# Patient Record
Sex: FEMALE | Race: WHITE | NOT HISPANIC OR LATINO | Employment: UNEMPLOYED | URBAN - NONMETROPOLITAN AREA
[De-identification: names, ages, dates, MRNs, and addresses within clinical notes are randomized per-mention and may not be internally consistent; named-entity substitution may affect disease eponyms.]

---

## 2021-08-02 DIAGNOSIS — Z12.31 OTHER SCREENING MAMMOGRAM: Primary | ICD-10-CM

## 2021-08-12 ENCOUNTER — HOSPITAL ENCOUNTER (OUTPATIENT)
Dept: RADIOLOGY | Facility: HOSPITAL | Age: 56
Discharge: HOME | End: 2021-08-12
Attending: NURSE PRACTITIONER
Payer: MEDICAID

## 2021-08-12 VITALS — WEIGHT: 215 LBS | HEIGHT: 68 IN | BODY MASS INDEX: 32.58 KG/M2

## 2021-08-12 DIAGNOSIS — Z12.31 OTHER SCREENING MAMMOGRAM: ICD-10-CM

## 2021-08-12 PROCEDURE — 77067 SCR MAMMO BI INCL CAD: CPT | Mod: TC

## 2021-08-16 DIAGNOSIS — K70.30 ALCOHOLIC CIRRHOSIS OF LIVER: Primary | ICD-10-CM

## 2021-08-16 DIAGNOSIS — R92.8 ABNORMAL MAMMOGRAM: Primary | ICD-10-CM

## 2021-08-18 ENCOUNTER — HOSPITAL ENCOUNTER (OUTPATIENT)
Dept: RADIOLOGY | Facility: HOSPITAL | Age: 56
Discharge: HOME | End: 2021-08-18
Attending: NURSE PRACTITIONER
Payer: MEDICAID

## 2021-08-18 DIAGNOSIS — R92.8 ABNORMAL MAMMOGRAM: ICD-10-CM

## 2021-08-18 DIAGNOSIS — K70.30 ALCOHOLIC CIRRHOSIS OF LIVER: ICD-10-CM

## 2021-08-18 PROCEDURE — 76705 ECHO EXAM OF ABDOMEN: CPT | Mod: TC

## 2021-08-18 PROCEDURE — 77066 DX MAMMO INCL CAD BI: CPT | Mod: TC

## 2021-09-20 DIAGNOSIS — F17.210 NICOTINE DEPENDENCE, CIGARETTES, UNCOMPLICATED: ICD-10-CM

## 2021-09-20 LAB
HPV APTIMA: NEGATIVE
PAP RECOMMENDATION EXT: NORMAL

## 2021-09-23 ENCOUNTER — HOSPITAL ENCOUNTER (OUTPATIENT)
Dept: RADIOLOGY | Facility: HOSPITAL | Age: 56
Discharge: HOME | End: 2021-09-23
Attending: NURSE PRACTITIONER
Payer: MEDICAID

## 2021-09-23 DIAGNOSIS — F17.210 NICOTINE DEPENDENCE, CIGARETTES, UNCOMPLICATED: ICD-10-CM

## 2021-09-23 PROCEDURE — 71271 CT THORAX LUNG CANCER SCR C-: CPT | Mod: TC

## 2021-10-15 PROBLEM — K70.30 ALCOHOLIC CIRRHOSIS OF LIVER WITHOUT ASCITES: Status: ACTIVE | Noted: 2021-10-15

## 2021-10-22 PROBLEM — K80.20 CALCULUS OF GALLBLADDER WITHOUT CHOLECYSTITIS WITHOUT OBSTRUCTION: Status: ACTIVE | Noted: 2021-10-22

## 2021-10-22 PROBLEM — Z95.828 S/P TIPS (TRANSJUGULAR INTRAHEPATIC PORTOSYSTEMIC SHUNT): Status: ACTIVE | Noted: 2021-10-22

## 2021-10-22 PROBLEM — I85.00 ESOPHAGEAL VARICES WITHOUT BLEEDING: Status: ACTIVE | Noted: 2021-10-22

## 2021-10-22 PROBLEM — D69.6 THROMBOCYTOPENIA: Status: ACTIVE | Noted: 2021-10-22

## 2021-10-28 ENCOUNTER — OFFICE VISIT (OUTPATIENT)
Dept: SURGERY | Facility: CLINIC | Age: 56
End: 2021-10-28
Payer: MEDICAID

## 2021-10-28 VITALS
HEIGHT: 68 IN | DIASTOLIC BLOOD PRESSURE: 67 MMHG | SYSTOLIC BLOOD PRESSURE: 144 MMHG | OXYGEN SATURATION: 95 % | BODY MASS INDEX: 32.01 KG/M2 | WEIGHT: 211.19 LBS | HEART RATE: 86 BPM

## 2021-10-28 DIAGNOSIS — G89.29 CHRONIC BILATERAL UPPER ABDOMINAL PAIN: ICD-10-CM

## 2021-10-28 DIAGNOSIS — R10.12 CHRONIC BILATERAL UPPER ABDOMINAL PAIN: ICD-10-CM

## 2021-10-28 DIAGNOSIS — R10.11 CHRONIC BILATERAL UPPER ABDOMINAL PAIN: ICD-10-CM

## 2021-10-28 DIAGNOSIS — K80.20 CALCULUS OF GALLBLADDER WITHOUT CHOLECYSTITIS WITHOUT OBSTRUCTION: Primary | ICD-10-CM

## 2021-10-28 PROCEDURE — 99202 PR OFFICE/OUTPT VISIT, NEW, LEVL II, 15-29 MIN: ICD-10-PCS | Mod: S$PBB,,, | Performed by: STUDENT IN AN ORGANIZED HEALTH CARE EDUCATION/TRAINING PROGRAM

## 2021-10-28 PROCEDURE — 99999 PR PBB SHADOW E&M-EST. PATIENT-LVL IV: ICD-10-PCS | Mod: PBBFAC,,, | Performed by: STUDENT IN AN ORGANIZED HEALTH CARE EDUCATION/TRAINING PROGRAM

## 2021-10-28 PROCEDURE — 99202 OFFICE O/P NEW SF 15 MIN: CPT | Mod: S$PBB,,, | Performed by: STUDENT IN AN ORGANIZED HEALTH CARE EDUCATION/TRAINING PROGRAM

## 2021-10-28 PROCEDURE — 99999 PR PBB SHADOW E&M-EST. PATIENT-LVL IV: CPT | Mod: PBBFAC,,, | Performed by: STUDENT IN AN ORGANIZED HEALTH CARE EDUCATION/TRAINING PROGRAM

## 2021-10-28 PROCEDURE — 99214 OFFICE O/P EST MOD 30 MIN: CPT | Mod: PBBFAC,PN | Performed by: STUDENT IN AN ORGANIZED HEALTH CARE EDUCATION/TRAINING PROGRAM

## 2021-10-28 RX ORDER — THIAMINE HCL 250 MG
250 TABLET ORAL DAILY
COMMUNITY
End: 2022-04-20 | Stop reason: SDUPTHER

## 2021-10-28 RX ORDER — TIOTROPIUM BROMIDE AND OLODATEROL 3.124; 2.736 UG/1; UG/1
SPRAY, METERED RESPIRATORY (INHALATION)
COMMUNITY
End: 2022-04-20 | Stop reason: SDUPTHER

## 2021-10-28 RX ORDER — ROPINIROLE 0.25 MG/1
0.5 TABLET, FILM COATED ORAL DAILY
COMMUNITY
End: 2022-04-20 | Stop reason: SDUPTHER

## 2021-10-28 RX ORDER — NITROFURANTOIN 25; 75 MG/1; MG/1
100 CAPSULE ORAL DAILY
COMMUNITY
End: 2022-04-20 | Stop reason: ALTCHOICE

## 2021-10-28 RX ORDER — FESOTERODINE FUMARATE 4 MG/1
4 TABLET, FILM COATED, EXTENDED RELEASE ORAL DAILY
COMMUNITY
End: 2022-04-20 | Stop reason: SDUPTHER

## 2021-11-18 ENCOUNTER — OFFICE VISIT (OUTPATIENT)
Dept: SURGERY | Facility: CLINIC | Age: 56
End: 2021-11-18
Payer: MEDICAID

## 2021-11-18 VITALS
WEIGHT: 231.13 LBS | SYSTOLIC BLOOD PRESSURE: 147 MMHG | HEIGHT: 68 IN | BODY MASS INDEX: 35.03 KG/M2 | DIASTOLIC BLOOD PRESSURE: 67 MMHG | OXYGEN SATURATION: 97 % | HEART RATE: 87 BPM

## 2021-11-18 DIAGNOSIS — R10.12 CHRONIC BILATERAL UPPER ABDOMINAL PAIN: ICD-10-CM

## 2021-11-18 DIAGNOSIS — R10.11 CHRONIC BILATERAL UPPER ABDOMINAL PAIN: ICD-10-CM

## 2021-11-18 DIAGNOSIS — G89.29 CHRONIC BILATERAL UPPER ABDOMINAL PAIN: ICD-10-CM

## 2021-11-18 DIAGNOSIS — K80.20 CALCULUS OF GALLBLADDER WITHOUT CHOLECYSTITIS WITHOUT OBSTRUCTION: Primary | ICD-10-CM

## 2021-11-18 PROCEDURE — 99213 OFFICE O/P EST LOW 20 MIN: CPT | Mod: PBBFAC | Performed by: STUDENT IN AN ORGANIZED HEALTH CARE EDUCATION/TRAINING PROGRAM

## 2021-11-18 PROCEDURE — 99999 PR PBB SHADOW E&M-EST. PATIENT-LVL III: ICD-10-PCS | Mod: PBBFAC,,, | Performed by: STUDENT IN AN ORGANIZED HEALTH CARE EDUCATION/TRAINING PROGRAM

## 2021-11-18 PROCEDURE — 99212 PR OFFICE/OUTPT VISIT, EST, LEVL II, 10-19 MIN: ICD-10-PCS | Mod: S$PBB,,, | Performed by: STUDENT IN AN ORGANIZED HEALTH CARE EDUCATION/TRAINING PROGRAM

## 2021-11-18 PROCEDURE — 99999 PR PBB SHADOW E&M-EST. PATIENT-LVL III: CPT | Mod: PBBFAC,,, | Performed by: STUDENT IN AN ORGANIZED HEALTH CARE EDUCATION/TRAINING PROGRAM

## 2021-11-18 PROCEDURE — 99212 OFFICE O/P EST SF 10 MIN: CPT | Mod: S$PBB,,, | Performed by: STUDENT IN AN ORGANIZED HEALTH CARE EDUCATION/TRAINING PROGRAM

## 2021-12-01 ENCOUNTER — HOSPITAL ENCOUNTER (OUTPATIENT)
Dept: RADIOLOGY | Facility: HOSPITAL | Age: 56
Discharge: HOME | End: 2021-12-01
Attending: STUDENT IN AN ORGANIZED HEALTH CARE EDUCATION/TRAINING PROGRAM
Payer: MEDICAID

## 2021-12-01 DIAGNOSIS — K80.20 CALCULUS OF GALLBLADDER WITHOUT CHOLECYSTITIS WITHOUT OBSTRUCTION: ICD-10-CM

## 2021-12-01 DIAGNOSIS — R10.12 CHRONIC BILATERAL UPPER ABDOMINAL PAIN: ICD-10-CM

## 2021-12-01 DIAGNOSIS — R10.11 CHRONIC BILATERAL UPPER ABDOMINAL PAIN: ICD-10-CM

## 2021-12-01 DIAGNOSIS — G89.29 CHRONIC BILATERAL UPPER ABDOMINAL PAIN: ICD-10-CM

## 2021-12-01 PROCEDURE — 78227 HEPATOBIL SYST IMAGE W/DRUG: CPT | Mod: TC

## 2021-12-01 PROCEDURE — 63600175 PHARM REV CODE 636 W HCPCS: Performed by: STUDENT IN AN ORGANIZED HEALTH CARE EDUCATION/TRAINING PROGRAM

## 2021-12-01 RX ORDER — SINCALIDE 5 UG/5ML
0.02 INJECTION, POWDER, LYOPHILIZED, FOR SOLUTION INTRAVENOUS ONCE
Status: COMPLETED | OUTPATIENT
Start: 2021-12-01 | End: 2021-12-01

## 2021-12-01 RX ADMIN — SINCALIDE 2 MCG: 5 INJECTION, POWDER, LYOPHILIZED, FOR SOLUTION INTRAVENOUS at 11:12

## 2021-12-16 ENCOUNTER — HOSPITAL ENCOUNTER (EMERGENCY)
Facility: HOSPITAL | Age: 56
Discharge: HOME | End: 2021-12-16
Attending: EMERGENCY MEDICINE
Payer: MEDICAID

## 2021-12-16 ENCOUNTER — OFFICE VISIT (OUTPATIENT)
Dept: SURGERY | Facility: CLINIC | Age: 56
End: 2021-12-16
Payer: MEDICAID

## 2021-12-16 ENCOUNTER — HOSPITAL ENCOUNTER (OUTPATIENT)
Dept: RADIOLOGY | Facility: HOSPITAL | Age: 56
Discharge: HOME | End: 2021-12-16
Attending: STUDENT IN AN ORGANIZED HEALTH CARE EDUCATION/TRAINING PROGRAM
Payer: MEDICAID

## 2021-12-16 VITALS
DIASTOLIC BLOOD PRESSURE: 71 MMHG | HEART RATE: 80 BPM | WEIGHT: 243 LBS | BODY MASS INDEX: 36.95 KG/M2 | SYSTOLIC BLOOD PRESSURE: 187 MMHG | RESPIRATION RATE: 18 BRPM | OXYGEN SATURATION: 99 % | TEMPERATURE: 98 F

## 2021-12-16 VITALS
OXYGEN SATURATION: 96 % | HEIGHT: 68 IN | DIASTOLIC BLOOD PRESSURE: 72 MMHG | BODY MASS INDEX: 36.58 KG/M2 | SYSTOLIC BLOOD PRESSURE: 139 MMHG | HEART RATE: 86 BPM | WEIGHT: 241.38 LBS

## 2021-12-16 DIAGNOSIS — R06.02 SOB (SHORTNESS OF BREATH): ICD-10-CM

## 2021-12-16 DIAGNOSIS — K70.30 ALCOHOLIC CIRRHOSIS OF LIVER WITHOUT ASCITES: ICD-10-CM

## 2021-12-16 DIAGNOSIS — K70.30 ALCOHOLIC CIRRHOSIS OF LIVER WITHOUT ASCITES: Primary | ICD-10-CM

## 2021-12-16 DIAGNOSIS — R06.00 DYSPNEA, UNSPECIFIED TYPE: Primary | ICD-10-CM

## 2021-12-16 LAB
ALBUMIN SERPL BCP-MCNC: 2.6 G/DL (ref 3.5–5.2)
ALP SERPL-CCNC: 198 U/L (ref 55–135)
ALT SERPL W/O P-5'-P-CCNC: 39 U/L (ref 10–44)
ANION GAP SERPL CALC-SCNC: 6 MMOL/L (ref 8–16)
AST SERPL-CCNC: 73 U/L (ref 10–40)
BASOPHILS # BLD AUTO: 0.05 K/UL (ref 0–0.2)
BASOPHILS NFR BLD: 1 % (ref 0–1.9)
BILIRUB SERPL-MCNC: 1.9 MG/DL (ref 0.1–1)
BUN SERPL-MCNC: 8 MG/DL (ref 6–20)
CALCIUM SERPL-MCNC: 8.8 MG/DL (ref 8.7–10.5)
CHLORIDE SERPL-SCNC: 109 MMOL/L (ref 95–110)
CO2 SERPL-SCNC: 28 MMOL/L (ref 23–29)
CREAT SERPL-MCNC: 0.8 MG/DL (ref 0.5–1.4)
DIFFERENTIAL METHOD: ABNORMAL
EOSINOPHIL # BLD AUTO: 0.2 K/UL (ref 0–0.5)
EOSINOPHIL NFR BLD: 3.8 % (ref 0–8)
ERYTHROCYTE [DISTWIDTH] IN BLOOD BY AUTOMATED COUNT: 13.9 % (ref 11.5–14.5)
EST. GFR  (AFRICAN AMERICAN): >60 ML/MIN/1.73 M^2
EST. GFR  (NON AFRICAN AMERICAN): >60 ML/MIN/1.73 M^2
GLUCOSE SERPL-MCNC: 99 MG/DL (ref 70–110)
HCT VFR BLD AUTO: 39.9 % (ref 37–48.5)
HGB BLD-MCNC: 13.4 G/DL (ref 12–16)
IMM GRANULOCYTES # BLD AUTO: 0.02 K/UL (ref 0–0.04)
IMM GRANULOCYTES NFR BLD AUTO: 0.4 % (ref 0–0.5)
LYMPHOCYTES # BLD AUTO: 1.8 K/UL (ref 1–4.8)
LYMPHOCYTES NFR BLD: 33.8 % (ref 18–48)
MCH RBC QN AUTO: 31.2 PG (ref 27–31)
MCHC RBC AUTO-ENTMCNC: 33.6 G/DL (ref 32–36)
MCV RBC AUTO: 93 FL (ref 82–98)
MONOCYTES # BLD AUTO: 0.6 K/UL (ref 0.3–1)
MONOCYTES NFR BLD: 10.6 % (ref 4–15)
NEUTROPHILS # BLD AUTO: 2.7 K/UL (ref 1.8–7.7)
NEUTROPHILS NFR BLD: 50.4 % (ref 38–73)
NRBC BLD-RTO: 0 /100 WBC
NT-PROBNP SERPL-MCNC: 121 PG/ML (ref 5–900)
PLATELET # BLD AUTO: 111 K/UL (ref 150–450)
PMV BLD AUTO: 11 FL (ref 9.2–12.9)
POTASSIUM SERPL-SCNC: 3.7 MMOL/L (ref 3.5–5.1)
PROT SERPL-MCNC: 6.7 G/DL (ref 6–8.4)
RBC # BLD AUTO: 4.29 M/UL (ref 4–5.4)
SODIUM SERPL-SCNC: 143 MMOL/L (ref 136–145)
WBC # BLD AUTO: 5.26 K/UL (ref 3.9–12.7)

## 2021-12-16 PROCEDURE — 80053 COMPREHEN METABOLIC PANEL: CPT | Performed by: EMERGENCY MEDICINE

## 2021-12-16 PROCEDURE — 36415 COLL VENOUS BLD VENIPUNCTURE: CPT | Performed by: EMERGENCY MEDICINE

## 2021-12-16 PROCEDURE — 93005 ELECTROCARDIOGRAM TRACING: CPT

## 2021-12-16 PROCEDURE — 99213 PR OFFICE/OUTPT VISIT, EST, LEVL III, 20-29 MIN: ICD-10-PCS | Mod: S$PBB,,, | Performed by: STUDENT IN AN ORGANIZED HEALTH CARE EDUCATION/TRAINING PROGRAM

## 2021-12-16 PROCEDURE — 99999 PR PBB SHADOW E&M-EST. PATIENT-LVL III: ICD-10-PCS | Mod: PBBFAC,,, | Performed by: STUDENT IN AN ORGANIZED HEALTH CARE EDUCATION/TRAINING PROGRAM

## 2021-12-16 PROCEDURE — 99213 OFFICE O/P EST LOW 20 MIN: CPT | Mod: S$PBB,,, | Performed by: STUDENT IN AN ORGANIZED HEALTH CARE EDUCATION/TRAINING PROGRAM

## 2021-12-16 PROCEDURE — 99213 OFFICE O/P EST LOW 20 MIN: CPT | Mod: PBBFAC,25 | Performed by: STUDENT IN AN ORGANIZED HEALTH CARE EDUCATION/TRAINING PROGRAM

## 2021-12-16 PROCEDURE — 93010 EKG 12-LEAD: ICD-10-PCS | Mod: ,,, | Performed by: INTERNAL MEDICINE

## 2021-12-16 PROCEDURE — 85025 COMPLETE CBC W/AUTO DIFF WBC: CPT | Performed by: EMERGENCY MEDICINE

## 2021-12-16 PROCEDURE — 83880 ASSAY OF NATRIURETIC PEPTIDE: CPT | Performed by: EMERGENCY MEDICINE

## 2021-12-16 PROCEDURE — 99284 EMERGENCY DEPT VISIT MOD MDM: CPT | Mod: 25,27

## 2021-12-16 PROCEDURE — 99999 PR PBB SHADOW E&M-EST. PATIENT-LVL III: CPT | Mod: PBBFAC,,, | Performed by: STUDENT IN AN ORGANIZED HEALTH CARE EDUCATION/TRAINING PROGRAM

## 2021-12-16 PROCEDURE — 71045 X-RAY EXAM CHEST 1 VIEW: CPT | Mod: TC

## 2021-12-16 PROCEDURE — 93010 ELECTROCARDIOGRAM REPORT: CPT | Mod: ,,, | Performed by: INTERNAL MEDICINE

## 2021-12-16 RX ORDER — DICYCLOMINE HYDROCHLORIDE 10 MG/1
10 CAPSULE ORAL
Qty: 120 CAPSULE | Refills: 0 | Status: SHIPPED | OUTPATIENT
Start: 2021-12-16 | End: 2022-01-18 | Stop reason: SDUPTHER

## 2021-12-16 RX ORDER — CHOLESTYRAMINE 4 G/9G
4 POWDER, FOR SUSPENSION ORAL 3 TIMES DAILY
Qty: 348 G | Refills: 3 | Status: SHIPPED | OUTPATIENT
Start: 2021-12-16 | End: 2022-07-26

## 2022-01-18 ENCOUNTER — TELEPHONE (OUTPATIENT)
Dept: SURGERY | Facility: CLINIC | Age: 57
End: 2022-01-18
Payer: MEDICAID

## 2022-01-18 RX ORDER — DICYCLOMINE HYDROCHLORIDE 10 MG/1
10 CAPSULE ORAL
Qty: 120 CAPSULE | Refills: 3 | Status: SHIPPED | OUTPATIENT
Start: 2022-01-18 | End: 2022-04-20

## 2022-02-07 ENCOUNTER — OFFICE VISIT (OUTPATIENT)
Dept: SURGERY | Facility: CLINIC | Age: 57
End: 2022-02-07
Payer: MEDICAID

## 2022-02-07 VITALS
OXYGEN SATURATION: 96 % | DIASTOLIC BLOOD PRESSURE: 70 MMHG | SYSTOLIC BLOOD PRESSURE: 149 MMHG | HEIGHT: 68 IN | WEIGHT: 238.19 LBS | BODY MASS INDEX: 36.1 KG/M2 | HEART RATE: 83 BPM

## 2022-02-07 DIAGNOSIS — I87.2 STASIS DERMATITIS WITHOUT VARICOSITIES: ICD-10-CM

## 2022-02-07 DIAGNOSIS — K70.30 ALCOHOLIC CIRRHOSIS OF LIVER WITHOUT ASCITES: Primary | ICD-10-CM

## 2022-02-07 PROCEDURE — 3078F DIAST BP <80 MM HG: CPT | Mod: CPTII,,, | Performed by: STUDENT IN AN ORGANIZED HEALTH CARE EDUCATION/TRAINING PROGRAM

## 2022-02-07 PROCEDURE — 3078F PR MOST RECENT DIASTOLIC BLOOD PRESSURE < 80 MM HG: ICD-10-PCS | Mod: CPTII,,, | Performed by: STUDENT IN AN ORGANIZED HEALTH CARE EDUCATION/TRAINING PROGRAM

## 2022-02-07 PROCEDURE — 99212 PR OFFICE/OUTPT VISIT, EST, LEVL II, 10-19 MIN: ICD-10-PCS | Mod: S$PBB,,, | Performed by: STUDENT IN AN ORGANIZED HEALTH CARE EDUCATION/TRAINING PROGRAM

## 2022-02-07 PROCEDURE — 3077F SYST BP >= 140 MM HG: CPT | Mod: CPTII,,, | Performed by: STUDENT IN AN ORGANIZED HEALTH CARE EDUCATION/TRAINING PROGRAM

## 2022-02-07 PROCEDURE — 3008F PR BODY MASS INDEX (BMI) DOCUMENTED: ICD-10-PCS | Mod: CPTII,,, | Performed by: STUDENT IN AN ORGANIZED HEALTH CARE EDUCATION/TRAINING PROGRAM

## 2022-02-07 PROCEDURE — 99213 OFFICE O/P EST LOW 20 MIN: CPT | Mod: PBBFAC | Performed by: STUDENT IN AN ORGANIZED HEALTH CARE EDUCATION/TRAINING PROGRAM

## 2022-02-07 PROCEDURE — 1159F PR MEDICATION LIST DOCUMENTED IN MEDICAL RECORD: ICD-10-PCS | Mod: CPTII,,, | Performed by: STUDENT IN AN ORGANIZED HEALTH CARE EDUCATION/TRAINING PROGRAM

## 2022-02-07 PROCEDURE — 99212 OFFICE O/P EST SF 10 MIN: CPT | Mod: S$PBB,,, | Performed by: STUDENT IN AN ORGANIZED HEALTH CARE EDUCATION/TRAINING PROGRAM

## 2022-02-07 PROCEDURE — 99999 PR PBB SHADOW E&M-EST. PATIENT-LVL III: CPT | Mod: PBBFAC,,, | Performed by: STUDENT IN AN ORGANIZED HEALTH CARE EDUCATION/TRAINING PROGRAM

## 2022-02-07 PROCEDURE — 3077F PR MOST RECENT SYSTOLIC BLOOD PRESSURE >= 140 MM HG: ICD-10-PCS | Mod: CPTII,,, | Performed by: STUDENT IN AN ORGANIZED HEALTH CARE EDUCATION/TRAINING PROGRAM

## 2022-02-07 PROCEDURE — 3008F BODY MASS INDEX DOCD: CPT | Mod: CPTII,,, | Performed by: STUDENT IN AN ORGANIZED HEALTH CARE EDUCATION/TRAINING PROGRAM

## 2022-02-07 PROCEDURE — 1159F MED LIST DOCD IN RCRD: CPT | Mod: CPTII,,, | Performed by: STUDENT IN AN ORGANIZED HEALTH CARE EDUCATION/TRAINING PROGRAM

## 2022-02-07 PROCEDURE — 99999 PR PBB SHADOW E&M-EST. PATIENT-LVL III: ICD-10-PCS | Mod: PBBFAC,,, | Performed by: STUDENT IN AN ORGANIZED HEALTH CARE EDUCATION/TRAINING PROGRAM

## 2022-02-10 PROBLEM — I87.2 STASIS DERMATITIS WITHOUT VARICOSITIES: Status: ACTIVE | Noted: 2022-02-10

## 2022-02-10 NOTE — PROGRESS NOTES
[facility]  General Surgery Clinic Progress Note    HPI:  Shonda Borrego is a 56 y.o. female with history of cholelithiasis and EtOH cirrhosis who presents with new concern of progressive discoloration of dorsum of feet.  Does not complain of pain or difficulty walking.  Lower extremity swelling has improved with alterations in medication.  Denies CP, SOB, abdominal pain, nausea, vomiting, fevers, or chills.       ROS:  Negative except above    PE:  Vitals:    02/07/22 0915   BP: (!) 149/70   Pulse: 83       Gen: Alert and oriented x3, judgement intact, NAD  CV: RRR  Resp: CTAB; breaths non-labored and symmetrical  Abd: Soft, NT, ND, BS+  Extremities: +1 pitting edema to bilateral LE;  Brown speckled discoloration to dorsum of bilateral feet;  Feet warm to touch.  +DP, PT pulses detected bilaterally;  No signs of tissue loss or ulceration     A/P:  56 y.o. female with history of cholelithiasis and EtOH cirrhosis who presents with concerns over feet discoloration  1.  Feet discoloration  -No acute surgical concern; pt with palpable pulses and no signs of tissue loss  -Discoloration likely due to chronic LE swelling and early stasis dermatitis   -Leg elevation and compression socks encouraged     2.  Cholelithiasis  -Abdominal pain with PO intake has improved and patient still in agreement with deferring surgical intervention     RTC PRRICK Juarez MD  General Surgery   [phone]  [phone]        2/10/2022  10:45 AM

## 2022-03-18 ENCOUNTER — OFFICE VISIT (OUTPATIENT)
Dept: PRIMARY CARE CLINIC | Facility: CLINIC | Age: 57
End: 2022-03-18
Payer: MEDICAID

## 2022-03-18 VITALS
SYSTOLIC BLOOD PRESSURE: 131 MMHG | OXYGEN SATURATION: 97 % | WEIGHT: 230.06 LBS | HEART RATE: 80 BPM | BODY MASS INDEX: 34.87 KG/M2 | DIASTOLIC BLOOD PRESSURE: 64 MMHG | HEIGHT: 68 IN | TEMPERATURE: 99 F

## 2022-03-18 DIAGNOSIS — K70.30 ALCOHOLIC CIRRHOSIS OF LIVER WITHOUT ASCITES: ICD-10-CM

## 2022-03-18 DIAGNOSIS — I73.9 CLAUDICATION OF BOTH LOWER EXTREMITIES: Primary | ICD-10-CM

## 2022-03-18 DIAGNOSIS — F17.218 CIGARETTE NICOTINE DEPENDENCE WITH OTHER NICOTINE-INDUCED DISORDER: ICD-10-CM

## 2022-03-18 DIAGNOSIS — F41.9 ANXIETY: ICD-10-CM

## 2022-03-18 PROBLEM — F17.210 DEPENDENCE ON NICOTINE FROM CIGARETTES: Status: ACTIVE | Noted: 2022-03-18

## 2022-03-18 PROCEDURE — 99999 PR PBB SHADOW E&M-EST. PATIENT-LVL IV: CPT | Mod: PBBFAC,,, | Performed by: STUDENT IN AN ORGANIZED HEALTH CARE EDUCATION/TRAINING PROGRAM

## 2022-03-18 PROCEDURE — 1159F PR MEDICATION LIST DOCUMENTED IN MEDICAL RECORD: ICD-10-PCS | Mod: CPTII,,, | Performed by: STUDENT IN AN ORGANIZED HEALTH CARE EDUCATION/TRAINING PROGRAM

## 2022-03-18 PROCEDURE — 99204 PR OFFICE/OUTPT VISIT, NEW, LEVL IV, 45-59 MIN: ICD-10-PCS | Mod: S$PBB,,, | Performed by: STUDENT IN AN ORGANIZED HEALTH CARE EDUCATION/TRAINING PROGRAM

## 2022-03-18 PROCEDURE — 3078F PR MOST RECENT DIASTOLIC BLOOD PRESSURE < 80 MM HG: ICD-10-PCS | Mod: CPTII,,, | Performed by: STUDENT IN AN ORGANIZED HEALTH CARE EDUCATION/TRAINING PROGRAM

## 2022-03-18 PROCEDURE — 99214 OFFICE O/P EST MOD 30 MIN: CPT | Mod: PBBFAC,PN | Performed by: STUDENT IN AN ORGANIZED HEALTH CARE EDUCATION/TRAINING PROGRAM

## 2022-03-18 PROCEDURE — 3008F BODY MASS INDEX DOCD: CPT | Mod: CPTII,,, | Performed by: STUDENT IN AN ORGANIZED HEALTH CARE EDUCATION/TRAINING PROGRAM

## 2022-03-18 PROCEDURE — 99999 PR PBB SHADOW E&M-EST. PATIENT-LVL IV: ICD-10-PCS | Mod: PBBFAC,,, | Performed by: STUDENT IN AN ORGANIZED HEALTH CARE EDUCATION/TRAINING PROGRAM

## 2022-03-18 PROCEDURE — 3078F DIAST BP <80 MM HG: CPT | Mod: CPTII,,, | Performed by: STUDENT IN AN ORGANIZED HEALTH CARE EDUCATION/TRAINING PROGRAM

## 2022-03-18 PROCEDURE — 3075F PR MOST RECENT SYSTOLIC BLOOD PRESS GE 130-139MM HG: ICD-10-PCS | Mod: CPTII,,, | Performed by: STUDENT IN AN ORGANIZED HEALTH CARE EDUCATION/TRAINING PROGRAM

## 2022-03-18 PROCEDURE — 1159F MED LIST DOCD IN RCRD: CPT | Mod: CPTII,,, | Performed by: STUDENT IN AN ORGANIZED HEALTH CARE EDUCATION/TRAINING PROGRAM

## 2022-03-18 PROCEDURE — 99204 OFFICE O/P NEW MOD 45 MIN: CPT | Mod: S$PBB,,, | Performed by: STUDENT IN AN ORGANIZED HEALTH CARE EDUCATION/TRAINING PROGRAM

## 2022-03-18 PROCEDURE — 3075F SYST BP GE 130 - 139MM HG: CPT | Mod: CPTII,,, | Performed by: STUDENT IN AN ORGANIZED HEALTH CARE EDUCATION/TRAINING PROGRAM

## 2022-03-18 PROCEDURE — 3008F PR BODY MASS INDEX (BMI) DOCUMENTED: ICD-10-PCS | Mod: CPTII,,, | Performed by: STUDENT IN AN ORGANIZED HEALTH CARE EDUCATION/TRAINING PROGRAM

## 2022-03-18 RX ORDER — HYDROXYZINE HYDROCHLORIDE 50 MG/1
50 TABLET, FILM COATED ORAL 3 TIMES DAILY
Qty: 90 TABLET | Refills: 0 | Status: SHIPPED | OUTPATIENT
Start: 2022-03-18 | End: 2022-04-13

## 2022-03-18 RX ORDER — TOLTERODINE 2 MG/1
4 CAPSULE, EXTENDED RELEASE ORAL DAILY
COMMUNITY
End: 2022-04-20 | Stop reason: ALTCHOICE

## 2022-03-18 NOTE — ASSESSMENT & PLAN NOTE
- stay physically active  - maintain daily adequate hydration 1.5 to 2L fluid volume  - as tolerated incorporate resistance training with stretching and cardio  - goal of 150 minutes per week of moderate intensity activity or 7,500 - 10,000 steps per day  - follow the Mediterranean diet  - include whole fresh fruits, vegetables, olive oil, seeds, nuts, whole grains, cold water fish, salmon, mackerel and lean cuts of meat    - do not drink sugary/diet carbonated beverages  - decrease portion sizes slightly which will result in an approximately 500-calorie deficit  - avoid fast or fried and processed food, especially canned foods   - avoid refined carbohydrates, white starchy foods, flour, white potato, bread, muffins, and cakes

## 2022-03-18 NOTE — PROGRESS NOTES
"[facility] Note    Chief Complaint      Chief Complaint   Patient presents with    Establish Care       History of Present Illness      hSonda Borrego is a 56 y.o. female who presents today for [facility]   .  56 year old female with liver cirrhosis s/p TIPs placement four years ago in [place] complains of darkly pigmented feet bilaterally. She states she has had the discoloration for couple of months. There has been no swelling of the feet in recent weeks. After ambulation her legs start to hurt from the knees down to her feet and has to rest for the pain to go away.   Denies fever, chills, headaches, nausea, vomiting, chest pain, palpitations, difficulty breathing, edema to extremities or abdomen, GI/ complaints.   She endorses abstaining from alcohol one week ago after her primary care doctor "discharged" her.   In the past, has been diagnosed with bipolar disorder and was once on medications. She is currently complaining of severe anxiety that is affecting her sleep as well. She has an upcoming evaluation for behavioral health.   Patient remains a smoker of 4 cigarettes in a day and is working on smoking cessation. She has been a heavy smoker since age 14, (+2PPD in the past) and prefers to quit on her own terms.     Review of medical records from [place].   Recent labs from outside resource available 3/4/22.  - Hg/HCT 14/41.2  -    - vitamin D25OH 35  - TSH 1.77  - free T4 1.05  - , TG 87, HDL48, LDL 35  - BUN/Cr, 6/0.77  - alk phos 170, AST 98, ALT 47  - vitamin B12 1008  - folic acid 3.1  - electrolytes wnl    Past Medical History:  Past Medical History:   Diagnosis Date    Bipolar disorder     Cirrhosis     GERD (gastroesophageal reflux disease)     Restless leg syndrome     Urinary frequency      Past Surgical History:   has a past surgical history that includes TIPS procedure.    Family History:  family history includes Cancer in her sister; Lung cancer in her " mother and sister; No Known Problems in her daughter, father, maternal aunt, maternal grandfather, maternal grandmother, maternal uncle, paternal aunt, paternal grandfather, paternal grandmother, and paternal uncle.     Social History:  Social History     Tobacco Use    Smoking status: Current Every Day Smoker    Smokeless tobacco: Never Used   Substance Use Topics    Alcohol use: Not Currently     Comment: socially    Drug use: Never     I personally reviewed all past medical, surgical, social and family history.    Review of Systems   Constitutional: Negative for chills, fever, malaise/fatigue and weight loss.   HENT: Negative for congestion, ear discharge, ear pain, sinus pain and sore throat.    Eyes: Negative for blurred vision, pain, discharge and redness.   Respiratory: Negative for cough, hemoptysis, sputum production, shortness of breath, wheezing and stridor.    Cardiovascular: Negative for chest pain, palpitations and leg swelling.   Gastrointestinal: Negative for abdominal pain, blood in stool, constipation, diarrhea, nausea and vomiting.   Genitourinary: Negative for dysuria, frequency and hematuria.   Musculoskeletal: Negative for back pain, falls, joint pain, myalgias and neck pain.   Skin: Negative.  Negative for rash.   Neurological: Negative for dizziness, tingling, focal weakness, seizures, weakness and headaches.   Endo/Heme/Allergies: Negative for environmental allergies and polydipsia. Does not bruise/bleed easily.   Psychiatric/Behavioral: Negative for depression and suicidal ideas. The patient is not nervous/anxious.       Medications:  Outpatient Encounter Medications as of 3/18/2022   Medication Sig Dispense Refill    cholestyramine, with sugar, 4 gram Powd Take 4 g by mouth 3 (three) times daily. 348 g 3    dicyclomine (BENTYL) 10 MG capsule Take 1 capsule (10 mg total) by mouth 4 (four) times daily before meals and nightly. 120 capsule 3    fesoterodine (TOVIAZ) 4 mg Tb24 Take 4 mg  by mouth once daily.      furosemide (LASIX) 20 MG tablet Take 20 mg by mouth once daily.      hydrOXYzine (ATARAX) 50 MG tablet Take 1 tablet (50 mg total) by mouth 3 (three) times daily. 90 tablet 0    nitrofurantoin, macrocrystal-monohydrate, (MACROBID) 100 MG capsule Take 100 mg by mouth once daily.      pantoprazole (PROTONIX) 20 MG tablet Take 20 mg by mouth once daily.      rOPINIRole (REQUIP) 0.25 MG tablet Take 0.25 mg by mouth once daily.      spironolactone (ALDACTONE) 50 MG tablet Take 50 mg by mouth once daily.      thiamine 250 MG tablet Take 250 mg by mouth once daily.      tiotropium-olodateroL (STIOLTO RESPIMAT) 2.5-2.5 mcg/actuation Mist Inhale into the lungs. Controller      tolterodine (DETROL LA) 2 MG Cp24 Take 4 mg by mouth once daily.      [DISCONTINUED] cholecalciferol, vitamin D3, 1,250 mcg (50,000 unit) capsule Take 1,250 mcg by mouth every 14 (fourteen) days.       No facility-administered encounter medications on file as of 3/18/2022.       Allergies:  Review of patient's allergies indicates:  No Known Allergies    Health Maintenance:    There is no immunization history on file for this patient.   Health Maintenance   Topic Date Due    Hepatitis C Screening  Never done    Lipid Panel  Never done    TETANUS VACCINE  Never done    Mammogram  08/18/2022     Health Maintenance Topics with due status: Not Due       Topic Last Completion Date    Mammogram 08/18/2021    Shingles Vaccine 03/03/2022     Health Maintenance Due   Topic Date Due    Hepatitis C Screening  Never done    Cervical Cancer Screening  Never done    Lipid Panel  Never done    COVID-19 Vaccine (1) Never done    Pneumococcal Vaccines (Age 0-64) (1 of 2 - PPSV23) Never done    HIV Screening  Never done    TETANUS VACCINE  Never done    Colorectal Cancer Screening  Never done    Influenza Vaccine (1) Never done     Physical Exam      Vital Signs  Temp: 98.5 °F (36.9 °C)  Temp src: Oral  Pulse: 80  SpO2: 97  "%  BP: 131/64  BP Location: Right arm  Patient Position: Sitting  Height and Weight  Height: 5' 8" (172.7 cm)  Weight: 104.3 kg (230 lb 0.8 oz)  BSA (Calculated - sq m): 2.24 sq meters  BMI (Calculated): 35  Weight in (lb) to have BMI = 25: 164.1]    Physical Exam  Vitals reviewed.   Constitutional:       General: She is not in acute distress.     Appearance: Normal appearance. She is normal weight. She is not ill-appearing or toxic-appearing.   HENT:      Head: Normocephalic and atraumatic.      Right Ear: External ear normal.      Left Ear: External ear normal.      Nose: Nose normal.      Mouth/Throat:      Mouth: Mucous membranes are moist.      Pharynx: Oropharynx is clear.   Eyes:      Extraocular Movements: Extraocular movements intact.      Conjunctiva/sclera: Conjunctivae normal.   Cardiovascular:      Rate and Rhythm: Normal rate and regular rhythm.      Pulses: Normal pulses.      Heart sounds: Normal heart sounds.      Comments: +DP, PT pulses bilaterally  Pulmonary:      Effort: Pulmonary effort is normal. No respiratory distress.      Breath sounds: No stridor. No wheezing, rhonchi or rales.   Abdominal:      General: Abdomen is flat. Bowel sounds are normal. There is no distension.      Palpations: Abdomen is soft.      Tenderness: There is no abdominal tenderness. There is no right CVA tenderness, left CVA tenderness or guarding.   Musculoskeletal:         General: No swelling or tenderness. Normal range of motion.      Cervical back: Normal range of motion and neck supple. No rigidity or tenderness.      Right lower leg: No edema.      Left lower leg: No edema.   Skin:     General: Skin is warm and dry.      Capillary Refill: Capillary refill takes less than 2 seconds.      Coloration: Skin is not jaundiced.      Comments: sun drenched skin throughout, feet bilaterally, dorsum with punctum hyperpigmentation, ROM intact in lower extremities, hip, knee, ankle and distal toes, no swelling throughout "   Neurological:      General: No focal deficit present.      Mental Status: She is alert and oriented to person, place, and time. Mental status is at baseline.      Motor: No weakness.      Gait: Gait normal.   Psychiatric:         Mood and Affect: Mood normal.         Behavior: Behavior normal.         Thought Content: Thought content normal.         Judgment: Judgment normal.        Assessment/Plan     Shonda Borrego is a 56 y.o.female with:    Problem List Items Addressed This Visit        Psychiatric    Anxiety    Relevant Medications    hydrOXYzine (ATARAX) 50 MG tablet       Cardiac/Vascular    Claudication of both lower extremities - Primary    Relevant Orders    US Lower Extrem Arteries Bilat with SWAPNA (xpd)       Endocrine    BMI 34.0-34.9,adult    Current Assessment & Plan     - stay physically active  - maintain daily adequate hydration 1.5 to 2L fluid volume  - as tolerated incorporate resistance training with stretching and cardio  - goal of 150 minutes per week of moderate intensity activity or 7,500 - 10,000 steps per day  - follow the Mediterranean diet  - include whole fresh fruits, vegetables, olive oil, seeds, nuts, whole grains, cold water fish, salmon, mackerel and lean cuts of meat    - do not drink sugary/diet carbonated beverages  - decrease portion sizes slightly which will result in an approximately 500-calorie deficit  - avoid fast or fried and processed food, especially canned foods   - avoid refined carbohydrates, white starchy foods, flour, white potato, bread, muffins, and cakes              GI    Alcoholic cirrhosis of liver without ascites    Current Assessment & Plan     Follows hepatology in [facility], has upcoming ultrasound study of liver and labwork.               Other    Dependence on nicotine from cigarettes    Current Assessment & Plan     Per patient reduced smoking significantly and will work on complete cessation.                Other than changes above, cont current  medications and maintain follow up with specialists.  Follow up in about 4 weeks (around 4/15/2022).    Future Appointments   Date Time Provider Department Center   4/13/2022  8:30 AM Geeta Gaines DO [facility]   4/16/2022 10:00 AM [facility]   4/18/2022 10:30 AM [address]     Kazumi G Yoshinaga, DO [facility]

## 2022-04-20 ENCOUNTER — OFFICE VISIT (OUTPATIENT)
Dept: PRIMARY CARE CLINIC | Facility: CLINIC | Age: 57
End: 2022-04-20
Payer: MEDICAID

## 2022-04-20 ENCOUNTER — HOSPITAL ENCOUNTER (OUTPATIENT)
Dept: RADIOLOGY | Facility: HOSPITAL | Age: 57
Discharge: HOME | End: 2022-04-20
Attending: STUDENT IN AN ORGANIZED HEALTH CARE EDUCATION/TRAINING PROGRAM
Payer: MEDICAID

## 2022-04-20 VITALS
TEMPERATURE: 99 F | HEIGHT: 68 IN | HEART RATE: 89 BPM | DIASTOLIC BLOOD PRESSURE: 68 MMHG | WEIGHT: 230.38 LBS | BODY MASS INDEX: 34.92 KG/M2 | SYSTOLIC BLOOD PRESSURE: 120 MMHG | OXYGEN SATURATION: 96 %

## 2022-04-20 DIAGNOSIS — E80.6 HYPERBILIRUBINEMIA: Primary | ICD-10-CM

## 2022-04-20 DIAGNOSIS — I73.9 CLAUDICATION OF BOTH LOWER EXTREMITIES: ICD-10-CM

## 2022-04-20 DIAGNOSIS — G25.81 RESTLESS LEG SYNDROME: ICD-10-CM

## 2022-04-20 DIAGNOSIS — J44.9 COPD WITHOUT EXACERBATION: ICD-10-CM

## 2022-04-20 DIAGNOSIS — K21.9 GASTROESOPHAGEAL REFLUX DISEASE, UNSPECIFIED WHETHER ESOPHAGITIS PRESENT: ICD-10-CM

## 2022-04-20 DIAGNOSIS — E72.20 HYPERAMMONEMIA: ICD-10-CM

## 2022-04-20 DIAGNOSIS — F41.9 ANXIETY: ICD-10-CM

## 2022-04-20 DIAGNOSIS — K70.30 ALCOHOLIC CIRRHOSIS OF LIVER WITHOUT ASCITES: ICD-10-CM

## 2022-04-20 DIAGNOSIS — N32.81 OVERACTIVE BLADDER: ICD-10-CM

## 2022-04-20 PROCEDURE — 99999 PR PBB SHADOW E&M-EST. PATIENT-LVL III: ICD-10-PCS | Mod: PBBFAC,,, | Performed by: STUDENT IN AN ORGANIZED HEALTH CARE EDUCATION/TRAINING PROGRAM

## 2022-04-20 PROCEDURE — 1159F PR MEDICATION LIST DOCUMENTED IN MEDICAL RECORD: ICD-10-PCS | Mod: CPTII,,, | Performed by: STUDENT IN AN ORGANIZED HEALTH CARE EDUCATION/TRAINING PROGRAM

## 2022-04-20 PROCEDURE — 3078F PR MOST RECENT DIASTOLIC BLOOD PRESSURE < 80 MM HG: ICD-10-PCS | Mod: CPTII,,, | Performed by: STUDENT IN AN ORGANIZED HEALTH CARE EDUCATION/TRAINING PROGRAM

## 2022-04-20 PROCEDURE — 99214 OFFICE O/P EST MOD 30 MIN: CPT | Mod: S$PBB,,, | Performed by: STUDENT IN AN ORGANIZED HEALTH CARE EDUCATION/TRAINING PROGRAM

## 2022-04-20 PROCEDURE — 99214 PR OFFICE/OUTPT VISIT, EST, LEVL IV, 30-39 MIN: ICD-10-PCS | Mod: S$PBB,,, | Performed by: STUDENT IN AN ORGANIZED HEALTH CARE EDUCATION/TRAINING PROGRAM

## 2022-04-20 PROCEDURE — 3074F PR MOST RECENT SYSTOLIC BLOOD PRESSURE < 130 MM HG: ICD-10-PCS | Mod: CPTII,,, | Performed by: STUDENT IN AN ORGANIZED HEALTH CARE EDUCATION/TRAINING PROGRAM

## 2022-04-20 PROCEDURE — 3008F BODY MASS INDEX DOCD: CPT | Mod: CPTII,,, | Performed by: STUDENT IN AN ORGANIZED HEALTH CARE EDUCATION/TRAINING PROGRAM

## 2022-04-20 PROCEDURE — 1160F RVW MEDS BY RX/DR IN RCRD: CPT | Mod: CPTII,,, | Performed by: STUDENT IN AN ORGANIZED HEALTH CARE EDUCATION/TRAINING PROGRAM

## 2022-04-20 PROCEDURE — 1159F MED LIST DOCD IN RCRD: CPT | Mod: CPTII,,, | Performed by: STUDENT IN AN ORGANIZED HEALTH CARE EDUCATION/TRAINING PROGRAM

## 2022-04-20 PROCEDURE — 99999 PR PBB SHADOW E&M-EST. PATIENT-LVL III: CPT | Mod: PBBFAC,,, | Performed by: STUDENT IN AN ORGANIZED HEALTH CARE EDUCATION/TRAINING PROGRAM

## 2022-04-20 PROCEDURE — 93925 LOWER EXTREMITY STUDY: CPT | Mod: TC

## 2022-04-20 PROCEDURE — 3074F SYST BP LT 130 MM HG: CPT | Mod: CPTII,,, | Performed by: STUDENT IN AN ORGANIZED HEALTH CARE EDUCATION/TRAINING PROGRAM

## 2022-04-20 PROCEDURE — 99213 OFFICE O/P EST LOW 20 MIN: CPT | Mod: PBBFAC,25,PN | Performed by: STUDENT IN AN ORGANIZED HEALTH CARE EDUCATION/TRAINING PROGRAM

## 2022-04-20 PROCEDURE — 3008F PR BODY MASS INDEX (BMI) DOCUMENTED: ICD-10-PCS | Mod: CPTII,,, | Performed by: STUDENT IN AN ORGANIZED HEALTH CARE EDUCATION/TRAINING PROGRAM

## 2022-04-20 PROCEDURE — 3078F DIAST BP <80 MM HG: CPT | Mod: CPTII,,, | Performed by: STUDENT IN AN ORGANIZED HEALTH CARE EDUCATION/TRAINING PROGRAM

## 2022-04-20 PROCEDURE — 1160F PR REVIEW ALL MEDS BY PRESCRIBER/CLIN PHARMACIST DOCUMENTED: ICD-10-PCS | Mod: CPTII,,, | Performed by: STUDENT IN AN ORGANIZED HEALTH CARE EDUCATION/TRAINING PROGRAM

## 2022-04-20 RX ORDER — SPIRONOLACTONE 50 MG/1
50 TABLET, FILM COATED ORAL DAILY
Qty: 30 TABLET | Refills: 3 | Status: SHIPPED | OUTPATIENT
Start: 2022-04-20 | End: 2023-02-07

## 2022-04-20 RX ORDER — PANTOPRAZOLE SODIUM 20 MG/1
20 TABLET, DELAYED RELEASE ORAL DAILY
Qty: 30 TABLET | Refills: 3 | Status: SHIPPED | OUTPATIENT
Start: 2022-04-20 | End: 2022-06-10 | Stop reason: SDUPTHER

## 2022-04-20 RX ORDER — TIOTROPIUM BROMIDE AND OLODATEROL 3.124; 2.736 UG/1; UG/1
1 SPRAY, METERED RESPIRATORY (INHALATION) DAILY
Qty: 4 G | Refills: 3 | Status: SHIPPED | OUTPATIENT
Start: 2022-04-20 | End: 2022-05-20

## 2022-04-20 RX ORDER — HYDROXYZINE HYDROCHLORIDE 50 MG/1
50 TABLET, FILM COATED ORAL 4 TIMES DAILY
Qty: 90 TABLET | Refills: 3 | Status: SHIPPED | OUTPATIENT
Start: 2022-04-20 | End: 2022-05-20

## 2022-04-20 RX ORDER — FUROSEMIDE 20 MG/1
20 TABLET ORAL DAILY
Qty: 30 TABLET | Refills: 3 | Status: SHIPPED | OUTPATIENT
Start: 2022-04-20 | End: 2022-06-09 | Stop reason: SDUPTHER

## 2022-04-20 RX ORDER — ROPINIROLE 0.5 MG/1
0.5 TABLET, FILM COATED ORAL NIGHTLY
Qty: 30 TABLET | Refills: 1 | Status: SHIPPED | OUTPATIENT
Start: 2022-04-20 | End: 2022-07-06

## 2022-04-20 RX ORDER — THIAMINE HCL 250 MG
250 TABLET ORAL DAILY
Qty: 30 TABLET | Refills: 1 | Status: SHIPPED | OUTPATIENT
Start: 2022-04-20 | End: 2022-07-19

## 2022-04-20 RX ORDER — FESOTERODINE FUMARATE 4 MG/1
4 TABLET, FILM COATED, EXTENDED RELEASE ORAL DAILY
Qty: 30 TABLET | Refills: 3 | Status: SHIPPED | OUTPATIENT
Start: 2022-04-20 | End: 2022-09-06 | Stop reason: SDUPTHER

## 2022-04-20 NOTE — PROGRESS NOTES
[facility] Note    Chief Complaint      Chief Complaint   Patient presents with    Follow-up     History of Present Illness      Shonda Borrego is a 56 y.o. female who presents today for Follow-up  Patient requires refills on her medications. Interim visit, patient has started seeing behavioral health specialist at [facility]. She will have more frequent visits with therapist and will meet with psychiatry on 5/7/22. Patient feels comfortable and meeting with therapist will be helpful.   Patient has noticed hand tremors and some short term memory loss. Will check ammonia levels. Right upper quadrant pain minimal, but intermittently presents more frequently. Ultrasound to be scheduled.     For anxiety, hydroxyzine is helping, takes dose in morning, PRN in the afternoon and two tablets at bedtime.    Overactive bladder; unchanged. Encouraged increased hydration and to plan scheduled voiding every 2 hours to minimize discomfort of incontinence. Patient agrees to discontinue Detrol and continue Toviaz daily.   Leg swelling; well controlled with daily lasix 20 mg and spironolactone  50 mg.   Restless leg syndrome; still complains of leg fatigue and pain in morning and on ambulation. Will increase Requip to 0.5 mg from 0.25 mg at night time.   Bilateral LE SWAPNA study (4/20/22), no stenosis findings, normal SWAPNA bilaterally.    GI follow up for liver cirrhosis; will order liver ultrasound prior to her follow up. Labs reviewed. LFTs stable, coag within normal limits, no significant electrolyte derangements. T bili and direct bili elevated concerning for choledocholithiasis. Patient with known cholelithiasis. Will discuss with general surgery.         Denies fever, chills, excessive headache, chest pain, palpitations, shortness of breath, nausea, vomiting, diarrhea, constipation.     Past Medical History:  Past Medical History:   Diagnosis Date    Bipolar disorder     Cirrhosis     GERD  (gastroesophageal reflux disease)     Restless leg syndrome     Urinary frequency      Past Surgical History:   has a past surgical history that includes TIPS procedure.    Family History:  family history includes Cancer in her sister; Lung cancer in her mother and sister; No Known Problems in her daughter, father, maternal aunt, maternal grandfather, maternal grandmother, maternal uncle, paternal aunt, paternal grandfather, paternal grandmother, and paternal uncle.     Social History:  Social History     Tobacco Use    Smoking status: Current Every Day Smoker    Smokeless tobacco: Never Used   Substance Use Topics    Alcohol use: Not Currently     Comment: socially    Drug use: Never     I personally reviewed all past medical, surgical, social and family history.    Review of Systems   Constitutional: Negative for chills, fever, malaise/fatigue and weight loss.   HENT: Negative for congestion, ear discharge, ear pain, sinus pain and sore throat.    Eyes: Negative for blurred vision, pain, discharge and redness.   Respiratory: Negative for cough, hemoptysis, sputum production, shortness of breath, wheezing and stridor.    Cardiovascular: Negative for chest pain, palpitations and leg swelling.   Gastrointestinal: Negative for abdominal pain, blood in stool, constipation, diarrhea, nausea and vomiting.   Genitourinary: Negative for dysuria, frequency and hematuria.   Musculoskeletal: Negative for back pain, falls, joint pain, myalgias and neck pain.   Skin: Negative.  Negative for rash.   Neurological: Negative for dizziness, tingling, focal weakness, seizures, weakness and headaches.   Endo/Heme/Allergies: Negative for environmental allergies and polydipsia. Does not bruise/bleed easily.   Psychiatric/Behavioral: Negative for depression and suicidal ideas. The patient is not nervous/anxious.         Medications:  Outpatient Encounter Medications as of 4/20/2022   Medication Sig Dispense Refill     [DISCONTINUED] fesoterodine (TOVIAZ) 4 mg Tb24 Take 4 mg by mouth once daily.      [DISCONTINUED] furosemide (LASIX) 20 MG tablet Take 20 mg by mouth once daily.      [DISCONTINUED] hydrOXYzine (ATARAX) 50 MG tablet TAKE 1 TABLET BY MOUTH THREE TIMES DAILY 90 tablet 0    [DISCONTINUED] pantoprazole (PROTONIX) 20 MG tablet Take 20 mg by mouth once daily.      [DISCONTINUED] rOPINIRole (REQUIP) 0.25 MG tablet Take 0.5 mg by mouth once daily.      [DISCONTINUED] spironolactone (ALDACTONE) 50 MG tablet Take 50 mg by mouth once daily.      [DISCONTINUED] thiamine 250 MG tablet Take 250 mg by mouth once daily.      [DISCONTINUED] tiotropium-olodateroL (STIOLTO RESPIMAT) 2.5-2.5 mcg/actuation Mist Inhale into the lungs. Controller      [DISCONTINUED] tolterodine (DETROL LA) 2 MG Cp24 Take 4 mg by mouth once daily.      cholestyramine, with sugar, 4 gram Powd Take 4 g by mouth 3 (three) times daily. (Patient not taking: Reported on 4/20/2022) 348 g 3    fesoterodine (TOVIAZ) 4 mg Tb24 Take 1 tablet (4 mg total) by mouth once daily. 30 tablet 3    furosemide (LASIX) 20 MG tablet Take 1 tablet (20 mg total) by mouth once daily. 30 tablet 3    hydrOXYzine (ATARAX) 50 MG tablet Take 1 tablet (50 mg total) by mouth 4 (four) times daily. 90 tablet 3    incontinence pad, liner, disp Pads 1 Piece by Misc.(Non-Drug; Combo Route) route 3 (three) times daily. 72 each 3    lactulose (CHRONULAC) 20 gram/30 mL Soln Take 30 mLs (20 g total) by mouth 4 (four) times daily. for 14 days 1680 mL 0    pantoprazole (PROTONIX) 20 MG tablet Take 1 tablet (20 mg total) by mouth once daily. 30 tablet 3    rOPINIRole (REQUIP) 0.5 MG tablet Take 1 tablet (0.5 mg total) by mouth every evening. 30 tablet 1    spironolactone (ALDACTONE) 50 MG tablet Take 1 tablet (50 mg total) by mouth once daily. 30 tablet 3    thiamine 250 MG tablet Take 1 tablet (250 mg total) by mouth once daily. 30 tablet 1    tiotropium-olodateroL (STIOLTO  "RESPIMAT) 2.5-2.5 mcg/actuation Mist Inhale 1 puff into the lungs once daily at 6am. Controller 4 g 3    [DISCONTINUED] dicyclomine (BENTYL) 10 MG capsule Take 1 capsule (10 mg total) by mouth 4 (four) times daily before meals and nightly. (Patient not taking: Reported on 4/20/2022) 120 capsule 3    [DISCONTINUED] nitrofurantoin, macrocrystal-monohydrate, (MACROBID) 100 MG capsule Take 100 mg by mouth once daily.       No facility-administered encounter medications on file as of 4/20/2022.       Allergies:  Review of patient's allergies indicates:  No Known Allergies    Health Maintenance:  Immunization History   Administered Date(s) Administered    COVID-19 MRNA, LN-S PF (MODERNA HALF 0.25 ML DOSE) 12/09/2021    COVID-19, MRNA, LN-S, PF (MODERNA FULL 0.5 ML DOSE) 05/12/2021    COVID-19, MRNA, LN-S, PF (Pfizer) (Purple Cap) 06/09/2021      Health Maintenance   Topic Date Due    Hepatitis C Screening  Never done    Lipid Panel  Never done    TETANUS VACCINE  Never done    Mammogram  08/18/2022        Physical Exam      Vital Signs  Temp: 98.5 °F (36.9 °C)  Temp src: Oral  Pulse: 89  SpO2: 96 %  BP: 120/68  BP Location: Left arm  Patient Position: Sitting  Height and Weight  Height: 5' 8" (172.7 cm)  Weight: 104.5 kg (230 lb 6.1 oz)  BSA (Calculated - sq m): 2.24 sq meters  BMI (Calculated): 35  Weight in (lb) to have BMI = 25: 164.1]    Physical Exam  Vitals reviewed.   Constitutional:       General: She is not in acute distress.     Appearance: Normal appearance. She is normal weight. She is not ill-appearing or toxic-appearing.   HENT:      Head: Normocephalic and atraumatic.      Right Ear: External ear normal.      Left Ear: External ear normal.      Nose: Nose normal.      Mouth/Throat:      Mouth: Mucous membranes are moist.      Pharynx: Oropharynx is clear.   Eyes:      Extraocular Movements: Extraocular movements intact.      Conjunctiva/sclera: Conjunctivae normal.   Cardiovascular:      Rate and " Rhythm: Normal rate and regular rhythm.      Pulses: Normal pulses.      Heart sounds: Normal heart sounds.      Comments: +DP, PT pulses bilaterally  Pulmonary:      Effort: Pulmonary effort is normal. No respiratory distress.      Breath sounds: No stridor. No wheezing, rhonchi or rales.   Abdominal:      General: Abdomen is flat. Bowel sounds are normal. There is no distension.      Palpations: Abdomen is soft.      Tenderness: There is abdominal tenderness (RUQ, mild). There is no right CVA tenderness, left CVA tenderness or guarding.      Comments: No fluid sign   Musculoskeletal:         General: No swelling or tenderness. Normal range of motion.      Cervical back: Normal range of motion and neck supple. No rigidity or tenderness.      Right lower leg: No edema.      Left lower leg: No edema.   Skin:     General: Skin is warm and dry.      Capillary Refill: Capillary refill takes less than 2 seconds.      Coloration: Skin is not jaundiced.      Comments: sun drenched skin throughout, feet bilaterally, dorsum with punctum hyperpigmentation, ROM intact in lower extremities, hip, knee, ankle and distal toes, no swelling throughout   Neurological:      General: No focal deficit present.      Mental Status: She is alert and oriented to person, place, and time. Mental status is at baseline.      Motor: No weakness.      Gait: Gait normal.   Psychiatric:         Mood and Affect: Mood normal.         Behavior: Behavior normal.         Thought Content: Thought content normal.         Judgment: Judgment normal.        Laboratory:  CBC:  Recent Labs   Lab 10/15/21  1416 12/16/21  1202 04/20/22  1303   WBC 8.19  8.19 5.26 6.39   RBC 4.76  4.76 4.29 5.00   Hemoglobin 14.8  14.8 13.4 15.2   Hematocrit 44.1  44.1 39.9 44.8   Platelets 142 L  142 L 111 L 123 L   MCV 93  93 93 90   MCH 31.1 H  31.1 H 31.2 H 30.4   MCHC 33.6  33.6 33.6 33.9     CMP:  Recent Labs   Lab 10/15/21  1416 12/16/21  1202 04/20/22  1303    Glucose 129 H  129 H 99 112 H   Calcium 10.0  10.0 8.8 9.6   Albumin 3.1 L 2.6 L 3.1 L   Total Protein 7.1 6.7 7.8   Sodium 142  142 143 140   Potassium 2.9 L  2.9 L 3.7 3.7   CO2 34 H  34 H 28 28   Chloride 96  96 109 106   BUN 11  11 8 12   Alkaline Phosphatase 212 H 198 H 187 H   ALT 50 H 39 43   AST 84 H 73 H 54 H   Total Bilirubin 2.5 H 1.9 H 3.0 H     Assessment/Plan     Shonda Borrego is a 56 y.o.female with:    Problem List Items Addressed This Visit        Psychiatric    Anxiety    Relevant Medications    hydrOXYzine (ATARAX) 50 MG tablet       GI    Alcoholic cirrhosis of liver without ascites    Relevant Medications    furosemide (LASIX) 20 MG tablet    spironolactone (ALDACTONE) 50 MG tablet    thiamine 250 MG tablet    Other Relevant Orders    US Abdomen Limited    Ammonia (Completed)    Bilirubin, Direct (Completed)    Esophageal varices without bleeding      Other Visit Diagnoses     Restless leg syndrome    -  Primary    Relevant Medications    rOPINIRole (REQUIP) 0.5 MG tablet    Gastroesophageal reflux disease, unspecified whether esophagitis present        Relevant Medications    pantoprazole (PROTONIX) 20 MG tablet    COPD without exacerbation        Relevant Medications    tiotropium-olodateroL (STIOLTO RESPIMAT) 2.5-2.5 mcg/actuation Mist    Overactive bladder        Relevant Medications    fesoterodine (TOVIAZ) 4 mg Tb24    incontinence pad, liner, disp Pads    Hyperbilirubinemia        Relevant Orders    Bilirubin, Direct (Completed)    Hyperammonemia        Relevant Medications    lactulose (CHRONULAC) 20 gram/30 mL Soln        Other than changes above, cont current medications and maintain follow up with specialists.  No follow-ups on file.    Future Appointments   Date Time Provider Department Center   5/24/2022  8:30 AM Geeta Gaines DO [facility]       Kazumi G Yoshinaga, DO [facility]

## 2022-04-21 PROBLEM — E72.20 HYPERAMMONEMIA: Status: ACTIVE | Noted: 2022-04-21

## 2022-04-21 PROBLEM — K21.9 GASTROESOPHAGEAL REFLUX DISEASE: Status: ACTIVE | Noted: 2022-04-21

## 2022-04-21 PROBLEM — N32.81 OVERACTIVE BLADDER: Status: ACTIVE | Noted: 2022-04-21

## 2022-04-21 PROBLEM — E80.6 HYPERBILIRUBINEMIA: Status: ACTIVE | Noted: 2022-04-21

## 2022-04-21 PROBLEM — G25.81 RESTLESS LEG SYNDROME: Status: ACTIVE | Noted: 2022-04-21

## 2022-04-21 PROBLEM — J44.9 COPD WITHOUT EXACERBATION: Status: ACTIVE | Noted: 2022-04-21

## 2022-04-21 RX ORDER — LACTULOSE 10 G/15ML
20 SOLUTION ORAL 4 TIMES DAILY
Qty: 1680 ML | Refills: 0 | Status: SHIPPED | OUTPATIENT
Start: 2022-04-21 | End: 2022-05-05

## 2022-04-22 ENCOUNTER — HOSPITAL ENCOUNTER (OUTPATIENT)
Dept: RADIOLOGY | Facility: HOSPITAL | Age: 57
Discharge: HOME | End: 2022-04-22
Attending: STUDENT IN AN ORGANIZED HEALTH CARE EDUCATION/TRAINING PROGRAM
Payer: MEDICAID

## 2022-04-22 DIAGNOSIS — K70.30 ALCOHOLIC CIRRHOSIS OF LIVER WITHOUT ASCITES: ICD-10-CM

## 2022-04-22 PROCEDURE — 76705 ECHO EXAM OF ABDOMEN: CPT | Mod: TC

## 2022-05-24 ENCOUNTER — OFFICE VISIT (OUTPATIENT)
Dept: PRIMARY CARE CLINIC | Facility: CLINIC | Age: 57
End: 2022-05-24
Payer: MEDICAID

## 2022-05-24 VITALS
TEMPERATURE: 98 F | BODY MASS INDEX: 33.98 KG/M2 | DIASTOLIC BLOOD PRESSURE: 64 MMHG | HEIGHT: 68 IN | WEIGHT: 224.19 LBS | OXYGEN SATURATION: 96 % | SYSTOLIC BLOOD PRESSURE: 124 MMHG | HEART RATE: 66 BPM

## 2022-05-24 DIAGNOSIS — F41.9 ANXIETY: Primary | ICD-10-CM

## 2022-05-24 DIAGNOSIS — K70.30 ALCOHOLIC CIRRHOSIS OF LIVER WITHOUT ASCITES: ICD-10-CM

## 2022-05-24 DIAGNOSIS — K59.00 CONSTIPATION, UNSPECIFIED CONSTIPATION TYPE: ICD-10-CM

## 2022-05-24 DIAGNOSIS — K80.20 CALCULUS OF GALLBLADDER WITHOUT CHOLECYSTITIS WITHOUT OBSTRUCTION: ICD-10-CM

## 2022-05-24 PROCEDURE — 3074F SYST BP LT 130 MM HG: CPT | Mod: CPTII,,, | Performed by: STUDENT IN AN ORGANIZED HEALTH CARE EDUCATION/TRAINING PROGRAM

## 2022-05-24 PROCEDURE — 3008F BODY MASS INDEX DOCD: CPT | Mod: CPTII,,, | Performed by: STUDENT IN AN ORGANIZED HEALTH CARE EDUCATION/TRAINING PROGRAM

## 2022-05-24 PROCEDURE — 3008F PR BODY MASS INDEX (BMI) DOCUMENTED: ICD-10-PCS | Mod: CPTII,,, | Performed by: STUDENT IN AN ORGANIZED HEALTH CARE EDUCATION/TRAINING PROGRAM

## 2022-05-24 PROCEDURE — 99214 OFFICE O/P EST MOD 30 MIN: CPT | Mod: S$PBB,,, | Performed by: STUDENT IN AN ORGANIZED HEALTH CARE EDUCATION/TRAINING PROGRAM

## 2022-05-24 PROCEDURE — 99999 PR PBB SHADOW E&M-EST. PATIENT-LVL IV: ICD-10-PCS | Mod: PBBFAC,,, | Performed by: STUDENT IN AN ORGANIZED HEALTH CARE EDUCATION/TRAINING PROGRAM

## 2022-05-24 PROCEDURE — 3078F PR MOST RECENT DIASTOLIC BLOOD PRESSURE < 80 MM HG: ICD-10-PCS | Mod: CPTII,,, | Performed by: STUDENT IN AN ORGANIZED HEALTH CARE EDUCATION/TRAINING PROGRAM

## 2022-05-24 PROCEDURE — 99214 OFFICE O/P EST MOD 30 MIN: CPT | Mod: PBBFAC,PN | Performed by: STUDENT IN AN ORGANIZED HEALTH CARE EDUCATION/TRAINING PROGRAM

## 2022-05-24 PROCEDURE — 1159F PR MEDICATION LIST DOCUMENTED IN MEDICAL RECORD: ICD-10-PCS | Mod: CPTII,,, | Performed by: STUDENT IN AN ORGANIZED HEALTH CARE EDUCATION/TRAINING PROGRAM

## 2022-05-24 PROCEDURE — 1159F MED LIST DOCD IN RCRD: CPT | Mod: CPTII,,, | Performed by: STUDENT IN AN ORGANIZED HEALTH CARE EDUCATION/TRAINING PROGRAM

## 2022-05-24 PROCEDURE — 3074F PR MOST RECENT SYSTOLIC BLOOD PRESSURE < 130 MM HG: ICD-10-PCS | Mod: CPTII,,, | Performed by: STUDENT IN AN ORGANIZED HEALTH CARE EDUCATION/TRAINING PROGRAM

## 2022-05-24 PROCEDURE — 3078F DIAST BP <80 MM HG: CPT | Mod: CPTII,,, | Performed by: STUDENT IN AN ORGANIZED HEALTH CARE EDUCATION/TRAINING PROGRAM

## 2022-05-24 PROCEDURE — 99999 PR PBB SHADOW E&M-EST. PATIENT-LVL IV: CPT | Mod: PBBFAC,,, | Performed by: STUDENT IN AN ORGANIZED HEALTH CARE EDUCATION/TRAINING PROGRAM

## 2022-05-24 PROCEDURE — 99214 PR OFFICE/OUTPT VISIT, EST, LEVL IV, 30-39 MIN: ICD-10-PCS | Mod: S$PBB,,, | Performed by: STUDENT IN AN ORGANIZED HEALTH CARE EDUCATION/TRAINING PROGRAM

## 2022-05-24 RX ORDER — LACTULOSE 10 G/15ML
30 SOLUTION ORAL 3 TIMES DAILY
Qty: 4050 ML | Refills: 1 | Status: SHIPPED | OUTPATIENT
Start: 2022-05-24 | End: 2022-07-26 | Stop reason: SDUPTHER

## 2022-05-24 RX ORDER — SPIRONOLACTONE 50 MG/1
TABLET, FILM COATED ORAL
COMMUNITY
End: 2022-08-18 | Stop reason: SDUPTHER

## 2022-05-24 RX ORDER — HYDROXYZINE HYDROCHLORIDE 50 MG/1
TABLET, FILM COATED ORAL
COMMUNITY
Start: 2022-05-23 | End: 2022-08-04

## 2022-05-24 RX ORDER — ALBUTEROL SULFATE 90 UG/1
AEROSOL, METERED RESPIRATORY (INHALATION)
COMMUNITY
Start: 2022-05-06 | End: 2023-03-23 | Stop reason: SDUPTHER

## 2022-05-24 RX ORDER — TIOTROPIUM BROMIDE AND OLODATEROL 3.124; 2.736 UG/1; UG/1
SPRAY, METERED RESPIRATORY (INHALATION)
COMMUNITY
End: 2023-03-23

## 2022-05-24 NOTE — ASSESSMENT & PLAN NOTE
Wt Readings from Last 3 Encounters:   05/24/22 0826 101.7 kg (224 lb 3.3 oz)   04/20/22 1420 104.5 kg (230 lb 6.1 oz)   03/18/22 0920 104.3 kg (230 lb 0.8 oz)     - stay physically active  - maintain daily adequate hydration 1.5 to 2L fluid volume  - as tolerated incorporate resistance training with stretching and cardio  - goal of 150 minutes per week of moderate intensity activity or 7,500 - 10,000 steps per day  - follow the Mediterranean diet  - include whole fresh fruits, vegetables, olive oil, seeds, nuts, whole grains, cold water fish, salmon, mackerel and lean cuts of meat    - do not drink sugary/diet carbonated beverages  - decrease portion sizes slightly which will result in an approximately 500-calorie deficit  - avoid fast or fried and processed food, especially canned foods   - avoid refined carbohydrates, white starchy foods, flour, white potato, bread, muffins, and cakes

## 2022-05-24 NOTE — PROGRESS NOTES
[facility] Note    Chief Complaint      Chief Complaint   Patient presents with    Follow-up     Follow up ultrasounds on feet and legs       History of Present Illness      Shonda Borrego is a 56 y.o. female who presents today for Follow-up (Follow up ultrasounds on feet and legs)  Recent concerning for claudication and hyperpigmentation over her lower extremities, SWAPNA study within normal limits with no findings of stenosis.   Repeat upper quadrant ultrasound shows stable disease, + cholelithiasis,  nodular contour of the liver indicating cirrhotic change.  There is associated diffuse heterogeneity of the hepatic parenchymal echotexture  Past Medical History:  Past Medical History:   Diagnosis Date    Anxiety     Bipolar disorder     Cirrhosis     GERD (gastroesophageal reflux disease)     Restless leg syndrome     Urinary frequency      Past Surgical History:   has a past surgical history that includes TIPS procedure.    Family History:  family history includes Cancer in her sister; Lung cancer in her mother and sister; No Known Problems in her daughter, father, maternal aunt, maternal grandfather, maternal grandmother, maternal uncle, paternal aunt, paternal grandfather, paternal grandmother, and paternal uncle.     Social History:  Social History     Tobacco Use    Smoking status: Current Every Day Smoker    Smokeless tobacco: Never Used   Substance Use Topics    Alcohol use: Not Currently     Comment: socially    Drug use: Never     I personally reviewed all past medical, surgical, social and family history.    Review of Systems   Constitutional: Negative for chills, fever, malaise/fatigue and weight loss.   HENT: Negative for congestion, ear discharge, ear pain, sinus pain and sore throat.    Eyes: Negative for blurred vision, pain, discharge and redness.   Respiratory: Negative for cough, hemoptysis, sputum production, shortness of breath, wheezing and stridor.    Cardiovascular:  Negative for chest pain, palpitations and leg swelling.   Gastrointestinal: Negative for abdominal pain, blood in stool, constipation, diarrhea, nausea and vomiting.   Genitourinary: Negative for dysuria, frequency and hematuria.   Musculoskeletal: Negative for back pain, falls, joint pain, myalgias and neck pain.   Skin: Negative.  Negative for rash.   Neurological: Negative for dizziness, tingling, focal weakness, seizures, weakness and headaches.   Endo/Heme/Allergies: Negative for environmental allergies and polydipsia. Does not bruise/bleed easily.   Psychiatric/Behavioral: Negative for depression and suicidal ideas. The patient is not nervous/anxious.         Medications:  Outpatient Encounter Medications as of 2022   Medication Sig Dispense Refill    albuterol (PROVENTIL/VENTOLIN HFA) 90 mcg/actuation inhaler Inhale into the lungs.      hydrOXYzine (ATARAX) 50 MG tablet Take by mouth.      pantoprazole (PROTONIX) 20 MG tablet Take 1 tablet (20 mg total) by mouth once daily. 30 tablet 3    thiamine 250 MG tablet Take 1 tablet (250 mg total) by mouth once daily. 30 tablet 1    cholestyramine, with sugar, 4 gram Powd Take 4 g by mouth 3 (three) times daily. (Patient not taking: Reported on 2022) 348 g 3    fesoterodine (TOVIAZ) 4 mg Tb24 Take 1 tablet (4 mg total) by mouth once daily. 30 tablet 3    furosemide (LASIX) 20 MG tablet Take 1 tablet (20 mg total) by mouth once daily. 30 tablet 3    [] hydrOXYzine (ATARAX) 50 MG tablet Take 1 tablet (50 mg total) by mouth 4 (four) times daily. 90 tablet 3    incontinence pad, liner, disp Pads 1 Piece by Misc.(Non-Drug; Combo Route) route 3 (three) times daily. 72 each 3    lactulose (CONSTULOSE) 10 gram/15 mL solution Take 45 mLs (30 g total) by mouth 3 (three) times daily. 4050 mL 1    rOPINIRole (REQUIP) 0.5 MG tablet Take 1 tablet (0.5 mg total) by mouth every evening. 30 tablet 1    spironolactone (ALDACTONE) 50 MG tablet Take 1 tablet  "(50 mg total) by mouth once daily. 30 tablet 3    spironolactone (ALDACTONE) 50 MG tablet 1 tablet      [] tiotropium-olodateroL (STIOLTO RESPIMAT) 2.5-2.5 mcg/actuation Mist Inhale 1 puff into the lungs once daily at 6am. Controller 4 g 3    tiotropium-olodateroL (STIOLTO RESPIMAT) 2.5-2.5 mcg/actuation Mist 2 puffs       No facility-administered encounter medications on file as of 2022.     Allergies:  Review of patient's allergies indicates:  No Known Allergies    Health Maintenance:  Immunization History   Administered Date(s) Administered    COVID-19 MRNA, LN-S PF (MODERNA HALF 0.25 ML DOSE) 2021    COVID-19, MRNA, LN-S, PF (MODERNA FULL 0.5 ML DOSE) 2021    COVID-19, MRNA, LN-S, PF (Pfizer) (Purple Cap) 2021    Influenza - Trivalent (ADULT) 10/20/2021    Tdap 2022    Zoster Recombinant 2022      Health Maintenance   Topic Date Due    Hepatitis C Screening  Never done    Lipid Panel  Never done    Mammogram  2022    TETANUS VACCINE  2032        Physical Exam      Vital Signs  Temp: 98.3 °F (36.8 °C)  Temp src: Oral  Pulse: 66  SpO2: 96 %  BP: 124/64  BP Location: Right arm  Patient Position: Sitting  Pain Score:   6  Pain Loc: Leg  Height and Weight  Height: 5' 8" (172.7 cm)  Weight: 101.7 kg (224 lb 3.3 oz)  BSA (Calculated - sq m): 2.21 sq meters  BMI (Calculated): 34.1  Weight in (lb) to have BMI = 25: 164.1]    Physical Exam  Vitals reviewed.   Constitutional:       General: She is not in acute distress.     Appearance: Normal appearance. She is normal weight. She is not ill-appearing or toxic-appearing.   HENT:      Head: Normocephalic and atraumatic.      Right Ear: External ear normal.      Left Ear: External ear normal.      Nose: Nose normal.      Mouth/Throat:      Mouth: Mucous membranes are moist.      Pharynx: Oropharynx is clear.   Eyes:      Extraocular Movements: Extraocular movements intact.      Conjunctiva/sclera: Conjunctivae " normal.   Cardiovascular:      Rate and Rhythm: Normal rate and regular rhythm.      Pulses: Normal pulses.      Heart sounds: Normal heart sounds.      Comments: +DP, PT pulses bilaterally  Pulmonary:      Effort: Pulmonary effort is normal. No respiratory distress.      Breath sounds: No stridor. No wheezing, rhonchi or rales.   Abdominal:      General: Abdomen is flat. Bowel sounds are normal. There is no distension.      Palpations: Abdomen is soft.      Tenderness: There is no abdominal tenderness. There is no right CVA tenderness, left CVA tenderness or guarding.      Comments: No fluid sign   Musculoskeletal:         General: No swelling or tenderness. Normal range of motion.      Cervical back: Normal range of motion and neck supple. No rigidity or tenderness.      Right lower leg: No edema.      Left lower leg: No edema.   Skin:     General: Skin is warm and dry.      Capillary Refill: Capillary refill takes less than 2 seconds.      Coloration: Skin is not jaundiced.      Comments: sun drenched skin throughout, feet bilaterally, dorsum with punctum hyperpigmentation, ROM intact in lower extremities, hip, knee, ankle and distal toes, no swelling throughout   Neurological:      General: No focal deficit present.      Mental Status: She is alert and oriented to person, place, and time. Mental status is at baseline.      Motor: No weakness.      Gait: Gait normal.   Psychiatric:         Mood and Affect: Mood normal.         Behavior: Behavior normal.         Thought Content: Thought content normal.         Judgment: Judgment normal.       Laboratory:  CBC:  Recent Labs   Lab 10/15/21  1416 12/16/21  1202 04/20/22  1303   WBC 8.19  8.19 5.26 6.39   RBC 4.76  4.76 4.29 5.00   Hemoglobin 14.8  14.8 13.4 15.2   Hematocrit 44.1  44.1 39.9 44.8   Platelets 142 L  142 L 111 L 123 L   MCV 93  93 93 90   MCH 31.1 H  31.1 H 31.2 H 30.4   MCHC 33.6  33.6 33.6 33.9     CMP:  Recent Labs   Lab 10/15/21  1416  12/16/21  1202 04/20/22  1303   Glucose 129 H  129 H 99 112 H   Calcium 10.0  10.0 8.8 9.6   Albumin 3.1 L 2.6 L 3.1 L   Total Protein 7.1 6.7 7.8   Sodium 142  142 143 140   Potassium 2.9 L  2.9 L 3.7 3.7   CO2 34 H  34 H 28 28   Chloride 96  96 109 106   BUN 11  11 8 12   Alkaline Phosphatase 212 H 198 H 187 H   ALT 50 H 39 43   AST 84 H 73 H 54 H   Total Bilirubin 2.5 H 1.9 H 3.0 H         US Abdomen Limited  Narrative: EXAMINATION:  US ABDOMEN LIMITED    CLINICAL HISTORY:  Alcoholic cirrhosis of liver without ascites liver cirrhosis, plus doppler for portal hypertension, concern for choledocholithiasis;    COMPARISON:  08/18/2021    TECHNIQUE:  Multiple transverse and sagittal sonographic grayscale and Doppler images obtained of the right upper quadrant    FINDINGS:  Suboptimal study secondary to patient body habitus and bowel gas resulting in decreased acoustic windows.  Gallbladder is somewhat distended, but appears similar to previous ultrasound.  No gallbladder wall thickening or pericholecystic fluid.  Sonographer notes a negative sonographic Gonzales sign.  Single echogenic gallstone present within the dependent gallbladder.  Patent IVC.  Diffuse coarsened appearance of the hepatic parenchymal echotexture.  The liver measures 14.3 cm with a nodular contour.  Hepatopetal flow in the portal vein.  Previously demonstrated tips shunt is not demonstrated on this exam.  Common duct normal in size measuring 5 mm.  Impression: 1. Nodular contour of the liver indicating cirrhotic change.  There is associated diffuse heterogeneity of the hepatic parenchymal echotexture.  2. Cholelithiasis    Electronically signed by: Ashu Menezes MD  Date:    04/22/2022  Time:    09:50    Assessment/Plan     Shonda Borrego is a 56 y.o.female with:    Problem List Items Addressed This Visit        Psychiatric    Anxiety - Primary       Endocrine    BMI 34.0-34.9,adult    Current Assessment & Plan     Wt Readings from Last 3  Encounters:   05/24/22 0826 101.7 kg (224 lb 3.3 oz)   04/20/22 1420 104.5 kg (230 lb 6.1 oz)   03/18/22 0920 104.3 kg (230 lb 0.8 oz)     - stay physically active  - maintain daily adequate hydration 1.5 to 2L fluid volume  - as tolerated incorporate resistance training with stretching and cardio  - goal of 150 minutes per week of moderate intensity activity or 7,500 - 10,000 steps per day  - follow the Mediterranean diet  - include whole fresh fruits, vegetables, olive oil, seeds, nuts, whole grains, cold water fish, salmon, mackerel and lean cuts of meat    - do not drink sugary/diet carbonated beverages  - decrease portion sizes slightly which will result in an approximately 500-calorie deficit  - avoid fast or fried and processed food, especially canned foods   - avoid refined carbohydrates, white starchy foods, flour, white potato, bread, muffins, and cakes              GI    Alcoholic cirrhosis of liver without ascites    Current Assessment & Plan     Stable. With no complaint of abdominal pain. Patient denies excessive leg, abdominal swelling, difficulty with PO intake. Patient has not been stooling as regularly, will start lactulose and to taper use if she experiences more than 4 -5 uncomfortable stooling. Encouraged maintaining adequate hydration, diet with high protein, avoiding processed food items, cold cuts, canned foods in sugary and high sodium preservatives. Stay physically active.   - TIPS not visualized on last ultrasound 4/20/22, patient has not had removal procedure, no increased complaint over abdomen  - discussed with patient, to ensure visualization will require CT abdomen with and without contrast, will defer study for now  - place referral to hepatology clinic for further recommendations on management           Relevant Medications    lactulose (CONSTULOSE) 10 gram/15 mL solution    Other Relevant Orders    Ambulatory referral/consult to Hepatology    Calculus of gallbladder without  cholecystitis without obstruction      Other Visit Diagnoses     Constipation, unspecified constipation type        Relevant Medications    lactulose (CONSTULOSE) 10 gram/15 mL solution        Other than changes above, cont current medications and maintain follow up with specialists.  No follow-ups on file.    Future Appointments   Date Time Provider Department Center   6/3/2022  9:00 AM [facility]   7/26/2022  9:00 AM Geeta Gaines DO [facility]       Kazumi G Yoshinaga, DO [facility]

## 2022-05-25 NOTE — ASSESSMENT & PLAN NOTE
Stable. With no complaint of abdominal pain. Patient denies excessive leg, abdominal swelling, difficulty with PO intake. Patient has not been stooling as regularly, will start lactulose and to taper use if she experiences more than 4 -5 uncomfortable stooling. Encouraged maintaining adequate hydration, diet with high protein, avoiding processed food items, cold cuts, canned foods in sugary and high sodium preservatives. Stay physically active.   - TIPS not visualized on last ultrasound 4/20/22, patient has not had removal procedure, no increased complaint over abdomen  - discussed with patient, to ensure visualization will require CT abdomen with and without contrast, will defer study for now  - place referral to hepatology clinic for further recommendations on management

## 2022-06-03 ENCOUNTER — IMMUNIZATION (OUTPATIENT)
Dept: PRIMARY CARE CLINIC | Facility: CLINIC | Age: 57
End: 2022-06-03
Payer: MEDICAID

## 2022-06-03 DIAGNOSIS — Z23 NEED FOR VACCINATION: Primary | ICD-10-CM

## 2022-06-03 PROCEDURE — 91305 COVID-19, MRNA, LNP-S, PF, 30 MCG/0.3 ML DOSE VACCINE (PFIZER): CPT | Mod: PBBFAC,PN

## 2022-06-09 DIAGNOSIS — K70.30 ALCOHOLIC CIRRHOSIS OF LIVER WITHOUT ASCITES: ICD-10-CM

## 2022-06-10 DIAGNOSIS — K21.9 GASTROESOPHAGEAL REFLUX DISEASE, UNSPECIFIED WHETHER ESOPHAGITIS PRESENT: ICD-10-CM

## 2022-06-10 RX ORDER — FUROSEMIDE 20 MG/1
20 TABLET ORAL DAILY
Qty: 30 TABLET | Refills: 1 | Status: SHIPPED | OUTPATIENT
Start: 2022-06-10 | End: 2022-08-18 | Stop reason: SDUPTHER

## 2022-06-10 RX ORDER — PANTOPRAZOLE SODIUM 20 MG/1
20 TABLET, DELAYED RELEASE ORAL DAILY
Qty: 30 TABLET | Refills: 3 | Status: SHIPPED | OUTPATIENT
Start: 2022-06-10 | End: 2023-01-09 | Stop reason: SDUPTHER

## 2022-06-21 ENCOUNTER — PATIENT MESSAGE (OUTPATIENT)
Dept: SMOKING CESSATION | Facility: CLINIC | Age: 57
End: 2022-06-21
Payer: MEDICAID

## 2022-06-23 ENCOUNTER — TELEPHONE (OUTPATIENT)
Dept: SMOKING CESSATION | Facility: CLINIC | Age: 57
End: 2022-06-23
Payer: MEDICAID

## 2022-06-23 NOTE — TELEPHONE ENCOUNTER
Called patient in regard to smoking cessation free program, had to leave a detailed message with name and telephone number for a call back.

## 2022-07-07 ENCOUNTER — LAB VISIT (OUTPATIENT)
Dept: LAB | Facility: HOSPITAL | Age: 57
End: 2022-07-07
Payer: MEDICAID

## 2022-07-07 ENCOUNTER — OFFICE VISIT (OUTPATIENT)
Dept: HEPATOLOGY | Facility: CLINIC | Age: 57
End: 2022-07-07
Payer: MEDICAID

## 2022-07-07 VITALS
OXYGEN SATURATION: 92 % | SYSTOLIC BLOOD PRESSURE: 116 MMHG | HEIGHT: 68 IN | DIASTOLIC BLOOD PRESSURE: 58 MMHG | TEMPERATURE: 99 F | HEART RATE: 77 BPM | RESPIRATION RATE: 18 BRPM | BODY MASS INDEX: 33.88 KG/M2 | WEIGHT: 223.56 LBS

## 2022-07-07 DIAGNOSIS — K70.30 ALCOHOLIC CIRRHOSIS OF LIVER WITHOUT ASCITES: ICD-10-CM

## 2022-07-07 LAB
AFP SERPL-MCNC: 3.7 NG/ML (ref 0–8.4)
ALBUMIN SERPL BCP-MCNC: 3.2 G/DL (ref 3.5–5.2)
ALP SERPL-CCNC: 168 U/L (ref 55–135)
ALT SERPL W/O P-5'-P-CCNC: 37 U/L (ref 10–44)
ANION GAP SERPL CALC-SCNC: 6 MMOL/L (ref 8–16)
AST SERPL-CCNC: 57 U/L (ref 10–40)
BASOPHILS # BLD AUTO: 0.07 K/UL (ref 0–0.2)
BASOPHILS NFR BLD: 1.2 % (ref 0–1.9)
BILIRUB SERPL-MCNC: 3.2 MG/DL (ref 0.1–1)
BUN SERPL-MCNC: 16 MG/DL (ref 6–20)
CALCIUM SERPL-MCNC: 9.3 MG/DL (ref 8.7–10.5)
CHLORIDE SERPL-SCNC: 106 MMOL/L (ref 95–110)
CO2 SERPL-SCNC: 26 MMOL/L (ref 23–29)
CREAT SERPL-MCNC: 1.3 MG/DL (ref 0.5–1.4)
DIFFERENTIAL METHOD: ABNORMAL
EOSINOPHIL # BLD AUTO: 0.3 K/UL (ref 0–0.5)
EOSINOPHIL NFR BLD: 4.5 % (ref 0–8)
ERYTHROCYTE [DISTWIDTH] IN BLOOD BY AUTOMATED COUNT: 14.2 % (ref 11.5–14.5)
EST. GFR  (AFRICAN AMERICAN): 52.6 ML/MIN/1.73 M^2
EST. GFR  (NON AFRICAN AMERICAN): 45.6 ML/MIN/1.73 M^2
GLUCOSE SERPL-MCNC: 193 MG/DL (ref 70–110)
HCT VFR BLD AUTO: 41.7 % (ref 37–48.5)
HGB BLD-MCNC: 14 G/DL (ref 12–16)
IMM GRANULOCYTES # BLD AUTO: 0 K/UL (ref 0–0.04)
IMM GRANULOCYTES NFR BLD AUTO: 0 % (ref 0–0.5)
INR PPP: 1.2 (ref 0.8–1.2)
LYMPHOCYTES # BLD AUTO: 2.5 K/UL (ref 1–4.8)
LYMPHOCYTES NFR BLD: 43.1 % (ref 18–48)
MCH RBC QN AUTO: 30.4 PG (ref 27–31)
MCHC RBC AUTO-ENTMCNC: 33.6 G/DL (ref 32–36)
MCV RBC AUTO: 91 FL (ref 82–98)
MONOCYTES # BLD AUTO: 0.6 K/UL (ref 0.3–1)
MONOCYTES NFR BLD: 9.8 % (ref 4–15)
NEUTROPHILS # BLD AUTO: 2.4 K/UL (ref 1.8–7.7)
NEUTROPHILS NFR BLD: 41.4 % (ref 38–73)
NRBC BLD-RTO: 0 /100 WBC
PLATELET # BLD AUTO: 128 K/UL (ref 150–450)
PMV BLD AUTO: 10.1 FL (ref 9.2–12.9)
POTASSIUM SERPL-SCNC: 3.8 MMOL/L (ref 3.5–5.1)
PROT SERPL-MCNC: 6.9 G/DL (ref 6–8.4)
PROTHROMBIN TIME: 12.3 SEC (ref 9–12.5)
RBC # BLD AUTO: 4.61 M/UL (ref 4–5.4)
SODIUM SERPL-SCNC: 138 MMOL/L (ref 136–145)
WBC # BLD AUTO: 5.8 K/UL (ref 3.9–12.7)

## 2022-07-07 PROCEDURE — 82105 ALPHA-FETOPROTEIN SERUM: CPT | Performed by: STUDENT IN AN ORGANIZED HEALTH CARE EDUCATION/TRAINING PROGRAM

## 2022-07-07 PROCEDURE — 99999 PR PBB SHADOW E&M-EST. PATIENT-LVL IV: ICD-10-PCS | Mod: PBBFAC,,, | Performed by: INTERNAL MEDICINE

## 2022-07-07 PROCEDURE — 85025 COMPLETE CBC W/AUTO DIFF WBC: CPT | Performed by: STUDENT IN AN ORGANIZED HEALTH CARE EDUCATION/TRAINING PROGRAM

## 2022-07-07 PROCEDURE — 99999 PR PBB SHADOW E&M-EST. PATIENT-LVL IV: CPT | Mod: PBBFAC,,, | Performed by: INTERNAL MEDICINE

## 2022-07-07 PROCEDURE — 99214 OFFICE O/P EST MOD 30 MIN: CPT | Mod: PBBFAC | Performed by: INTERNAL MEDICINE

## 2022-07-07 PROCEDURE — 99205 OFFICE O/P NEW HI 60 MIN: CPT | Mod: S$PBB,,, | Performed by: INTERNAL MEDICINE

## 2022-07-07 PROCEDURE — 80053 COMPREHEN METABOLIC PANEL: CPT | Performed by: STUDENT IN AN ORGANIZED HEALTH CARE EDUCATION/TRAINING PROGRAM

## 2022-07-07 PROCEDURE — 3078F DIAST BP <80 MM HG: CPT | Mod: CPTII,,, | Performed by: INTERNAL MEDICINE

## 2022-07-07 PROCEDURE — 36415 COLL VENOUS BLD VENIPUNCTURE: CPT | Performed by: STUDENT IN AN ORGANIZED HEALTH CARE EDUCATION/TRAINING PROGRAM

## 2022-07-07 PROCEDURE — 3078F PR MOST RECENT DIASTOLIC BLOOD PRESSURE < 80 MM HG: ICD-10-PCS | Mod: CPTII,,, | Performed by: INTERNAL MEDICINE

## 2022-07-07 PROCEDURE — 85610 PROTHROMBIN TIME: CPT | Performed by: STUDENT IN AN ORGANIZED HEALTH CARE EDUCATION/TRAINING PROGRAM

## 2022-07-07 PROCEDURE — 3074F SYST BP LT 130 MM HG: CPT | Mod: CPTII,,, | Performed by: INTERNAL MEDICINE

## 2022-07-07 PROCEDURE — 3074F PR MOST RECENT SYSTOLIC BLOOD PRESSURE < 130 MM HG: ICD-10-PCS | Mod: CPTII,,, | Performed by: INTERNAL MEDICINE

## 2022-07-07 PROCEDURE — 99205 PR OFFICE/OUTPT VISIT, NEW, LEVL V, 60-74 MIN: ICD-10-PCS | Mod: S$PBB,,, | Performed by: INTERNAL MEDICINE

## 2022-07-07 RX ORDER — CARIPRAZINE 1.5 MG/1
1 CAPSULE, GELATIN COATED ORAL DAILY
COMMUNITY
Start: 2022-06-21 | End: 2022-07-26 | Stop reason: SDUPTHER

## 2022-07-07 NOTE — PATIENT INSTRUCTIONS
- avoid non-steroidal anti-inflammatory drugs (NSAIDs) such as ibuprofen, Motrin, naprosyn, Alleve due to the risk of kidney damage  - can take acetaminophen (Tylenol), no more than 2000 mg per day  - low sodium (salt) 2 gram per day diet  - nutrition: 25-30kcal (calorie per body body weight in kilogram) per day  - no need to restrict protein in diet  - high protein diet: 1.2-1.5 gram/kg (protein per body weight in kilogram) per day to prevent muscle mass loss  - avoid fasting  - Late night snack with 50 gram of complex carbohydrates and protein  - resistance exercises for muscle strength  - avoid raw seafoods due to the risk of fatal Vibrio vulnificus infection  - ultrasound or MRI of the liver every 6 months for liver cancer screening (you are at risk of developing liver cancer due to scar tissue in the liver)  - Upper endoscopy every 1-2 years to screen for varices in the stomach and foodpipe which can burst and cause fatal bleeding

## 2022-07-07 NOTE — PROGRESS NOTES
[facility] New Patient (Self-Referral) Note    Reason for Visit:  The encounter diagnosis is Alcoholic Cirrhosis         HPI:  This is a 56 year lady with history of Rest less leg syndrome, bipolar disorder  here for evaluation of her alcoholic cirrhosis diagnosed in 2019 when she had a variceal requiring emergent TIPS in [facility].  She had been following with PCP but has not had hepatology evaluation.  Patient says that she quit drinking 6 months ago after slowly cutting down since her diagnosis in 2019.  Her only complaints are lack of energy fatigue and constipation. The constipation is gradually improving since she became compliant with Lactulose.  She has also been taking Spironolactone and Lasix which has helped considerably with ascites and she has noted some weight loss after fluid removal.        Elevated liver enzymes: Yes  Abnormal imaging: Yes  Cirrhosis: Yes  Hepatitis C: No  Hepatitis B: No  Fatty liver: No  Encephalopathy: No  Post-hospital discharge: No  Symptoms: Malaise Fatigue     Primary hepatic manifestations:  Fatigue: Yes  Edema:No  Ascites:Yes  Encephalopathy:According to patient few days after TIPS none since   Abdominal pain:No  GI bleeds: None since bleeding episode in 2019   Pruritus:No  Weight Changes:No  Changes in Bowel habits: No  Muscle cramps:No    Risk factors for liver disease:  No jaundice  No transfusions  No IVDU  Did not snort cocaine or similar agents  Did not live with anyone with hepatitis B or C  Sexual partner not tested  No hepatotoxic medications  No exposure to industrial toxins  Alcohol: Quit 6 months ago after using hard liquor > 10-15 years        Review of Systems   Constitutional: Positive for malaise/fatigue. Negative for chills, fever and weight loss.   Respiratory: Negative for cough.    Cardiovascular: Negative for chest pain, palpitations and orthopnea.   Gastrointestinal: Negative for abdominal pain, blood in stool, constipation,  diarrhea, heartburn, melena, nausea and vomiting.         Surgical History:  None     Family History: No Family history of Liver disorders      Current Outpatient Medications   Medication Sig    albuterol (PROVENTIL/VENTOLIN HFA) 90 mcg/actuation inhaler Inhale into the lungs.    cholestyramine, with sugar, 4 gram Powd Take 4 g by mouth 3 (three) times daily.    fesoterodine (TOVIAZ) 4 mg Tb24 Take 1 tablet (4 mg total) by mouth once daily.    furosemide (LASIX) 20 MG tablet Take 1 tablet (20 mg total) by mouth once daily.    hydrOXYzine (ATARAX) 50 MG tablet Take by mouth.    pantoprazole (PROTONIX) 20 MG tablet Take 1 tablet (20 mg total) by mouth once daily.    rOPINIRole (REQUIP) 0.5 MG tablet Take 1 tablet by mouth in the evening    spironolactone (ALDACTONE) 50 MG tablet 1 tablet    thiamine 250 MG tablet Take 1 tablet (250 mg total) by mouth once daily.    tiotropium-olodateroL (STIOLTO RESPIMAT) 2.5-2.5 mcg/actuation Mist 2 puffs    VRAYLAR 1.5 mg Cap Take 1 capsule by mouth once daily.    incontinence pad, liner, disp Pads 1 Piece by Misc.(Non-Drug; Combo Route) route 3 (three) times daily. (Patient not taking: Reported on 7/7/2022)     No current facility-administered medications for this visit.         Objective Findings:    Vital Signs:  Vitals:    07/07/22 1450   BP: (!) 116/58   Pulse: 77   Resp: 18   Temp: 98.7 °F (37.1 °C)         Physical Exam  Vitals reviewed.   Constitutional:       Appearance: Normal appearance. She is not ill-appearing.   Cardiovascular:      Rate and Rhythm: Normal rate.      Pulses: Normal pulses.   Pulmonary:      Effort: Pulmonary effort is normal. No respiratory distress.   Abdominal:      General: Abdomen is flat. Bowel sounds are normal. There is no distension.      Palpations: Abdomen is soft. There is no mass.      Tenderness: There is no abdominal tenderness. There is no guarding.   Musculoskeletal:      Right lower leg: No edema.      Left lower leg: No  edema.   Skin:     Capillary Refill: Capillary refill takes less than 2 seconds.      Coloration: Skin is pale. Skin is not jaundiced.      Findings: No erythema.   Neurological:      General: No focal deficit present.      Mental Status: She is alert and oriented to person, place, and time.             Imaging:   Ultrasound liver    Nodular contour of the liver indicating cirrhotic change.  There is associated diffuse heterogeneity of the hepatic parenchymal echotexture.  Hepatopetal flow in the portal vein.  Previously demonstrated tips shunt is not demonstrated on this exam.  Common duct normal in size measuring 5 mm  2. Cholelithiasis    Endoscopy:  Will evaluate after TIPS stent  evaluation is compleetd    Assessment:   Alcoholic Liver Cirrhosis   Decompensated with ascites and variceal bleeding  S/p TIPS but most likely stent migration as the TIPS is no longer visualized on the recent ultrasound- Will Repeat ultrasound today to confirm recent scan findings  MELD-Na score of 14 Child Class B  HCC screen- No focal lesion on ultrasound in May and AFP negative- will repeat in 6 months   HE- no history, for primary prophylaxis continue Lactulose to target 2-3 soft BM/day   - strict abstinence of alcohol use  - No history of SBP- today no ascites noted continue furosemide 20 mga nd Spironolactone 50 mg.    Recommendations:  -  Labs today:    CBC, CMP, PT INR  AFP,     Transplant option discussed, will evaluate when MELD >15 or HCC found in the liver.  Return in 4 months    - avoid non-steroidal anti-inflammatory drugs (NSAIDs) such as ibuprofen, Motrin, naprosyn, Alleve due to the risk of kidney damage  - can take acetaminophen (Tylenol), no more than 2000 mg per day  - low sodium (salt) 2 gram per day diet  - nutrition: 25-30kcal (calorie per body body weight in kilogram) per day  - no need to restrict protein in diet  - high protein diet: 1.2-1.5 gram/kg (protein per body weight in kilogram) per day to prevent  muscle mass loss  - avoid fasting  - Late night snack with 50 gram of complex carbohydrates and protein  - resistance exercises for muscle strength  - avoid raw seafoods due to the risk of fatal Vibrio vulnificus infection  - ultrasound or MRI of the liver every 6 months for liver cancer screening (you are at risk of developing liver cancer due to scar tissue in the liver)  - Upper endoscopy every 1-2 years to screen for varices in the stomach and foodpipe which can burst and cause fatal bleeding

## 2022-07-08 ENCOUNTER — PATIENT MESSAGE (OUTPATIENT)
Dept: HEPATOLOGY | Facility: CLINIC | Age: 57
End: 2022-07-08
Payer: MEDICAID

## 2022-07-21 ENCOUNTER — PATIENT OUTREACH (OUTPATIENT)
Dept: ADMINISTRATIVE | Facility: HOSPITAL | Age: 57
End: 2022-07-21
Payer: MEDICAID

## 2022-07-26 ENCOUNTER — OFFICE VISIT (OUTPATIENT)
Dept: PRIMARY CARE CLINIC | Facility: CLINIC | Age: 57
End: 2022-07-26
Payer: MEDICAID

## 2022-07-26 VITALS
RESPIRATION RATE: 15 BRPM | DIASTOLIC BLOOD PRESSURE: 70 MMHG | OXYGEN SATURATION: 97 % | HEIGHT: 68 IN | TEMPERATURE: 98 F | WEIGHT: 223 LBS | HEART RATE: 58 BPM | BODY MASS INDEX: 33.8 KG/M2 | SYSTOLIC BLOOD PRESSURE: 137 MMHG

## 2022-07-26 DIAGNOSIS — R68.89 FORGETFULNESS: ICD-10-CM

## 2022-07-26 DIAGNOSIS — F39 MOOD DISORDER: ICD-10-CM

## 2022-07-26 DIAGNOSIS — K59.00 CONSTIPATION, UNSPECIFIED CONSTIPATION TYPE: ICD-10-CM

## 2022-07-26 DIAGNOSIS — K70.30 ALCOHOLIC CIRRHOSIS OF LIVER WITHOUT ASCITES: ICD-10-CM

## 2022-07-26 PROCEDURE — 3008F BODY MASS INDEX DOCD: CPT | Mod: CPTII,,, | Performed by: STUDENT IN AN ORGANIZED HEALTH CARE EDUCATION/TRAINING PROGRAM

## 2022-07-26 PROCEDURE — 3075F PR MOST RECENT SYSTOLIC BLOOD PRESS GE 130-139MM HG: ICD-10-PCS | Mod: CPTII,,, | Performed by: STUDENT IN AN ORGANIZED HEALTH CARE EDUCATION/TRAINING PROGRAM

## 2022-07-26 PROCEDURE — 99213 OFFICE O/P EST LOW 20 MIN: CPT | Mod: PBBFAC,PN | Performed by: STUDENT IN AN ORGANIZED HEALTH CARE EDUCATION/TRAINING PROGRAM

## 2022-07-26 PROCEDURE — 99214 PR OFFICE/OUTPT VISIT, EST, LEVL IV, 30-39 MIN: ICD-10-PCS | Mod: S$PBB,,, | Performed by: STUDENT IN AN ORGANIZED HEALTH CARE EDUCATION/TRAINING PROGRAM

## 2022-07-26 PROCEDURE — 99214 OFFICE O/P EST MOD 30 MIN: CPT | Mod: S$PBB,,, | Performed by: STUDENT IN AN ORGANIZED HEALTH CARE EDUCATION/TRAINING PROGRAM

## 2022-07-26 PROCEDURE — 1159F MED LIST DOCD IN RCRD: CPT | Mod: CPTII,,, | Performed by: STUDENT IN AN ORGANIZED HEALTH CARE EDUCATION/TRAINING PROGRAM

## 2022-07-26 PROCEDURE — 3078F DIAST BP <80 MM HG: CPT | Mod: CPTII,,, | Performed by: STUDENT IN AN ORGANIZED HEALTH CARE EDUCATION/TRAINING PROGRAM

## 2022-07-26 PROCEDURE — 3008F PR BODY MASS INDEX (BMI) DOCUMENTED: ICD-10-PCS | Mod: CPTII,,, | Performed by: STUDENT IN AN ORGANIZED HEALTH CARE EDUCATION/TRAINING PROGRAM

## 2022-07-26 PROCEDURE — 99999 PR PBB SHADOW E&M-EST. PATIENT-LVL III: ICD-10-PCS | Mod: PBBFAC,,, | Performed by: STUDENT IN AN ORGANIZED HEALTH CARE EDUCATION/TRAINING PROGRAM

## 2022-07-26 PROCEDURE — 1160F RVW MEDS BY RX/DR IN RCRD: CPT | Mod: CPTII,,, | Performed by: STUDENT IN AN ORGANIZED HEALTH CARE EDUCATION/TRAINING PROGRAM

## 2022-07-26 PROCEDURE — 3078F PR MOST RECENT DIASTOLIC BLOOD PRESSURE < 80 MM HG: ICD-10-PCS | Mod: CPTII,,, | Performed by: STUDENT IN AN ORGANIZED HEALTH CARE EDUCATION/TRAINING PROGRAM

## 2022-07-26 PROCEDURE — 1159F PR MEDICATION LIST DOCUMENTED IN MEDICAL RECORD: ICD-10-PCS | Mod: CPTII,,, | Performed by: STUDENT IN AN ORGANIZED HEALTH CARE EDUCATION/TRAINING PROGRAM

## 2022-07-26 PROCEDURE — 1160F PR REVIEW ALL MEDS BY PRESCRIBER/CLIN PHARMACIST DOCUMENTED: ICD-10-PCS | Mod: CPTII,,, | Performed by: STUDENT IN AN ORGANIZED HEALTH CARE EDUCATION/TRAINING PROGRAM

## 2022-07-26 PROCEDURE — 99999 PR PBB SHADOW E&M-EST. PATIENT-LVL III: CPT | Mod: PBBFAC,,, | Performed by: STUDENT IN AN ORGANIZED HEALTH CARE EDUCATION/TRAINING PROGRAM

## 2022-07-26 PROCEDURE — 3075F SYST BP GE 130 - 139MM HG: CPT | Mod: CPTII,,, | Performed by: STUDENT IN AN ORGANIZED HEALTH CARE EDUCATION/TRAINING PROGRAM

## 2022-07-26 RX ORDER — LACTULOSE 10 G/15ML
30 SOLUTION ORAL; RECTAL 3 TIMES DAILY
Qty: 4050 ML | Refills: 1 | Status: SHIPPED | OUTPATIENT
Start: 2022-07-26 | End: 2022-08-25

## 2022-07-26 RX ORDER — CARIPRAZINE 1.5 MG/1
1.5 CAPSULE, GELATIN COATED ORAL DAILY
Qty: 30 CAPSULE | Refills: 0 | Status: SHIPPED | OUTPATIENT
Start: 2022-07-26 | End: 2022-08-25

## 2022-07-26 NOTE — ASSESSMENT & PLAN NOTE
Currently sees the following behavioral specialists; Dr. Chas Abarca [facility] and Melody at [facility].   Patient has tried Abilify and did not tolerate and was placed on Vryalar. Patient has been feeling well on Vraylar, but has run of meds. She has been off for 5 days and can feel the effects of not taking the medication. Denies SI/HI. Will refill today. Patient has follow up on 8/6/22.

## 2022-07-26 NOTE — ASSESSMENT & PLAN NOTE
Wt Readings from Last 3 Encounters:   07/26/22 0858 101.2 kg (223 lb)   07/07/22 1450 101.4 kg (223 lb 8.7 oz)   05/24/22 0826 101.7 kg (224 lb 3.3 oz)   - stay physically active  - maintain daily adequate hydration 1.5 to 2L fluid volume  - as tolerated incorporate resistance training with stretching and cardio  - goal of 150 minutes per week of moderate intensity activity or 7,500 - 10,000 steps per day  - follow the Mediterranean diet  - include whole fresh fruits, vegetables, olive oil, seeds, nuts, whole grains, cold water fish, salmon, mackerel and lean cuts of meat    - do not drink sugary/diet carbonated beverages  - decrease portion sizes slightly which will result in an approximately 500-calorie deficit  - avoid fast or fried and processed food, especially canned foods   - avoid refined carbohydrates, white starchy foods, flour, white potato, bread, muffins, and cakes

## 2022-07-26 NOTE — ASSESSMENT & PLAN NOTE
Now being followed by hepatology. Monitoring MELD score for transplant listing. Per patient, TIPS and its function to be evaluated on 8/1/22.

## 2022-07-26 NOTE — PROGRESS NOTES
[facility] Note    Chief Complaint      Chief Complaint   Patient presents with    Follow-up     Patient reports that she saw the liver transplant doctor at [facility] in [place], and that she is on new mental health meds.        History of Present Illness      Shonda Borrego is a 56 y.o. female who presents today for Follow-up (Patient reports that she saw the liver transplant doctor at [facility] in [place], and that she is on new mental health meds. )    Past Medical History:  Past Medical History:   Diagnosis Date    Anxiety     Bipolar disorder     Cirrhosis     GERD (gastroesophageal reflux disease)     Restless leg syndrome     Urinary frequency        Past Surgical History:   has a past surgical history that includes TIPS procedure.    Family History:  family history includes Cancer in her sister; Lung cancer in her mother and sister; No Known Problems in her daughter, father, maternal aunt, maternal grandfather, maternal grandmother, maternal uncle, paternal aunt, paternal grandfather, paternal grandmother, and paternal uncle.     Social History:  Social History     Tobacco Use    Smoking status: Current Every Day Smoker    Smokeless tobacco: Never Used   Substance Use Topics    Alcohol use: Not Currently     Comment: socially    Drug use: Never     I personally reviewed all past medical, surgical, social and family history.    Review of Systems   Constitutional: Negative for chills, fever, malaise/fatigue and weight loss.   HENT: Negative for congestion, ear discharge, ear pain, sinus pain and sore throat.    Eyes: Negative for blurred vision, pain, discharge and redness.   Respiratory: Negative for cough, hemoptysis, sputum production, shortness of breath, wheezing and stridor.    Cardiovascular: Negative for chest pain, palpitations and leg swelling.   Gastrointestinal: Negative for abdominal pain, blood in stool, constipation, diarrhea, nausea and vomiting.    Genitourinary: Negative for dysuria, frequency and hematuria.   Musculoskeletal: Negative for back pain, falls, joint pain, myalgias and neck pain.   Skin: Negative.  Negative for rash.   Neurological: Negative for dizziness, tingling, focal weakness, seizures, weakness and headaches.   Endo/Heme/Allergies: Negative for environmental allergies and polydipsia. Does not bruise/bleed easily.   Psychiatric/Behavioral: Positive for memory loss. Negative for depression, hallucinations, substance abuse and suicidal ideas. The patient is nervous/anxious (mildly). Insomnia: denies hypersomnia.       Medications:  Outpatient Encounter Medications as of 7/26/2022   Medication Sig Dispense Refill    albuterol (PROVENTIL/VENTOLIN HFA) 90 mcg/actuation inhaler Inhale into the lungs.      hydrOXYzine (ATARAX) 50 MG tablet Take by mouth.      pantoprazole (PROTONIX) 20 MG tablet Take 1 tablet (20 mg total) by mouth once daily. 30 tablet 3    rOPINIRole (REQUIP) 0.5 MG tablet Take 1 tablet by mouth in the evening 30 tablet 0    spironolactone (ALDACTONE) 50 MG tablet 1 tablet      tiotropium-olodateroL (STIOLTO RESPIMAT) 2.5-2.5 mcg/actuation Mist 2 puffs      [DISCONTINUED] VRAYLAR 1.5 mg Cap Take 1 capsule by mouth once daily.      cholestyramine, with sugar, 4 gram Powd Take 4 g by mouth 3 (three) times daily. (Patient not taking: Reported on 7/26/2022) 348 g 3    fesoterodine (TOVIAZ) 4 mg Tb24 Take 1 tablet (4 mg total) by mouth once daily. 30 tablet 3    furosemide (LASIX) 20 MG tablet Take 1 tablet (20 mg total) by mouth once daily. 30 tablet 1    incontinence pad, liner, disp Pads 1 Piece by Misc.(Non-Drug; Combo Route) route 3 (three) times daily. (Patient not taking: No sig reported) 72 each 3    lactulose (CONSTULOSE) 10 gram/15 mL solution Take 45 mLs (30 g total) by mouth 3 (three) times daily. 4050 mL 1    VRAYLAR 1.5 mg Cap Take 1 capsule (1.5 mg total) by mouth once daily. 30 capsule 0     No  "facility-administered encounter medications on file as of 7/26/2022.       Allergies:  Review of patient's allergies indicates:  No Known Allergies    Health Maintenance:  Immunization History   Administered Date(s) Administered    COVID-19 MRNA, LN-S PF (MODERNA HALF 0.25 ML DOSE) 12/09/2021    COVID-19, MRNA, LN-S, PF (MODERNA FULL 0.5 ML DOSE) 05/12/2021    COVID-19, MRNA, LN-S, PF (Pfizer) (Gray Cap) 06/03/2022    COVID-19, MRNA, LN-S, PF (Pfizer) (Purple Cap) 06/09/2021    Influenza - Trivalent (ADULT) 10/20/2021    Tdap 03/03/2022    Zoster Recombinant 03/03/2022      Health Maintenance   Topic Date Due    Hepatitis C Screening  Never done    Lipid Panel  Never done    Mammogram  08/18/2022    TETANUS VACCINE  03/03/2032      Physical Exam      Vital Signs  Temp: 98.2 °F (36.8 °C)  Temp src: Oral  Pulse: (!) 58  Resp: 15  SpO2: 97 %  BP: 137/70  Pain Score: 0-No pain  Height and Weight  Height: 5' 8" (172.7 cm)  Weight: 101.2 kg (223 lb)  BSA (Calculated - sq m): 2.2 sq meters  BMI (Calculated): 33.9  Weight in (lb) to have BMI = 25: 164.1]    Physical Exam  Vitals reviewed.   Constitutional:       General: She is not in acute distress.     Appearance: Normal appearance. She is normal weight. She is not ill-appearing or toxic-appearing.   HENT:      Head: Normocephalic and atraumatic.      Right Ear: External ear normal.      Left Ear: External ear normal.      Nose: Nose normal.      Mouth/Throat:      Mouth: Mucous membranes are moist.      Pharynx: Oropharynx is clear.   Eyes:      Extraocular Movements: Extraocular movements intact.      Conjunctiva/sclera: Conjunctivae normal.   Cardiovascular:      Rate and Rhythm: Normal rate and regular rhythm.      Pulses: Normal pulses.      Heart sounds: Normal heart sounds.      Comments: +DP, PT pulses bilaterally  Pulmonary:      Effort: Pulmonary effort is normal. No respiratory distress.      Breath sounds: No stridor. No wheezing, rhonchi or rales. "   Abdominal:      General: Abdomen is flat. Bowel sounds are normal. There is no distension.      Palpations: Abdomen is soft.      Tenderness: There is no abdominal tenderness. There is no right CVA tenderness, left CVA tenderness or guarding.      Comments: No fluid sign   Musculoskeletal:         General: No swelling or tenderness. Normal range of motion.      Cervical back: Normal range of motion and neck supple. No rigidity or tenderness.      Right lower leg: No edema.      Left lower leg: No edema.   Skin:     General: Skin is warm and dry.      Capillary Refill: Capillary refill takes less than 2 seconds.      Coloration: Skin is not jaundiced.      Comments: sun drenched skin throughout, feet bilaterally, dorsum with punctum hyperpigmentation, ROM intact in lower extremities, hip, knee, ankle and distal toes, no swelling throughout   Neurological:      General: No focal deficit present.      Mental Status: She is alert and oriented to person, place, and time. Mental status is at baseline.      Motor: No weakness.      Gait: Gait normal.   Psychiatric:         Mood and Affect: Mood normal.         Behavior: Behavior normal.         Thought Content: Thought content normal.         Judgment: Judgment normal.      Laboratory:  CBC:  Recent Labs   Lab 12/16/21  1202 04/20/22  1303 07/07/22  1547   WBC 5.26 6.39 5.80   RBC 4.29 5.00 4.61   Hemoglobin 13.4 15.2 14.0   Hematocrit 39.9 44.8 41.7   Platelets 111 L 123 L 128 L   MCV 93 90 91   MCH 31.2 H 30.4 30.4   MCHC 33.6 33.9 33.6     CMP:  Recent Labs   Lab 12/16/21  1202 04/20/22  1303 07/07/22  1547   Glucose 99 112 H 193 H   Calcium 8.8 9.6 9.3   Albumin 2.6 L 3.1 L 3.2 L   Total Protein 6.7 7.8 6.9   Sodium 143 140 138   Potassium 3.7 3.7 3.8   CO2 28 28 26   Chloride 109 106 106   BUN 8 12 16   Alkaline Phosphatase 198 H 187 H 168 H   ALT 39 43 37   AST 73 H 54 H 57 H   Total Bilirubin 1.9 H 3.0 H 3.2 H     Assessment/Plan     Shonda Borrego is a 56  y.o.female with:    Problem List Items Addressed This Visit        Psychiatric    Mood disorder    Current Assessment & Plan     Currently sees the following behavioral specialists; Dr. Chas Abarca [facility] and Melody at [facility].   Patient has tried Abilify and did not tolerate and was placed on Vryalar. Patient has been feeling well on Vraylar, but has run of meds. She has been off for 5 days and can feel the effects of not taking the medication. Denies SI/HI. Will refill today. Patient has follow up on 8/6/22.              Relevant Medications    VRAYLAR 1.5 mg Cap       Endocrine    BMI 34.0-34.9,adult - Primary    Current Assessment & Plan     Wt Readings from Last 3 Encounters:   07/26/22 0858 101.2 kg (223 lb)   07/07/22 1450 101.4 kg (223 lb 8.7 oz)   05/24/22 0826 101.7 kg (224 lb 3.3 oz)   - stay physically active  - maintain daily adequate hydration 1.5 to 2L fluid volume  - as tolerated incorporate resistance training with stretching and cardio  - goal of 150 minutes per week of moderate intensity activity or 7,500 - 10,000 steps per day  - follow the Mediterranean diet  - include whole fresh fruits, vegetables, olive oil, seeds, nuts, whole grains, cold water fish, salmon, mackerel and lean cuts of meat    - do not drink sugary/diet carbonated beverages  - decrease portion sizes slightly which will result in an approximately 500-calorie deficit  - avoid fast or fried and processed food, especially canned foods   - avoid refined carbohydrates, white starchy foods, flour, white potato, bread, muffins, and cakes              GI    Alcoholic cirrhosis of liver without ascites    Current Assessment & Plan     Now being followed by hepatology. Monitoring MELD score for transplant listing. Per patient, TIPS and its function to be evaluated on 8/1/22.            Relevant Medications    lactulose (CONSTULOSE) 10 gram/15 mL solution    Constipation    Current Assessment & Plan     Patient has been stooling  "infrequently at every 2-3 days. Also complains of mild memory loss and "forgetfulness."  Speech not affected, no signs of active tremor. Patient with history of hyperammonemia. Will resume lactulose regimen.   - f/u two weeks or sooner with worsening symptoms           Relevant Medications    lactulose (CONSTULOSE) 10 gram/15 mL solution       Other    Forgetfulness    Current Assessment & Plan     See above constipation. Resume lactulose and monitor for worsening symptoms.                Other than changes above, cont current medications and maintain follow up with specialists.  No follow-ups on file.    Future Appointments   Date Time Provider Department Center   8/1/2022 11:00 AM [facility] OIC-US1 MASTER [facility]   11/8/2022  2:00 PM Shari Arreola MD [facility] HEPAT [facility] Kazumi G Yoshinaga, DO [facility]      Dr. Chas Abarca [facility] and Melody at [facility]            "

## 2022-07-26 NOTE — ASSESSMENT & PLAN NOTE
"Patient has been stooling infrequently at every 2-3 days. Also complains of mild memory loss and "forgetfulness."  Speech not affected, no signs of active tremor. Patient with history of hyperammonemia. Will resume lactulose regimen.   - f/u two weeks or sooner with worsening symptoms  "

## 2022-07-29 ENCOUNTER — TELEPHONE (OUTPATIENT)
Dept: PRIMARY CARE CLINIC | Facility: CLINIC | Age: 57
End: 2022-07-29
Payer: MEDICAID

## 2022-07-29 NOTE — TELEPHONE ENCOUNTER
Called patient to let her know that she may come  her medical records to bring with her to her liver doctor appt.  They have been scanned in.   The numbers that we have are not right.  The first number someone answered and stated whe have the wrong number. And the 2nd number is not in service

## 2022-08-12 ENCOUNTER — HOSPITAL ENCOUNTER (OUTPATIENT)
Dept: RADIOLOGY | Facility: HOSPITAL | Age: 57
Discharge: HOME | End: 2022-08-12
Attending: STUDENT IN AN ORGANIZED HEALTH CARE EDUCATION/TRAINING PROGRAM
Payer: MEDICAID

## 2022-08-12 DIAGNOSIS — K70.30 ALCOHOLIC CIRRHOSIS OF LIVER WITHOUT ASCITES: ICD-10-CM

## 2022-08-12 PROCEDURE — 93976 VASCULAR STUDY: CPT | Mod: 26,,, | Performed by: RADIOLOGY

## 2022-08-12 PROCEDURE — 76705 ECHO EXAM OF ABDOMEN: CPT | Mod: 26,XS,, | Performed by: RADIOLOGY

## 2022-08-12 PROCEDURE — 93976 VASCULAR STUDY: CPT | Mod: TC

## 2022-08-12 PROCEDURE — 93976 US LIVER WITH DOPPLER: ICD-10-PCS | Mod: 26,,, | Performed by: RADIOLOGY

## 2022-08-12 PROCEDURE — 76705 US LIVER WITH DOPPLER: ICD-10-PCS | Mod: 26,XS,, | Performed by: RADIOLOGY

## 2022-08-12 PROCEDURE — 76705 ECHO EXAM OF ABDOMEN: CPT | Mod: 59,TC

## 2022-08-18 DIAGNOSIS — K70.30 ALCOHOLIC CIRRHOSIS OF LIVER WITHOUT ASCITES: ICD-10-CM

## 2022-08-18 RX ORDER — SPIRONOLACTONE 50 MG/1
TABLET, FILM COATED ORAL
Qty: 90 TABLET | Refills: 1 | Status: SHIPPED | OUTPATIENT
Start: 2022-08-18 | End: 2023-02-20

## 2022-08-18 RX ORDER — FUROSEMIDE 20 MG/1
20 TABLET ORAL DAILY
Qty: 30 TABLET | Refills: 1 | Status: SHIPPED | OUTPATIENT
Start: 2022-08-18 | End: 2022-10-05

## 2022-08-19 ENCOUNTER — PATIENT MESSAGE (OUTPATIENT)
Dept: HEPATOLOGY | Facility: CLINIC | Age: 57
End: 2022-08-19
Payer: MEDICAID

## 2022-08-19 ENCOUNTER — TELEPHONE (OUTPATIENT)
Dept: HEPATOLOGY | Facility: CLINIC | Age: 57
End: 2022-08-19
Payer: MEDICAID

## 2022-08-19 DIAGNOSIS — K76.9 LIVER DISEASE, UNSPECIFIED: ICD-10-CM

## 2022-08-19 NOTE — TELEPHONE ENCOUNTER
I called patient today. No answer. Unable to get in touch with patient.  My Chart NIRMALA message sent.            ----- Message from Shari Arreola MD sent at 8/19/2022  7:32 AM CDT -----  Please inform patient:   Ultrasound showed cirrhosis and there is a small lump in the liver measuring 2 cm which needs further evaluation with MRI.  Tips is ok.   Clinic note is by Dr. Norah Rodriguez, [employer] fellow from [place].    Placed order for MRI, please get it done as soon as possible.

## 2022-08-25 ENCOUNTER — TELEPHONE (OUTPATIENT)
Dept: HEPATOLOGY | Facility: CLINIC | Age: 57
End: 2022-08-25
Payer: MEDICAID

## 2022-08-25 NOTE — TELEPHONE ENCOUNTER
Results printed and mailed to patient with a note that we need to schedule MRI to further evaluate the liver.  Hepatology clinic phone number given.

## 2022-09-01 ENCOUNTER — TELEPHONE (OUTPATIENT)
Dept: HEPATOLOGY | Facility: CLINIC | Age: 57
End: 2022-09-01
Payer: MEDICAID

## 2022-09-01 NOTE — TELEPHONE ENCOUNTER
----- Message from Kimmy West sent at 9/1/2022 12:28 PM CDT -----  Regarding: Speak with office  Contact: Shonda Gonzalez is calling to speak with office about letter received.    [phone]

## 2022-09-07 ENCOUNTER — HOSPITAL ENCOUNTER (OUTPATIENT)
Dept: RADIOLOGY | Facility: HOSPITAL | Age: 57
Discharge: HOME | End: 2022-09-07
Attending: INTERNAL MEDICINE
Payer: MEDICAID

## 2022-09-07 ENCOUNTER — TELEPHONE (OUTPATIENT)
Dept: HEPATOLOGY | Facility: CLINIC | Age: 57
End: 2022-09-07
Payer: MEDICAID

## 2022-09-07 DIAGNOSIS — K76.9 LIVER DISEASE, UNSPECIFIED: ICD-10-CM

## 2022-09-07 RX ORDER — GADOBUTROL 604.72 MG/ML
20 INJECTION INTRAVENOUS
Status: DISCONTINUED | OUTPATIENT
Start: 2022-09-07 | End: 2022-09-08 | Stop reason: HOSPADM

## 2022-09-07 NOTE — TELEPHONE ENCOUNTER
----- Message from Shane Young sent at 9/7/2022  3:58 PM CDT -----  Regarding: MRI ordered  Contact: Raymundo Nunes  Radiologist in MRI dept at [facility] is calling to state that their machine for MRI is incapable of conducting the abdominal exam ordered by provider. States that their machine is outdated. Also, confirmed by their lead Radiologist.      Contact: [phone]

## 2022-09-08 DIAGNOSIS — F41.9 ANXIETY: ICD-10-CM

## 2022-09-08 DIAGNOSIS — F41.9 ANXIETY DUE TO INVASIVE PROCEDURE: Primary | ICD-10-CM

## 2022-09-08 RX ORDER — ALPRAZOLAM 0.25 MG/1
0.25 TABLET ORAL 2 TIMES DAILY PRN
Qty: 2 TABLET | Refills: 0 | Status: SHIPPED | OUTPATIENT
Start: 2022-09-08 | End: 2022-10-05

## 2022-09-12 ENCOUNTER — TELEPHONE (OUTPATIENT)
Dept: HEPATOLOGY | Facility: CLINIC | Age: 57
End: 2022-09-12
Payer: MEDICAID

## 2022-09-12 NOTE — TELEPHONE ENCOUNTER
----- Message from Gadiel Sweeney sent at 9/12/2022  3:16 PM CDT -----  Regarding: call back  Pt call to speak with someone in regards to her MRI    CALL

## 2022-09-13 ENCOUNTER — TELEPHONE (OUTPATIENT)
Dept: HEPATOLOGY | Facility: CLINIC | Age: 57
End: 2022-09-13
Payer: MEDICAID

## 2022-09-13 NOTE — TELEPHONE ENCOUNTER
----- Message from Gadiel Sweeney sent at 9/13/2022  9:47 AM CDT -----  Regarding: call back  Pt call to speak with someone in regards to scheduling  MRI     CALL

## 2022-09-14 ENCOUNTER — TELEPHONE (OUTPATIENT)
Dept: HEPATOLOGY | Facility: CLINIC | Age: 57
End: 2022-09-14
Payer: MEDICAID

## 2022-09-14 NOTE — TELEPHONE ENCOUNTER
----- Message from Gadiel Sweeney sent at 9/14/2022  9:22 AM CDT -----  Regarding: call back  Pt call to speak with someone in regards to scheduling  MRI     CALL

## 2022-09-23 ENCOUNTER — TELEPHONE (OUTPATIENT)
Dept: HEPATOLOGY | Facility: CLINIC | Age: 57
End: 2022-09-23
Payer: MEDICAID

## 2022-09-23 DIAGNOSIS — R16.0 LIVER MASS: ICD-10-CM

## 2022-09-23 DIAGNOSIS — K70.30 ALCOHOLIC CIRRHOSIS OF LIVER WITHOUT ASCITES: Primary | ICD-10-CM

## 2022-09-23 NOTE — TELEPHONE ENCOUNTER
Patient: Shonda Borrego       MRN: [id number]      : 1965     Age: 57 y.o.  [address]  [facility] [id number]    Providers  Hepatologists: Shari Arreola MD    Priority of review: Cancer    Patient Transplant Status: Other will evaluate for transplant.     Reason for presentation: Initial staging for transplant    Clinical Summary: 56 y/o female with bipolar disorder, alcoholic cirrhosis, has a 2 cm liver mass on ultrasound and now MRI shows a 1.8 x 1.6 cm lass with concern for HCC.  Should we plan on treatment of lesion, should we monitor her lesion until it reaches 2 cm size before treating it so she can get MELD exception?  Last MELD in July was 15.  Labs ordered for current MELD.  Can start transplant evaluation in the meantime.   AFP 3.7, T bili 3.2.     Imaging to be reviewed: MRI 22    HCC Treatment History: none    ABO: NA    Platelets:   Lab Results   Component Value Date/Time     (L) 2022 03:47 PM     Creatinine:   Lab Results   Component Value Date/Time    CREATININE 1.3 2022 03:47 PM     Bilirubin:   Lab Results   Component Value Date/Time    BILITOT 3.2 (H) 2022 03:47 PM     AFP Last 3 each if available:   Lab Results   Component Value Date/Time    AFP 3.7 2022 03:47 PM    AFP 5.4 2022 01:03 PM    AFP 4.4 10/15/2021 02:16 PM    AFP 4.4 10/15/2021 02:16 PM       MELD: MELD-Na score: 15 at 2022  3:47 PM  MELD score: 15 at 2022  3:47 PM  Calculated from:  Serum Creatinine: 1.3 mg/dL at 2022  3:47 PM  Serum Sodium: 138 mmol/L (Using max of 137 mmol/L) at 2022  3:47 PM  Total Bilirubin: 3.2 mg/dL at 2022  3:47 PM  INR(ratio): 1.2 at 2022  3:47 PM  Age: 56 years    Plan:     Follow-up Provider:

## 2022-09-26 ENCOUNTER — LAB VISIT (OUTPATIENT)
Dept: LAB | Facility: HOSPITAL | Age: 57
End: 2022-09-26
Attending: PHYSICIAN ASSISTANT
Payer: MEDICAID

## 2022-09-26 DIAGNOSIS — K70.30 ALCOHOLIC CIRRHOSIS OF LIVER WITHOUT ASCITES: ICD-10-CM

## 2022-09-26 DIAGNOSIS — R16.0 LIVER MASS: ICD-10-CM

## 2022-09-26 LAB
ALBUMIN SERPL BCP-MCNC: 3.1 G/DL (ref 3.5–5.2)
ALP SERPL-CCNC: 150 U/L (ref 55–135)
ALT SERPL W/O P-5'-P-CCNC: 43 U/L (ref 10–44)
ANION GAP SERPL CALC-SCNC: 1 MMOL/L (ref 8–16)
AST SERPL-CCNC: 51 U/L (ref 10–40)
BASOPHILS # BLD AUTO: 0.06 K/UL (ref 0–0.2)
BASOPHILS NFR BLD: 0.8 % (ref 0–1.9)
BILIRUB SERPL-MCNC: 4 MG/DL (ref 0.1–1)
BUN SERPL-MCNC: 15 MG/DL (ref 6–20)
CALCIUM SERPL-MCNC: 9.1 MG/DL (ref 8.7–10.5)
CHLORIDE SERPL-SCNC: 111 MMOL/L (ref 95–110)
CO2 SERPL-SCNC: 28 MMOL/L (ref 23–29)
CREAT SERPL-MCNC: 1 MG/DL (ref 0.5–1.4)
DIFFERENTIAL METHOD: ABNORMAL
EOSINOPHIL # BLD AUTO: 0.1 K/UL (ref 0–0.5)
EOSINOPHIL NFR BLD: 1.3 % (ref 0–8)
ERYTHROCYTE [DISTWIDTH] IN BLOOD BY AUTOMATED COUNT: 15.1 % (ref 11.5–14.5)
EST. GFR  (NO RACE VARIABLE): >60 ML/MIN/1.73 M^2
GLUCOSE SERPL-MCNC: 108 MG/DL (ref 70–110)
HCT VFR BLD AUTO: 44 % (ref 37–48.5)
HGB BLD-MCNC: 15.4 G/DL (ref 12–16)
IMM GRANULOCYTES # BLD AUTO: 0.02 K/UL (ref 0–0.04)
IMM GRANULOCYTES NFR BLD AUTO: 0.3 % (ref 0–0.5)
INR PPP: 1.3 (ref 0.8–1.2)
LYMPHOCYTES # BLD AUTO: 1.7 K/UL (ref 1–4.8)
LYMPHOCYTES NFR BLD: 22.9 % (ref 18–48)
MCH RBC QN AUTO: 30.4 PG (ref 27–31)
MCHC RBC AUTO-ENTMCNC: 35 G/DL (ref 32–36)
MCV RBC AUTO: 87 FL (ref 82–98)
MONOCYTES # BLD AUTO: 1 K/UL (ref 0.3–1)
MONOCYTES NFR BLD: 13 % (ref 4–15)
NEUTROPHILS # BLD AUTO: 4.6 K/UL (ref 1.8–7.7)
NEUTROPHILS NFR BLD: 61.7 % (ref 38–73)
NRBC BLD-RTO: 0 /100 WBC
PLATELET # BLD AUTO: 98 K/UL (ref 150–450)
PLATELET BLD QL SMEAR: ABNORMAL
PMV BLD AUTO: 10.9 FL (ref 9.2–12.9)
POTASSIUM SERPL-SCNC: 4.4 MMOL/L (ref 3.5–5.1)
PROT SERPL-MCNC: 7 G/DL (ref 6–8.4)
PROTHROMBIN TIME: 13.5 SEC (ref 9–12.5)
RBC # BLD AUTO: 5.06 M/UL (ref 4–5.4)
SODIUM SERPL-SCNC: 140 MMOL/L (ref 136–145)
WBC # BLD AUTO: 7.41 K/UL (ref 3.9–12.7)

## 2022-09-26 PROCEDURE — 85610 PROTHROMBIN TIME: CPT | Performed by: INTERNAL MEDICINE

## 2022-09-26 PROCEDURE — 36415 COLL VENOUS BLD VENIPUNCTURE: CPT | Performed by: INTERNAL MEDICINE

## 2022-09-26 PROCEDURE — 80053 COMPREHEN METABOLIC PANEL: CPT | Performed by: INTERNAL MEDICINE

## 2022-09-26 PROCEDURE — 85025 COMPLETE CBC W/AUTO DIFF WBC: CPT | Performed by: INTERNAL MEDICINE

## 2022-09-29 ENCOUNTER — TELEPHONE (OUTPATIENT)
Dept: TRANSPLANT | Facility: CLINIC | Age: 57
End: 2022-09-29
Payer: MEDICAID

## 2022-09-29 ENCOUNTER — TELEPHONE (OUTPATIENT)
Dept: HEPATOLOGY | Facility: CLINIC | Age: 57
End: 2022-09-29
Payer: MEDICAID

## 2022-09-29 DIAGNOSIS — K83.1 CHOLESTASIS: Primary | ICD-10-CM

## 2022-09-29 RX ORDER — URSODIOL 300 MG/1
300 CAPSULE ORAL 3 TIMES DAILY
Qty: 90 CAPSULE | Refills: 3 | Status: SHIPPED | OUTPATIENT
Start: 2022-09-29 | End: 2023-03-23 | Stop reason: SDUPTHER

## 2022-09-30 ENCOUNTER — TELEPHONE (OUTPATIENT)
Dept: HEPATOLOGY | Facility: CLINIC | Age: 57
End: 2022-09-30
Payer: MEDICAID

## 2022-09-30 DIAGNOSIS — K70.30 ALCOHOLIC CIRRHOSIS OF LIVER WITHOUT ASCITES: ICD-10-CM

## 2022-09-30 DIAGNOSIS — K70.30 ALCOHOLIC CIRRHOSIS OF LIVER WITHOUT ASCITES: Primary | ICD-10-CM

## 2022-09-30 DIAGNOSIS — K76.9 LIVER DISEASE, UNSPECIFIED: Primary | ICD-10-CM

## 2022-09-30 NOTE — TELEPHONE ENCOUNTER
----- Message from Shari Arreola MD sent at 9/29/2022  4:47 PM CDT -----  I have called the patient, and explained to her the 1.8 cm lesion in the liver was reviewed in our conference, and it appears to be consistent with HCC.  We have the option to treat it now or wait for it to grow to at least 2 cm, and then treat it.  Latter option is for people who are interested in liver transplant.  She stated that she wants a liver transplant.  Please schedule her for abd MRI w/wo contrast and AFP to be done in 3 months around 12/23/2022.  Thanks

## 2022-09-30 NOTE — TELEPHONE ENCOUNTER
Per Dr Arreola,    I informed patient that her blood test results show normal blood cell counts, except low platelet count, which is usually seen with cirrhosis.    Liver chemistries are elevated, which we can try to improve with a natural bile acid called ursodiol, 300 mg tablet 3 tomes daily. Sending prescription to her pharmacy.   We should do the repeat MRI in 2 months instead of 2 months.        Patient notified.

## 2022-09-30 NOTE — TELEPHONE ENCOUNTER
MELD-Na score: 15 at 9/26/2022  2:12 PM  MELD score: 15 at 9/26/2022  2:12 PM  Calculated from:  Serum Creatinine: 1.0 mg/dL at 9/26/2022  2:12 PM  Serum Sodium: 140 mmol/L (Using max of 137 mmol/L) at 9/26/2022  2:12 PM  Total Bilirubin: 4.0 mg/dL at 9/26/2022  2:12 PM  INR(ratio): 1.3 at 9/26/2022  2:12 PM  Age: 57 years     Ursodiol 300 mg TID with meals, escript sent to pharmacy.   This is to bring down total bilirubin, before locoregional treatment.  Please let patient know to  medication. Call us if any problems.     Shari Arreola MD   Hepatology

## 2022-10-03 DIAGNOSIS — F17.210 CONTINUOUS DEPENDENCE ON CIGARETTE SMOKING: Primary | ICD-10-CM

## 2022-10-03 RX ORDER — MICONAZOLE NITRATE 2 %
2 CREAM (GRAM) TOPICAL
Qty: 90 EACH | Refills: 5 | Status: SHIPPED | OUTPATIENT
Start: 2022-10-03 | End: 2022-10-05 | Stop reason: SDUPTHER

## 2022-10-05 ENCOUNTER — OFFICE VISIT (OUTPATIENT)
Dept: PRIMARY CARE CLINIC | Facility: CLINIC | Age: 57
End: 2022-10-05
Payer: MEDICAID

## 2022-10-05 VITALS
SYSTOLIC BLOOD PRESSURE: 137 MMHG | HEART RATE: 68 BPM | RESPIRATION RATE: 14 BRPM | HEIGHT: 68 IN | OXYGEN SATURATION: 95 % | TEMPERATURE: 99 F | BODY MASS INDEX: 33.04 KG/M2 | DIASTOLIC BLOOD PRESSURE: 61 MMHG | WEIGHT: 218 LBS

## 2022-10-05 DIAGNOSIS — K70.30 ALCOHOLIC CIRRHOSIS OF LIVER WITHOUT ASCITES: ICD-10-CM

## 2022-10-05 DIAGNOSIS — F17.210 CONTINUOUS DEPENDENCE ON CIGARETTE SMOKING: ICD-10-CM

## 2022-10-05 DIAGNOSIS — M79.89 LEG SWELLING: ICD-10-CM

## 2022-10-05 DIAGNOSIS — G25.81 RESTLESS LEG SYNDROME: ICD-10-CM

## 2022-10-05 DIAGNOSIS — Z12.31 SCREENING MAMMOGRAM, ENCOUNTER FOR: ICD-10-CM

## 2022-10-05 DIAGNOSIS — F41.9 ANXIETY DUE TO INVASIVE PROCEDURE: ICD-10-CM

## 2022-10-05 DIAGNOSIS — F17.218 CIGARETTE NICOTINE DEPENDENCE WITH OTHER NICOTINE-INDUCED DISORDER: ICD-10-CM

## 2022-10-05 DIAGNOSIS — L60.2 THICKENED NAIL: Primary | ICD-10-CM

## 2022-10-05 DIAGNOSIS — F39 MOOD DISORDER: ICD-10-CM

## 2022-10-05 DIAGNOSIS — K59.00 CONSTIPATION, UNSPECIFIED CONSTIPATION TYPE: ICD-10-CM

## 2022-10-05 PROCEDURE — 3075F PR MOST RECENT SYSTOLIC BLOOD PRESS GE 130-139MM HG: ICD-10-PCS | Mod: CPTII,,, | Performed by: STUDENT IN AN ORGANIZED HEALTH CARE EDUCATION/TRAINING PROGRAM

## 2022-10-05 PROCEDURE — 3075F SYST BP GE 130 - 139MM HG: CPT | Mod: CPTII,,, | Performed by: STUDENT IN AN ORGANIZED HEALTH CARE EDUCATION/TRAINING PROGRAM

## 2022-10-05 PROCEDURE — 3008F BODY MASS INDEX DOCD: CPT | Mod: CPTII,,, | Performed by: STUDENT IN AN ORGANIZED HEALTH CARE EDUCATION/TRAINING PROGRAM

## 2022-10-05 PROCEDURE — 99214 OFFICE O/P EST MOD 30 MIN: CPT | Mod: S$PBB,,, | Performed by: STUDENT IN AN ORGANIZED HEALTH CARE EDUCATION/TRAINING PROGRAM

## 2022-10-05 PROCEDURE — 99999 PR PBB SHADOW E&M-EST. PATIENT-LVL IV: CPT | Mod: PBBFAC,,, | Performed by: STUDENT IN AN ORGANIZED HEALTH CARE EDUCATION/TRAINING PROGRAM

## 2022-10-05 PROCEDURE — 3078F DIAST BP <80 MM HG: CPT | Mod: CPTII,,, | Performed by: STUDENT IN AN ORGANIZED HEALTH CARE EDUCATION/TRAINING PROGRAM

## 2022-10-05 PROCEDURE — 3008F PR BODY MASS INDEX (BMI) DOCUMENTED: ICD-10-PCS | Mod: CPTII,,, | Performed by: STUDENT IN AN ORGANIZED HEALTH CARE EDUCATION/TRAINING PROGRAM

## 2022-10-05 PROCEDURE — 3078F PR MOST RECENT DIASTOLIC BLOOD PRESSURE < 80 MM HG: ICD-10-PCS | Mod: CPTII,,, | Performed by: STUDENT IN AN ORGANIZED HEALTH CARE EDUCATION/TRAINING PROGRAM

## 2022-10-05 PROCEDURE — 99999 PR PBB SHADOW E&M-EST. PATIENT-LVL IV: ICD-10-PCS | Mod: PBBFAC,,, | Performed by: STUDENT IN AN ORGANIZED HEALTH CARE EDUCATION/TRAINING PROGRAM

## 2022-10-05 PROCEDURE — 1159F MED LIST DOCD IN RCRD: CPT | Mod: CPTII,,, | Performed by: STUDENT IN AN ORGANIZED HEALTH CARE EDUCATION/TRAINING PROGRAM

## 2022-10-05 PROCEDURE — 99214 OFFICE O/P EST MOD 30 MIN: CPT | Mod: PBBFAC,PN | Performed by: STUDENT IN AN ORGANIZED HEALTH CARE EDUCATION/TRAINING PROGRAM

## 2022-10-05 PROCEDURE — 99214 PR OFFICE/OUTPT VISIT, EST, LEVL IV, 30-39 MIN: ICD-10-PCS | Mod: S$PBB,,, | Performed by: STUDENT IN AN ORGANIZED HEALTH CARE EDUCATION/TRAINING PROGRAM

## 2022-10-05 PROCEDURE — 1159F PR MEDICATION LIST DOCUMENTED IN MEDICAL RECORD: ICD-10-PCS | Mod: CPTII,,, | Performed by: STUDENT IN AN ORGANIZED HEALTH CARE EDUCATION/TRAINING PROGRAM

## 2022-10-05 RX ORDER — CARIPRAZINE 3 MG/1
3 CAPSULE, GELATIN COATED ORAL EVERY MORNING
COMMUNITY
Start: 2022-09-17 | End: 2023-02-07 | Stop reason: SDUPTHER

## 2022-10-05 RX ORDER — ALPRAZOLAM 0.25 MG/1
0.5 TABLET ORAL 2 TIMES DAILY PRN
Qty: 2 TABLET | Refills: 0 | Status: SHIPPED | OUTPATIENT
Start: 2022-10-05 | End: 2023-01-20 | Stop reason: ALTCHOICE

## 2022-10-05 RX ORDER — ESCITALOPRAM OXALATE 5 MG/1
5 TABLET ORAL DAILY
COMMUNITY
Start: 2022-09-26 | End: 2022-12-14

## 2022-10-05 RX ORDER — MICONAZOLE NITRATE 2 %
2 CREAM (GRAM) TOPICAL
Qty: 90 EACH | Refills: 5 | Status: SHIPPED | OUTPATIENT
Start: 2022-10-05 | End: 2022-10-13 | Stop reason: SDUPTHER

## 2022-10-05 RX ORDER — ROPINIROLE 0.5 MG/1
0.5 TABLET, FILM COATED ORAL NIGHTLY
Qty: 90 TABLET | Refills: 1 | Status: SHIPPED | OUTPATIENT
Start: 2022-10-05 | End: 2022-11-22 | Stop reason: SDUPTHER

## 2022-10-05 RX ORDER — LACTULOSE 10 G/15ML
SOLUTION ORAL
COMMUNITY
Start: 2022-09-07 | End: 2022-10-05 | Stop reason: ALTCHOICE

## 2022-10-05 RX ORDER — FUROSEMIDE 20 MG/1
20 TABLET ORAL 2 TIMES DAILY
Qty: 60 TABLET | Refills: 2 | Status: SHIPPED | OUTPATIENT
Start: 2022-10-05 | End: 2023-01-20 | Stop reason: ALTCHOICE

## 2022-10-05 NOTE — ASSESSMENT & PLAN NOTE
Wt Readings from Last 3 Encounters:   10/05/22 1009 98.9 kg (218 lb)   07/26/22 0858 101.2 kg (223 lb)   07/07/22 1450 101.4 kg (223 lb 8.7 oz)   - stay physically active  - maintain daily adequate hydration 1.5 to 2L fluid volume  - follow low-sodium diet  - incorporate protein in each meal  - as tolerated incorporate resistance training with stretching and cardio  - goal of 150 minutes per week of moderate intensity activity or 7,500 - 10,000 steps per day  - follow the Mediterranean diet  - include whole fresh fruits, vegetables, olive oil, seeds, nuts, whole grains, cold water fish, salmon, mackerel and lean cuts of meat    - do not drink sugary/diet carbonated beverages  - decrease portion sizes slightly which will result in an approximately 500-calorie deficit  - avoid fast or fried and processed food, especially canned foods   - avoid refined carbohydrates, white starchy foods, flour, white potato, bread, muffins, and cakes

## 2022-10-05 NOTE — ASSESSMENT & PLAN NOTE
Findings of lesion on liver, possible HCC and being worked up by hepatologist for liver transplant. MRI scan scheduled in couple of weeks. Patient's goal is to optimize her health in preparation for liver in the future.   - follow diet per hepatology  - f/u counseling for smoking cessation  - maintain adequate hydration and monitor daily BM and daily use of lactulose  - f/u hepatology notes

## 2022-10-05 NOTE — PROGRESS NOTES
[facility] Note    Chief Complaint      Chief Complaint   Patient presents with    Ingrown Toenail     Requesting referral to foot doctor. She has 3 overgrown thickened toenails bilaterally. Has tried soaking her feet in warm water to soften nails for trimming, but was unable to do that at her nail salon. Denies trauma to toes, cuts, ulcerations, swelling, bleeding. Pain associated with ingrown nature.     Follow-up     Recent hepatology visit.  Patient with diagnosis of HCC and is currently undergoing liver transplant workup.  She states she was recently started on Ursodiol and has an MRI of the liver scheduled in mid October.  She requests refill of her Lasix.  Also states the lactulose she was given can last visit has not helped with her constipation.  Denies fever, chills, dizziness, chest pain, palpitations, shortness of breath, excessive swelling over the abdomen or lower extremity, nausea and vomiting.       History of Present Illness      Shonda Borrego is a 57 y.o. female who presents today for Ingrown Toenail (Requesting referral to foot doctor. She has 3 overgrown thickened toenails bilaterally. Has tried soaking her feet in warm water to soften nails for trimming, but was unable to do that at her nail salon. Denies trauma to toes, cuts, ulcerations, swelling, bleeding. Pain associated with ingrown nature. ) and Follow-up (Recent hepatology visit.  Patient with diagnosis of HCC and is currently undergoing liver transplant workup.  She states she was recently started on Ursodiol and has an MRI of the liver scheduled in mid October.  She requests refill of her Lasix.  Also states the lactulose she was given can last visit has not helped with her constipation.  Denies fever, chills, dizziness, chest pain, palpitations, shortness of breath, excessive swelling over the abdomen or lower extremity, nausea and vomiting.)  Past Medical History:  Past Medical History:   Diagnosis Date    Anxiety      Bipolar disorder     Cirrhosis     GERD (gastroesophageal reflux disease)     Restless leg syndrome     Urinary frequency        Past Surgical History:   has a past surgical history that includes TIPS procedure.    Family History:  family history includes Cancer in her sister; Lung cancer in her mother and sister; No Known Problems in her daughter, father, maternal aunt, maternal grandfather, maternal grandmother, maternal uncle, paternal aunt, paternal grandfather, paternal grandmother, and paternal uncle.     Social History:  Social History     Tobacco Use    Smoking status: Every Day    Smokeless tobacco: Never   Substance Use Topics    Alcohol use: Not Currently     Comment: socially    Drug use: Never       I personally reviewed all past medical, surgical, social and family history.    Review of Systems   Constitutional:  Negative for chills, fever, malaise/fatigue and weight loss.   HENT:  Negative for congestion, ear discharge, ear pain, sinus pain and sore throat.    Eyes:  Negative for blurred vision, pain, discharge and redness.   Respiratory:  Negative for cough, hemoptysis, sputum production, shortness of breath, wheezing and stridor.    Cardiovascular:  Negative for chest pain, palpitations and leg swelling.   Gastrointestinal:  Positive for constipation (one every other day). Negative for abdominal pain, blood in stool, diarrhea, nausea and vomiting.   Genitourinary:  Negative for dysuria, frequency and hematuria.   Musculoskeletal:  Negative for back pain, falls, joint pain, myalgias and neck pain.   Skin: Negative.  Negative for rash.   Neurological:  Negative for dizziness, tingling, focal weakness, seizures, weakness and headaches.   Endo/Heme/Allergies:  Negative for environmental allergies and polydipsia. Does not bruise/bleed easily.   Psychiatric/Behavioral:  Positive for memory loss. Negative for depression, hallucinations, substance abuse and suicidal ideas. The patient is not  nervous/anxious (mildly) and does not have insomnia.       Medications:  Outpatient Encounter Medications as of 10/5/2022   Medication Sig Dispense Refill    albuterol (PROVENTIL/VENTOLIN HFA) 90 mcg/actuation inhaler Inhale into the lungs.      EScitalopram oxalate (LEXAPRO) 5 MG Tab Take 5 mg by mouth once daily.      spironolactone (ALDACTONE) 50 MG tablet 1 tablet 90 tablet 1    tiotropium-olodateroL (STIOLTO RESPIMAT) 2.5-2.5 mcg/actuation Mist 2 puffs      ursodioL (ACTIGALL) 300 mg capsule Take 1 capsule (300 mg total) by mouth 3 (three) times daily. 90 capsule 3    VRAYLAR 3 mg Cap Take 3 mg by mouth once daily.      [DISCONTINUED] CONSTULOSE 10 gram/15 mL solution Take by mouth.      [DISCONTINUED] rOPINIRole (REQUIP) 0.5 MG tablet Take 1 tablet by mouth in the evening 30 tablet 0    ALPRAZolam (XANAX) 0.25 MG tablet Take 2 tablets (0.5 mg total) by mouth 2 (two) times daily as needed for Anxiety. 2 tablet 0    fesoterodine (TOVIAZ) 4 mg Tb24 Take 1 tablet (4 mg total) by mouth once daily. (Patient not taking: Reported on 10/5/2022) 30 tablet 3    furosemide (LASIX) 20 MG tablet Take 1 tablet (20 mg total) by mouth 2 (two) times a day. 60 tablet 2    hydrOXYzine (ATARAX) 50 MG tablet Take 1 tablet by mouth 4 times daily (Patient not taking: Reported on 10/5/2022) 90 tablet 0    lactulose (CEPHULAC) 20 gram Pack Take 1 packet (20 g total) by mouth 3 (three) times daily. 90 packet 2    nicotine, polacrilex, (NICORETTE) 2 mg Gum Take 1 each (2 mg total) by mouth every 2 (two) hours. 90 each 5    pantoprazole (PROTONIX) 20 MG tablet Take 1 tablet (20 mg total) by mouth once daily. 30 tablet 3    rOPINIRole (REQUIP) 0.5 MG tablet Take 1 tablet (0.5 mg total) by mouth every evening. 90 tablet 1    [DISCONTINUED] ALPRAZolam (XANAX) 0.25 MG tablet Take 1 tablet (0.25 mg total) by mouth 2 (two) times daily as needed for Anxiety. (Patient not taking: Reported on 10/5/2022) 2 tablet 0    [DISCONTINUED] furosemide  "(LASIX) 20 MG tablet Take 1 tablet (20 mg total) by mouth once daily. 30 tablet 1    [DISCONTINUED] nicotine, polacrilex, (NICORETTE) 2 mg Gum Take 1 each (2 mg total) by mouth every 2 (two) hours. (Patient not taking: Reported on 10/5/2022) 90 each 5     No facility-administered encounter medications on file as of 10/5/2022.       Allergies:  Review of patient's allergies indicates:  No Known Allergies    Health Maintenance:  Immunization History   Administered Date(s) Administered    COVID-19 MRNA, LN-S PF (MODERNA HALF 0.25 ML DOSE) 12/09/2021    COVID-19, MRNA, LN-S, PF (MODERNA FULL 0.5 ML DOSE) 05/12/2021    COVID-19, MRNA, LN-S, PF (Pfizer) (Gray Cap) 06/03/2022    COVID-19, MRNA, LN-S, PF (Pfizer) (Purple Cap) 06/09/2021    COVID-19, mRNA, LNP-S, bivalent booster, PF (Moderna Omicron) 09/25/2022    Influenza - Quadrivalent - PF *Preferred* (6 months and older) 09/25/2022    Influenza - Trivalent (ADULT) 10/20/2021    Tdap 03/03/2022    Zoster Recombinant 03/03/2022, 09/25/2022      Health Maintenance   Topic Date Due    Hepatitis C Screening  Never done    Lipid Panel  Never done    Mammogram  08/18/2022    TETANUS VACCINE  03/03/2032        Physical Exam      Vital Signs  Temp: 98.5 °F (36.9 °C)  Temp src: Oral  Pulse: 68  Resp: 14  SpO2: 95 %  BP: 137/61  BP Location: Left arm  Patient Position: Sitting  Pain Score: 0-No pain  Height and Weight  Height: 5' 8" (172.7 cm)  Weight: 98.9 kg (218 lb)  BSA (Calculated - sq m): 2.18 sq meters  BMI (Calculated): 33.2  Weight in (lb) to have BMI = 25: 164.1]    Physical Exam  Vitals reviewed.   Constitutional:       General: She is not in acute distress.     Appearance: Normal appearance. She is normal weight. She is not ill-appearing or toxic-appearing.   HENT:      Head: Normocephalic and atraumatic.      Right Ear: External ear normal.      Left Ear: External ear normal.      Nose: Nose normal.      Mouth/Throat:      Mouth: Mucous membranes are moist.      " Pharynx: Oropharynx is clear.   Eyes:      Extraocular Movements: Extraocular movements intact.      Conjunctiva/sclera: Conjunctivae normal.   Cardiovascular:      Rate and Rhythm: Normal rate and regular rhythm.      Pulses: Normal pulses.      Heart sounds: Normal heart sounds.      Comments: +DP, PT pulses bilaterally  Pulmonary:      Effort: Pulmonary effort is normal. No respiratory distress.      Breath sounds: No stridor. No wheezing, rhonchi or rales.   Abdominal:      General: Abdomen is flat. Bowel sounds are normal. There is no distension.      Palpations: Abdomen is soft.      Tenderness: There is no abdominal tenderness. There is no right CVA tenderness, left CVA tenderness or guarding.      Comments: No fluid sign   Musculoskeletal:         General: No swelling or tenderness. Normal range of motion.      Cervical back: Normal range of motion and neck supple. No rigidity or tenderness.      Right lower leg: No edema.      Left lower leg: No edema.   Feet:      Right foot:      Toenail Condition: Right toenails are abnormally thick, long and ingrown.      Left foot:      Toenail Condition: Left toenails are abnormally thick, long and ingrown.   Skin:     General: Skin is warm and dry.      Capillary Refill: Capillary refill takes less than 2 seconds.      Coloration: Skin is not jaundiced.   Neurological:      General: No focal deficit present.      Mental Status: She is alert and oriented to person, place, and time. Mental status is at baseline.      Motor: No weakness.      Gait: Gait normal.   Psychiatric:         Mood and Affect: Mood normal.         Behavior: Behavior normal.         Thought Content: Thought content normal.         Judgment: Judgment normal.      Laboratory:  CBC:  Recent Labs   Lab 04/20/22  1303 07/07/22  1547 09/26/22  1412   WBC 6.39 5.80 7.41   RBC 5.00 4.61 5.06   Hemoglobin 15.2 14.0 15.4   Hematocrit 44.8 41.7 44.0   Platelets 123 L 128 L 98 L   MCV 90 91 87   MCH 30.4 30.4  30.4   MCHC 33.9 33.6 35.0     CMP:  Recent Labs   Lab 04/20/22  1303 07/07/22  1547 09/26/22  1412   Glucose 112 H 193 H 108   Calcium 9.6 9.3 9.1   Albumin 3.1 L 3.2 L 3.1 L   Total Protein 7.8 6.9 7.0   Sodium 140 138 140   Potassium 3.7 3.8 4.4   CO2 28 26 28   Chloride 106 106 111 H   BUN 12 16 15   Alkaline Phosphatase 187 H 168 H 150 H   ALT 43 37 43   AST 54 H 57 H 51 H   Total Bilirubin 3.0 H 3.2 H 4.0 H           Assessment/Plan     Shonda Borrego is a 57 y.o.female with:    Problem List Items Addressed This Visit          Neuro    Restless leg syndrome    Current Assessment & Plan     With current regimen she is waking up in the middle of the night with restless leg half the night.  Will increase Requip to 1 mg every night.  And continue to monitor symptom resolution.           Relevant Medications    rOPINIRole (REQUIP) 0.5 MG tablet       Psychiatric    Mood disorder    Current Assessment & Plan     Currently sees the following behavioral specialists; Dr. Chas Abarca [facility] and Melody at [facility].   Mood stable while on Vraylar.  States she was depressed the notification of liver cancer development, but now ready to fight the cancer as best as she can. Denies SI/HI.   - f/u 4 months            Endocrine    BMI 34.0-34.9,adult    Current Assessment & Plan     Wt Readings from Last 3 Encounters:   10/05/22 1009 98.9 kg (218 lb)   07/26/22 0858 101.2 kg (223 lb)   07/07/22 1450 101.4 kg (223 lb 8.7 oz)   - stay physically active  - maintain daily adequate hydration 1.5 to 2L fluid volume  - follow low-sodium diet  - incorporate protein in each meal  - as tolerated incorporate resistance training with stretching and cardio  - goal of 150 minutes per week of moderate intensity activity or 7,500 - 10,000 steps per day  - follow the Mediterranean diet  - include whole fresh fruits, vegetables, olive oil, seeds, nuts, whole grains, cold water fish, salmon, mackerel and lean cuts of meat    - do not  drink sugary/diet carbonated beverages  - decrease portion sizes slightly which will result in an approximately 500-calorie deficit  - avoid fast or fried and processed food, especially canned foods   - avoid refined carbohydrates, white starchy foods, flour, white potato, bread, muffins, and cakes            GI    Alcoholic cirrhosis of liver without ascites    Current Assessment & Plan     Findings of lesion on liver, possible HCC and being worked up by hepatologist for liver transplant. MRI scan scheduled in couple of weeks. Patient's goal is to optimize her health in preparation for liver in the future.   - follow diet per hepatology  - f/u counseling for smoking cessation  - maintain adequate hydration and monitor daily BM and daily use of lactulose  - f/u hepatology notes           Relevant Medications    lactulose (CEPHULAC) 20 gram Pack    furosemide (LASIX) 20 MG tablet    Constipation    Relevant Medications    lactulose (CEPHULAC) 20 gram Pack       Other    Dependence on nicotine from cigarettes    Current Assessment & Plan     Has scheduled counseling this week. Follow up notes. Patient would like to start with nicotine gum. Provided RX.             Other Visit Diagnoses       Screening mammogram, encounter for    -  Primary    Relevant Orders    Mammo Digital Screening Bilat    Continuous dependence on cigarette smoking        Relevant Medications    nicotine, polacrilex, (NICORETTE) 2 mg Gum    Leg swelling        Relevant Medications    furosemide (LASIX) 20 MG tablet    Anxiety due to invasive procedure        Relevant Medications    ALPRAZolam (XANAX) 0.25 MG tablet    Thickened nail        Relevant Orders    Ambulatory referral/consult to Podiatry            Other than changes above, cont current medications and maintain follow up with specialists.  No follow-ups on file.    Future Appointments   Date Time Provider Department Center   10/13/2022  9:00 AM CASS [facility] SMOKE [facility]   10/24/2022 10:20 AM [facility] MAMMO1 [facility]   11/28/2022  9:20 AM LAB, [facility]   11/29/2022  9:50 AM TG MRI1 TG MRI [place] [facility]   1/3/2023  2:00 PM Shari Arreola MD [facility] HEPAT [address]   2/7/2023  8:45 AM Geeta Gaines DO [facility] PRICAR [facility]       Geeta Gaines DO  [facility]

## 2022-10-05 NOTE — ASSESSMENT & PLAN NOTE
Has scheduled counseling this week. Follow up notes. Patient would like to start with nicotine gum. Provided RX.

## 2022-10-05 NOTE — ASSESSMENT & PLAN NOTE
With current regimen she is waking up in the middle of the night with restless leg half the night.  Will increase Requip to 1 mg every night.  And continue to monitor symptom resolution.

## 2022-10-05 NOTE — ASSESSMENT & PLAN NOTE
During last MRI, Xanax was provided for claustrophobia.  We will provide another course for her upcoming procedure.

## 2022-10-05 NOTE — ASSESSMENT & PLAN NOTE
Currently sees the following behavioral specialists; Dr. Chas Abarca [facility] and Melody at [facility].   Mood stable while on Vraylar.  States she was depressed the notification of liver cancer development, but now ready to fight the cancer as best as she can. Denies SI/HI.   - f/u 4 months

## 2022-10-13 ENCOUNTER — CLINICAL SUPPORT (OUTPATIENT)
Dept: SMOKING CESSATION | Facility: CLINIC | Age: 57
End: 2022-10-13
Payer: COMMERCIAL

## 2022-10-13 DIAGNOSIS — F17.200 NICOTINE DEPENDENCE: Primary | ICD-10-CM

## 2022-10-13 PROCEDURE — 99404 PREV MED CNSL INDIV APPRX 60: CPT | Mod: S$GLB,,,

## 2022-10-13 PROCEDURE — 99999 PR PBB SHADOW E&M-EST. PATIENT-LVL II: ICD-10-PCS | Mod: PBBFAC,,,

## 2022-10-13 PROCEDURE — 99999 PR PBB SHADOW E&M-EST. PATIENT-LVL II: CPT | Mod: PBBFAC,,,

## 2022-10-13 PROCEDURE — 99404 PR PREVENT COUNSEL,INDIV,60 MIN: ICD-10-PCS | Mod: S$GLB,,,

## 2022-10-13 RX ORDER — DM/P-EPHED/ACETAMINOPH/DOXYLAM 30-7.5/3
2 LIQUID (ML) ORAL
Qty: 144 LOZENGE | Refills: 0 | Status: SHIPPED | OUTPATIENT
Start: 2022-10-13 | End: 2023-02-23

## 2022-10-13 RX ORDER — IBUPROFEN 200 MG
1 TABLET ORAL DAILY
Qty: 14 PATCH | Refills: 0 | Status: SHIPPED | OUTPATIENT
Start: 2022-10-13 | End: 2022-10-26 | Stop reason: SDUPTHER

## 2022-10-13 NOTE — Clinical Note
Good morning. This patient is new to the smoking cessation program. She has significant medical history including cirrhosis, cancer, Bipolar disorder, and alcohol dependence. I have started her on 21 mg nicotine patch QD and 2 mg lozenges PRN. Please let me know if I can provided you with any other information. Thank you

## 2022-10-20 ENCOUNTER — CLINICAL SUPPORT (OUTPATIENT)
Dept: SMOKING CESSATION | Facility: CLINIC | Age: 57
End: 2022-10-20
Payer: COMMERCIAL

## 2022-10-20 DIAGNOSIS — F17.200 NICOTINE DEPENDENCE: Primary | ICD-10-CM

## 2022-10-20 PROCEDURE — 99403 PREV MED CNSL INDIV APPRX 45: CPT | Mod: S$GLB,,,

## 2022-10-20 PROCEDURE — 99999 PR PBB SHADOW E&M-EST. PATIENT-LVL I: CPT | Mod: PBBFAC,,,

## 2022-10-20 PROCEDURE — 99999 PR PBB SHADOW E&M-EST. PATIENT-LVL I: ICD-10-PCS | Mod: PBBFAC,,,

## 2022-10-20 PROCEDURE — 99403 PR PREVENT COUNSEL,INDIV,45 MIN: ICD-10-PCS | Mod: S$GLB,,,

## 2022-10-20 NOTE — PROGRESS NOTES
Individual Follow-Up Form    10/20/2022    Quit Date: 10/19/22    Clinical Status of Patient: Outpatient    Length of Service: 45 minutes    Continuing Medication: yes  Patches and lozenges    Other Medications: none     Target Symptoms: Withdrawal and medication side effects. The following were  rated moderate (3) to severe (4) on TCRS:  Moderate (3): none  Severe (4): none    Comments: Patient seen in clinic regarding smoking cessation follow up. Patient reports that she is smoke free as of yesterday, 10/19/22. Congratulated patient on meeting her goal. Pt remains on 21 mg nicotine patch QD and 2 mg nicotine lozenge PRN (1-2 in place of cigarette.) No adverse reactions noted at this time. Goal is to wean NRT as tolerated. Identified pt personal reasons for wanting to quit as 1)She needs a liver transplant and 2) She is ready to be done with cigarettes forever. Reviewed learned addiction model, triggers, cues, and short/long term consequences of tobacco use. Pt identified personal triggers as withdrawal upon waking, coffee, following meals, and stress. Of note, her partner is also quitting with her. Pt's exhaled carbon monoxide level was 3 ppm as per smokerlyzer (non-smoker = 0-5.) Pt to continue with smoking cessation counseling in 1 week.    Diagnosis: F17.200    Next Visit: 1 week

## 2022-10-24 ENCOUNTER — HOSPITAL ENCOUNTER (OUTPATIENT)
Dept: RADIOLOGY | Facility: HOSPITAL | Age: 57
Discharge: HOME | End: 2022-10-24
Attending: STUDENT IN AN ORGANIZED HEALTH CARE EDUCATION/TRAINING PROGRAM
Payer: MEDICAID

## 2022-10-24 VITALS — HEIGHT: 68 IN | BODY MASS INDEX: 33.04 KG/M2 | WEIGHT: 218 LBS

## 2022-10-24 DIAGNOSIS — Z12.31 SCREENING MAMMOGRAM, ENCOUNTER FOR: ICD-10-CM

## 2022-10-24 PROCEDURE — 77067 SCR MAMMO BI INCL CAD: CPT | Mod: TC

## 2022-10-24 PROCEDURE — 77063 BREAST TOMOSYNTHESIS BI: CPT | Mod: TC

## 2022-10-26 DIAGNOSIS — F17.200 NICOTINE DEPENDENCE: ICD-10-CM

## 2022-10-26 RX ORDER — IBUPROFEN 200 MG
1 TABLET ORAL DAILY
Qty: 14 PATCH | Refills: 0 | Status: SHIPPED | OUTPATIENT
Start: 2022-10-26 | End: 2022-11-14 | Stop reason: DRUGHIGH

## 2022-10-31 ENCOUNTER — CLINICAL SUPPORT (OUTPATIENT)
Dept: SMOKING CESSATION | Facility: CLINIC | Age: 57
End: 2022-10-31
Payer: COMMERCIAL

## 2022-10-31 DIAGNOSIS — F17.200 NICOTINE DEPENDENCE: Primary | ICD-10-CM

## 2022-10-31 PROCEDURE — 99999 PR PBB SHADOW E&M-EST. PATIENT-LVL I: CPT | Mod: PBBFAC,,,

## 2022-10-31 PROCEDURE — 99404 PREV MED CNSL INDIV APPRX 60: CPT | Mod: S$GLB,,,

## 2022-10-31 PROCEDURE — 99999 PR PBB SHADOW E&M-EST. PATIENT-LVL I: ICD-10-PCS | Mod: PBBFAC,,,

## 2022-10-31 PROCEDURE — 99404 PR PREVENT COUNSEL,INDIV,60 MIN: ICD-10-PCS | Mod: S$GLB,,,

## 2022-10-31 NOTE — PROGRESS NOTES
Individual Follow-Up Form    10/31/2022    Quit Date: TBD    Clinical Status of Patient: Outpatient    Length of Service: 60 minutes    Continuing Medication: yes  Patches and lozenges    Other Medications: none     Target Symptoms: Withdrawal and medication side effects. The following were  rated moderate (3) to severe (4) on TCRS:  Moderate (3): none  Severe (4): none    Comments: Patient seen in clinic regarding smoking cessation follow up. Patient reports that she is not tobacco free at this time. She has smoked a on a few different occassions since our last meeting. Her last cigarette was Saturday.  Pt remains on 21 mg nicotine patch QD and 2 mg nicotine lozenge PRN (1-2 in place of cigarette.) No adverse reactions noted at this time. Goal is reset quit date. Completion of TCRS (Tobacco Cessation Rating Scale) reviewed strategies, cues, and triggers. Introduced the negative impact of tobacco on health, the health advantages of discontinuing the use of tobacco, time line improved health changes after a quit, withdrawal issues to expect from nicotine and habit, and ways to achieve the goal of a quit. Pt's exhaled carbon monoxide level was 4 ppm as per smokerlyzer (non-smoker = 0-5.) Pt to continue with smoking cessation counseling in 1 week    Diagnosis: F17.200    Next Visit: 1 week

## 2022-11-01 ENCOUNTER — TELEPHONE (OUTPATIENT)
Dept: HEPATOLOGY | Facility: CLINIC | Age: 57
End: 2022-11-01
Payer: MEDICAID

## 2022-11-01 NOTE — TELEPHONE ENCOUNTER
----- Message from Shane Young sent at 11/1/2022 12:15 PM CDT -----  Regarding: Patient Health  Patient called in to inform nurse that she has some concerns with her health she would like to express. Requesting a call back.        Contact: [phone]

## 2022-11-01 NOTE — TELEPHONE ENCOUNTER
MA contacted Ms. Merchant she stated she's been feeling real weak have no energy its been hard to get out the bed stated she know she have labs and mri soon wanted to know if she can get these testing done sooner scared that the cancer grew I moved labs and mri up to 11/8 will mail reminders

## 2022-11-03 ENCOUNTER — TELEPHONE (OUTPATIENT)
Dept: HEPATOLOGY | Facility: CLINIC | Age: 57
End: 2022-11-03
Payer: MEDICAID

## 2022-11-03 NOTE — TELEPHONE ENCOUNTER
Patient calling to let me know she will go to MRI/lab appointments on 11/8/22    ----- Message from Deyanira Cronin MA sent at 10/31/2022  2:27 PM CDT -----  Regarding: FW: speak with office  Contact: duran    ----- Message -----  From: Kimmy West  Sent: 10/31/2022   2:18 PM CDT  To: Maxwell Mccarthy Staff  Subject: speak with office                                Pt is calling to speak with christianne ..[phone] (home)

## 2022-11-09 ENCOUNTER — TELEPHONE (OUTPATIENT)
Dept: HEPATOLOGY | Facility: CLINIC | Age: 57
End: 2022-11-09
Payer: MEDICAID

## 2022-11-09 NOTE — TELEPHONE ENCOUNTER
----- Message from Shari Arreola MD sent at 11/9/2022  2:30 PM CST -----  Please inform patient:   MRI showed the mass has increased a little, now meets criteria to start liver transplant evaluation and be treated by IR in the meantime.

## 2022-11-09 NOTE — TELEPHONE ENCOUNTER
Patient notified.  Per Dr Arreola, MRI showed the mass has increased a little, now meets criteria to start liver transplant evaluation and be treated by IR in the meantime.      Patient verbalized understanding.

## 2022-11-09 NOTE — TELEPHONE ENCOUNTER
----- Message from Jessie Schulz sent at 11/9/2022  3:20 PM CST -----  Regarding: RE: Treatment for mass  An AFP was collected yesterday and is in process. Will monitor and notify all of result.  ----- Message -----  From: Lopez Fisher MD  Sent: 11/9/2022   3:14 PM CST  To: Shari Arreola MD, Marquis Hernández MD, #  Subject: RE: Treatment for mass                           2.2cm mass in the right hepatic lobe with arterial enhancement and washout that meets imaging criteria for HCC.  It has a weird taper to it that trails posterior and medially. I don't think there is portal vein vascular invasion.  ##But, I'd get a repeat AFP.##  If it's through the roof, I'd consider it real.  If it's not significantly elevated, then it's probably just motion artifact.  In the meantime, we should see her for Y90.    Procedure: Y90 mapping  Procedure duration: 2 hours  Level of sedation: IV sedation  [place]: Dane [address]ine   Performing physician (if any specific): Any  Procedure room: 188/189   Clinic visit: Yes  Priority: Non- Urgent  Snapboard comments: N/A  Other comments: N/A     ----- Message -----  From: Shari Arreola MD  Sent: 11/9/2022   2:41 PM CST  To: Marquis Hernández MD, Leyda Lino RN, #  Subject: Treatment for mass                               Her liver mass has increased to 2.2 cm, so if you folks agree, will consult you for Y-90 or whatever you want to treat her with. We will start txp eval.     Shari

## 2022-11-10 ENCOUNTER — TELEPHONE (OUTPATIENT)
Dept: INTERVENTIONAL RADIOLOGY/VASCULAR | Facility: CLINIC | Age: 57
End: 2022-11-10
Payer: MEDICAID

## 2022-11-10 NOTE — TELEPHONE ENCOUNTER
Spoke to patient and schedule an IR clinic visit on Tue 11/22 at 9am for Y90 consult. Ref by Dr. Shari Arreola. Patient verbally confirmed.

## 2022-11-14 ENCOUNTER — CLINICAL SUPPORT (OUTPATIENT)
Dept: SMOKING CESSATION | Facility: CLINIC | Age: 57
End: 2022-11-14
Payer: COMMERCIAL

## 2022-11-14 DIAGNOSIS — F17.200 NICOTINE DEPENDENCE: Primary | ICD-10-CM

## 2022-11-14 PROCEDURE — 99406 BEHAV CHNG SMOKING 3-10 MIN: CPT | Mod: S$GLB,,,

## 2022-11-14 PROCEDURE — 99999 PR PBB SHADOW E&M-EST. PATIENT-LVL I: CPT | Mod: PBBFAC,,,

## 2022-11-14 PROCEDURE — 99999 PR PBB SHADOW E&M-EST. PATIENT-LVL I: ICD-10-PCS | Mod: PBBFAC,,,

## 2022-11-14 PROCEDURE — 99406 PR TOBACCO USE CESSATION INTERMEDIATE 3-10 MINUTES: ICD-10-PCS | Mod: S$GLB,,,

## 2022-11-14 RX ORDER — IBUPROFEN 200 MG
1 TABLET ORAL DAILY
Qty: 28 PATCH | Refills: 0 | Status: SHIPPED | OUTPATIENT
Start: 2022-11-14 | End: 2023-02-23

## 2022-11-15 ENCOUNTER — TELEPHONE (OUTPATIENT)
Dept: TRANSPLANT | Facility: CLINIC | Age: 57
End: 2022-11-15
Payer: MEDICAID

## 2022-11-16 NOTE — TELEPHONE ENCOUNTER
Noted    ----- Message from Marquis Hernández MD sent at 11/14/2022  8:54 AM CST -----  Regarding: RE: Treatment for mass  Sounds good. OK to proceed, that is likely artifact and not vascular invasion.    Marquis    ----- Message -----  From: Leyda Lino RN  Sent: 11/14/2022   8:32 AM CST  To: Shari Arreola MD, Marquis Hernández MD, #  Subject: RE: Treatment for mass                           AFP 3.4    ----- Message -----  From: Rosibel Cotto MD  Sent: 11/11/2022   8:42 PM CST  To: Shari Arreola MD, Marquis Hernández MD, #  Subject: RE: Treatment for mass                           I dont see it  ----- Message -----  From: Jen Law NP  Sent: 11/11/2022  11:42 AM CST  To: Shari Arreola MD, Marquis Hernández MD, #  Subject: RE: Treatment for mass                           Just curious if the AFP resulted yet? I couldn't find it in the chart.    Thank you,  Jen    ----- Message -----  From: Jessie Schulz  Sent: 11/9/2022   3:21 PM CST  To: Shari Arreola MD, Marquis Hernández MD, #  Subject: RE: Treatment for mass                           An AFP was collected yesterday and is in process. Will monitor and notify all of result.  ----- Message -----  From: Lopez Fisher MD  Sent: 11/9/2022   3:14 PM CST  To: Shari Arreola MD, Marquis Hernández MD, #  Subject: RE: Treatment for mass                           2.2cm mass in the right hepatic lobe with arterial enhancement and washout that meets imaging criteria for HCC.  It has a weird taper to it that trails posterior and medially. I don't think there is portal vein vascular invasion.  ##But, I'd get a repeat AFP.##  If it's through the roof, I'd consider it real.  If it's not significantly elevated, then it's probably just motion artifact.  In the meantime, we should see her for Y90.    Procedure: Y90 mapping  Procedure duration: 2 hours  Level of sedation: IV sedation  [place]: [address]   Performing physician (if any specific):  Any  Procedure room: 188/189   Clinic visit: Yes  Priority: Non- Urgent  Snapboard comments: N/A  Other comments: N/A     ----- Message -----  From: Shari Arreola MD  Sent: 11/9/2022   2:41 PM CST  To: Marquis Hernández MD, Leyda Lino RN, #  Subject: Treatment for mass                               Her liver mass has increased to 2.2 cm, so if you folks agree, will consult you for Y-90 or whatever you want to treat her with. We will start txp eval.     Shari

## 2022-11-21 ENCOUNTER — TELEPHONE (OUTPATIENT)
Dept: PRIMARY CARE CLINIC | Facility: CLINIC | Age: 57
End: 2022-11-21
Payer: MEDICAID

## 2022-11-21 DIAGNOSIS — G25.81 RESTLESS LEG SYNDROME: ICD-10-CM

## 2022-11-21 NOTE — TELEPHONE ENCOUNTER
Pt needs refill on her ropinprole for restless legs she said you told her to up her meds and take 2 pills at night

## 2022-11-22 ENCOUNTER — OFFICE VISIT (OUTPATIENT)
Dept: INTERVENTIONAL RADIOLOGY/VASCULAR | Facility: CLINIC | Age: 57
End: 2022-11-22
Payer: MEDICAID

## 2022-11-22 VITALS
DIASTOLIC BLOOD PRESSURE: 75 MMHG | BODY MASS INDEX: 34.1 KG/M2 | WEIGHT: 225 LBS | HEART RATE: 62 BPM | SYSTOLIC BLOOD PRESSURE: 133 MMHG | HEIGHT: 68 IN

## 2022-11-22 DIAGNOSIS — C22.0 HCC (HEPATOCELLULAR CARCINOMA): Primary | ICD-10-CM

## 2022-11-22 DIAGNOSIS — G25.81 RESTLESS LEG SYNDROME: ICD-10-CM

## 2022-11-22 PROCEDURE — 1160F RVW MEDS BY RX/DR IN RCRD: CPT | Mod: CPTII,,, | Performed by: FAMILY MEDICINE

## 2022-11-22 PROCEDURE — 1159F PR MEDICATION LIST DOCUMENTED IN MEDICAL RECORD: ICD-10-PCS | Mod: CPTII,,, | Performed by: FAMILY MEDICINE

## 2022-11-22 PROCEDURE — 99999 PR PBB SHADOW E&M-EST. PATIENT-LVL IV: ICD-10-PCS | Mod: PBBFAC,,, | Performed by: FAMILY MEDICINE

## 2022-11-22 PROCEDURE — 3075F PR MOST RECENT SYSTOLIC BLOOD PRESS GE 130-139MM HG: ICD-10-PCS | Mod: CPTII,,, | Performed by: FAMILY MEDICINE

## 2022-11-22 PROCEDURE — 3078F PR MOST RECENT DIASTOLIC BLOOD PRESSURE < 80 MM HG: ICD-10-PCS | Mod: CPTII,,, | Performed by: FAMILY MEDICINE

## 2022-11-22 PROCEDURE — 99204 PR OFFICE/OUTPT VISIT, NEW, LEVL IV, 45-59 MIN: ICD-10-PCS | Mod: S$PBB,,, | Performed by: FAMILY MEDICINE

## 2022-11-22 PROCEDURE — 99999 PR PBB SHADOW E&M-EST. PATIENT-LVL IV: CPT | Mod: PBBFAC,,, | Performed by: FAMILY MEDICINE

## 2022-11-22 PROCEDURE — 3078F DIAST BP <80 MM HG: CPT | Mod: CPTII,,, | Performed by: FAMILY MEDICINE

## 2022-11-22 PROCEDURE — 1159F MED LIST DOCD IN RCRD: CPT | Mod: CPTII,,, | Performed by: FAMILY MEDICINE

## 2022-11-22 PROCEDURE — 1160F PR REVIEW ALL MEDS BY PRESCRIBER/CLIN PHARMACIST DOCUMENTED: ICD-10-PCS | Mod: CPTII,,, | Performed by: FAMILY MEDICINE

## 2022-11-22 PROCEDURE — 3008F BODY MASS INDEX DOCD: CPT | Mod: CPTII,,, | Performed by: FAMILY MEDICINE

## 2022-11-22 PROCEDURE — 3075F SYST BP GE 130 - 139MM HG: CPT | Mod: CPTII,,, | Performed by: FAMILY MEDICINE

## 2022-11-22 PROCEDURE — 99214 OFFICE O/P EST MOD 30 MIN: CPT | Mod: PBBFAC | Performed by: FAMILY MEDICINE

## 2022-11-22 PROCEDURE — 3008F PR BODY MASS INDEX (BMI) DOCUMENTED: ICD-10-PCS | Mod: CPTII,,, | Performed by: FAMILY MEDICINE

## 2022-11-22 PROCEDURE — 99204 OFFICE O/P NEW MOD 45 MIN: CPT | Mod: S$PBB,,, | Performed by: FAMILY MEDICINE

## 2022-11-22 RX ORDER — ROPINIROLE 0.5 MG/1
0.5 TABLET, FILM COATED ORAL NIGHTLY
Qty: 90 TABLET | Refills: 1 | Status: SHIPPED | OUTPATIENT
Start: 2022-11-22 | End: 2022-11-22

## 2022-11-22 RX ORDER — ROPINIROLE 0.5 MG/1
1 TABLET, FILM COATED ORAL NIGHTLY
Qty: 180 TABLET | Refills: 1 | Status: SHIPPED | OUTPATIENT
Start: 2022-11-22 | End: 2023-02-07 | Stop reason: SDUPTHER

## 2022-11-22 NOTE — Clinical Note
Thank you for referring Ms. Borrego to Interventional Radiology at the [facility]. Please don't hesitate to contact us if there are any questions regarding this evaluation at [phone].   Sincerely, ALEN Good, FNP Interventional Radiology

## 2022-11-22 NOTE — PROGRESS NOTES
"Subjective:       Patient ID: Shonda Borrego is a 57 y.o. female.    Chief Complaint: Cancer    Patient referred to Interventional Radiology bu Shari Arreola MD to discuss treatment of hepatocellular carcinoma. She has a history of alcoholic cirrhosis. Recent imaging with MRI obtained on 11/8/2022 noted "2.2 x 1.7 cm enhancing right hepatic lobe lesion.  This lesion is slightly larger from the previous exam and is suspicious for hepatocellular carcinoma. New 1.0 cm nodular lesion with apparent rim enhancement at the dome of the liver.  HCC is in the differential." Patient has complaints of fatigue. She denies any abdominal pain or abdominal distention. She is accompanied by her fiance. She was reviewed in liver conference.     Review of Systems   Constitutional:  Positive for activity change (decreased due to fatigue) and fatigue. Negative for appetite change, chills and fever.   HENT:  Negative for nasal congestion, drooling, ear discharge, postnasal drip, sneezing and trouble swallowing.    Eyes:  Negative for pain, discharge, redness and itching.   Respiratory:  Negative for cough, shortness of breath, wheezing and stridor.    Cardiovascular:  Negative for chest pain, palpitations and leg swelling.   Gastrointestinal:  Positive for abdominal distention (intermittent) and abdominal pain (intermittent 7/10; passes after a couple of hours). Negative for constipation, diarrhea, nausea and vomiting.   Genitourinary:  Negative for difficulty urinating, dysuria, frequency and urgency.   Musculoskeletal:  Negative for arthralgias, back pain, gait problem, joint swelling, myalgias and neck pain.   Integumentary:  Negative for color change, pallor and rash.   Neurological:  Positive for weakness. Negative for dizziness and headaches.       Objective:      Physical Exam  Vitals reviewed.   Constitutional:       General: She is not in acute distress.     Appearance: She is well-developed. She is not diaphoretic.   HENT:     "  Head: Normocephalic and atraumatic.      Right Ear: External ear normal.      Left Ear: External ear normal.      Nose: Nose normal.      Mouth/Throat:      Pharynx: No oropharyngeal exudate.   Eyes:      General: No scleral icterus.        Right eye: No discharge.         Left eye: No discharge.      Conjunctiva/sclera: Conjunctivae normal.      Pupils: Pupils are equal, round, and reactive to light.   Neck:      Thyroid: No thyromegaly.      Vascular: No JVD.      Trachea: No tracheal deviation.   Cardiovascular:      Rate and Rhythm: Normal rate and regular rhythm.      Heart sounds: Normal heart sounds. No murmur heard.    No friction rub. No gallop.   Pulmonary:      Effort: Pulmonary effort is normal. No respiratory distress.      Breath sounds: Normal breath sounds. No stridor. No wheezing or rales.   Abdominal:      General: Bowel sounds are normal. There is no distension.      Palpations: Abdomen is soft. There is no mass.      Tenderness: There is no abdominal tenderness. There is no guarding or rebound.   Musculoskeletal:      Cervical back: Neck supple.   Lymphadenopathy:      Cervical: No cervical adenopathy.   Skin:     General: Skin is warm and dry.      Nails: There is no clubbing.   Neurological:      Mental Status: She is alert and oriented to person, place, and time.      Gait: Gait normal.       ECO  MELD-Na score: 11 at 2022 11:54 AM  MELD score: 11 at 2022 11:54 AM  Calculated from:  Serum Creatinine: 0.84 mg/dL (Using min of 1 mg/dL) at 2022 11:54 AM  Serum Sodium: 137 mmol/L at 2022 11:54 AM  Total Bilirubin: 2.7 mg/dL at 2022 11:54 AM  INR(ratio): 1.1 at 2022 11:54 AM  Age: 57 years  Child Golden: Class A  Transplant Status: to be evaluated    MRI 2022      Assessment:       Problem List Items Addressed This Visit    None  Visit Diagnoses       HCC (hepatocellular carcinoma)    -  Primary    Relevant Orders    IR Embolization for Tumor_Organ Ischemia     NM Liver Imaging Static PRE Y-90 Emboliz    NM Liver Imaging Static POST Y-90 Emboli    NM Spect/CT Single Area    NM Spect/CT Single Area    IR Embolization for Tumor_Organ Ischemia    Comprehensive Metabolic Panel    Bilirubin, Direct    Protime-INR    CBC Auto Differential            Plan:        Explained to patient recommendation is to treat with radioembolization. Yttrium 90 Radioembolization discussed in detail with the patient including risks (including, but not limited to, pain, bleeding, infection, damage to nearby structures, and the need for additional procedures), benefits, potential complications, usual pre and post procedure course.  Discussed the need for initial Angiogram mapping study prior to scheduling the actual Radioembolization procedure. Utilized images from the patient's chart, the internet, and illustrations to help explain procedure. The patient voices understanding and all questions have been answered.  The patient agrees to proceed as planned. Patient scheduled for mapping on 12/14/2022. Pre-procedure handout with clinic phone number provided.

## 2022-11-22 NOTE — LETTER
November 22, 2022    Shonda Borrego  [address]  [place] [facility] [address]     [facility] [address]  [address]  [facility] [address]  Phone: [phone] PRE-PROCEDURE INSTRUCTIONS    Your procedure with Interventional Radiology is scheduled for 12/14/2022. Please arrive by 11:00am. Please note your appointment time in [facility] will be different.    You must check-in and receive a wristband before going to your procedure. We will call you the day before to let you know your check-in location.    **Do not eat or drink anything between midnight and the time of your procedure. This includes gum, mints, and candy lemon drops.    **Do not smoke or drink alcoholic beverages 24 hours prior to your procedure.    **If you wear contact lenses, dentures, hearing aids, or glasses, bring a container to put them in during the procedure and give them to a family member for safekeeping.    **If you have been diagnosed with sleep apnea please bring your CPAP machine.    **If your doctor has scheduled you for an overnight stay, bring a small overnight bag with any personal items that you may need.    **Make arrangements in advance for transportation home by a responsible adult. It is not safe to drive a vehicle during the 24 hours following the procedure.    **All [facility] facilities and properties are tobacco free. Smoking is NOT allowed.    PLEASE NOTE: The procedure schedule has many variables which affect the time of your procedure. Family members should be available if your surgery time changes.    If you have any questions about these instructions call Interventional Radiology at [phone] Monday - Friday between 8:00am and 4:00pm or [phone] (ask for interventional radiology resident) for after hours.

## 2022-12-01 ENCOUNTER — TELEPHONE (OUTPATIENT)
Dept: TRANSPLANT | Facility: CLINIC | Age: 57
End: 2022-12-01
Payer: MEDICAID

## 2022-12-01 NOTE — TELEPHONE ENCOUNTER
Call to pt re radiology email. Pt already set up for Y90 mapping. AFP done 11/8/22- no need to repeat

## 2022-12-05 ENCOUNTER — TELEPHONE (OUTPATIENT)
Dept: TRANSPLANT | Facility: CLINIC | Age: 57
End: 2022-12-05
Payer: MEDICAID

## 2022-12-05 NOTE — TELEPHONE ENCOUNTER
Referral received from Dr Arreola  Initial  referral from Geeta Gaines DO  Patient with hepatocellular carcinoma. She has a history of alcoholic cirrhosis. MELD 11  ICD-10: k74.60  Referred for liver transplant for EVALUATION.    Referral completed and forwarded to [facility].          Insurance: EPIC  PRIMARY:   ID:  Contact #     SECONDARY:   ID:  Contact #

## 2022-12-07 ENCOUNTER — TELEPHONE (OUTPATIENT)
Dept: TRANSPLANT | Facility: CLINIC | Age: 57
End: 2022-12-07
Payer: MEDICAID

## 2022-12-08 NOTE — TELEPHONE ENCOUNTER
I have informed the patient that she has Liver cancer, which we can have treat with radiologic procedures, but on the long run, I am recommending getting a liver transplant.  She understood, and agrees to go forward with this plan.  Please get insurance approval for liver transplant and schedule an evaluation as soon as possible.      Thank you.  Shari Arreola MD  Transplant Hepatology

## 2022-12-09 ENCOUNTER — PATIENT MESSAGE (OUTPATIENT)
Dept: INFECTIOUS DISEASES | Facility: CLINIC | Age: 57
End: 2022-12-09
Payer: MEDICAID

## 2022-12-12 ENCOUNTER — TELEPHONE (OUTPATIENT)
Dept: INTERVENTIONAL RADIOLOGY/VASCULAR | Facility: HOSPITAL | Age: 57
End: 2022-12-12
Payer: MEDICAID

## 2022-12-12 ENCOUNTER — TELEPHONE (OUTPATIENT)
Dept: SMOKING CESSATION | Facility: CLINIC | Age: 57
End: 2022-12-12
Payer: MEDICAID

## 2022-12-12 NOTE — NURSING
Pre-procedure call complete.  Pt instructed not to eat or drink anything after midnight the night before procedure.  Pt aware will need someone to provide transport home and monitor pt 8 hours post procedure.  No driving for 3 days after procedure.   Medications reviewed.  Arrival time and location given.  Expected length of stay reviewed.  Covid screening completed.  Pt verbalized understanding.

## 2022-12-12 NOTE — TELEPHONE ENCOUNTER
Attempted to contact patient regarding smoking cessation follow up. There was no answer at this time and voicemail is currently unavailable.

## 2022-12-13 ENCOUNTER — LAB VISIT (OUTPATIENT)
Dept: LAB | Facility: HOSPITAL | Age: 57
End: 2022-12-13
Attending: FAMILY MEDICINE
Payer: MEDICAID

## 2022-12-13 DIAGNOSIS — C22.0 HCC (HEPATOCELLULAR CARCINOMA): ICD-10-CM

## 2022-12-13 LAB
ALBUMIN SERPL BCP-MCNC: 2.8 G/DL (ref 3.5–5.2)
ALP SERPL-CCNC: 210 U/L (ref 55–135)
ALT SERPL W/O P-5'-P-CCNC: 37 U/L (ref 10–44)
ANION GAP SERPL CALC-SCNC: 5 MMOL/L (ref 8–16)
AST SERPL-CCNC: 48 U/L (ref 10–40)
BASOPHILS # BLD AUTO: 0.09 K/UL (ref 0–0.2)
BASOPHILS NFR BLD: 1.3 % (ref 0–1.9)
BILIRUB DIRECT SERPL-MCNC: 0.7 MG/DL (ref 0.1–0.3)
BILIRUB SERPL-MCNC: 2.1 MG/DL (ref 0.1–1)
BUN SERPL-MCNC: 11 MG/DL (ref 6–20)
CALCIUM SERPL-MCNC: 8.8 MG/DL (ref 8.7–10.5)
CHLORIDE SERPL-SCNC: 109 MMOL/L (ref 95–110)
CO2 SERPL-SCNC: 26 MMOL/L (ref 23–29)
CREAT SERPL-MCNC: 1.1 MG/DL (ref 0.5–1.4)
DIFFERENTIAL METHOD: ABNORMAL
EOSINOPHIL # BLD AUTO: 0.2 K/UL (ref 0–0.5)
EOSINOPHIL NFR BLD: 2.7 % (ref 0–8)
ERYTHROCYTE [DISTWIDTH] IN BLOOD BY AUTOMATED COUNT: 14.8 % (ref 11.5–14.5)
EST. GFR  (NO RACE VARIABLE): 58.2 ML/MIN/1.73 M^2
GLUCOSE SERPL-MCNC: 190 MG/DL (ref 70–110)
HCT VFR BLD AUTO: 41.6 % (ref 37–48.5)
HGB BLD-MCNC: 14.3 G/DL (ref 12–16)
IMM GRANULOCYTES # BLD AUTO: 0.02 K/UL (ref 0–0.04)
IMM GRANULOCYTES NFR BLD AUTO: 0.3 % (ref 0–0.5)
INR PPP: 1.1 (ref 0.8–1.2)
LYMPHOCYTES # BLD AUTO: 2.3 K/UL (ref 1–4.8)
LYMPHOCYTES NFR BLD: 33.6 % (ref 18–48)
MCH RBC QN AUTO: 31.6 PG (ref 27–31)
MCHC RBC AUTO-ENTMCNC: 34.4 G/DL (ref 32–36)
MCV RBC AUTO: 92 FL (ref 82–98)
MONOCYTES # BLD AUTO: 0.6 K/UL (ref 0.3–1)
MONOCYTES NFR BLD: 9.4 % (ref 4–15)
NEUTROPHILS # BLD AUTO: 3.5 K/UL (ref 1.8–7.7)
NEUTROPHILS NFR BLD: 52.7 % (ref 38–73)
NRBC BLD-RTO: 0 /100 WBC
PLATELET # BLD AUTO: 158 K/UL (ref 150–450)
PMV BLD AUTO: 10.3 FL (ref 9.2–12.9)
POTASSIUM SERPL-SCNC: 3.9 MMOL/L (ref 3.5–5.1)
PROT SERPL-MCNC: 7.2 G/DL (ref 6–8.4)
PROTHROMBIN TIME: 11.5 SEC (ref 9–12.5)
RBC # BLD AUTO: 4.52 M/UL (ref 4–5.4)
SODIUM SERPL-SCNC: 140 MMOL/L (ref 136–145)
WBC # BLD AUTO: 6.72 K/UL (ref 3.9–12.7)

## 2022-12-13 PROCEDURE — 82248 BILIRUBIN DIRECT: CPT | Mod: NTX | Performed by: FAMILY MEDICINE

## 2022-12-13 PROCEDURE — 36415 COLL VENOUS BLD VENIPUNCTURE: CPT | Mod: TXP | Performed by: FAMILY MEDICINE

## 2022-12-13 PROCEDURE — 85025 COMPLETE CBC W/AUTO DIFF WBC: CPT | Mod: TXP | Performed by: FAMILY MEDICINE

## 2022-12-13 PROCEDURE — 80053 COMPREHEN METABOLIC PANEL: CPT | Mod: TXP | Performed by: FAMILY MEDICINE

## 2022-12-14 ENCOUNTER — HOSPITAL ENCOUNTER (OUTPATIENT)
Dept: RADIOLOGY | Facility: HOSPITAL | Age: 57
Discharge: HOME | End: 2022-12-14
Attending: FAMILY MEDICINE
Payer: MEDICAID

## 2022-12-14 ENCOUNTER — HOSPITAL ENCOUNTER (OUTPATIENT)
Dept: INTERVENTIONAL RADIOLOGY/VASCULAR | Facility: HOSPITAL | Age: 57
Discharge: HOME | End: 2022-12-14
Attending: FAMILY MEDICINE
Payer: MEDICAID

## 2022-12-14 VITALS
TEMPERATURE: 97 F | OXYGEN SATURATION: 97 % | RESPIRATION RATE: 11 BRPM | SYSTOLIC BLOOD PRESSURE: 107 MMHG | HEART RATE: 79 BPM | BODY MASS INDEX: 33.34 KG/M2 | WEIGHT: 220 LBS | DIASTOLIC BLOOD PRESSURE: 56 MMHG | HEIGHT: 68 IN

## 2022-12-14 DIAGNOSIS — C22.0 HCC (HEPATOCELLULAR CARCINOMA): Primary | ICD-10-CM

## 2022-12-14 DIAGNOSIS — C22.0 HCC (HEPATOCELLULAR CARCINOMA): ICD-10-CM

## 2022-12-14 LAB
B-HCG UR QL: NEGATIVE
CTP QC/QA: YES

## 2022-12-14 PROCEDURE — C1894 INTRO/SHEATH, NON-LASER: HCPCS | Mod: NTX

## 2022-12-14 PROCEDURE — 76377 3D RENDER W/INTRP POSTPROCES: CPT | Mod: 26,NTX,, | Performed by: STUDENT IN AN ORGANIZED HEALTH CARE EDUCATION/TRAINING PROGRAM

## 2022-12-14 PROCEDURE — 76937 PR  US GUIDE, VASCULAR ACCESS: ICD-10-PCS | Mod: 26,NTX,, | Performed by: STUDENT IN AN ORGANIZED HEALTH CARE EDUCATION/TRAINING PROGRAM

## 2022-12-14 PROCEDURE — 25500020 PHARM REV CODE 255: Mod: NTX | Performed by: FAMILY MEDICINE

## 2022-12-14 PROCEDURE — 76377 PR  3D RENDERING W/ IMAGE POSTPROCESS: ICD-10-PCS | Mod: 26,NTX,, | Performed by: STUDENT IN AN ORGANIZED HEALTH CARE EDUCATION/TRAINING PROGRAM

## 2022-12-14 PROCEDURE — 76937 US GUIDE VASCULAR ACCESS: CPT | Mod: TC,NTX | Performed by: STUDENT IN AN ORGANIZED HEALTH CARE EDUCATION/TRAINING PROGRAM

## 2022-12-14 PROCEDURE — 76380 PR  CT SCAN,LIMITED/LOCALIZED F/U STUDY: ICD-10-PCS | Mod: 26,NTX,, | Performed by: STUDENT IN AN ORGANIZED HEALTH CARE EDUCATION/TRAINING PROGRAM

## 2022-12-14 PROCEDURE — 75774 ARTERY X-RAY EACH VESSEL: CPT | Mod: TC,59,NTX | Performed by: STUDENT IN AN ORGANIZED HEALTH CARE EDUCATION/TRAINING PROGRAM

## 2022-12-14 PROCEDURE — 76380 CAT SCAN FOLLOW-UP STUDY: CPT | Mod: 26,NTX,, | Performed by: STUDENT IN AN ORGANIZED HEALTH CARE EDUCATION/TRAINING PROGRAM

## 2022-12-14 PROCEDURE — 36247 INS CATH ABD/L-EXT ART 3RD: CPT | Mod: RT,TXP | Performed by: STUDENT IN AN ORGANIZED HEALTH CARE EDUCATION/TRAINING PROGRAM

## 2022-12-14 PROCEDURE — G0269 OCCLUSIVE DEVICE IN VEIN ART: HCPCS | Mod: NTX | Performed by: STUDENT IN AN ORGANIZED HEALTH CARE EDUCATION/TRAINING PROGRAM

## 2022-12-14 PROCEDURE — 25000003 PHARM REV CODE 250: Mod: NTX | Performed by: STUDENT IN AN ORGANIZED HEALTH CARE EDUCATION/TRAINING PROGRAM

## 2022-12-14 PROCEDURE — 75726 ARTERY X-RAYS ABDOMEN: CPT | Mod: 26,NTX,, | Performed by: STUDENT IN AN ORGANIZED HEALTH CARE EDUCATION/TRAINING PROGRAM

## 2022-12-14 PROCEDURE — 76377 3D RENDER W/INTRP POSTPROCES: CPT | Mod: TC,TXP | Performed by: STUDENT IN AN ORGANIZED HEALTH CARE EDUCATION/TRAINING PROGRAM

## 2022-12-14 PROCEDURE — 75726 CHG ANGIO VISCERAL SELECTV/SUBSELEC: ICD-10-PCS | Mod: 26,NTX,, | Performed by: STUDENT IN AN ORGANIZED HEALTH CARE EDUCATION/TRAINING PROGRAM

## 2022-12-14 PROCEDURE — 75726 ARTERY X-RAYS ABDOMEN: CPT | Mod: TC,TXP | Performed by: STUDENT IN AN ORGANIZED HEALTH CARE EDUCATION/TRAINING PROGRAM

## 2022-12-14 PROCEDURE — 63600175 PHARM REV CODE 636 W HCPCS: Mod: TXP | Performed by: STUDENT IN AN ORGANIZED HEALTH CARE EDUCATION/TRAINING PROGRAM

## 2022-12-14 PROCEDURE — 75774 PR  ANGIO EA ADDNL SELECTV VESSEL: ICD-10-PCS | Mod: 26,59,NTX, | Performed by: STUDENT IN AN ORGANIZED HEALTH CARE EDUCATION/TRAINING PROGRAM

## 2022-12-14 PROCEDURE — 76937 US GUIDE VASCULAR ACCESS: CPT | Mod: 26,NTX,, | Performed by: STUDENT IN AN ORGANIZED HEALTH CARE EDUCATION/TRAINING PROGRAM

## 2022-12-14 PROCEDURE — 36247 IR EMBOLIZATION COMP FOR TUMOR_ORGAN ISCHEMIA_INFARC: ICD-10-PCS | Mod: RT,NTX,, | Performed by: STUDENT IN AN ORGANIZED HEALTH CARE EDUCATION/TRAINING PROGRAM

## 2022-12-14 PROCEDURE — 76380 CAT SCAN FOLLOW-UP STUDY: CPT | Mod: TC,NTX | Performed by: STUDENT IN AN ORGANIZED HEALTH CARE EDUCATION/TRAINING PROGRAM

## 2022-12-14 PROCEDURE — 81025 URINE PREGNANCY TEST: CPT | Mod: NTX | Performed by: FAMILY MEDICINE

## 2022-12-14 PROCEDURE — 75774 ARTERY X-RAY EACH VESSEL: CPT | Mod: 26,59,NTX, | Performed by: STUDENT IN AN ORGANIZED HEALTH CARE EDUCATION/TRAINING PROGRAM

## 2022-12-14 PROCEDURE — A4550 SURGICAL TRAYS: HCPCS | Mod: TXP

## 2022-12-14 RX ORDER — SODIUM CHLORIDE 9 MG/ML
INJECTION, SOLUTION INTRAVENOUS CONTINUOUS
Status: DISCONTINUED | OUTPATIENT
Start: 2022-12-14 | End: 2022-12-15 | Stop reason: HOSPADM

## 2022-12-14 RX ORDER — LIDOCAINE HYDROCHLORIDE 10 MG/ML
INJECTION INFILTRATION; PERINEURAL
Status: COMPLETED | OUTPATIENT
Start: 2022-12-14 | End: 2022-12-14

## 2022-12-14 RX ORDER — MIDAZOLAM HYDROCHLORIDE 1 MG/ML
INJECTION INTRAMUSCULAR; INTRAVENOUS
Status: COMPLETED | OUTPATIENT
Start: 2022-12-14 | End: 2022-12-14

## 2022-12-14 RX ORDER — LIDOCAINE HYDROCHLORIDE 10 MG/ML
1 INJECTION, SOLUTION EPIDURAL; INFILTRATION; INTRACAUDAL; PERINEURAL ONCE AS NEEDED
Status: DISCONTINUED | OUTPATIENT
Start: 2022-12-14 | End: 2022-12-15 | Stop reason: HOSPADM

## 2022-12-14 RX ORDER — FENTANYL CITRATE 50 UG/ML
INJECTION, SOLUTION INTRAMUSCULAR; INTRAVENOUS
Status: COMPLETED | OUTPATIENT
Start: 2022-12-14 | End: 2022-12-14

## 2022-12-14 RX ADMIN — IOHEXOL 105 ML: 300 INJECTION, SOLUTION INTRAVENOUS at 02:12

## 2022-12-14 RX ADMIN — FENTANYL CITRATE 50 MCG: 50 INJECTION, SOLUTION INTRAMUSCULAR; INTRAVENOUS at 02:12

## 2022-12-14 RX ADMIN — MIDAZOLAM HYDROCHLORIDE 0.5 MG: 1 INJECTION INTRAMUSCULAR; INTRAVENOUS at 02:12

## 2022-12-14 RX ADMIN — MIDAZOLAM HYDROCHLORIDE 1 MG: 1 INJECTION INTRAMUSCULAR; INTRAVENOUS at 01:12

## 2022-12-14 RX ADMIN — FENTANYL CITRATE 50 MCG: 50 INJECTION, SOLUTION INTRAMUSCULAR; INTRAVENOUS at 01:12

## 2022-12-14 RX ADMIN — LIDOCAINE HYDROCHLORIDE 5 ML: 10 INJECTION, SOLUTION INFILTRATION; PERINEURAL at 01:12

## 2022-12-14 NOTE — DISCHARGE INSTRUCTIONS
Please call with any questions or concerns.      Monday thru Friday 8:00 am - 4:30 pm    Interventional Radiology   [phone]    After Hours    Ask for the Radiology Intern on call  [phone]    Discharge Instructions for Hepatic Angiography  You had a procedure called hepatic angiography. This is an X-ray study of the blood vessels that supply your liver. During  the procedure, a catheter (thin, flexible tube) was inserted into one of your blood vessels through a small incision. A  specially trained doctor called an interventional radiologist usually does the procedure. Heres what to do at home  afterward.  Home care  Follow your doctor's recommendations on when it is safe to drive after the procedure.  Rest according to your doctor's instructions after the procedure. Most people are able to resume normal activity  within a few days.  Dont lift anything heavier than 10 pounds for 3 to 4 days.  Avoid strenuous activity for 2 weeks after the procedure.  Exercise according to your doctors recommendations.  You can shower the day after the procedure.  Ask your doctor when it is safe to swim or take a bath.  Take your medications exactly as directed. Dont skip doses.  Unless directed otherwise, drink 6 to 8 glasses of water a day to prevent dehydration and to help flush your body of  the dye that was used during your procedure.  Take your temperature and check the place where your incision was made for signs of infection (redness, swelling, bleeding or  warmth) every day for a week.  Report any numbness or coolness to the extremity. Report any discoloration to the puncture site or the extremity.  Follow-up care  Make a follow-up appointment as directed by our staff.  Ask your doctor when you can return to work.  Date Last Reviewed: 6/14/2015  © 6992-8195 [employer]. [address] [place] [address], [place] [address]. All rights reserved. This information  is not intended as a substitute for  professional medical care. Always follow your healthcare professional's instructions.

## 2022-12-14 NOTE — DISCHARGE SUMMARY
Radiology Discharge Summary      Hospital Course: No complications    Admit Date: 12/14/2022  Discharge Date: 12/14/2022     Instructions Given to Patient: Yes  Diet: Resume prior diet  Activity: no lifting, Driving, or Strenuous exercise for 1 week.    Description of Condition on Discharge: Stable  Vital Signs (Most Recent): Temp: 98.1 °F (36.7 °C) (12/14/22 1145)  Pulse: 71 (12/14/22 1145)  Resp: 16 (12/14/22 1145)  BP: 137/68 (12/14/22 1145)  SpO2: 97 % (12/14/22 1145)    Discharge Disposition: Home    Discharge Diagnosis: Status post hepatic angiogram.     Follow Up: Patient will return to IR in the coming weeks for microwave ablation of segment 6 hepatic lesion.    Dahlia Bravo MD  Fellow, Dept.Of Interventional Radiology  [facility]

## 2022-12-14 NOTE — PLAN OF CARE
Recovery completed. Patient tolerated well; VSS. Site remains CDI without hematoma to right groin. PIV removed. Discharge instructions printed and reviewed with patient and ; they verbalized understanding and denied any questions/concerns at this time. Patient escorted to parking garage via wheelchair for discharge home with .

## 2022-12-14 NOTE — PROCEDURES
IR POST PROCEDURE NOTE    Date of Procedure: 12/14/2022    Procedure: Hepatic angiogram    Pre-Procedure Diagnosis: HCC    Post-Procedure Diagnosis: Same    Procedure Personnel: Lopez Fisher MD and Dahlia Bravo MD    Written Informed Consent Obtained: Yes    Specimen Removed: None    Estimated Blood Loss: Minimal    Findings: Hepatic angiography performed. While attempting to cannulate the segment 6 hepatic artery, focal dissection of the segmental branch was encountered. The decision was made at this time to truncate the procedure. 5 Fr Vascade deployed at right groin access site.    Complications: Focal dissection of segment 6 hepatic artery branch.      Dahlia Bravo MD  Fellow, Dept. Of Interventional Radiology  [facility]

## 2022-12-14 NOTE — PLAN OF CARE
Pt arrived to ROCU bed 4 for 2 hour post pre-yttrium recovery. Report received from Aissatou ALLISON. Pt denies pain/discomfort. Dressing CDI. VSS. No acute events. See flow sheets for post procedure monitoring.

## 2022-12-14 NOTE — PLAN OF CARE
Pre-Y90 mapping procedure completed. Patient tolerated well. Patient AAOx3, no distress noted, respirations even and unlabored, will continue to monitor. VSS. 5 F vascade closure device deployed in right femoral artery; hemostasis achieved at 1426. Patient to lay flat for 2 hours until 1626 on 12/14/2022 per MD. Right groin site clean, dry, and intact; no bleeding or hematoma noted. Patient to be transferred to MPU for post-procedural recovery per MD. Report to be given at bedside to RN.

## 2022-12-14 NOTE — PLAN OF CARE
Pt arrived to  for pre Y-90. Pt oriented to unit and staff. Plan of care reviewed with patient, patient verbalizes understanding. Comfort measures utilized. Pt safely transferred from stretcher to procedural table. Fall risk reviewed with patient, fall risk interventions maintained. Safety strap applied, positioner pillows utilized to minimize pressure points. Blankets applied. Pt prepped and draped utilizing standard sterile technique. Patient placed on continuous monitoring, as required by sedation policy. Timeouts completed utilizing standard universal time-out, per department and facility policy. RN to remain at bedside, continuous monitoring maintained. Pt resting comfortably. Denies pain/discomfort. Will continue to monitor. See flow sheets for monitoring, medication administration, and updates.

## 2022-12-14 NOTE — H&P
Vascular and Interventional Radiology History & Physical    Date:  2022    Chief Complaint:   HCC    History of Present Illness:  Shonda Borrego is a 57 y.o. female who presents for hepatic angiogram, Y90 mapping procedure with Tc99m-MAA administration.    Past Medical History:  Past Medical History:   Diagnosis Date    Anxiety     Bipolar disorder     Cirrhosis     COPD (chronic obstructive pulmonary disease)     GERD (gastroesophageal reflux disease)     Restless leg syndrome     Urinary frequency        Past Surgical History:  Past Surgical History:   Procedure Laterality Date    TIPS PROCEDURE          Sedation History:    Denies any adverse reactions.  Denies problems laying flat.    Social History:  Social History     Tobacco Use    Smoking status: Some Days     Packs/day: 1.50     Years: 43.00     Pack years: 64.50     Types: Cigarettes     Last attempt to quit: 10/19/2022     Years since quittin.1    Smokeless tobacco: Never   Substance Use Topics    Alcohol use: Not Currently     Comment: socially    Drug use: Never        Home Medications:   Prior to Admission medications    Medication Sig Start Date End Date Taking? Authorizing Provider   albuterol (PROVENTIL/VENTOLIN HFA) 90 mcg/actuation inhaler Inhale into the lungs. 22  Yes Historical Provider   lactulose (CEPHULAC) 20 gram Pack Take 1 packet (20 g total) by mouth 3 (three) times daily. 10/5/22 1/3/23 Yes Geeta Gaines, DO   nicotine (NICODERM CQ) 14 mg/24 hr Place 1 patch onto the skin once daily. 22  Yes Marianne Chu MD   nicotine polacrilex 2 MG Lozg Take 1 lozenge (2 mg total) by mouth as needed (Take 1 lozenge (2 mg total) by mouth as needed (1-2 per hour in place of cigarette (5-16 daily max)- oral). 10/13/22  Yes Marianne Chu MD   pantoprazole (PROTONIX) 20 MG tablet Take 1 tablet (20 mg total) by mouth once daily. 6/10/22 12/14/22 Yes Geeta Gaines,    rOPINIRole (REQUIP) 0.5 MG tablet Take 2 tablets (1 mg  total) by mouth every evening. 11/22/22 5/21/23 Yes Geeta Gaines DO   spironolactone (ALDACTONE) 50 MG tablet 1 tablet 8/18/22  Yes Geeta Gaines DO   tiotropium-olodateroL (STIOLTO RESPIMAT) 2.5-2.5 mcg/actuation Mist 2 puffs   Yes Historical Provider   ursodioL (ACTIGALL) 300 mg capsule Take 1 capsule (300 mg total) by mouth 3 (three) times daily. 9/29/22 9/29/23 Yes Shari Arreola MD   VRAYLAR 3 mg Cap Take 3 mg by mouth once daily. 9/17/22  Yes Historical Provider   ALPRAZolam (XANAX) 0.25 MG tablet Take 2 tablets (0.5 mg total) by mouth 2 (two) times daily as needed for Anxiety. 10/5/22 10/6/22  Geeta Gaines DO   furosemide (LASIX) 20 MG tablet Take 1 tablet (20 mg total) by mouth 2 (two) times a day. 10/5/22 1/3/23  Geeta Gaines DO   EScitalopram oxalate (LEXAPRO) 5 MG Tab Take 5 mg by mouth once daily. 9/26/22 12/14/22  Historical Provider       Inpatient Medications:    Current Outpatient Medications:     albuterol (PROVENTIL/VENTOLIN HFA) 90 mcg/actuation inhaler, Inhale into the lungs., Disp: , Rfl:     lactulose (CEPHULAC) 20 gram Pack, Take 1 packet (20 g total) by mouth 3 (three) times daily., Disp: 90 packet, Rfl: 2    nicotine (NICODERM CQ) 14 mg/24 hr, Place 1 patch onto the skin once daily., Disp: 28 patch, Rfl: 0    nicotine polacrilex 2 MG Lozg, Take 1 lozenge (2 mg total) by mouth as needed (Take 1 lozenge (2 mg total) by mouth as needed (1-2 per hour in place of cigarette (5-16 daily max)- oral)., Disp: 144 lozenge, Rfl: 0    pantoprazole (PROTONIX) 20 MG tablet, Take 1 tablet (20 mg total) by mouth once daily., Disp: 30 tablet, Rfl: 3    rOPINIRole (REQUIP) 0.5 MG tablet, Take 2 tablets (1 mg total) by mouth every evening., Disp: 180 tablet, Rfl: 1    spironolactone (ALDACTONE) 50 MG tablet, 1 tablet, Disp: 90 tablet, Rfl: 1    tiotropium-olodateroL (STIOLTO RESPIMAT) 2.5-2.5 mcg/actuation Mist, 2 puffs, Disp: , Rfl:     ursodioL (ACTIGALL) 300 mg capsule, Take  1 capsule (300 mg total) by mouth 3 (three) times daily., Disp: 90 capsule, Rfl: 3    VRAYLAR 3 mg Cap, Take 3 mg by mouth once daily., Disp: , Rfl:     ALPRAZolam (XANAX) 0.25 MG tablet, Take 2 tablets (0.5 mg total) by mouth 2 (two) times daily as needed for Anxiety., Disp: 2 tablet, Rfl: 0    furosemide (LASIX) 20 MG tablet, Take 1 tablet (20 mg total) by mouth 2 (two) times a day., Disp: 60 tablet, Rfl: 2    Current Facility-Administered Medications:     0.9%  NaCl infusion, , Intravenous, Continuous, Jen Law NP    LIDOcaine (PF) 10 mg/ml (1%) injection 10 mg, 1 mL, Intradermal, Once PRN, Jen Law NP     Anticoagulants/Antiplatelets:   no anticoagulation    Allergies:   Review of patient's allergies indicates:  No Known Allergies    Review of Systems:   As documented in primary provider H&P.    Vital Signs (Most Recent):  Temp: 98.1 °F (36.7 °C) (12/14/22 1145)  Pulse: 71 (12/14/22 1145)  Resp: 16 (12/14/22 1145)  BP: 137/68 (12/14/22 1145)  SpO2: 97 % (12/14/22 1145)    Physical Exam:  No acute distress, laying comfortably in bed, pleasant and cooperative, anxious  Regular rate and rhythm  Breathing unlabored  Abdomen benign  Extremities warm and well perfused    Sedation Exam:  ASA: III - Patient appears to have severe systemic disease not posing a constant threat to life   Mallampati: II (hard and soft palate, upper portion of tonsils anduvula visible)     Laboratory:  Lab Results   Component Value Date    INR 1.1 12/13/2022       Lab Results   Component Value Date    WBC 6.72 12/13/2022    HGB 14.3 12/13/2022    HCT 41.6 12/13/2022    MCV 92 12/13/2022     12/13/2022      Lab Results   Component Value Date     (H) 12/13/2022     12/13/2022    K 3.9 12/13/2022     12/13/2022    CO2 26 12/13/2022    BUN 11 12/13/2022    CREATININE 1.1 12/13/2022    CALCIUM 8.8 12/13/2022    ALT 37 12/13/2022    AST 48 (H) 12/13/2022    ALBUMIN 2.8 (L) 12/13/2022    BILITOT 2.1  (H) 12/13/2022    BILIDIR 0.7 (H) 12/13/2022       Imaging:  Reviewed.      ASSESSMENT/PLAN:   57F here for Y90 mapping procedure.     Procedure discussed in detail with patient and partner at bedside. Risks including bleeding, infection, and the need for additional procedures were discussed. Informed consent obtained.     Will plan for procedure with moderate intravenous conscious sedation.    Dahlia Bravo MD  Fellow, Dept. Of Interventional Radiology  [facility]

## 2022-12-15 ENCOUNTER — TELEPHONE (OUTPATIENT)
Dept: INTERVENTIONAL RADIOLOGY/VASCULAR | Facility: CLINIC | Age: 57
End: 2022-12-15
Payer: MEDICAID

## 2022-12-15 NOTE — TELEPHONE ENCOUNTER
Spoke to pt on phone,Pt is scheduled on 1/25/2023 for IR procedure.  Preop instructions given, pt verbally understood. Pt aware and confirmed, Thanks

## 2022-12-16 ENCOUNTER — RESEARCH ENCOUNTER (OUTPATIENT)
Dept: RESEARCH | Facility: HOSPITAL | Age: 57
End: 2022-12-16
Payer: MEDICAID

## 2022-12-16 NOTE — PROGRESS NOTES
RESEARCH STUDY CONSENT ENCOUNTER  ORGAN TRANSPLANT  [facility] [address]    Study Title: Role of Tumor-Induced Immune Tolerance in the Patient Response to Locoregional Therapy: Implications in Assessment Risk of Hepatocellular Carcinoma Recurrence Following Liver Transplantation    IRB #: [id number]    IRB Approval Date: 6/8/2016    : Ramiro Calvert MD  Sub-investigator: Guanako Garner, PhD    Patient Number: to be assigned    Patient is scheduled for a Y90  on TBD.     This study is an observational study to evaluate patients receiving transarterial chemoembolization (TACE) or transarterial radioembolization (TARE) therapy to define the link between tumor-elicited peripheral cell populations, and the risk of hepatocellular carcinoma (HCC) recurrence before orthotopic liver transplantation (OLT). [IRB 2016.131.B]      Present for discussion: patient Shonda Borrego, consenter Sheri Burrell, witness Joycelyn Nima  Is LAR Consenting for Subject: NO    Prior to the Informed Consent (IC) being signed, or any protocol required testing, procedure, or intervention being performed, the following was done or discussed with Hina Salima:    Purpose of the Study, Qualifications to Participate: YES  Study Design, Schedule and Procedures: YES  Risks, Benefits, Alternative Treatments, Compensation and Costs: YES  Confidentiality and HIPAA Authorization for Release of Medical Records for the research trial/subject's right/study related injury: YES  Study related contact information: YES  Voluntary Participation and Withdrawal from the research trial at any time: YES  Patient has been offered the opportunity to ask questions regarding the study and all questions were answered satisfactorily: YES  Patient verbalizes understanding of the study/procedures and agrees to participate: YES  CRC and PI contact information given to patient:YES  Verification the ICF was appropriately completed: YES  Signed copy given to  patient: YES  Copy in patient's chart and original uploaded to [facility]: YES    Research staff present during consent: Joycelyn Thomas      No study procedures (I.e. specimen collection) were performed before the informed consent was signed.     Specimens will be collected in [facility] at the time of peripheral IV line placement: NO  Specimens will be collected in [facility] RAD IR: YES    Sheri Burrell  Admin Research- Liver Transplant

## 2022-12-19 ENCOUNTER — TELEPHONE (OUTPATIENT)
Dept: HEPATOLOGY | Facility: CLINIC | Age: 57
End: 2022-12-19
Payer: MEDICAID

## 2022-12-19 NOTE — TELEPHONE ENCOUNTER
----- Message from Farzana Mejia sent at 12/19/2022  1:26 PM CST -----  Regarding: Reschedule appt  Pt called stating that she need to reschedule her appt on 1/3      Contact Shonda [phone]

## 2022-12-21 ENCOUNTER — TELEPHONE (OUTPATIENT)
Dept: TRANSPLANT | Facility: CLINIC | Age: 57
End: 2022-12-21
Payer: MEDICAID

## 2022-12-22 NOTE — TELEPHONE ENCOUNTER
Spoke with Ms Borrego in reference to coordinating dates for liver transplant evaluation.  Standard and Fast Pass evaluation procedures reviewed, as well as tentative start dates.  Patient expressed understanding and agreed to Fast Pass evaluation January 19th and 20th.  Evaluation tests and consults discussed. Patient reports having a recent colonoscopy and mammogram. Will need updated gyn/pap and egd. BMD to be scheduled with evaluation.  Tentative evaluation schedule reviewed, including HIV testing and anesthesia review of medica records and cardiac testing.  Advised of the need to fast after midnight January 18th and need to have caregiver attend.  All questions and concerns addressed. Caregiver and Fast Pass appointment letters mailed.     [facility] emailed as requested by patient

## 2022-12-26 ENCOUNTER — HOSPITAL ENCOUNTER (EMERGENCY)
Facility: HOSPITAL | Age: 57
Discharge: HOME | End: 2022-12-26
Attending: EMERGENCY MEDICINE
Payer: MEDICAID

## 2022-12-26 VITALS
SYSTOLIC BLOOD PRESSURE: 133 MMHG | HEIGHT: 68 IN | WEIGHT: 221 LBS | OXYGEN SATURATION: 95 % | BODY MASS INDEX: 33.49 KG/M2 | RESPIRATION RATE: 18 BRPM | TEMPERATURE: 98 F | HEART RATE: 78 BPM | DIASTOLIC BLOOD PRESSURE: 81 MMHG

## 2022-12-26 DIAGNOSIS — R79.89 INCREASED AMMONIA LEVEL: Primary | ICD-10-CM

## 2022-12-26 LAB
ALBUMIN SERPL BCP-MCNC: 3.2 G/DL (ref 3.5–5.2)
ALP SERPL-CCNC: 197 U/L (ref 55–135)
ALT SERPL W/O P-5'-P-CCNC: 39 U/L (ref 10–44)
AMMONIA PLAS-SCNC: 117 UMOL/L (ref 10–50)
ANION GAP SERPL CALC-SCNC: 2 MMOL/L (ref 8–16)
APTT BLDCRRT: 27.4 SEC (ref 21–32)
AST SERPL-CCNC: 49 U/L (ref 10–40)
BASOPHILS # BLD AUTO: 0.06 K/UL (ref 0–0.2)
BASOPHILS NFR BLD: 1 % (ref 0–1.9)
BILIRUB SERPL-MCNC: 2.4 MG/DL (ref 0.1–1)
BUN SERPL-MCNC: 11 MG/DL (ref 6–20)
CALCIUM SERPL-MCNC: 8.8 MG/DL (ref 8.7–10.5)
CHLORIDE SERPL-SCNC: 103 MMOL/L (ref 95–110)
CO2 SERPL-SCNC: 33 MMOL/L (ref 23–29)
CREAT SERPL-MCNC: 1 MG/DL (ref 0.5–1.4)
DIFFERENTIAL METHOD: ABNORMAL
EOSINOPHIL # BLD AUTO: 0.1 K/UL (ref 0–0.5)
EOSINOPHIL NFR BLD: 2 % (ref 0–8)
ERYTHROCYTE [DISTWIDTH] IN BLOOD BY AUTOMATED COUNT: 14 % (ref 11.5–14.5)
EST. GFR  (NO RACE VARIABLE): >60 ML/MIN/1.73 M^2
ETHANOL SERPL-MCNC: <3 MG/DL
GLUCOSE SERPL-MCNC: 176 MG/DL (ref 70–110)
HCT VFR BLD AUTO: 41.7 % (ref 37–48.5)
HGB BLD-MCNC: 14.6 G/DL (ref 12–16)
IMM GRANULOCYTES # BLD AUTO: 0.01 K/UL (ref 0–0.04)
IMM GRANULOCYTES NFR BLD AUTO: 0.2 % (ref 0–0.5)
INR PPP: 1.1 (ref 0.8–1.2)
LYMPHOCYTES # BLD AUTO: 1.6 K/UL (ref 1–4.8)
LYMPHOCYTES NFR BLD: 26.6 % (ref 18–48)
MCH RBC QN AUTO: 31.6 PG (ref 27–31)
MCHC RBC AUTO-ENTMCNC: 35 G/DL (ref 32–36)
MCV RBC AUTO: 90 FL (ref 82–98)
MONOCYTES # BLD AUTO: 0.6 K/UL (ref 0.3–1)
MONOCYTES NFR BLD: 10.5 % (ref 4–15)
NEUTROPHILS # BLD AUTO: 3.7 K/UL (ref 1.8–7.7)
NEUTROPHILS NFR BLD: 59.7 % (ref 38–73)
NRBC BLD-RTO: 0 /100 WBC
PLATELET # BLD AUTO: 175 K/UL (ref 150–450)
PMV BLD AUTO: 10 FL (ref 9.2–12.9)
POTASSIUM SERPL-SCNC: 4 MMOL/L (ref 3.5–5.1)
PROT SERPL-MCNC: 7.4 G/DL (ref 6–8.4)
PROTHROMBIN TIME: 11.9 SEC (ref 9–12.5)
RBC # BLD AUTO: 4.62 M/UL (ref 4–5.4)
SODIUM SERPL-SCNC: 138 MMOL/L (ref 136–145)
WBC # BLD AUTO: 6.12 K/UL (ref 3.9–12.7)

## 2022-12-26 PROCEDURE — 85610 PROTHROMBIN TIME: CPT | Mod: NTX | Performed by: EMERGENCY MEDICINE

## 2022-12-26 PROCEDURE — 36415 COLL VENOUS BLD VENIPUNCTURE: CPT | Mod: NTX | Performed by: EMERGENCY MEDICINE

## 2022-12-26 PROCEDURE — 82140 ASSAY OF AMMONIA: CPT | Mod: NTX | Performed by: EMERGENCY MEDICINE

## 2022-12-26 PROCEDURE — 85025 COMPLETE CBC W/AUTO DIFF WBC: CPT | Mod: NTX | Performed by: EMERGENCY MEDICINE

## 2022-12-26 PROCEDURE — 80053 COMPREHEN METABOLIC PANEL: CPT | Mod: NTX | Performed by: EMERGENCY MEDICINE

## 2022-12-26 PROCEDURE — 85730 THROMBOPLASTIN TIME PARTIAL: CPT | Mod: NTX | Performed by: EMERGENCY MEDICINE

## 2022-12-26 PROCEDURE — 99283 EMERGENCY DEPT VISIT LOW MDM: CPT | Mod: NTX

## 2022-12-26 PROCEDURE — 25000003 PHARM REV CODE 250: Mod: NTX | Performed by: EMERGENCY MEDICINE

## 2022-12-26 PROCEDURE — 82077 ASSAY SPEC XCP UR&BREATH IA: CPT | Mod: NTX | Performed by: EMERGENCY MEDICINE

## 2022-12-26 RX ORDER — LACTULOSE 10 G/15ML
40 SOLUTION ORAL
Status: COMPLETED | OUTPATIENT
Start: 2022-12-26 | End: 2022-12-26

## 2022-12-26 RX ADMIN — LACTULOSE 40 G: 20 SOLUTION ORAL at 10:12

## 2022-12-26 NOTE — ED PROVIDER NOTES
Encounter Date: 2022       History     Chief Complaint   Patient presents with    Anxiety     Pt has hx cirrhosis of liver - stated that began feeling very anxious and restless today with slight feeling of confusion. Stated that last time this occurred it was due to elevated ammonia levels - currently takes lactulose 3x daily.      57-year-old female with history of alcoholic cirrhosis, anxiety, elevated ammonia levels presents the emergency department with anxiety, she is been somewhat confused over the past few weeks.  Requesting an ammonia level.  Denies any alcohol use.  Not ill appearing.  Alert oriented x4, GCS is 15.  Acting appropriately.  Answering questions appropriate.  Patient states she is been taking her lactulose as prescribed    Review of patient's allergies indicates:  No Known Allergies  Past Medical History:   Diagnosis Date    Anxiety     Bipolar disorder     Cirrhosis     COPD (chronic obstructive pulmonary disease)     GERD (gastroesophageal reflux disease)     Restless leg syndrome     Urinary frequency      Past Surgical History:   Procedure Laterality Date    TIPS PROCEDURE       Family History   Problem Relation Age of Onset    Lung cancer Mother     No Known Problems Father     Cancer Sister     Lung cancer Sister     No Known Problems Daughter     No Known Problems Maternal Aunt     No Known Problems Maternal Uncle     No Known Problems Paternal Aunt     No Known Problems Paternal Uncle     No Known Problems Maternal Grandmother     No Known Problems Maternal Grandfather     No Known Problems Paternal Grandmother     No Known Problems Paternal Grandfather     Breast cancer Neg Hx     Ovarian cancer Neg Hx     BRCA 1/2 Neg Hx      Social History     Tobacco Use    Smoking status: Some Days     Packs/day: 1.50     Years: 43.00     Pack years: 64.50     Types: Cigarettes     Last attempt to quit: 10/19/2022     Years since quittin.1    Smokeless tobacco: Never   Substance Use  Topics    Alcohol use: Not Currently     Comment: socially    Drug use: Never     Review of Systems   Constitutional:  Negative for fever.   HENT:  Negative for sore throat.    Respiratory:  Negative for shortness of breath.    Cardiovascular:  Negative for chest pain.   Gastrointestinal:  Negative for nausea.   Genitourinary:  Negative for dysuria.   Musculoskeletal:  Negative for back pain.   Skin:  Negative for rash.   Neurological:  Negative for weakness.   Hematological:  Does not bruise/bleed easily.   All other systems reviewed and are negative.    Physical Exam     Initial Vitals [12/26/22 0856]   BP Pulse Resp Temp SpO2   133/81 78 18 98.2 °F (36.8 °C) 95 %      MAP       --         Physical Exam    Nursing note and vitals reviewed.  Constitutional: She appears well-developed and well-nourished. She is not diaphoretic. No distress.   HENT:   Head: Normocephalic and atraumatic.   Eyes: Conjunctivae and EOM are normal. Pupils are equal, round, and reactive to light.   Neck: Neck supple.   Normal range of motion.  Cardiovascular:  Normal rate, regular rhythm, normal heart sounds and intact distal pulses.           No murmur heard.  Pulmonary/Chest: Breath sounds normal. No respiratory distress. She has no wheezes. She has no rhonchi. She has no rales. She exhibits no tenderness.   Abdominal: Abdomen is soft. Bowel sounds are normal. She exhibits no distension. There is no abdominal tenderness. There is no rebound and no guarding.   Musculoskeletal:         General: No tenderness or edema. Normal range of motion.      Cervical back: Normal range of motion and neck supple.     Neurological: She is alert and oriented to person, place, and time. She has normal strength. No cranial nerve deficit. GCS score is 15. GCS eye subscore is 4. GCS verbal subscore is 5. GCS motor subscore is 6.   Skin: Skin is warm and dry. Capillary refill takes less than 2 seconds.       ED Course   Procedures  Labs Reviewed   CBC W/ AUTO  DIFFERENTIAL - Abnormal; Notable for the following components:       Result Value    MCH 31.6 (*)     All other components within normal limits   COMPREHENSIVE METABOLIC PANEL - Abnormal; Notable for the following components:    CO2 33 (*)     Glucose 176 (*)     Albumin 3.2 (*)     Total Bilirubin 2.4 (*)     Alkaline Phosphatase 197 (*)     AST 49 (*)     Anion Gap 2 (*)     All other components within normal limits   AMMONIA - Abnormal; Notable for the following components:    Ammonia 117 (*)     All other components within normal limits   APTT   PROTIME-INR   ALCOHOL,MEDICAL (ETHANOL)          Imaging Results    None          Medications   lactulose 20 gram/30 mL solution Soln 40 g (40 g Oral Given 12/26/22 1027)     Medical Decision Making:   Differential Diagnosis:   Alcoholic cirrhosis, elevated ammonia levels, anxiety           ED Course as of 12/26/22 1029   Mon Dec 26, 2022   1027 Patient is alert oriented x4, GCS is 15, bony is elevated, discussed the need to take her lactulose as prescribed 3 times a day.  See her primary care physician in the next day or 2 for repeat laboratory work.  She voices understanding.  Stable for discharge with strict ER precautions for worsening [SD]      ED Course User Index  [SD] Nabil Rees MD                 Clinical Impression:   Final diagnoses:  [R79.89] Increased ammonia level (Primary)        ED Disposition Condition    Discharge Stable          ED Prescriptions    None       Follow-up Information       Follow up With Specialties Details Why Contact Info Additional Information    Geeta Gaines, DO Family Medicine In 1 day  [address]  [address]  [place] [address]  [phone]       [facility] Emergency Department Emergency Medicine  If symptoms worsen [address] [id number] [phone] Floor 1             Nabil Rees MD  12/26/22 1038

## 2022-12-30 ENCOUNTER — LAB VISIT (OUTPATIENT)
Dept: LAB | Facility: HOSPITAL | Age: 57
End: 2022-12-30
Attending: STUDENT IN AN ORGANIZED HEALTH CARE EDUCATION/TRAINING PROGRAM
Payer: MEDICAID

## 2022-12-30 ENCOUNTER — TELEPHONE (OUTPATIENT)
Dept: TRANSPLANT | Facility: CLINIC | Age: 57
End: 2022-12-30

## 2022-12-30 DIAGNOSIS — K70.30 ALCOHOLIC CIRRHOSIS OF LIVER WITHOUT ASCITES: ICD-10-CM

## 2022-12-30 DIAGNOSIS — R16.0 LIVER MASS: ICD-10-CM

## 2022-12-30 LAB
AFP SERPL-MCNC: 2.6 NG/ML (ref 0–8.4)
ALBUMIN SERPL BCP-MCNC: 3.3 G/DL (ref 3.5–5.2)
ALP SERPL-CCNC: 169 U/L (ref 55–135)
ALT SERPL W/O P-5'-P-CCNC: 36 U/L (ref 10–44)
ANION GAP SERPL CALC-SCNC: 4 MMOL/L (ref 8–16)
AST SERPL-CCNC: 43 U/L (ref 10–40)
BASOPHILS # BLD AUTO: 0.09 K/UL (ref 0–0.2)
BASOPHILS NFR BLD: 1.7 % (ref 0–1.9)
BILIRUB SERPL-MCNC: 3.1 MG/DL (ref 0.1–1)
BUN SERPL-MCNC: 11 MG/DL (ref 6–20)
CALCIUM SERPL-MCNC: 8.9 MG/DL (ref 8.7–10.5)
CHLORIDE SERPL-SCNC: 104 MMOL/L (ref 95–110)
CO2 SERPL-SCNC: 31 MMOL/L (ref 23–29)
CREAT SERPL-MCNC: 1 MG/DL (ref 0.5–1.4)
DIFFERENTIAL METHOD: ABNORMAL
EOSINOPHIL # BLD AUTO: 0.3 K/UL (ref 0–0.5)
EOSINOPHIL NFR BLD: 4.8 % (ref 0–8)
ERYTHROCYTE [DISTWIDTH] IN BLOOD BY AUTOMATED COUNT: 14.6 % (ref 11.5–14.5)
EST. GFR  (NO RACE VARIABLE): >60 ML/MIN/1.73 M^2
GLUCOSE SERPL-MCNC: 126 MG/DL (ref 70–110)
HCT VFR BLD AUTO: 43.7 % (ref 37–48.5)
HGB BLD-MCNC: 15.3 G/DL (ref 12–16)
IMM GRANULOCYTES # BLD AUTO: 0.01 K/UL (ref 0–0.04)
IMM GRANULOCYTES NFR BLD AUTO: 0.2 % (ref 0–0.5)
INR PPP: 1.2 (ref 0.8–1.2)
LYMPHOCYTES # BLD AUTO: 2.5 K/UL (ref 1–4.8)
LYMPHOCYTES NFR BLD: 45.7 % (ref 18–48)
MCH RBC QN AUTO: 31.9 PG (ref 27–31)
MCHC RBC AUTO-ENTMCNC: 35 G/DL (ref 32–36)
MCV RBC AUTO: 91 FL (ref 82–98)
MONOCYTES # BLD AUTO: 0.6 K/UL (ref 0.3–1)
MONOCYTES NFR BLD: 11.6 % (ref 4–15)
NEUTROPHILS # BLD AUTO: 2 K/UL (ref 1.8–7.7)
NEUTROPHILS NFR BLD: 36 % (ref 38–73)
NRBC BLD-RTO: 0 /100 WBC
PLATELET # BLD AUTO: 157 K/UL (ref 150–450)
PMV BLD AUTO: 10.3 FL (ref 9.2–12.9)
POTASSIUM SERPL-SCNC: 3.4 MMOL/L (ref 3.5–5.1)
PROT SERPL-MCNC: 7.3 G/DL (ref 6–8.4)
PROTHROMBIN TIME: 12.9 SEC (ref 9–12.5)
RBC # BLD AUTO: 4.79 M/UL (ref 4–5.4)
SODIUM SERPL-SCNC: 139 MMOL/L (ref 136–145)
WBC # BLD AUTO: 5.41 K/UL (ref 3.9–12.7)

## 2022-12-30 PROCEDURE — 80053 COMPREHEN METABOLIC PANEL: CPT | Mod: TXP | Performed by: INTERNAL MEDICINE

## 2022-12-30 PROCEDURE — 85610 PROTHROMBIN TIME: CPT | Mod: NTX | Performed by: INTERNAL MEDICINE

## 2022-12-30 PROCEDURE — 85025 COMPLETE CBC W/AUTO DIFF WBC: CPT | Mod: TXP | Performed by: INTERNAL MEDICINE

## 2022-12-30 PROCEDURE — 82105 ALPHA-FETOPROTEIN SERUM: CPT | Mod: TXP | Performed by: INTERNAL MEDICINE

## 2022-12-30 PROCEDURE — 36415 COLL VENOUS BLD VENIPUNCTURE: CPT | Mod: NTX | Performed by: INTERNAL MEDICINE

## 2022-12-30 NOTE — TELEPHONE ENCOUNTER
Hepatology result note 12/30/22:    I called patient due to low potassium of 3.4, told her to hold furosemide from now (5:24 PM, 12/30/22) until Monday (1/2/23) night.  Then start furosemide only 1 tablet of 20 mg daily starting Tuesday 1/3/23.  She showed understanding.    Shari Arreola MD

## 2023-01-03 ENCOUNTER — OFFICE VISIT (OUTPATIENT)
Dept: HEPATOLOGY | Facility: CLINIC | Age: 58
End: 2023-01-03
Payer: MEDICAID

## 2023-01-03 DIAGNOSIS — Z01.818 PRE-TRANSPLANT EVALUATION FOR LIVER TRANSPLANT: ICD-10-CM

## 2023-01-03 DIAGNOSIS — C22.0 HCC (HEPATOCELLULAR CARCINOMA): Primary | ICD-10-CM

## 2023-01-03 DIAGNOSIS — K70.30 ALCOHOLIC CIRRHOSIS OF LIVER WITHOUT ASCITES: ICD-10-CM

## 2023-01-03 PROCEDURE — 99213 OFFICE O/P EST LOW 20 MIN: CPT | Mod: 95,TXP,, | Performed by: INTERNAL MEDICINE

## 2023-01-03 PROCEDURE — 99213 PR OFFICE/OUTPT VISIT, EST, LEVL III, 20-29 MIN: ICD-10-PCS | Mod: 95,TXP,, | Performed by: INTERNAL MEDICINE

## 2023-01-03 NOTE — PROGRESS NOTES
[facility] New Patient (Self-Referral) Note    Reason for Visit:  The encounter diagnosis is Alcoholic Cirrhosis         HPI:  This is a 56 year lady with history of Restless leg syndrome, bipolar disorder, here for evaluation of her alcoholic cirrhosis diagnosed in 2019 when she had a variceal requiring emergent TIPS in [facility].  She had been following with PCP but has not had hepatology evaluation.  Patient says that she quit drinking 6 months ago after slowly cutting down since her diagnosis in 2019.  Her only complaints are lack of energy fatigue and constipation. The constipation is gradually improving since she became compliant with Lactulose.  She has also been taking Spironolactone and Lasix which has helped considerably with ascites and she has noted some weight loss after fluid removal.        Elevated liver enzymes: Yes  Abnormal imaging: Yes  Cirrhosis: Yes  Hepatitis C: No  Hepatitis B: No  Fatty liver: No  Encephalopathy: No  Post-hospital discharge: No  Symptoms: Malaise Fatigue     Primary hepatic manifestations:  Fatigue: Yes  Edema:No  Ascites:Yes  Encephalopathy:According to patient few days after TIPS none since   Abdominal pain:No  GI bleeds: None since bleeding episode in 2019   Pruritus:No  Weight Changes:No  Changes in Bowel habits: No  Muscle cramps:No    Risk factors for liver disease:  No jaundice  No transfusions  No IVDU  Did not snort cocaine or similar agents  Did not live with anyone with hepatitis B or C  Sexual partner not tested  No hepatotoxic medications  No exposure to industrial toxins  Alcohol: Quit 6 months ago after using hard liquor > 10-15 years        Review of Systems   Constitutional:  Positive for malaise/fatigue. Negative for chills, fever and weight loss.   Respiratory:  Negative for cough.    Cardiovascular:  Negative for chest pain, palpitations and orthopnea.   Gastrointestinal:  Negative for abdominal pain, blood in stool, constipation,  diarrhea, heartburn, melena, nausea and vomiting.         Surgical History:  None     Family History: No Family history of Liver disorders      Current Outpatient Medications   Medication Sig    albuterol (PROVENTIL/VENTOLIN HFA) 90 mcg/actuation inhaler Inhale into the lungs.    ALPRAZolam (XANAX) 0.25 MG tablet Take 2 tablets (0.5 mg total) by mouth 2 (two) times daily as needed for Anxiety.    furosemide (LASIX) 20 MG tablet Take 1 tablet (20 mg total) by mouth 2 (two) times a day.    lactulose (CEPHULAC) 20 gram Pack Take 1 packet (20 g total) by mouth 3 (three) times daily.    nicotine (NICODERM CQ) 14 mg/24 hr Place 1 patch onto the skin once daily.    nicotine polacrilex 2 MG Lozg Take 1 lozenge (2 mg total) by mouth as needed (Take 1 lozenge (2 mg total) by mouth as needed (1-2 per hour in place of cigarette (5-16 daily max)- oral).    pantoprazole (PROTONIX) 20 MG tablet Take 1 tablet (20 mg total) by mouth once daily.    rOPINIRole (REQUIP) 0.5 MG tablet Take 2 tablets (1 mg total) by mouth every evening.    spironolactone (ALDACTONE) 50 MG tablet 1 tablet    tiotropium-olodateroL (STIOLTO RESPIMAT) 2.5-2.5 mcg/actuation Mist 2 puffs    ursodioL (ACTIGALL) 300 mg capsule Take 1 capsule (300 mg total) by mouth 3 (three) times daily.    VRAYLAR 3 mg Cap Take 3 mg by mouth once daily.     No current facility-administered medications for this visit.         Objective Findings:    Vital Signs:  There were no vitals filed for this visit.        Physical Exam  Vitals reviewed.   Constitutional:       Appearance: Normal appearance. She is not ill-appearing.   Cardiovascular:      Rate and Rhythm: Normal rate.      Pulses: Normal pulses.   Pulmonary:      Effort: Pulmonary effort is normal. No respiratory distress.   Abdominal:      General: Abdomen is flat. Bowel sounds are normal. There is no distension.      Palpations: Abdomen is soft. There is no mass.      Tenderness: There is no abdominal tenderness. There  is no guarding.   Musculoskeletal:      Right lower leg: No edema.      Left lower leg: No edema.   Skin:     Capillary Refill: Capillary refill takes less than 2 seconds.      Coloration: Skin is pale. Skin is not jaundiced.      Findings: No erythema.   Neurological:      General: No focal deficit present.      Mental Status: She is alert and oriented to person, place, and time.           Imaging:   Ultrasound liver    Nodular contour of the liver indicating cirrhotic change.  There is associated diffuse heterogeneity of the hepatic parenchymal echotexture.  Hepatopetal flow in the portal vein.  Previously demonstrated tips shunt is not demonstrated on this exam.  Common duct normal in size measuring 5 mm  2. Cholelithiasis    Endoscopy:  Will evaluate after TIPS stent  evaluation is compleetd    Assessment:   Alcoholic Liver Cirrhosis   Decompensated with ascites and variceal bleeding  S/p TIPS but most likely stent migration as the TIPS is no longer visualized on the recent ultrasound- Will Repeat ultrasound today to confirm recent scan findings  MELD-Na score of 14 Child Class B  HCC screen- No focal lesion on ultrasound in May and AFP negative- will repeat in 6 months   HE- no history, for primary prophylaxis continue Lactulose to target 2-3 soft BM/day   - strict abstinence of alcohol use  - No history of SBP- today no ascites noted continue furosemide 20 mga nd Spironolactone 50 mg.    Recommendations:  -  Labs in 2 months:  CBC, CMP, PT INR, AFP.     -   Transplant option discussed, will evaluate when MELD >15 or HCC found in the liver. Transplant evaluation scheduled on Jan 19-20, 2023.     -  Y-90 was attempted to treat the HCC lesion, but it was met with difficulty. Hence, patient will return for microwave ablation of segment 6 hypervascular tumor on Jan 255, 2023.     Return in 2 months    - avoid non-steroidal anti-inflammatory drugs (NSAIDs) such as ibuprofen, Motrin, naprosyn, Alleve due to the risk  of kidney damage  - can take acetaminophen (Tylenol), no more than 2000 mg per day  - low sodium (salt) 2 gram per day diet  - nutrition: 25-30kcal (calorie per body body weight in kilogram) per day  - no need to restrict protein in diet  - high protein diet: 1.2-1.5 gram/kg (protein per body weight in kilogram) per day to prevent muscle mass loss  - avoid fasting  - Late night snack with 50 gram of complex carbohydrates and protein  - resistance exercises for muscle strength  - avoid raw seafoods due to the risk of fatal Vibrio vulnificus infection  - ultrasound or MRI of the liver every 6 months for liver cancer screening (you are at risk of developing liver cancer due to scar tissue in the liver)  - Upper endoscopy every 1-2 years to screen for varices in the stomach and foodpipe which can burst and cause fatal bleeding

## 2023-01-03 NOTE — PATIENT INSTRUCTIONS
Recommendations:  -  Labs in 2 months:  CBC, CMP, PT INR, AFP.     -   Transplant option discussed, will evaluate when MELD >15 or HCC found in the liver. Transplant evaluation scheduled on Jan 19-20, 2023.     -  Y-90 was attempted to treat the HCC lesion, but it was met with difficulty. Hence, patient will return for microwave ablation of segment 6 hypervascular tumor on Jan 255, 2023.     Return in 2 months    - avoid non-steroidal anti-inflammatory drugs (NSAIDs) such as ibuprofen, Motrin, naprosyn, Alleve due to the risk of kidney damage  - can take acetaminophen (Tylenol), no more than 2000 mg per day  - low sodium (salt) 2 gram per day diet  - nutrition: 25-30kcal (calorie per body body weight in kilogram) per day  - no need to restrict protein in diet  - high protein diet: 1.2-1.5 gram/kg (protein per body weight in kilogram) per day to prevent muscle mass loss  - avoid fasting  - Late night snack with 50 gram of complex carbohydrates and protein  - resistance exercises for muscle strength  - avoid raw seafoods due to the risk of fatal Vibrio vulnificus infection  - ultrasound or MRI of the liver every 6 months for liver cancer screening (you are at risk of developing liver cancer due to scar tissue in the liver)  - Upper endoscopy every 1-2 years to screen for varices in the stomach and foodpipe which can burst and cause fatal bleeding

## 2023-01-03 NOTE — Clinical Note
Recommendations: -  Labs in 2 months:  CBC, CMP, PT INR, AFP.   -   Transplant option discussed, will evaluate when MELD >15 or HCC found in the liver. Transplant evaluation scheduled on Jan 19-20, 2023.   -  Y-90 was attempted to treat the HCC lesion, but it was met with difficulty. Hence, patient will return for microwave ablation of segment 6 hypervascular tumor on Jan 255, 2023.   Return in 2 months  - avoid non-steroidal anti-inflammatory drugs (NSAIDs) due to the risk of kidney damage - can take acetaminophen (Tylenol), no more than 2000 mg per day - low sodium (salt) 2 gram per day diet - nutrition: 25-30kcal/day - Late night snack with 50 gram of complex carbohydrates and protein - resistance exercises-muscle strength - No raw seafoods due to the risk of fatal Vibrio vulnificus infection - ultrasound or MRI of the liver every 6 months for liver cancer screening - Upper endoscopy every 1-2 years to screen for varices in the stomach and foodpipe which can burst and cause fatal bleeding

## 2023-01-09 ENCOUNTER — TELEPHONE (OUTPATIENT)
Dept: TRANSPLANT | Facility: CLINIC | Age: 58
End: 2023-01-09
Payer: MEDICAID

## 2023-01-09 DIAGNOSIS — K21.9 GASTROESOPHAGEAL REFLUX DISEASE, UNSPECIFIED WHETHER ESOPHAGITIS PRESENT: ICD-10-CM

## 2023-01-09 RX ORDER — PANTOPRAZOLE SODIUM 20 MG/1
20 TABLET, DELAYED RELEASE ORAL DAILY
Qty: 30 TABLET | Refills: 3 | Status: SHIPPED | OUTPATIENT
Start: 2023-01-09 | End: 2023-03-08 | Stop reason: SDUPTHER

## 2023-01-09 NOTE — TELEPHONE ENCOUNTER
Call returned with no answer. Unable to leave a voice message as voice mailbox has not been set up, yet.     Patient messaged via the portal    ----- Message from Maggie Manriquez sent at 1/9/2023 12:35 PM CST -----  Regarding: requesting appt info  Patient states she thought she had some appt scheduled 1/18, 1/19 & 1/20 for a work up for transplant  I dont see those appts scheduled  Please contact patient and advise    Patient can be contacted @# [phone]

## 2023-01-10 ENCOUNTER — TELEPHONE (OUTPATIENT)
Dept: TRANSPLANT | Facility: CLINIC | Age: 58
End: 2023-01-10
Payer: MEDICAID

## 2023-01-10 DIAGNOSIS — C22.0 HEPATOCELLULAR CARCINOMA: ICD-10-CM

## 2023-01-10 DIAGNOSIS — Z01.818 PRE-TRANSPLANT EVALUATION FOR LIVER TRANSPLANT: ICD-10-CM

## 2023-01-10 DIAGNOSIS — I73.9 CLAUDICATION OF BOTH LOWER EXTREMITIES: ICD-10-CM

## 2023-01-10 DIAGNOSIS — K70.30 ALCOHOLIC CIRRHOSIS OF LIVER WITHOUT ASCITES: Primary | ICD-10-CM

## 2023-01-10 DIAGNOSIS — Z95.828 S/P TIPS (TRANSJUGULAR INTRAHEPATIC PORTOSYSTEMIC SHUNT): ICD-10-CM

## 2023-01-10 DIAGNOSIS — J44.9 COPD WITHOUT EXACERBATION: ICD-10-CM

## 2023-01-10 DIAGNOSIS — F17.219 CIGARETTE NICOTINE DEPENDENCE WITH NICOTINE-INDUCED DISORDER: ICD-10-CM

## 2023-01-10 NOTE — TELEPHONE ENCOUNTER
Call returned with no answer. Voice message left confirming Fast Pass evaluation dates, Jan 19th and 20th, and [facility] [place] referral Jan 18th-20th. Contact numbers provided for a return call as needed.     ----- Message from Alfonso Oconnor MA sent at 1/10/2023  9:22 AM CST -----  Regarding: Returning call  Contact: [phone]  Patient is returning call from yesterday. Please call and advise.

## 2023-01-11 ENCOUNTER — PATIENT MESSAGE (OUTPATIENT)
Dept: TRANSPLANT | Facility: CLINIC | Age: 58
End: 2023-01-11
Payer: MEDICAID

## 2023-01-11 ENCOUNTER — TELEPHONE (OUTPATIENT)
Dept: TRANSPLANT | Facility: CLINIC | Age: 58
End: 2023-01-11
Payer: MEDICAID

## 2023-01-11 NOTE — TELEPHONE ENCOUNTER
Noted    ----- Message from Azucena Marlow MA sent at 1/11/2023  7:11 AM CST -----  Regarding: RE: Treatment for mass    Pt is scheduled on 1/25/2023 for MWA procedure. Pt aware and confirmed, Thanks     ----- Message -----  From: Lopez Fisher MD  Sent: 1/10/2023  11:31 AM CST  To: Shari Arreola MD, Marquis Hernández MD, #  Subject: RE: Treatment for mass                           So, I'd treat as isolated tumor without vascular invasion.  BCLC A, within Ernie.  Looks like Belinda is planning for MWA.    ----- Message -----  From: Shari Arreola MD  Sent: 1/10/2023  10:56 AM CST  To: Marquis Hernández MD, Leyda Lino RN, #  Subject: RE: Treatment for mass                           Repeat AFP was 2.6 on 12/30/22.     ----- Message -----  From: Jessie Schulz  Sent: 11/9/2022   3:21 PM CST  To: Shari Arreola MD, Marquis eHrnández MD, #  Subject: RE: Treatment for mass                           An AFP was collected yesterday and is in process. Will monitor and notify all of result.    ----- Message -----  From: Lopez Fisher MD  Sent: 11/9/2022   3:14 PM CST  To: Shari Arreola MD, Marquis Hernández MD, #  Subject: RE: Treatment for mass                           2.2cm mass in the right hepatic lobe with arterial enhancement and washout that meets imaging criteria for HCC.  It has a weird taper to it that trails posterior and medially. I don't think there is portal vein vascular invasion.  ##But, I'd get a repeat AFP.##  If it's through the roof, I'd consider it real.  If it's not significantly elevated, then it's probably just motion artifact.  In the meantime, we should see her for Y90.    Procedure: Y90 mapping  Procedure duration: 2 hours  Level of sedation: IV sedation  [place]: Dane Reyes   Performing physician (if any specific): Any  Procedure room: 188/189   Clinic visit: Yes  Priority: Non- Urgent  Snapboard comments: N/A  Other comments: N/A     ----- Message -----  From: Shari Arreola,  MD  Sent: 11/9/2022   2:41 PM CST  To: Marquis Hernández MD, Leyda Lino RN, #  Subject: Treatment for mass                               Her liver mass has increased to 2.2 cm, so if you folks agree, will consult you for Y-90 or whatever you want to treat her with. We will start txp eval.     Shari

## 2023-01-12 DIAGNOSIS — J44.9 COPD WITHOUT EXACERBATION: ICD-10-CM

## 2023-01-12 DIAGNOSIS — K70.30 ALCOHOLIC CIRRHOSIS OF LIVER WITHOUT ASCITES: Primary | ICD-10-CM

## 2023-01-12 DIAGNOSIS — Z95.828 S/P TIPS (TRANSJUGULAR INTRAHEPATIC PORTOSYSTEMIC SHUNT): ICD-10-CM

## 2023-01-12 DIAGNOSIS — Z76.82 ORGAN TRANSPLANT CANDIDATE: ICD-10-CM

## 2023-01-12 NOTE — PROGRESS NOTES
Orders entered and appointment card completed for clinic follow-up in 2 months (March 2023) with labs. Transplant evaluation's scheduled Jan 19th and 20th, and MWA scheduled with IR Jan 25th. Will follow-up accordingly.

## 2023-01-13 NOTE — TELEPHONE ENCOUNTER
Call placed to patient with no answer. Voice message left requesting a return call as needed. Contact numbers provided.     Reply also sent to patient via the portal, addressing her question regarding evaluation start date.

## 2023-01-18 ENCOUNTER — TELEPHONE (OUTPATIENT)
Dept: TRANSPLANT | Facility: CLINIC | Age: 58
End: 2023-01-18
Payer: MEDICAID

## 2023-01-18 DIAGNOSIS — Z00.6 RESEARCH STUDY PATIENT: Primary | ICD-10-CM

## 2023-01-18 DIAGNOSIS — C22.0 HCC (HEPATOCELLULAR CARCINOMA): ICD-10-CM

## 2023-01-18 NOTE — TELEPHONE ENCOUNTER
Patient called to review appointments for Fast Pass, there was no answer.  Message left for the patient to call the office.

## 2023-01-18 NOTE — TELEPHONE ENCOUNTER
Reached out to pt today re appointments for tomorrow.   No answer, left voice mail for pt explaining she could come to [facility] clinic to see me tomorrow afternoon when she is free.     Left return call back number.

## 2023-01-19 ENCOUNTER — HOSPITAL ENCOUNTER (OUTPATIENT)
Dept: RADIOLOGY | Facility: HOSPITAL | Age: 58
Discharge: HOME | End: 2023-01-19
Attending: INTERNAL MEDICINE
Payer: MEDICAID

## 2023-01-19 ENCOUNTER — HOSPITAL ENCOUNTER (OUTPATIENT)
Dept: CARDIOLOGY | Facility: HOSPITAL | Age: 58
Discharge: HOME | End: 2023-01-19
Attending: INTERNAL MEDICINE
Payer: MEDICAID

## 2023-01-19 ENCOUNTER — RESEARCH ENCOUNTER (OUTPATIENT)
Dept: RESEARCH | Facility: HOSPITAL | Age: 58
End: 2023-01-19
Payer: MEDICAID

## 2023-01-19 ENCOUNTER — TELEPHONE (OUTPATIENT)
Dept: TRANSPLANT | Facility: CLINIC | Age: 58
End: 2023-01-19
Payer: MEDICAID

## 2023-01-19 VITALS
WEIGHT: 221 LBS | RESPIRATION RATE: 16 BRPM | HEIGHT: 68 IN | SYSTOLIC BLOOD PRESSURE: 101 MMHG | BODY MASS INDEX: 33.49 KG/M2 | DIASTOLIC BLOOD PRESSURE: 48 MMHG | HEART RATE: 58 BPM

## 2023-01-19 DIAGNOSIS — Z01.818 PRE-TRANSPLANT EVALUATION FOR LIVER TRANSPLANT: ICD-10-CM

## 2023-01-19 DIAGNOSIS — J44.9 COPD WITHOUT EXACERBATION: ICD-10-CM

## 2023-01-19 DIAGNOSIS — F17.219 CIGARETTE NICOTINE DEPENDENCE WITH NICOTINE-INDUCED DISORDER: ICD-10-CM

## 2023-01-19 DIAGNOSIS — K70.30 ALCOHOLIC CIRRHOSIS OF LIVER WITHOUT ASCITES: ICD-10-CM

## 2023-01-19 DIAGNOSIS — C22.0 HEPATOCELLULAR CARCINOMA: ICD-10-CM

## 2023-01-19 DIAGNOSIS — Z95.828 S/P TIPS (TRANSJUGULAR INTRAHEPATIC PORTOSYSTEMIC SHUNT): ICD-10-CM

## 2023-01-19 DIAGNOSIS — Z76.82 ORGAN TRANSPLANT CANDIDATE: ICD-10-CM

## 2023-01-19 DIAGNOSIS — I73.9 CLAUDICATION OF BOTH LOWER EXTREMITIES: ICD-10-CM

## 2023-01-19 LAB
ASCENDING AORTA: 3.12 CM
AV INDEX (PROSTH): 0.72
AV MEAN GRADIENT: 10 MMHG
AV PEAK GRADIENT: 17 MMHG
AV VALVE AREA: 3 CM2
AV VELOCITY RATIO: 0.86
BSA FOR ECHO PROCEDURE: 2.19 M2
CV ECHO LV RWT: 0.33 CM
CV STRESS BASE HR: 56 BPM
DIASTOLIC BLOOD PRESSURE: 48 MMHG
DOP CALC AO PEAK VEL: 2.07 M/S
DOP CALC AO VTI: 50.35 CM
DOP CALC LVOT AREA: 4.2 CM2
DOP CALC LVOT DIAMETER: 2.3 CM
DOP CALC LVOT PEAK VEL: 1.77 M/S
DOP CALC LVOT STROKE VOLUME: 151.07 CM3
DOP CALCLVOT PEAK VEL VTI: 36.38 CM
E WAVE DECELERATION TIME: 275.63 MSEC
E/A RATIO: 1.14
E/E' RATIO: 9.9 M/S
ECHO LV POSTERIOR WALL: 0.94 CM (ref 0.6–1.1)
EJECTION FRACTION: 65 %
FRACTIONAL SHORTENING: 38 % (ref 28–44)
INTERVENTRICULAR SEPTUM: 0.87 CM (ref 0.6–1.1)
LA MAJOR: 4.83 CM
LA MINOR: 4.62 CM
LA WIDTH: 3.56 CM
LEFT ATRIUM SIZE: 3.76 CM
LEFT ATRIUM VOLUME INDEX MOD: 22.7 ML/M2
LEFT ATRIUM VOLUME INDEX: 25.2 ML/M2
LEFT ATRIUM VOLUME MOD: 48.38 CM3
LEFT ATRIUM VOLUME: 53.73 CM3
LEFT INTERNAL DIMENSION IN SYSTOLE: 3.5 CM (ref 2.1–4)
LEFT VENTRICLE DIASTOLIC VOLUME INDEX: 73.44 ML/M2
LEFT VENTRICLE DIASTOLIC VOLUME: 156.42 ML
LEFT VENTRICLE MASS INDEX: 92 G/M2
LEFT VENTRICLE SYSTOLIC VOLUME INDEX: 23.8 ML/M2
LEFT VENTRICLE SYSTOLIC VOLUME: 50.76 ML
LEFT VENTRICULAR INTERNAL DIMENSION IN DIASTOLE: 5.64 CM (ref 3.5–6)
LEFT VENTRICULAR MASS: 195.36 G
LV LATERAL E/E' RATIO: 8.25 M/S
LV SEPTAL E/E' RATIO: 12.38 M/S
MV A" WAVE DURATION": 14.08 MSEC
MV PEAK A VEL: 0.87 M/S
MV PEAK E VEL: 0.99 M/S
MV STENOSIS PRESSURE HALF TIME: 79.93 MS
MV VALVE AREA P 1/2 METHOD: 2.75 CM2
OHS CV CPX 1 MINUTE RECOVERY HEART RATE: 136 BPM
OHS CV CPX 85 PERCENT MAX PREDICTED HEART RATE MALE: 132
OHS CV CPX MAX PREDICTED HEART RATE: 155
OHS CV CPX PATIENT IS FEMALE: 1
OHS CV CPX PATIENT IS MALE: 0
OHS CV CPX PEAK DIASTOLIC BLOOD PRESSURE: 70 MMHG
OHS CV CPX PEAK HEAR RATE: 141 BPM
OHS CV CPX PEAK RATE PRESSURE PRODUCT: NORMAL
OHS CV CPX PEAK SYSTOLIC BLOOD PRESSURE: 221 MMHG
OHS CV CPX PERCENT MAX PREDICTED HEART RATE ACHIEVED: 91
OHS CV CPX RATE PRESSURE PRODUCT PRESENTING: 5656
PISA TR MAX VEL: 2.66 M/S
PULM VEIN S/D RATIO: 1.29
PV PEAK D VEL: 0.51 M/S
PV PEAK S VEL: 0.66 M/S
RA MAJOR: 4.95 CM
RA PRESSURE: 3 MMHG
RA WIDTH: 3.32 CM
RIGHT VENTRICULAR END-DIASTOLIC DIMENSION: 3.44 CM
RV TISSUE DOPPLER FREE WALL SYSTOLIC VELOCITY 1 (APICAL 4 CHAMBER VIEW): 17.24 CM/S
SINUS: 2.85 CM
STJ: 3.18 CM
SYSTOLIC BLOOD PRESSURE: 101 MMHG
TDI LATERAL: 0.12 M/S
TDI SEPTAL: 0.08 M/S
TDI: 0.1 M/S
TR MAX PG: 28 MMHG
TRICUSPID ANNULAR PLANE SYSTOLIC EXCURSION: 2.67 CM
TV REST PULMONARY ARTERY PRESSURE: 31 MMHG

## 2023-01-19 PROCEDURE — 63600150 PHARM REV CODE 636: Mod: TXP | Performed by: INTERNAL MEDICINE

## 2023-01-19 PROCEDURE — 93325 DOPPLER ECHO COLOR FLOW MAPG: CPT | Mod: TXP

## 2023-01-19 PROCEDURE — 93351 STRESS TTE COMPLETE: CPT | Mod: 26,TXP,, | Performed by: INTERNAL MEDICINE

## 2023-01-19 PROCEDURE — 93351 STRESS ECHO (CUPID ONLY): ICD-10-PCS | Mod: 26,TXP,, | Performed by: INTERNAL MEDICINE

## 2023-01-19 PROCEDURE — 71046 X-RAY EXAM CHEST 2 VIEWS: CPT | Mod: TC,FY,TXP

## 2023-01-19 PROCEDURE — 71046 XR CHEST PA AND LATERAL: ICD-10-PCS | Mod: 26,TXP,, | Performed by: RADIOLOGY

## 2023-01-19 PROCEDURE — 71046 X-RAY EXAM CHEST 2 VIEWS: CPT | Mod: 26,TXP,, | Performed by: RADIOLOGY

## 2023-01-19 PROCEDURE — 25500020 PHARM REV CODE 255: Mod: TXP | Performed by: INTERNAL MEDICINE

## 2023-01-19 PROCEDURE — 93325 STRESS ECHO (CUPID ONLY): ICD-10-PCS | Mod: 26,TXP,, | Performed by: INTERNAL MEDICINE

## 2023-01-19 PROCEDURE — 93320 DOPPLER ECHO COMPLETE: CPT | Mod: 26,TXP,, | Performed by: INTERNAL MEDICINE

## 2023-01-19 PROCEDURE — 72197 MRI PELVIS W/O & W/DYE: CPT | Mod: 26,TXP,, | Performed by: RADIOLOGY

## 2023-01-19 PROCEDURE — 72197 MRI PELVIS W WO CONTRAST: ICD-10-PCS | Mod: 26,TXP,, | Performed by: RADIOLOGY

## 2023-01-19 PROCEDURE — A9585 GADOBUTROL INJECTION: HCPCS | Mod: TXP | Performed by: INTERNAL MEDICINE

## 2023-01-19 PROCEDURE — 72197 MRI PELVIS W/O & W/DYE: CPT | Mod: TC,TXP

## 2023-01-19 PROCEDURE — 93320 STRESS ECHO (CUPID ONLY): ICD-10-PCS | Mod: 26,TXP,, | Performed by: INTERNAL MEDICINE

## 2023-01-19 PROCEDURE — 93325 DOPPLER ECHO COLOR FLOW MAPG: CPT | Mod: 26,TXP,, | Performed by: INTERNAL MEDICINE

## 2023-01-19 RX ORDER — GADOBUTROL 604.72 MG/ML
10 INJECTION INTRAVENOUS
Status: COMPLETED | OUTPATIENT
Start: 2023-01-19 | End: 2023-01-19

## 2023-01-19 RX ORDER — DOBUTAMINE HYDROCHLORIDE 100 MG/100ML
10 INJECTION INTRAVENOUS
Status: COMPLETED | OUTPATIENT
Start: 2023-01-19 | End: 2023-01-19

## 2023-01-19 RX ORDER — ATROPINE SULFATE 0.1 MG/ML
1 INJECTION INTRAVENOUS
Status: DISCONTINUED | OUTPATIENT
Start: 2023-01-19 | End: 2023-01-19

## 2023-01-19 RX ADMIN — DOBUTAMINE IN DEXTROSE 10 MCG/KG/MIN: 100 INJECTION, SOLUTION INTRAVENOUS at 09:01

## 2023-01-19 RX ADMIN — GADOBUTROL 10 ML: 604.72 INJECTION INTRAVENOUS at 07:01

## 2023-01-19 NOTE — PROGRESS NOTES
RESEARCH STUDY ENCOUNTER  ORGAN TRANSPLANT  [facility] RAMANDEEP VIVAS    Study Title: Role of Tumor-Induced Immune Tolerance in the Patient Response to Locoregional Therapy: Implications in Assessment Risk of Hepatocellular Carcinoma Recurrence Following Liver Transplantation    IRB #: [id number]    IRB Approval Date: 6/8/2016    : Ramiro Calvert MD  Sub-investigator: Guanako Garner, PhD    Patient Number: [id number]    Patient is scheduled for a MWA on (date: 01/25/2023). In accordance with the study protocol, specimens were collected in:  [facility] LAB VNP: YES/NO: yes  [facility] LABTX: YES/NO: no  [facility] LAB IM: YES/NO: no  [facility] DOSC at the time of peripheral IV line placement: YES/NO: no  [facility] RAD IR: YES/NO: no      Joycelyn Herring  Admin Research- Liver Transplant

## 2023-01-19 NOTE — TELEPHONE ENCOUNTER
Return call received. Ms. Borrego says she received a message from someone asking her to return to the Transplant Clinic after she completed her morning appointments but does not recall who left the message, and it's since been deleted. Advised I will reach out to the clinic staff to see if anyone is aware of the call or indication for her to return today. Understanding expressed.    Patient advised of the difficulty we're having reaching her at her preferred number. Alternate number obtained and updated in demographics.

## 2023-01-19 NOTE — TELEPHONE ENCOUNTER
Message received from Sherry Villatoro, stating patient is requesting a return call regarding a message she received to stop by transplant. Call placed to patient with no answer. Voice message left, requesting a return call as needed. Contact numbers provided.

## 2023-01-20 ENCOUNTER — HOSPITAL ENCOUNTER (OUTPATIENT)
Dept: PULMONOLOGY | Facility: CLINIC | Age: 58
Discharge: HOME | End: 2023-01-20
Payer: MEDICAID

## 2023-01-20 ENCOUNTER — OFFICE VISIT (OUTPATIENT)
Dept: TRANSPLANT | Facility: CLINIC | Age: 58
End: 2023-01-20
Payer: MEDICAID

## 2023-01-20 ENCOUNTER — HOSPITAL ENCOUNTER (OUTPATIENT)
Dept: RADIOLOGY | Facility: HOSPITAL | Age: 58
Discharge: HOME | End: 2023-01-20
Attending: INTERNAL MEDICINE
Payer: MEDICAID

## 2023-01-20 ENCOUNTER — EVALUATION (OUTPATIENT)
Dept: TRANSPLANT | Facility: CLINIC | Age: 58
End: 2023-01-20
Payer: MEDICAID

## 2023-01-20 ENCOUNTER — CLINICAL SUPPORT (OUTPATIENT)
Dept: TRANSPLANT | Facility: CLINIC | Age: 58
End: 2023-01-20
Payer: MEDICAID

## 2023-01-20 VITALS
TEMPERATURE: 97 F | RESPIRATION RATE: 16 BRPM | SYSTOLIC BLOOD PRESSURE: 125 MMHG | RESPIRATION RATE: 16 BRPM | SYSTOLIC BLOOD PRESSURE: 125 MMHG | WEIGHT: 214.06 LBS | WEIGHT: 214.06 LBS | BODY MASS INDEX: 33.6 KG/M2 | HEIGHT: 67 IN | WEIGHT: 214.06 LBS | SYSTOLIC BLOOD PRESSURE: 125 MMHG | DIASTOLIC BLOOD PRESSURE: 62 MMHG | OXYGEN SATURATION: 96 % | HEART RATE: 56 BPM | HEART RATE: 56 BPM | HEIGHT: 67 IN | HEART RATE: 56 BPM | DIASTOLIC BLOOD PRESSURE: 62 MMHG | HEIGHT: 67 IN | RESPIRATION RATE: 16 BRPM | TEMPERATURE: 97 F | OXYGEN SATURATION: 96 % | BODY MASS INDEX: 33.6 KG/M2 | OXYGEN SATURATION: 96 % | DIASTOLIC BLOOD PRESSURE: 62 MMHG | TEMPERATURE: 97 F | BODY MASS INDEX: 33.6 KG/M2

## 2023-01-20 DIAGNOSIS — K70.30 ALCOHOLIC CIRRHOSIS OF LIVER WITHOUT ASCITES: ICD-10-CM

## 2023-01-20 DIAGNOSIS — Z95.828 S/P TIPS (TRANSJUGULAR INTRAHEPATIC PORTOSYSTEMIC SHUNT): ICD-10-CM

## 2023-01-20 DIAGNOSIS — F17.219 CIGARETTE NICOTINE DEPENDENCE WITH NICOTINE-INDUCED DISORDER: ICD-10-CM

## 2023-01-20 DIAGNOSIS — I73.9 CLAUDICATION OF BOTH LOWER EXTREMITIES: ICD-10-CM

## 2023-01-20 DIAGNOSIS — C22.0 HEPATOCELLULAR CARCINOMA: ICD-10-CM

## 2023-01-20 DIAGNOSIS — R76.8 HEPATITIS B CORE ANTIBODY POSITIVE: Primary | ICD-10-CM

## 2023-01-20 DIAGNOSIS — Z01.818 PRE-TRANSPLANT EVALUATION FOR LIVER TRANSPLANT: ICD-10-CM

## 2023-01-20 DIAGNOSIS — R76.8 HEPATITIS C ANTIBODY TEST POSITIVE: ICD-10-CM

## 2023-01-20 DIAGNOSIS — J44.9 COPD WITHOUT EXACERBATION: ICD-10-CM

## 2023-01-20 DIAGNOSIS — I85.00 ESOPHAGEAL VARICES WITHOUT BLEEDING, UNSPECIFIED ESOPHAGEAL VARICES TYPE: Primary | ICD-10-CM

## 2023-01-20 LAB
AMPHET+METHAMPHET UR QL: NEGATIVE
BACTERIA #/AREA URNS AUTO: ABNORMAL /HPF
BARBITURATES UR QL SCN>200 NG/ML: NEGATIVE
BENZODIAZ UR QL SCN>200 NG/ML: NEGATIVE
BILIRUB UR QL STRIP: NEGATIVE
BZE UR QL SCN: NEGATIVE
CANNABINOIDS UR QL SCN: ABNORMAL
CAOX CRY UR QL COMP ASSIST: ABNORMAL
CLARITY UR REFRACT.AUTO: ABNORMAL
COLOR UR AUTO: YELLOW
CREAT UR-MCNC: 283 MG/DL (ref 15–325)
ETHANOL UR-MCNC: <10 MG/DL
GLUCOSE UR QL STRIP: NEGATIVE
HGB UR QL STRIP: ABNORMAL
HYALINE CASTS UR QL AUTO: 0 /LPF
KETONES UR QL STRIP: ABNORMAL
LEUKOCYTE ESTERASE UR QL STRIP: ABNORMAL
METHADONE UR QL SCN>300 NG/ML: NEGATIVE
MICROSCOPIC COMMENT: ABNORMAL
NITRITE UR QL STRIP: POSITIVE
OPIATES UR QL SCN: NEGATIVE
PCP UR QL SCN>25 NG/ML: NEGATIVE
PH UR STRIP: 7 [PH] (ref 5–8)
PROT UR QL STRIP: ABNORMAL
RBC #/AREA URNS AUTO: 0 /HPF (ref 0–4)
SP GR UR STRIP: 1.02 (ref 1–1.03)
SQUAMOUS #/AREA URNS AUTO: 54 /HPF
TOXICOLOGY INFORMATION: ABNORMAL
URN SPEC COLLECT METH UR: ABNORMAL
WBC #/AREA URNS AUTO: 27 /HPF (ref 0–5)
YEAST UR QL AUTO: ABNORMAL

## 2023-01-20 PROCEDURE — 99999 PR PBB SHADOW E&M-EST. PATIENT-LVL III: CPT | Mod: PBBFAC,TXP,, | Performed by: TRANSPLANT SURGERY

## 2023-01-20 PROCEDURE — 99999 PR PBB SHADOW E&M-EST. PATIENT-LVL III: ICD-10-PCS | Mod: PBBFAC,TXP,,

## 2023-01-20 PROCEDURE — 71250 CT CHEST WITHOUT CONTRAST: ICD-10-PCS | Mod: 26,TXP,, | Performed by: RADIOLOGY

## 2023-01-20 PROCEDURE — 94727 PR PULM FUNCTION TEST BY GAS: ICD-10-PCS | Mod: 26,S$PBB,TXP, | Performed by: INTERNAL MEDICINE

## 2023-01-20 PROCEDURE — 94010 BREATHING CAPACITY TEST: CPT | Mod: PBBFAC,TXP | Performed by: INTERNAL MEDICINE

## 2023-01-20 PROCEDURE — 81001 URINALYSIS AUTO W/SCOPE: CPT | Mod: 59,TXP | Performed by: INTERNAL MEDICINE

## 2023-01-20 PROCEDURE — 94727 GAS DIL/WSHOT DETER LNG VOL: CPT | Mod: 26,S$PBB,TXP, | Performed by: INTERNAL MEDICINE

## 2023-01-20 PROCEDURE — 99213 OFFICE O/P EST LOW 20 MIN: CPT | Mod: PBBFAC,27,TXP,25

## 2023-01-20 PROCEDURE — 99213 OFFICE O/P EST LOW 20 MIN: CPT | Mod: PBBFAC,25,27,TXP | Performed by: TRANSPLANT SURGERY

## 2023-01-20 PROCEDURE — 80307 DRUG TEST PRSMV CHEM ANLYZR: CPT | Mod: TXP | Performed by: INTERNAL MEDICINE

## 2023-01-20 PROCEDURE — 99205 OFFICE O/P NEW HI 60 MIN: CPT | Mod: S$PBB,TXP,, | Performed by: TRANSPLANT SURGERY

## 2023-01-20 PROCEDURE — 99999 PR PBB SHADOW E&M-EST. PATIENT-LVL III: CPT | Mod: PBBFAC,TXP,,

## 2023-01-20 PROCEDURE — 99205 PR OFFICE/OUTPT VISIT, NEW, LEVL V, 60-74 MIN: ICD-10-PCS | Mod: S$PBB,TXP,, | Performed by: TRANSPLANT SURGERY

## 2023-01-20 PROCEDURE — 94729 DIFFUSING CAPACITY: CPT | Mod: 26,S$PBB,TXP, | Performed by: INTERNAL MEDICINE

## 2023-01-20 PROCEDURE — 94727 GAS DIL/WSHOT DETER LNG VOL: CPT | Mod: PBBFAC,TXP | Performed by: INTERNAL MEDICINE

## 2023-01-20 PROCEDURE — 97802 MEDICAL NUTRITION INDIV IN: CPT | Mod: PBBFAC,TXP

## 2023-01-20 PROCEDURE — 94729 DIFFUSING CAPACITY: CPT | Mod: PBBFAC,TXP | Performed by: INTERNAL MEDICINE

## 2023-01-20 PROCEDURE — 94729 PR C02/MEMBANE DIFFUSE CAPACITY: ICD-10-PCS | Mod: 26,S$PBB,TXP, | Performed by: INTERNAL MEDICINE

## 2023-01-20 PROCEDURE — 99999 PR PBB SHADOW E&M-EST. PATIENT-LVL III: ICD-10-PCS | Mod: PBBFAC,TXP,, | Performed by: TRANSPLANT SURGERY

## 2023-01-20 PROCEDURE — 71250 CT THORAX DX C-: CPT | Mod: TC,TXP

## 2023-01-20 PROCEDURE — 99213 OFFICE O/P EST LOW 20 MIN: CPT | Mod: PBBFAC,TXP,25

## 2023-01-20 PROCEDURE — 71250 CT THORAX DX C-: CPT | Mod: 26,TXP,, | Performed by: RADIOLOGY

## 2023-01-20 PROCEDURE — 94010 BREATHING CAPACITY TEST: CPT | Mod: 26,S$PBB,TXP, | Performed by: INTERNAL MEDICINE

## 2023-01-20 PROCEDURE — 94010 BREATHING CAPACITY TEST: ICD-10-PCS | Mod: 26,S$PBB,TXP, | Performed by: INTERNAL MEDICINE

## 2023-01-20 RX ORDER — FESOTERODINE FUMARATE 4 MG/1
1 TABLET, EXTENDED RELEASE ORAL NIGHTLY
COMMUNITY
End: 2023-02-07 | Stop reason: SDUPTHER

## 2023-01-20 NOTE — PROGRESS NOTES
PHARM.D. PRE-TRANSPLANT NOTE:    This patient's medication therapy was evaluated as part of her pre-transplant evaluation.      The following general pharmacologic concerns were noted: none     The following concerns for post-operative pain management were noted: none     The following pharmacologic concerns related to HCV therapy were noted: none       This patient's medication profile was reviewed for considerations for DAA Hepatitis C therapy:    [X]  No current inducers of CYP 3A4 or PGP  [X]  No amiodarone on this patient's EMR profile in the last 24 months  [X]  No past or current atrial fibrillation on this patient's EMR profile       Current Outpatient Medications   Medication Sig Dispense Refill    albuterol (PROVENTIL/VENTOLIN HFA) 90 mcg/actuation inhaler Inhale into the lungs.      fesoterodine (TOVIAZ) 4 mg Tb24 Take 1 tablet by mouth once daily.      nicotine (NICODERM CQ) 14 mg/24 hr Place 1 patch onto the skin once daily. 28 patch 0    nicotine polacrilex 2 MG Lozg Take 1 lozenge (2 mg total) by mouth as needed (Take 1 lozenge (2 mg total) by mouth as needed (1-2 per hour in place of cigarette (5-16 daily max)- oral). 144 lozenge 0    pantoprazole (PROTONIX) 20 MG tablet Take 1 tablet (20 mg total) by mouth once daily. 30 tablet 3    rOPINIRole (REQUIP) 0.5 MG tablet Take 2 tablets (1 mg total) by mouth every evening. 180 tablet 1    spironolactone (ALDACTONE) 50 MG tablet 1 tablet 90 tablet 1    tiotropium-olodateroL (STIOLTO RESPIMAT) 2.5-2.5 mcg/actuation Mist 2 puffs      ursodioL (ACTIGALL) 300 mg capsule Take 1 capsule (300 mg total) by mouth 3 (three) times daily. 90 capsule 3    VRAYLAR 3 mg Cap Take 3 mg by mouth once daily.       No current facility-administered medications for this visit.           I am available for consultation and can be contacted, as needed by the other members of the Transplant team.

## 2023-01-20 NOTE — PROGRESS NOTES
Patient seen in clinic with caregiver.  Consents reviewed and signatures obtained.   Patient given supplies needed to obtain urine specimen.    Patient's name and date of birth verified.   Instructions reviewed with the patient on the way the specimen should be obtained.   Patient stated that  they understood the information provided for the collection and  placement of the specimen. Specimen collected in clinic.  Patient given supplies and printed instructions for the collection of the 24 hour urine specimen.  Patient reports understanding the instructions provided to obtain the specimen and the storage of the specimen while at home.  Patient given instructed to bring the container to 2nd floor lab when the collection is completed.  Patient contact information verified.  Patient questions answered and concerns addressed.

## 2023-01-20 NOTE — PROGRESS NOTES
Shonda was  seen in clinic for [facility] evaluation.  Handbook on pre-liver transplant information (see outline below) was given to the patient.  Patient's  , accompanied her to scheduled appointments. Patient viewed pre-liver transplant education slides via desktop in transplant clinic.  Informed consent signed and written information given on selection criteria.    LIVER TRANSPLANT WORK-UP EDUCATION   I. UNDERSTANDING THE TRANSPLANT PROCESS     A. Transplant team      B. MELD score      C. Balancing urgency and outcome     D. Liver Transplant Options         1.  Donor         2. Living Donor--rationale, benefits     E. Transplant Work-up         1. Medical         2. Psychological and Social--lifetime commitment, life changes, personal plan/ goal         3. Financial--fundraising     F.  Completed work-up and Next Steps    G. Wait Time         1.  Can be listed at more than one center         2.  Can transfer wait time     H. The Call       I. Possible donor options         1. DCD         2. Hep B Core and Hep C Positive         3. Increased Risk     J.  Liver Transplant Surgery         1. Length         2. Transfusions, cell saver         3. Surgical risks         4. What to expect after sugery--Central lines, drains, Rivera catheter, incision, endotracheal              tube, NG tube, length of stay in ICU/ TSU  II.  HOW TO BEST CARE FOR YOURSELF (Take Five To Thrive)  III. UNDERSTANDING LIFE AFTER TRANSPLANT  A. Medicines after transplant      1. Immunosuppression--infection and rejection  B. Labs   IV. ADULT LIVER SURVIVAL RATES

## 2023-01-20 NOTE — PROGRESS NOTES
TRANSPLANT NUTRITIONAL ASSESSMENT    Referring Provider: Geeta Gaines     Reason for Visit: Pre-liver transplant work-up    Age: 57 y.o.  Sex: female    Patient Active Problem List   Diagnosis    Alcoholic cirrhosis of liver without ascites    Thrombocytopenia    Calculus of gallbladder without cholecystitis without obstruction    Esophageal varices without bleeding    S/P TIPS (transjugular intrahepatic portosystemic shunt)    Stasis dermatitis without varicosities    BMI 34.0-34.9,adult    Dependence on nicotine from cigarettes    Anxiety    Claudication of both lower extremities    Hyperbilirubinemia    Hyperammonemia    Overactive bladder    COPD without exacerbation    Gastroesophageal reflux disease    Restless leg syndrome    Mood disorder    Constipation    Forgetfulness    Screening mammogram, encounter for    Thickened nail    Anxiety due to invasive procedure    Leg swelling     Past Medical History:   Diagnosis Date    Anxiety     Bipolar disorder     Cirrhosis     COPD (chronic obstructive pulmonary disease)     GERD (gastroesophageal reflux disease)     Restless leg syndrome     Urinary frequency      Lab Results   Component Value Date     (H) 01/19/2023    K 3.5 01/19/2023    ALBUMIN 3.3 (L) 01/19/2023    AMMONIA 117 (H) 12/26/2022    CALCIUM 9.5 01/19/2023     Other Pertinent Labs: N/A    Current Outpatient Medications   Medication Sig    albuterol (PROVENTIL/VENTOLIN HFA) 90 mcg/actuation inhaler Inhale into the lungs.    ALPRAZolam (XANAX) 0.25 MG tablet Take 2 tablets (0.5 mg total) by mouth 2 (two) times daily as needed for Anxiety.    furosemide (LASIX) 20 MG tablet Take 1 tablet (20 mg total) by mouth 2 (two) times a day.    nicotine (NICODERM CQ) 14 mg/24 hr Place 1 patch onto the skin once daily.    nicotine polacrilex 2 MG Lozg Take 1 lozenge (2 mg total) by mouth as needed (Take 1 lozenge (2 mg total) by mouth as needed (1-2 per hour in place of cigarette (5-16 daily max)-  "oral).    pantoprazole (PROTONIX) 20 MG tablet Take 1 tablet (20 mg total) by mouth once daily.    rOPINIRole (REQUIP) 0.5 MG tablet Take 2 tablets (1 mg total) by mouth every evening.    spironolactone (ALDACTONE) 50 MG tablet 1 tablet    tiotropium-olodateroL (STIOLTO RESPIMAT) 2.5-2.5 mcg/actuation Mist 2 puffs    ursodioL (ACTIGALL) 300 mg capsule Take 1 capsule (300 mg total) by mouth 3 (three) times daily.    VRAYLAR 3 mg Cap Take 3 mg by mouth once daily.     No current facility-administered medications for this visit.     Allergies: Patient has no known allergies.    Ht Readings from Last 1 Encounters:   23 5' 6.61" (1.692 m)     Wt Readings from Last 1 Encounters:   23 97.1 kg (214 lb 1.1 oz)      BMI: Body mass index is 33.92 kg/m².    Usual Weight: 220 lb  Weight Change/Time: stable   Current Diet: regular   Appetite/Current Intake: fair , up and down   Exercise/Physical Activity: functional in ADLs; likes to work in the yard  LFI: 3.60  Nutritional/Herbal Supplements: vitamin B  Chewing/Swallowing Problems: none  Symptoms: constipation, relieved with medications     Estimated Kcal Need: 2428 kcal/day (25 kcal/kg)   Estimated Protein Need:  gm/day (1.0-1.3. gm/kg)     Nutritional History:   Pt and caregiver present. Pt and caregiver share grocery shopping responsibility. Caregiver cooks most dinner meals. Pt reports up and down appetite, usually not finishing everything on her plate.     Diet Recall    Mornin egg, sausage or pancakes     Midday: sandwich (white bread, ham, cheese, fitzgerald) with canned fruit cocktail     Evening: Enjoys seafood, chicken, pork, beef, limited vegetables (green beans, corn, jones beans), side salad (ice hero lettuce, ratliff bits, cheese, eggs, ranch dressing), coleslaw                Enjoys spaghetti with meatballs, gumbo, stew     Snacks: chips, cookies, pies, fruit (pears, oranges, tangerines, grapes)     Desserts: ice cream, pudding     Beverages: 3 16.9 " oz bottled water/day, coke zero, sprite zero       Seasonings: salt, pepper, John's     Cooking Methods: baking, frying     Restaurants/Fast Food: 1x/wk     Nutritional Diagnoses  Problem: food- and nutrition-related knowledge deficit  Etiology: lack of previous education on pre-kidney transplant nutrition recommendations  Symptoms: diet recall and questions from pt     Educational Need? yes  Barriers: none identified  Discussed with: patient and caregiver  Interventions: Patient taught nutrition information regarding Pre-liver transplant work-up. Pre liver transplant nutrition packet provided and discussed. Pt advised on the recommendations to follow a low sodium diet while taking in adequate protein.  This packet includes label low sodium reading tips, seasoning suggestions, protein intake goal amount (gm) for the individual patient, as well as oral supplement suggestions.   Exercise and physical activity are encouraged.  Goals/Recommendations: diet adherence  Actions Taken: instruct/provide written information  Patient and/or family comprehend instructions: yes  Outcome: Verbalizes understanding  Monitoring: Contact information provided, will f/u in clinic and communicate with the care team as needed.     Counseling Time: 20 minutes

## 2023-01-20 NOTE — Clinical Note
January 23, 2023        Geeta Gaines  [address]  [address]  [facility] [address]  Phone: [phone]  Fax: [phone]             [facility]st [facility]  [address]  [facility] [address]  Phone: [phone]   Patient: Shonda Borrego   MR Number: [id number]   YOB: 1965   Date of Visit: 1/20/2023       Dear Dr. Geeta Gaines    Thank you for referring Shonda Borrego to me for evaluation. Attached you will find relevant portions of my assessment and plan of care.    If you have questions, please do not hesitate to call me. I look forward to following Shonda Borrego along with you.    Sincerely,    Jovanny Hall MD    Enclosure    If you would like to receive this communication electronically, please contact [email or web address] or [phone] to request [employer] Link access.    [employer] is a tool which provides read-only access to select patient information with whom you have a relationship. Its easy to use and provides real time access to review your patients record including encounter summaries, notes, results, and demographic information.    If you feel you have received this communication in error or would no longer like to receive these types of communications, please e-mail [email or web address]

## 2023-01-20 NOTE — PROGRESS NOTES
Transplant Surgery Liver Transplant Recipient Evaluation    Referring Physician: Geeta Gaines  Corresponding Physician: Geeta Gaines    Subjective:     Reason for Visit: evaluate liver transplant candidacy    History of Present Illness: Shonda Borrego is a 57 y.o. year old female who is being evaluated for liver transplantation.    Transplant History: N/A    Native Liver Disease (UNOS terminology): Primary Liver Malignancy: Hepatoma (HCC) and Cirrhosis (based on medical records from referral).    Manifestations of liver disease: edema, portal hypertension, and variceal bleeding  MELD-Na score: 15 at 1/19/2023  8:22 AM  MELD score: 15 at 1/19/2023  8:22 AM  Calculated from:  Serum Creatinine: 0.9 mg/dL (Using min of 1 mg/dL) at 1/19/2023  8:22 AM  Serum Sodium: 138 mmol/L (Using max of 137 mmol/L) at 1/19/2023  8:22 AM  Total Bilirubin: 3.2 mg/dL at 1/19/2023  8:22 AM  INR(ratio): 1.4 at 1/19/2023  8:22 AM  Age: 57 years    External provider notes reviewed: Yes    PMH: reviewed  PSH: reviewed    Review of Systems   Constitutional:  Negative for activity change and chills.   HENT:  Negative for congestion and sore throat.    Eyes:  Negative for discharge and redness.   Respiratory:  Negative for cough and shortness of breath.    Cardiovascular:  Negative for chest pain and palpitations.   Gastrointestinal:  Negative for nausea and vomiting.   Endocrine: Negative for polydipsia and polyuria.   Genitourinary:  Negative for dysuria and frequency.   Musculoskeletal:  Negative for arthralgias and gait problem.   Skin:  Negative for pallor and rash.   Neurological:  Negative for dizziness and light-headedness.   Hematological:  Negative for adenopathy. Does not bruise/bleed easily.   Psychiatric/Behavioral:  Negative for agitation and suicidal ideas.    Objective:     Physical Exam:  Constitutional:   Vitals reviewed: yes   Well-nourished and well-groomed: yes  Eyes:   Sclerae icteric: no   Extraocular  movements intact: yes  GI:    Bowel sounds normal: yes   Tenderness: no    If yes, quadrant/location: not applicable   Palpable masses: no    If yes, quadrant/location: not applicable   Hepatosplenomegaly: no   Ascites: no   Hernia: no    If yes, type/location: not applicable   Surgical scars: no    If yes, type/location: not applicable  Resp:   Effort normal: yes   Breath sounds normal: yes    CV:   Regular rate and rhythm: yes   Heart sounds normal: yes   Femoral pulses normal: yes   Extremities edematous: no  Skin:   Rashes or lesions present: no    If yes, describe:not applicable   Jaundice:: no    Musculoskeletal:   Gait normal: yes   Strength normal: yes  Psych:   Oriented to person, place, and time: yes   Affect and mood normal: yes    Additional comments:  TIPSS    Diagnostics:  The following labs have been reviewed: CBC  CMP  The following radiology images have been independently reviewed and interpreted: CT Abd/Pelvis         Transplant Surgery - Candidacy   Assessment/Plan:   I see no surgical contraindication to liver transplantation. Based on available information, Shonda Borrego is a suitable liver transplant candidate. Final determination of transplant candidacy will be made once evaluation is complete and reviewed by the Liver Transplant Selection Committee.    Patient advised that it is recommended that all transplant candidates, and their close contacts and household members receive Covid vaccination.    Additional testing to be completed according to Liver : Written Order Guideline for Adult Liver Transplant Evaluation (LI-02)    Interpretation of tests and discussion of patient management involves all members of the multidisciplinary transplant team.    Jovanny Hall MD         Counseling: We provided Shonda Borrego with a group education session today.  We discussed liver transplantation at length with her, including risks, potential complications, and alternatives in the management of her  liver disease.  The discussion included complications related to anesthesia, bleeding, infection, primary nonfunction, and vascular thrombosis.  I discussed the typical postoperative course, length of hospitalization, the need for long-term immunosuppression, and the need for long-term routine follow-up.  I discussed living-donor and -donor transplantation and the relative advantages and disadvantages of each.  I also discussed average waiting times for both living donation and  donation.  I discussed national and center-specific survival rates.  I also mentioned the potential benefit of multicenter listing to candidates listed with centers within more than one organ procurement organization.  All questions were answered.    Coronavirus disease (COVID-19) caused by severe acute respiratory virus coronavirus 2 (SARS-C0V 2) is associated with increased mortality in solid organ transplant recipients (SOT) compared to non-transplant patients. Vaccine responses to vaccination are depressed against SARS-CoV2 compared to normal individuals but improve with third vaccination doses. Vaccination prior to SOT provides both the best opportunity for transplant candidates to develop protective immunity and to reduce the risk of serious COVID19 infections post transplantation. Organ transplant candidates at [facility] will be required to receive SARS-CoV-2 vaccination prior to being listed with a an active status, whenever possible. Exceptions will be made for disability related reasons or for sincerely held [employer] beliefs.     PHS: I discussed the use of organs from donors with PHS risk criteria, including the testing protocols utilized, as well as data from the literature regarding the likelihood of transmission of hepatitis or HIV.  The patient is willing to consider such grafts.  DCD: I discussed the use of organs recovered by donation after cardiac death (DCD), including  slightly decreased graft survival and greater risk of arterial and biliary complications. The potential advantage to the recipient is possibly receiving a transplant sooner by accepting such an organ. The patient is willing to consider such grafts.  HBcAb: I discussed the use of organs from donors with HBcAb in conjunction with long term use of HBV antiviral drugs, such as lamivudine. The small but measurable risk of hepatitis B seroconversion was discussed as well as the potentially life long need to continue antiviral drugs. The patient is willing to consider such grafts.  HCV Non-viremic recipient: I discussed the use of HCV-positive organs in naive recipients, including the risk of viral transmission to the patients or others, potential insurance barriers for antiviral medication coverage, risk for fibrosing cholestatic hepatitis, death or graft loss. The potential advantage to the recipient is the possibility of receiving a transplant sooner with decreased mortality risk by accepting such an organ. The patient is willing to consider such grafts.  LDLT: I discussed the nature of living donor liver transplant, including donor risks and more frequent recipient complications. The patient is willing to consider such grafts.  Covid: I discussed that this donor has tested positive for the covid virus, but with very low levels of virus and no evidence of covid disease. Although the risk of transmission is unknown, we believe this donor is not infectious and use of the abdominal organs is safe.  To date, these organs have been used with no evidence of transmission. The patient is willing to consider such grafts.

## 2023-01-20 NOTE — PROGRESS NOTES
Transplant Recipient Adult Psychosocial Assessment    Patient's significant other, Campbell, was present in clinic during this assessment.     Shonda Borrego  [address]  [facility] [address]  Telephone Information:   Mobile [phone]   Home  [phone] (home)  Work  There is no work phone number on file.  E-mail  [email or web address]    Sex: female  YOB: 1965  Age: 57 y.o.    Encounter Date: 2023  [place] Citizen: yes  Primary Language: English   Needed: no    Emergency Contact:  Name: Campbell Mccormick  Relationship: significant other  Address: same as patient  Phone Numbers:  [phone] (mobile)    Family/Social Support:   Number of dependents/: Patient has no dependents in need of care  Marital history: In a relationship  Other family dynamics: Patient's parents are . Patient and significant other, Campbell, have been in a relationship for 3 years. Patient has 3 adult children. Patient's son, Wilton Borrego (age 40) is currently incarcerated. Patient has 2 daughters, Do Morrison (age 38) who lives in [place], and Waleska Sarmiento (age 34) who lives in [place]. Patient has 4 siblings but reports she has no relationship with them. Patient has a nephew and significant other's step-daughter, Robel, who are involved in her life.     Household Composition:  Name: Shonda Borrego  Age: 57  Relationship: patient  Does person drive? yes    Name: Campbell Mccormick  Age: 63  Relationship: significant other  Does person drive? yes    Do you and your caregivers have access to reliable transportation? yes    PRIMARY CAREGIVER: Campbell Mccormick (significant other) will be primary caregiver, phone number [phone].      provided in-depth information to patient and caregiver regarding pre- and post-transplant caregiver role.   strongly encourages patient and caregiver to have concrete plan regarding post-transplant care giving, including back-up  caregiver(s) to ensure care giving needs are met as needed.    Patient and Caregiver states understanding all aspects of caregiver role/commitment and is able/willing/committed to being caregiver to the fullest extent necessary.    Patient and Caregiver verbalizes understanding of the education provided today and caregiver responsibilities.         remains available. Patient and Caregiver agree to contact  in a timely manner if concerns arise.      Able to take time off work without financial concerns: yes.     Additional Significant Others who will Assist with Transplant:  Name: Do Morrison (did not answer, left VM)  Age: 38  City: [place] State: [place]  Relationship: daughter  Does person drive? yes  Phone: Patient gave granddaughter's number to reach daughter (Ken: [phone]). Patient notes daughter may not answer the phone but will call back. SW will need to obtain direct number for daughter.     Living Will: no  Healthcare Power of : no  Advance Directives on file: <<no information> per medical record.  Verbally reviewed LW/HCPA information.   provided patient with copy of LW/HCPA documents and provided education on completion of forms.    Living Donors: N/A    Highest Education Level: High School (9-12) or GED  Reading Ability: 11th grade  Reports difficulty with:  vision (wears prescription glasses) and memory  Learns Best By: visual demonstration/information     Status: no  VA Benefits: no     Working for Income: No  If no, reason not working: Disability  Patient is disabled.  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 Patient last worked 10 years ago.    Spouse/Significant Other Employment: Retired    Disabled: yes: date disability began: 2019, due to: ESLD.    Monthly Income: $914/mo (disability); $2300/mo (significant other's income)  Able to afford all costs now and if transplanted, including medications:  Yes;  Patient may not be able to afford staying locally but willing to drive to [facility] as needed for any follow up appointments post transplant    Patient and Caregiver verbalizes understanding of personal responsibilities related to transplant costs and the importance of having a financial plan to ensure that patients transplant costs are fully covered.       provided fundraising information/education. Patient and Caregiververbalizes understanding.   remains available.    Insurance:   Payer/Plan Subscr  Sex Relation Sub. Ins. ID Effective Group Num   1. [place]* NICOLASA MARQUEZ ANN 1965 Female Self [id number] [facility]                                   [address] [address]   2. [employer] MANAGED* NICOLASA MARQUEZ ANN 1965 Female Self [id number]                                    [address] [address]     Primary Insurance (for UNOS reporting): Public Insurance - Medicaid  Secondary Insurance (for UNOS reporting): None  Patient and Caregiver verbalizes clear understanding that patient may experience difficulty obtaining and/or be denied insurance coverage post-surgery. This includes and is not limited to disability insurance, life insurance, health insurance, burial insurance, long term care insurance, and other insurances.      Patient and Caregiver also reports understanding that future health concerns related to or unrelated to transplantation may not be covered by patient's insurance.  Resources and information provided and reviewed.     Patient and Caregiver provides verbal permission to release any necessary information to outside resources for patient care and discharge planning.  Resources and information provided are reviewed.      Dialysis Adherence: Patient denies any dialysis  Infusion Service: patient utilizing? no  Home Health: patient utilizing? no  DME: no  Pulmonary/Cardiac Rehab: no   ADLS:  Patient reports she is mostly independent with ADLs. Patient drives.    Adherence: Patient  reports good adherence with medical and medication regimens. Adherence education and counseling provided.     Per History Section:  Past Medical History:   Diagnosis Date    Anxiety     Bipolar disorder     Cirrhosis     COPD (chronic obstructive pulmonary disease)     GERD (gastroesophageal reflux disease)     Restless leg syndrome     Urinary frequency      Social History     Tobacco Use    Smoking status: Some Days     Packs/day: 1.50     Years: 43.00     Pack years: 64.50     Types: Cigarettes     Last attempt to quit: 10/19/2022     Years since quittin.2    Smokeless tobacco: Never   Substance Use Topics    Alcohol use: Not Currently     Comment: socially     Social History     Substance and Sexual Activity   Drug Use Never     Social History     Substance and Sexual Activity   Sexual Activity Not Currently       Per Today's Psychosocial:  Tobacco:  Patient reports current cigarette use (5 cigarettes per day). Patient was enrolled in Smoking Cessation but has not followed up recently due to stating she has had a lot on her plate with medical appointments. Patient plans to reach back out to Smoking Cessation resources. Patient reports she has patches and gum to assist with cessation. Patient plans to quit use .  Alcohol:  Not at this time. Patient unsure of exact date of last use. Patient reports her last alcohol use was 6 months ago (summer 2022). Patient previously reported quitting in early  in provider notes. Patient reports being diagnosed with liver disease in 2019 and continued to drink. Patient was drinking 3 large drinks per day (Richard's) prior to quitting, with heavier amounts in the past. Patient reports quitting use once she learned she may need a liver transplant . Patient reports she quit with support from significant other and has not engaged in AA/IOP or rehab. Patient is open to resources but notes she has a lot going on with medical appointments. Patient is willing to be tested for  alcohol (peth or urine) and states she is committed to sobriety. (Current PETH is pending)  Illicit Drugs/Non-prescribed Medications:  Patient reports weekly marijuana use (2 joins per week) . SW advised patient on abstinence or to obtain legal prescription. SW advised patient to speak with medical team regarding recommendations. Patient denies any other illicit drug use. (Current tox screen is pending)    Patient and Caregiver states clear understanding of the potential impact of substance use as it relates to transplant candidacy and is aware of possible random substance screening.  Substance abstinence/cessation counseling, education and resources provided and reviewed.     Arrests/DWI/Treatment/Rehab: patient denies    Psychiatric History:    Mental Health:  Patient reports having a diagnosis for Bipolar Disorder and Anxiety. Patient also has records of Depression and Panic Disorder diagnoses. Patient reports symptoms are managed with medications through mental health provider.   Psychiatrist/Counselor: Patient reports she sees a Psychiatrist every month. (IAIN GREG  [phone]; [address] [place] [phone])  Medications: VRAYLAR  Suicide/Homicide Issues: Patient denies any past or current SI or HI   Safety at home: Patient reports living in a safe environment at home and denies any safety concerns.    Knowledge: Patient and Caregiver states having clear understanding and realistic expectations regarding the potential risks and potential benefits of organ transplantation and organ donation and agrees to discuss with health care team members and support system members, as well as to utilize available resources and express questions and/or concerns in order to further facilitate the pt informed decision-making.  Resources and information provided and reviewed.    Patient and Caregiver is aware of [facility]'s affiliation and/or partnership with agencies in home health care, LTAC, SNF, DME, and  other hospitals and clinics.    Understanding: Patient and Caregiver reports having a clear understanding of the many lifetime commitments involved with being a transplant recipient, including costs, compliance, medications, lab work, procedures, appointments, concrete and financial planning, preparedness, timely and appropriate communication of concerns, abstinence (ETOH, tobacco, illicit non-prescribed drugs), adherence to all health care team recommendations, support system and caregiver involvement, appropriate and timely resource utilization and follow-through, mental health counseling as needed/recommended, and patient and caregiver responsibilities.  Social Service Handbook, resources and detailed educational information provided and reviewed.  Educational information provided.    Patient and Caregiver also reports current and expected compliance with health care regime and states having a clear understanding of the importance of compliance.      Patient and Caregiver reports a clear understanding that risks and benefits may be involved with organ transplantation and with organ donation.       Patient and Caregiver also reports clear understanding that psychosocial risk factors may affect patient, and include but are not limited to feelings of depression, generalized anxiety, anxiety regarding dependence on others, post traumatic stress disorder, feelings of guilt and other emotional and/or mental concerns, and/or exacerbation of existing mental health concerns.  Detailed resources provided and discussed.      Patient and Caregiver agrees to access appropriate resources in a timely manner as needed and/or as recommended, and to communicate concerns appropriately.  Patient and Caregiver also reports a clear understanding of treatment options available.     Patient and Caregiver received education in a group setting.   reviewed education, provided additional information, and answered  "questions.    Feelings or Concerns: Patient reports feeling some anxiety about transplant but reports she is ready and excited for the opportunity.     Coping: Identify Patient & Caregiver Strategies to [place]:   1. Currently & Pre-transplant - Patient enjoys [employer] and flea markets. Patient also enjoys watching TV and reading scripture    2. At the time of surgery - Patient plans to have support from family   3. During post-Transplant & Recovery Period - Patient plans to have support from family, do puzzles and watch TV.    Goals: Patient's goal is to "live" and enjoy activities she used to do.      Interview Behavior: Patient and Caregiver presents as alert and oriented x 4, calm, communicative, and asking and answering questions appropriately. Patient's significant other was on phone watching videos throughout this assessment but would periodically engage to answer questions when prompted.       Transplant Social Work - Candidacy  Assessment/Plan:     Psychosocial Suitability: Patient presents as  a high risk  candidate for transplant at this time. Based on psychosocial risk factors, patient presents as high risk, due to history of alcohol use. Patient reports last use was about 6 months ago (peth is pending). Patient was diagnosed with Cirrhosis in 2019 and continued to drink. Patient reports quitting use once she learned she may need a liver transplant. Patient quit use on her own and has not engaged in AA/IOP or rehab. Patient is open to resources . Patient reports using marijuana weekly (tox screen is pending). Patient has also resumed smoking after being enrolled in Smoking Cessation. Patient plans to quit use with aid of patches and gum. Patient's secondary caregiver could not be verified at this time.     Patient's protective factors include patient having an adequate support system and confirmed primary caregiver plan. Patient reports she may have financial concerns if need to stay locally, however " denies significant financial concerns at this time. Patient reports managing mental health symptoms for Bipolar Disorder, Anxiety and Depression with medication. Patient reports having a Psychiatrist she sees every month.      Final determination of transplant candidacy will be made once evaluation is complete and reviewed by the Liver Transplant Selection Committee.    Recommendations/Additional Comments:   -Confirm secondary caregiver and obtain direct phone number  -Engagement in 12 step meetings (or similar) and IOP  -Send 12 step meeting logs to transplant  or coordinator  -Recommend periodic random alcohol testing (e.g. serum alcohol, urine alcohol, PETH) moving forward to gauge and monitor sobriety.  -Smoking Cessation  -Fundraising  -Local Lodging (Patient reports living 1.5 hours away from [facility] and notes staying locally would be a financial burden. Patient is willing to drive to [facility] for any follow up as needed if she can discharge home post transplant)  -Obtain mental health records as necessary  -Healthcare POA documents      Racquel Ruiz LCSW

## 2023-01-23 ENCOUNTER — TELEPHONE (OUTPATIENT)
Dept: TRANSPLANT | Facility: HOSPITAL | Age: 58
End: 2023-01-23
Payer: MEDICAID

## 2023-01-23 ENCOUNTER — LAB VISIT (OUTPATIENT)
Dept: LAB | Facility: HOSPITAL | Age: 58
End: 2023-01-23
Attending: INTERNAL MEDICINE
Payer: MEDICAID

## 2023-01-23 DIAGNOSIS — Z95.828 S/P TIPS (TRANSJUGULAR INTRAHEPATIC PORTOSYSTEMIC SHUNT): ICD-10-CM

## 2023-01-23 DIAGNOSIS — I73.9 CLAUDICATION OF BOTH LOWER EXTREMITIES: ICD-10-CM

## 2023-01-23 DIAGNOSIS — F17.219 CIGARETTE NICOTINE DEPENDENCE WITH NICOTINE-INDUCED DISORDER: ICD-10-CM

## 2023-01-23 DIAGNOSIS — J44.9 COPD WITHOUT EXACERBATION: ICD-10-CM

## 2023-01-23 DIAGNOSIS — K70.30 ALCOHOLIC CIRRHOSIS OF LIVER WITHOUT ASCITES: ICD-10-CM

## 2023-01-23 DIAGNOSIS — C22.0 HEPATOCELLULAR CARCINOMA: ICD-10-CM

## 2023-01-23 DIAGNOSIS — Z01.818 PRE-TRANSPLANT EVALUATION FOR LIVER TRANSPLANT: ICD-10-CM

## 2023-01-23 LAB
CREAT CL/1.73 SQ M 12H UR+SERPL-ARVRAT: 88 ML/MIN (ref 70–110)
CREAT SERPL-MCNC: 1 MG/DL (ref 0.5–1.4)
CREAT UR-MCNC: 134 MG/DL (ref 15–325)
CREATININE, URINE (MG/SPEC): 1273 MG/SPEC
PROT 24H UR-MRATE: 109 MG/SPEC (ref 0–100)
PROT UR-MCNC: 11.5 MG/DL (ref 0–15)
URINE COLLECTION DURATION: 24 HR
URINE COLLECTION DURATION: 24 HR
URINE VOLUME: 950 ML
URINE VOLUME: 950 ML

## 2023-01-23 PROCEDURE — 82575 CREATININE CLEARANCE TEST: CPT | Mod: TXP | Performed by: INTERNAL MEDICINE

## 2023-01-23 PROCEDURE — 84156 ASSAY OF PROTEIN URINE: CPT | Mod: NTX | Performed by: INTERNAL MEDICINE

## 2023-01-23 NOTE — TELEPHONE ENCOUNTER
Transplant Caregiver Follow Up    SW following up regarding secondary caregiver. Patient identified her daughter, Do, as a secondary caregiver during initial evaluation. Patient gave SW granddaughter's phone number (Ken, [phone]) as a way to reach daughter. SW left  last week for call back and attempted to contact granddaughter again today (3x) with no answer. Patient's granddaughter's phone is going straight to [facility].     SW contacted patient who notes her daughter Do has a [employer] phone. Patient notes granddaughter works at night and turns off her phone during the day. SW advised patient that any caregiver identified will need to have a direct phone number and be available when team is attempting to reach them. Patient reports she will talk to her daughter so she will call transplant SW back.     At this time, patient remains high risk and secondary caregiver has not been confirmed. SW awaiting call back from patient's daughter for verification and will need to obtain direct phone number that is reliable.

## 2023-01-24 ENCOUNTER — DOCUMENTATION ONLY (OUTPATIENT)
Dept: PREADMISSION TESTING | Facility: HOSPITAL | Age: 58
End: 2023-01-24
Payer: MEDICAID

## 2023-01-24 NOTE — PRE-PROCEDURE INSTRUCTIONS
PRE-OP INSTRUCTIONS:  Instructed patient to have no food,milk or milk products after midnight   It is ok to take AM medications with a few sips of water   Medication instructions for pm prior to and am of surgery reviewed.  Instructed patient to avoid taking vitamins,supplements,aspirin or ibuprofen the am of surgery.  Shower instructions provided    Patient denies any side effects or issues with anesthesia or sedation.

## 2023-01-25 ENCOUNTER — ANESTHESIA (OUTPATIENT)
Dept: INTERVENTIONAL RADIOLOGY/VASCULAR | Facility: HOSPITAL | Age: 58
End: 2023-01-25
Payer: MEDICAID

## 2023-01-25 ENCOUNTER — HOSPITAL ENCOUNTER (OUTPATIENT)
Dept: INTERVENTIONAL RADIOLOGY/VASCULAR | Facility: HOSPITAL | Age: 58
Discharge: HOME | End: 2023-01-25
Attending: FAMILY MEDICINE
Payer: MEDICAID

## 2023-01-25 DIAGNOSIS — C22.0 HCC (HEPATOCELLULAR CARCINOMA): ICD-10-CM

## 2023-01-25 LAB
B-HCG UR QL: NEGATIVE
CTP QC/QA: YES

## 2023-01-25 PROCEDURE — 63600175 PHARM REV CODE 636 W HCPCS: Mod: TXP | Performed by: NURSE ANESTHETIST, CERTIFIED REGISTERED

## 2023-01-25 PROCEDURE — 25000003 PHARM REV CODE 250: Mod: TXP | Performed by: NURSE ANESTHETIST, CERTIFIED REGISTERED

## 2023-01-25 PROCEDURE — 25000003 PHARM REV CODE 250: Mod: NTX | Performed by: STUDENT IN AN ORGANIZED HEALTH CARE EDUCATION/TRAINING PROGRAM

## 2023-01-25 PROCEDURE — 37000009 HC ANESTHESIA EA ADD 15 MINS: Mod: TXP

## 2023-01-25 PROCEDURE — 77013 IR RF ABLATION LIVER TUMORS: ICD-10-PCS | Mod: 26,NTX,, | Performed by: RADIOLOGY

## 2023-01-25 PROCEDURE — D9220A PRA ANESTHESIA: Mod: CRNA,NTX,, | Performed by: NURSE ANESTHETIST, CERTIFIED REGISTERED

## 2023-01-25 PROCEDURE — 47382 IR RF ABLATION LIVER TUMORS: ICD-10-PCS | Mod: NTX,,, | Performed by: RADIOLOGY

## 2023-01-25 PROCEDURE — 63600175 PHARM REV CODE 636 W HCPCS: Mod: NTX | Performed by: STUDENT IN AN ORGANIZED HEALTH CARE EDUCATION/TRAINING PROGRAM

## 2023-01-25 PROCEDURE — 37000008 HC ANESTHESIA 1ST 15 MINUTES: Mod: NTX

## 2023-01-25 PROCEDURE — D9220A PRA ANESTHESIA: ICD-10-PCS | Mod: ANES,NTX,, | Performed by: STUDENT IN AN ORGANIZED HEALTH CARE EDUCATION/TRAINING PROGRAM

## 2023-01-25 PROCEDURE — C1733 CATH, EP, OTHR THAN COOL-TIP: HCPCS | Mod: TXP

## 2023-01-25 PROCEDURE — D9220A PRA ANESTHESIA: Mod: ANES,NTX,, | Performed by: STUDENT IN AN ORGANIZED HEALTH CARE EDUCATION/TRAINING PROGRAM

## 2023-01-25 PROCEDURE — 77013 CT GUIDE FOR TISSUE ABLATION: CPT | Mod: 26,NTX,, | Performed by: RADIOLOGY

## 2023-01-25 PROCEDURE — 25000003 PHARM REV CODE 250: Mod: TXP | Performed by: STUDENT IN AN ORGANIZED HEALTH CARE EDUCATION/TRAINING PROGRAM

## 2023-01-25 PROCEDURE — 77013 CT GUIDE FOR TISSUE ABLATION: CPT | Mod: TC,TXP | Performed by: RADIOLOGY

## 2023-01-25 PROCEDURE — A4550 SURGICAL TRAYS: HCPCS | Mod: NTX

## 2023-01-25 PROCEDURE — 81025 URINE PREGNANCY TEST: CPT | Mod: NTX | Performed by: FAMILY MEDICINE

## 2023-01-25 PROCEDURE — 47382 PERCUT ABLATE LIVER RF: CPT | Mod: NTX | Performed by: RADIOLOGY

## 2023-01-25 PROCEDURE — D9220A PRA ANESTHESIA: ICD-10-PCS | Mod: CRNA,NTX,, | Performed by: NURSE ANESTHETIST, CERTIFIED REGISTERED

## 2023-01-25 RX ORDER — ROCURONIUM BROMIDE 10 MG/ML
INJECTION, SOLUTION INTRAVENOUS
Status: DISCONTINUED | OUTPATIENT
Start: 2023-01-25 | End: 2023-01-25

## 2023-01-25 RX ORDER — FENTANYL CITRATE 50 UG/ML
INJECTION, SOLUTION INTRAMUSCULAR; INTRAVENOUS
Status: DISCONTINUED | OUTPATIENT
Start: 2023-01-25 | End: 2023-01-25

## 2023-01-25 RX ORDER — DOCUSATE SODIUM 100 MG/1
100 CAPSULE, LIQUID FILLED ORAL 2 TIMES DAILY
Qty: 30 CAPSULE | Refills: 0 | Status: ON HOLD | OUTPATIENT
Start: 2023-01-25 | End: 2023-04-20 | Stop reason: HOSPADM

## 2023-01-25 RX ORDER — ONDANSETRON 4 MG/1
4 TABLET, FILM COATED ORAL 2 TIMES DAILY
Qty: 30 TABLET | Refills: 0 | Status: SHIPPED | OUTPATIENT
Start: 2023-01-25

## 2023-01-25 RX ORDER — ONDANSETRON 2 MG/ML
INJECTION INTRAMUSCULAR; INTRAVENOUS
Status: DISCONTINUED | OUTPATIENT
Start: 2023-01-25 | End: 2023-01-25

## 2023-01-25 RX ORDER — PROCHLORPERAZINE EDISYLATE 5 MG/ML
5 INJECTION INTRAMUSCULAR; INTRAVENOUS EVERY 30 MIN PRN
Status: DISCONTINUED | OUTPATIENT
Start: 2023-01-25 | End: 2023-01-26 | Stop reason: HOSPADM

## 2023-01-25 RX ORDER — DEXAMETHASONE SODIUM PHOSPHATE 4 MG/ML
INJECTION, SOLUTION INTRA-ARTICULAR; INTRALESIONAL; INTRAMUSCULAR; INTRAVENOUS; SOFT TISSUE
Status: DISCONTINUED | OUTPATIENT
Start: 2023-01-25 | End: 2023-01-25

## 2023-01-25 RX ORDER — OXYCODONE HYDROCHLORIDE 5 MG/1
5 TABLET ORAL
Status: DISCONTINUED | OUTPATIENT
Start: 2023-01-25 | End: 2023-01-26 | Stop reason: HOSPADM

## 2023-01-25 RX ORDER — LIDOCAINE HYDROCHLORIDE 10 MG/ML
1 INJECTION, SOLUTION EPIDURAL; INFILTRATION; INTRACAUDAL; PERINEURAL ONCE
Status: DISCONTINUED | OUTPATIENT
Start: 2023-01-25 | End: 2023-01-26 | Stop reason: HOSPADM

## 2023-01-25 RX ORDER — FENTANYL CITRATE 50 UG/ML
25 INJECTION, SOLUTION INTRAMUSCULAR; INTRAVENOUS EVERY 5 MIN PRN
Status: DISCONTINUED | OUTPATIENT
Start: 2023-01-25 | End: 2023-01-26 | Stop reason: HOSPADM

## 2023-01-25 RX ORDER — SODIUM CHLORIDE 9 MG/ML
INJECTION, SOLUTION INTRAVENOUS CONTINUOUS
Status: DISCONTINUED | OUTPATIENT
Start: 2023-01-25 | End: 2023-01-26 | Stop reason: HOSPADM

## 2023-01-25 RX ORDER — PROPOFOL 10 MG/ML
VIAL (ML) INTRAVENOUS
Status: DISCONTINUED | OUTPATIENT
Start: 2023-01-25 | End: 2023-01-25

## 2023-01-25 RX ORDER — CIPROFLOXACIN 500 MG/1
500 TABLET ORAL EVERY 12 HOURS
Qty: 10 TABLET | Refills: 0 | Status: SHIPPED | OUTPATIENT
Start: 2023-01-25 | End: 2023-01-30

## 2023-01-25 RX ORDER — LIDOCAINE HYDROCHLORIDE 20 MG/ML
INJECTION INTRAVENOUS
Status: DISCONTINUED | OUTPATIENT
Start: 2023-01-25 | End: 2023-01-25

## 2023-01-25 RX ORDER — MIDAZOLAM HYDROCHLORIDE 1 MG/ML
INJECTION, SOLUTION INTRAMUSCULAR; INTRAVENOUS
Status: DISCONTINUED | OUTPATIENT
Start: 2023-01-25 | End: 2023-01-25

## 2023-01-25 RX ORDER — OXYCODONE AND ACETAMINOPHEN 5; 325 MG/1; MG/1
1 TABLET ORAL EVERY 4 HOURS PRN
Qty: 15 TABLET | Refills: 0 | Status: SHIPPED | OUTPATIENT
Start: 2023-01-25 | End: 2023-02-07

## 2023-01-25 RX ORDER — KETAMINE HCL IN 0.9 % NACL 50 MG/5 ML
SYRINGE (ML) INTRAVENOUS
Status: DISCONTINUED | OUTPATIENT
Start: 2023-01-25 | End: 2023-01-25

## 2023-01-25 RX ADMIN — GLYCOPYRROLATE 0.2 MG: 0.2 INJECTION, SOLUTION INTRAMUSCULAR; INTRAVENOUS at 11:01

## 2023-01-25 RX ADMIN — FENTANYL CITRATE 25 MCG: 50 INJECTION, SOLUTION INTRAMUSCULAR; INTRAVENOUS at 03:01

## 2023-01-25 RX ADMIN — LIDOCAINE HYDROCHLORIDE 80 MG: 20 INJECTION INTRAVENOUS at 10:01

## 2023-01-25 RX ADMIN — Medication 20 MG: at 11:01

## 2023-01-25 RX ADMIN — MIDAZOLAM HYDROCHLORIDE 2 MG: 1 INJECTION, SOLUTION INTRAMUSCULAR; INTRAVENOUS at 10:01

## 2023-01-25 RX ADMIN — ROCURONIUM BROMIDE 50 MG: 10 INJECTION INTRAVENOUS at 10:01

## 2023-01-25 RX ADMIN — FENTANYL CITRATE 50 MCG: 50 INJECTION, SOLUTION INTRAMUSCULAR; INTRAVENOUS at 10:01

## 2023-01-25 RX ADMIN — PROPOFOL 150 MG: 10 INJECTION, EMULSION INTRAVENOUS at 10:01

## 2023-01-25 RX ADMIN — ONDANSETRON 4 MG: 2 INJECTION INTRAMUSCULAR; INTRAVENOUS at 12:01

## 2023-01-25 RX ADMIN — SUGAMMADEX 200 MG: 100 INJECTION, SOLUTION INTRAVENOUS at 12:01

## 2023-01-25 RX ADMIN — DEXAMETHASONE SODIUM PHOSPHATE 4 MG: 4 INJECTION, SOLUTION INTRAMUSCULAR; INTRAVENOUS at 10:01

## 2023-01-25 RX ADMIN — ROCURONIUM BROMIDE 20 MG: 10 INJECTION INTRAVENOUS at 11:01

## 2023-01-25 RX ADMIN — SODIUM CHLORIDE: 0.9 INJECTION, SOLUTION INTRAVENOUS at 10:01

## 2023-01-25 RX ADMIN — ROCURONIUM BROMIDE 10 MG: 10 INJECTION INTRAVENOUS at 11:01

## 2023-01-25 NOTE — PLAN OF CARE
RF Ablation completed without incident or pt complaint. Pt tolerated well. VSS. No signs or symptoms of distress noted. RUQ covered w band aide and remains clean/dry/in tact. Pt has 4hr bedrest ending at 1615. Pt will be transferred to PICU via RN and CRNA. report to be given at bedside.

## 2023-01-25 NOTE — ANESTHESIA POSTPROCEDURE EVALUATION
Anesthesia Post Evaluation    Patient: Shonda Borrego    Procedure(s) Performed: * No procedures listed *    Final Anesthesia Type: general      Patient location during evaluation: PACU  Patient participation: Yes- Able to Participate  Level of consciousness: awake and alert and oriented  Post-procedure vital signs: reviewed and stable  Pain management: adequate  Airway patency: patent    PONV status at discharge: No PONV  Anesthetic complications: no      Cardiovascular status: hemodynamically stable  Respiratory status: unassisted  Hydration status: euvolemic  Follow-up not needed.          Vitals Value Taken Time   /62 01/25/23 1332   Temp 36.6 °C (97.9 °F) 01/25/23 1245   Pulse 66 01/25/23 1341   Resp 16 01/25/23 1341   SpO2 95 % 01/25/23 1341   Vitals shown include unvalidated device data.      No case tracking events are documented in the log.      Pain/Trent Score: Pain Rating Post Med Admin: 0 (1/25/2023 12:45 PM)  Trent Score: 10 (1/25/2023  1:15 PM)

## 2023-01-25 NOTE — TRANSFER OF CARE
"Anesthesia Transfer of Care Note    Patient: Shonda Borergo    Procedure(s) Performed: * No procedures listed *    Patient location: PACU    Anesthesia Type: general    Transport from OR: Transported from OR on 6-10 L/min O2 by face mask with adequate spontaneous ventilation    Post pain: adequate analgesia    Post assessment: no apparent anesthetic complications and tolerated procedure well    Post vital signs: stable    Level of consciousness: awake    Nausea/Vomiting: no nausea/vomiting    Complications: none    Transfer of care protocol was followed      Last vitals:   Visit Vitals  /67 (BP Location: Right arm, Patient Position: Lying)   Pulse 70   Temp 36.7 °C (98.1 °F) (Temporal)   Resp 16   Ht 5' 8" (1.727 m)   Wt 99.8 kg (220 lb)   SpO2 98%   Breastfeeding No   BMI 33.45 kg/m²     "

## 2023-01-25 NOTE — DISCHARGE SUMMARY
Radiology Discharge Summary      Hospital Course: No complications    Admit Date: 1/25/2023  Discharge Date: 01/25/2023     Instructions Given to Patient: Yes  Diet: Resume prior diet  Activity: activity as tolerated    Description of Condition on Discharge: Stable  Vital Signs (Most Recent): Temp: 98.1 °F (36.7 °C) (01/25/23 0913)  Pulse: 70 (01/25/23 0913)  Resp: 16 (01/25/23 0913)  BP: 117/67 (01/25/23 0913)  SpO2: 98 % (01/25/23 0913)    Discharge Disposition: Home    Discharge Diagnosis: CHARLI Moreland DO   [id number]

## 2023-01-25 NOTE — H&P
Radiology History & Physical      SUBJECTIVE:     Chief Complaint: HCC     History of Present Illness:  Shonda Borrego is a 57 y.o. female who presents for microwave ablation of a hepatic mass. Hx of alcoholic cirrhosis and HCC, esophageal varices,  COPD, GERD. Patient has a 2.2 cm right hepatic mass.     Past Medical History:   Diagnosis Date    Anxiety     Bipolar disorder     Cirrhosis     COPD (chronic obstructive pulmonary disease)     GERD (gastroesophageal reflux disease)     Restless leg syndrome     Urinary frequency      Past Surgical History:   Procedure Laterality Date    TIPS PROCEDURE         Home Meds:   Prior to Admission medications    Medication Sig Start Date End Date Taking? Authorizing Provider   albuterol (PROVENTIL/VENTOLIN HFA) 90 mcg/actuation inhaler Inhale into the lungs. 5/6/22  Yes Historical Provider   fesoterodine (TOVIAZ) 4 mg Tb24 Take 1 tablet by mouth every evening.   Yes Historical Provider   nicotine (NICODERM CQ) 14 mg/24 hr Place 1 patch onto the skin once daily. 11/14/22  Yes Marianne Chu MD   nicotine polacrilex 2 MG Lozg Take 1 lozenge (2 mg total) by mouth as needed (Take 1 lozenge (2 mg total) by mouth as needed (1-2 per hour in place of cigarette (5-16 daily max)- oral). 10/13/22  Yes Marianne Chu MD   pantoprazole (PROTONIX) 20 MG tablet Take 1 tablet (20 mg total) by mouth once daily.  Patient taking differently: Take 20 mg by mouth every evening. 1/9/23 4/9/23 Yes Geeta Gaines,    rOPINIRole (REQUIP) 0.5 MG tablet Take 2 tablets (1 mg total) by mouth every evening. 11/22/22 5/21/23 Yes Geeta Gaines DO   spironolactone (ALDACTONE) 50 MG tablet 1 tablet  Patient taking differently: Take by mouth every evening. 1 tablet 8/18/22  Yes Geeta Gaines DO   tiotropium-olodateroL (STIOLTO RESPIMAT) 2.5-2.5 mcg/actuation Mist 2 puffs   Yes Historical Provider   ursodioL (ACTIGALL) 300 mg capsule Take 1 capsule (300 mg total) by mouth 3 (three) times  daily. 9/29/22 9/29/23 Yes Shari Arreola MD   VRAYLAR 3 mg Cap Take 3 mg by mouth every morning. 9/17/22  Yes Historical Provider     Anticoagulants/Antiplatelets: no anticoagulation    Allergies: Review of patient's allergies indicates:  No Known Allergies  Sedation History:  have not been any systemic reactions    Review of Systems:   Hematological: negative  no known coagulopathies  Respiratory: no cough, shortness of breath, or wheezing  no shortness of breath  Cardiovascular: no chest pain or dyspnea on exertion  no chest pain  Gastrointestinal: no abdominal pain, change in bowel habits, or black or bloody stools  no abdominal pain  Genito-Urinary: no dysuria, trouble voiding, or hematuria  no dysuria  Musculoskeletal: negative  Neurological: no TIA or stroke symptoms         OBJECTIVE:     Vital Signs (Most Recent)  Temp: 98.1 °F (36.7 °C) (01/25/23 0913)  Pulse: 70 (01/25/23 0913)  Resp: 16 (01/25/23 0913)  BP: 117/67 (01/25/23 0913)  SpO2: 98 % (01/25/23 0913)    Physical Exam:  ASA: Per anesthesia  Mallampati: Per anesthesia    General: no acute distress  Mental Status: alert and oriented to person, place and time  HEENT: normocephalic, atraumatic  Chest: unlabored breathing  Heart: regular heart rate  Abdomen: nondistended  Extremity: moves all extremities    Laboratory  Lab Results   Component Value Date    INR 1.5 (H) 01/23/2023       Lab Results   Component Value Date    WBC 6.18 01/23/2023    HGB 14.0 01/23/2023    HCT 40.6 01/23/2023    MCV 91 01/23/2023     (L) 01/23/2023      Lab Results   Component Value Date     (H) 01/23/2023     01/23/2023    K 4.0 01/23/2023     01/23/2023    CO2 28 01/23/2023    BUN 9 01/23/2023    CREATININE 1.1 01/23/2023    CALCIUM 9.0 01/23/2023    ALT 46 (H) 01/23/2023    AST 76 (H) 01/23/2023    ALBUMIN 3.0 (L) 01/23/2023    BILITOT 2.9 (H) 01/23/2023    BILIDIR 1.2 (H) 01/19/2023       ASSESSMENT/PLAN:     Sedation Plan: Per  anesthesia  Patient will undergo microwave ablation of a right hepatic lobe mass.    Michael Moreland,

## 2023-01-25 NOTE — PROCEDURES
Interventional Radiology postop note    Pre Op Diagnosis: HCC  Post Op Diagnosis: Same    Procedure: Image-guided microwave ablation    Procedure performed by: Benton    Written Informed Consent Obtained: Yes  Specimen Removed: NO  Estimated Blood Loss: Minimal    Findings:   Image-guided microwave ablation of a right hepatic lobe mass without acute complication.     Patient tolerated procedure well.    Michael Moreland,   Interventional Radiology

## 2023-01-25 NOTE — ANESTHESIA PROCEDURE NOTES
Intubation    Date/Time: 1/25/2023 10:54 AM  Performed by: Nancy Eddy CRNA  Authorized by: Gertrudis Bland MD     Intubation:     Induction:  Intravenous    Intubated:  Postinduction    Mask Ventilation:  Easy with oral airway    Attempts:  1    Attempted By:  CRNA    Method of Intubation:  Direct    Blade:  Scott 2    Laryngeal View Grade: Grade I - full view of cords      Difficult Airway Encountered?: No      Complications:  None    Airway Device:  Oral endotracheal tube    Airway Device Size:  7.0    Style/Cuff Inflation:  Cuffed    Inflation Amount (mL):  5    Tube secured:  21    Secured at:  The lips    Placement Verified By:  Capnometry    Complicating Factors:  None    Findings Post-Intubation:  BS equal bilateral and atraumatic/condition of teeth unchanged     Pt alert and oriented was transferred from bed to chair and diaper changed,vitals signs done and WNL,Pt currently resting in bed with call light within reach.

## 2023-01-25 NOTE — ANESTHESIA PREPROCEDURE EVALUATION
01/25/2023    Pre-operative evaluation for * No procedures listed *    Shonda Borrego is a 57 y.o. female with alcoholic cirrhosis and HCC, esophageal varices,  COPD, GERD  who presents for IR embolization of liver tumors      Prev airway: none on record      EKG (12/16/2021):   Vent. Rate : 079 BPM     Atrial Rate : 079 BPM      P-R Int : 162 ms          QRS Dur : 074 ms       QT Int : 392 ms       P-R-T Axes : 067 -02 061 degrees      QTc Int : 449 ms   Normal sinus rhythm   Low voltage QRS   Borderline Abnormal ECG       2D Echo (1/19/23):    The left ventricle is normal in size with normal systolic function. The estimated ejection fraction is 65%.   Normal right ventricular size with normal right ventricular systolic function.   Normal left ventricular diastolic function.   The estimated PA systolic pressure is 31 mmHg.   Normal central venous pressure (3 mmHg).   The ECG portion of this study is negative for myocardial ischemia.   The stress echo portion of this study is negative for myocardial ischemia.   Overall, this study is negative for myocardial ischemia.      Patient Active Problem List   Diagnosis    Alcoholic cirrhosis of liver without ascites    Thrombocytopenia    Calculus of gallbladder without cholecystitis without obstruction    Esophageal varices without bleeding    S/P TIPS (transjugular intrahepatic portosystemic shunt)    Stasis dermatitis without varicosities    BMI 34.0-34.9,adult    Dependence on nicotine from cigarettes    Anxiety    Claudication of both lower extremities    Hyperbilirubinemia    Hyperammonemia    Overactive bladder    COPD without exacerbation    Gastroesophageal reflux disease    Restless leg syndrome    Mood disorder    Constipation    Forgetfulness    Screening mammogram, encounter for    Thickened nail    Anxiety due to invasive  procedure    Leg swelling       Review of patient's allergies indicates:  No Known Allergies     Current Outpatient Medications on File Prior to Encounter   Medication Sig Dispense Refill    albuterol (PROVENTIL/VENTOLIN HFA) 90 mcg/actuation inhaler Inhale into the lungs.      fesoterodine (TOVIAZ) 4 mg Tb24 Take 1 tablet by mouth every evening.      nicotine (NICODERM CQ) 14 mg/24 hr Place 1 patch onto the skin once daily. 28 patch 0    nicotine polacrilex 2 MG Lozg Take 1 lozenge (2 mg total) by mouth as needed (Take 1 lozenge (2 mg total) by mouth as needed (1-2 per hour in place of cigarette (5-16 daily max)- oral). 144 lozenge 0    pantoprazole (PROTONIX) 20 MG tablet Take 1 tablet (20 mg total) by mouth once daily. (Patient taking differently: Take 20 mg by mouth every evening.) 30 tablet 3    rOPINIRole (REQUIP) 0.5 MG tablet Take 2 tablets (1 mg total) by mouth every evening. 180 tablet 1    spironolactone (ALDACTONE) 50 MG tablet 1 tablet (Patient taking differently: Take by mouth every evening. 1 tablet) 90 tablet 1    tiotropium-olodateroL (STIOLTO RESPIMAT) 2.5-2.5 mcg/actuation Mist 2 puffs      ursodioL (ACTIGALL) 300 mg capsule Take 1 capsule (300 mg total) by mouth 3 (three) times daily. 90 capsule 3    VRAYLAR 3 mg Cap Take 3 mg by mouth every morning.       No current facility-administered medications on file prior to encounter.       Past Surgical History:   Procedure Laterality Date    TIPS PROCEDURE         Social History     Socioeconomic History    Marital status: Single   Tobacco Use    Smoking status: Every Day     Packs/day: 1.50     Years: 43.00     Pack years: 64.50     Types: Cigarettes     Last attempt to quit: 10/19/2022     Years since quittin.2    Smokeless tobacco: Never    Tobacco comments:     3-4 cigarettes/day   Substance and Sexual Activity    Alcohol use: Not Currently     Comment: socially    Drug use: Yes     Types: Marijuana    Sexual activity: Not  Currently   Social History Narrative    Lives with her boyfriend and a room mate         Vital Signs Range (Last 24H):  Temp:  [36.7 °C (98.1 °F)]   Pulse:  [70]   Resp:  [16]   BP: (117)/(67)   SpO2:  [98 %]       CBC:   Recent Labs     01/23/23  1536   WBC 6.18   RBC 4.45   HGB 14.0   HCT 40.6   *   MCV 91   MCH 31.5*   MCHC 34.5       CMP:   Recent Labs     01/23/23  0840 01/23/23  1536   NA  --  140   K  --  4.0   CL  --  109   CO2  --  28   BUN  --  9   CREATININE 1.0 1.1   GLU  --  122*   CALCIUM  --  9.0   ALBUMIN  --  3.0*   PROT  --  6.6   ALKPHOS  --  242*   ALT  --  46*   AST  --  76*   BILITOT  --  2.9*       INR  Recent Labs     01/23/23  1536   INR 1.5*             Pre-op Assessment    I have reviewed the Patient Summary Reports.     I have reviewed the Nursing Notes. I have reviewed the NPO Status.   I have reviewed the Medications.     Review of Systems      Physical Exam  General: Alert and Oriented    Airway:  Mallampati: II   Mouth Opening: Normal  TM Distance: Normal  Tongue: Normal    Dental:  Edentulous    Chest/Lungs:  Clear to auscultation, Normal Respiratory Rate    Heart:  Rate: Normal  Rhythm: Regular Rhythm        Anesthesia Plan  Type of Anesthesia, risks & benefits discussed:    Anesthesia Type: Gen ETT  Intra-op Monitoring Plan: Standard ASA Monitors  Post Op Pain Control Plan: IV/PO Opioids PRN and multimodal analgesia  Induction:  IV  Airway Plan: Direct and Video  Informed Consent: Informed consent signed with the Patient and all parties understand the risks and agree with anesthesia plan.  All questions answered.   ASA Score: 3  Day of Surgery Review of History & Physical: H&P Update referred to the surgeon/provider.    Ready For Surgery From Anesthesia Perspective.     .

## 2023-01-25 NOTE — PLAN OF CARE
Pt arrived to Interventional Radiology room IR CT for RF ABLATION.  Plan of care reviewed with patient, patient verbalized understanding. Name verified using two identifiers. Allergies verified.  Labs, orders and consent reviewed on chart. Pt oriented to unit and staff. See flow sheet for documentation, monitoring and medication administration. Will continue to monitor.        *Refer to Anesthesia documentation for any additional information*

## 2023-01-26 DIAGNOSIS — C22.0 HCC (HEPATOCELLULAR CARCINOMA): Primary | ICD-10-CM

## 2023-01-27 VITALS
HEIGHT: 68 IN | OXYGEN SATURATION: 96 % | BODY MASS INDEX: 33.34 KG/M2 | RESPIRATION RATE: 18 BRPM | TEMPERATURE: 98 F | HEART RATE: 71 BPM | WEIGHT: 220 LBS | DIASTOLIC BLOOD PRESSURE: 60 MMHG | SYSTOLIC BLOOD PRESSURE: 125 MMHG

## 2023-01-30 ENCOUNTER — OFFICE VISIT (OUTPATIENT)
Dept: INFECTIOUS DISEASES | Facility: CLINIC | Age: 58
End: 2023-01-30
Payer: MEDICAID

## 2023-01-30 DIAGNOSIS — C22.0 HEPATOCELLULAR CARCINOMA: ICD-10-CM

## 2023-01-30 DIAGNOSIS — Z01.818 PRE-TRANSPLANT EVALUATION FOR LIVER TRANSPLANT: ICD-10-CM

## 2023-01-30 DIAGNOSIS — J44.9 COPD WITHOUT EXACERBATION: ICD-10-CM

## 2023-01-30 DIAGNOSIS — I73.9 CLAUDICATION OF BOTH LOWER EXTREMITIES: ICD-10-CM

## 2023-01-30 DIAGNOSIS — F17.219 CIGARETTE NICOTINE DEPENDENCE WITH NICOTINE-INDUCED DISORDER: ICD-10-CM

## 2023-01-30 DIAGNOSIS — Z95.828 S/P TIPS (TRANSJUGULAR INTRAHEPATIC PORTOSYSTEMIC SHUNT): ICD-10-CM

## 2023-01-30 DIAGNOSIS — K70.30 ALCOHOLIC CIRRHOSIS OF LIVER WITHOUT ASCITES: ICD-10-CM

## 2023-01-30 DIAGNOSIS — B78.9 STRONGYLOIDIASIS: Primary | ICD-10-CM

## 2023-01-30 PROCEDURE — 99204 OFFICE O/P NEW MOD 45 MIN: CPT | Mod: 95,TXP,, | Performed by: PHYSICIAN ASSISTANT

## 2023-01-30 PROCEDURE — 99204 PR OFFICE/OUTPT VISIT, NEW, LEVL IV, 45-59 MIN: ICD-10-PCS | Mod: 95,TXP,, | Performed by: PHYSICIAN ASSISTANT

## 2023-01-30 RX ORDER — IVERMECTIN 3 MG/1
200 TABLET ORAL DAILY
Qty: 26 TABLET | Refills: 0 | Status: SHIPPED | OUTPATIENT
Start: 2023-01-30 | End: 2023-02-07 | Stop reason: SDUPTHER

## 2023-01-30 NOTE — PROGRESS NOTES
The patient location is: home  The chief complaint leading to consultation is: liver transplant evaluation    Visit type: audiovisual    Face to Face time with patient: 30  >35 minutes of total time spent on the encounter, which includes face to face time and non-face to face time preparing to see the patient (eg, review of tests), Obtaining and/or reviewing separately obtained history, Documenting clinical information in the electronic or other health record, Independently interpreting results (not separately reported) and communicating results to the patient/family/caregiver, or Care coordination (not separately reported).         Each patient to whom he or she provides medical services by telemedicine is:  (1) informed of the relationship between the physician and patient and the respective role of any other health care provider with respect to management of the patient; and (2) notified that he or she may decline to receive medical services by telemedicine and may withdraw from such care at any time.    Notes:   PRE-TRANSPLANT INFECTIOUS DISEASE CONSULT    Reason for Visit:  Pre-transplant evaluation  Referring Provider: Dr. Shari Arreola     History of Present Illness:    57 y.o. female with a history of ESLD presents for pre-liver transplant evaluation.  ESLD 2/2 alcohol cirrhosis and HCC. Y-90 was attempted to treat the HCC lesion, but it was met with difficulty. Hence, patient will return for microwave ablation of segment 6 hypervascular tumor on Jan 255, 2023.     Infectious History:  Recent hospital admissions: No  Recent infections: No  Recent or current antibiotic use: Yes, completed abx after radiation treatment for her liver. Pt was on ciprofloxacin   History of recurrent infections *(sinus / pneumonia / UTI / SBP)*: No  Recent dental infections, issues or procedures: No  History of chicken pox: Yes  History of shingles: No  History of STI: No  History of COVID infection: No    History of  Immunosuppression:  Prior chemotherapy / immunosuppression: No  Prior transplant: No  History of splenectomy: No    Tuberculosis:  Prior screening for latent TB: No  Prior diagnosis of latent TB: No  Risk factors for TB *known exposure, incarceration, homelessness*: No    Geographical exposures:  Currently lives in [place], [place] with boyfriend   Lived in the following states: [place]  Lived in [facility] [place]: No  International travel: No  Travel-associated illness: No    Social/Environmental:  Occupation:  worked with construction   Pets: Yes, two indoor dogs.   Livestock: none  Fishing / hunting: Yes, fishing.   Hobbies: fishing, garden (flower beds, +shani bush)  Water: City water , bottled water   Consumption of raw/undercooked meat or seafood?  No  Tobacco: Yes, cigarettes. Two cigarettes a day.  Alcohol: No  Recreational drug use:  Yes, smoke.   Sexual partners: boyfriend       Past Histories:   Past Medical History:   Diagnosis Date    Anxiety     Bipolar disorder     Cirrhosis     COPD (chronic obstructive pulmonary disease)     GERD (gastroesophageal reflux disease)     Restless leg syndrome     Urinary frequency      Past Surgical History:   Procedure Laterality Date    TIPS PROCEDURE       Family History   Problem Relation Age of Onset    Lung cancer Mother     No Known Problems Father     Cancer Sister     Lung cancer Sister     No Known Problems Daughter     No Known Problems Maternal Aunt     No Known Problems Maternal Uncle     No Known Problems Paternal Aunt     No Known Problems Paternal Uncle     No Known Problems Maternal Grandmother     No Known Problems Maternal Grandfather     No Known Problems Paternal Grandmother     No Known Problems Paternal Grandfather     Breast cancer Neg Hx     Ovarian cancer Neg Hx     BRCA 1/2 Neg Hx      Social History     Tobacco Use    Smoking status: Every Day     Packs/day: 1.50     Years: 43.00     Pack years: 64.50     Types: Cigarettes     Last attempt to  quit: 10/19/2022     Years since quittin.2    Smokeless tobacco: Never    Tobacco comments:     3-4 cigarettes/day   Substance Use Topics    Alcohol use: Not Currently     Comment: socially    Drug use: Yes     Types: Marijuana     Review of patient's allergies indicates:  No Known Allergies      Immunization History:  Received all childhood vaccines: Yes  All household members receive annual flu vaccine: Yes  All household members are up to date on COVID vaccine: Yes    Immunization History   Administered Date(s) Administered    COVID-19 MRNA, LN-S PF (MODERNA HALF 0.25 ML DOSE) 2021    COVID-19, MRNA, LN-S, PF (MODERNA FULL 0.5 ML DOSE) 2021    COVID-19, MRNA, LN-S, PF (Pfizer) (Gray Cap) 2022    COVID-19, MRNA, LN-S, PF (Pfizer) (Purple Cap) 2021    COVID-19, mRNA, LNP-S, bivalent booster, PF (Moderna Omicron) 2022    Influenza - Quadrivalent - PF *Preferred* (6 months and older) 2022    Influenza - Trivalent (ADULT) 10/20/2021    Tdap 2022    Zoster Recombinant 2022, 2022          Current antibiotics:  Antibiotics (From admission, onward)      None              MELD: *liver transplant only*  MELD-Na score: 16 at 2023  3:36 PM  MELD score: 16 at 2023  3:36 PM  Calculated from:  Serum Creatinine: 1.1 mg/dL at 2023  3:36 PM  Serum Sodium: 140 mmol/L (Using max of 137 mmol/L) at 2023  3:36 PM  Total Bilirubin: 2.9 mg/dL at 2023  3:36 PM  INR(ratio): 1.5 at 2023  3:36 PM  Age: 57 years      Labs:    CBC:   Lab Results   Component Value Date    WBC 6.18 2023    HGB 14.0 2023    HCT 40.6 2023    MCV 91 2023     (L) 2023    GRAN 3.1 2023    GRAN 49.5 2023    LYMPH 2.2 2023    LYMPH 35.8 2023    MONO 0.7 2023    MONO 10.7 2023    EOSINOPHIL 2.8 2023       CMP:   Lab Results   Component Value Date     2023    K 4.0 2023     2023     CO2 28 01/23/2023     (H) 01/23/2023    BUN 9 01/23/2023    CREATININE 1.1 01/23/2023    CALCIUM 9.0 01/23/2023    ALBUMIN 3.0 (L) 01/23/2023    PROT 6.6 01/23/2023    ALT 46 (H) 01/23/2023    AST 76 (H) 01/23/2023    GGT 19 01/19/2023    ALKPHOS 242 (H) 01/23/2023    BILITOT 2.9 (H) 01/23/2023    ESTGFRAFRICA 52.6 (A) 07/07/2022       Syphilis screening:   Lab Results   Component Value Date    RPR Non-reactive 01/19/2023        TB screening:   Lab Results   Component Value Date    TBGOLDPLUS Negative 01/19/2023       HIV screening:   Lab Results   Component Value Date    [id number] Non-reactive 01/19/2023       Strongyloides IgG:   Lab Results   Component Value Date    STRONGANTIGG Positive (A) 01/19/2023       Hepatitis Serologies:   Lab Results   Component Value Date    HEPAIGG Non-reactive 01/19/2023    HEPBCAB Reactive (A) 01/19/2023    HEPBSAB 28.65 01/19/2023    HEPBSAB Reactive 01/19/2023    HEPCAB Reactive (A) 01/19/2023        Varicella IgG:   Lab Results   Component Value Date    VARICELLAINT Positive 01/19/2023       Recent Microbiology:   Microbiology Results (last 7 days)       ** No results found for the last 168 hours. **              Radiology:          Assessment and Plan    1. Risks of Infection: Available serologies were reviewed. No unusual risks of infection or significant barriers to transplantation were identified from the infectious disease standpoint.   - Pending serologies: HIV RPR QUANTGOLD NEGATIVE. STRONGY POSITIVE. RX GIVEN FOR IVERMECTIN. HEP C AB POSITIVE. HEP C PCR NON .     2. Immunizations:  Based on the patient's immunization history and serologies, the following immunizations are recommended:  - Hepatitis A    Patient does not have immunity to hepatitis A    Vaccination required: Yes   - Hepatitis B    Patient does have immunity to hepatitis B    Vaccination ordered today: No    If not ordered, reason for not vaccinating: Immunity   - COVID up to date      Current CDC vaccination recommendations were discussed with the patient   - Influenza up to date    Annual, high dose preferred   - Prevnar 20   - Tdap up to date   - Shingrix up to date    Recommended Pre-Transplant Immunization Schedule  Vaccine  0m 1m 2m 6m   Pneumococcal conjugate vaccine (Prevnar 20) X      Tetanus-diphtheria-pertussis (Tdap)* X      Hepatitis A Vaccine (Havrix)** X   X   Hepatitis B Vaccine (Heplisav)** X X     Influenza X      Zoster Recombinant Vaccine (Shingrix) X  X           *Administer booster every 10 years.       **Administer if no immunity demonstrated on serologies                 3. Counseling:   I discussed with the patient the risk for increased susceptibility to infections following transplantation including increased risk for infection right after transplant and if rejection should occur.  The patient has been counseled on the importance of vaccinations including but not limited to a yearly flu vaccine. Patient was also instructed to encourage that family/caretakers receive their flu vaccine yearly. The patient was encouraged to contact us about any problems that may develop after immunizations and possible side effects were reviewed.     Specific guidance has been provided to the patient regarding the patient's occupation, hobbies and activities to avoid future infectious complications. These include but are not limited to: avoiding raw/undercooked meats and seafood, avoiding unpasteurized milk/cheeses, proper (hand) hygiene, contact with animals and appropriate vaccination of animals, use of mosquito/tick precautions, avoiding walking barefoot, avoiding sick contacts, and seeking medical advice prior to foreign travel (specifically developing countries).     4. Transplant Candidacy: Based on available information, there are no identified significant barriers to transplantation from an infectious disease standpoint.  Final determination of transplant candidacy will be made  once evaluation is complete and reviewed by the Selection Committee.      Follow up with infectious disease as needed. Please call if any pending serologic testing is positive.      The total time for evaluation and management services performed on 1/30/23 was greater than 35 minutes.

## 2023-01-31 ENCOUNTER — CLINICAL SUPPORT (OUTPATIENT)
Dept: SMOKING CESSATION | Facility: CLINIC | Age: 58
End: 2023-01-31
Payer: COMMERCIAL

## 2023-01-31 DIAGNOSIS — F17.200 NICOTINE DEPENDENCE: Primary | ICD-10-CM

## 2023-01-31 PROCEDURE — 99407 PR TOBACCO USE CESSATION INTENSIVE >10 MINUTES: ICD-10-PCS | Mod: NTX,S$GLB,,

## 2023-01-31 PROCEDURE — 99407 BEHAV CHNG SMOKING > 10 MIN: CPT | Mod: NTX,S$GLB,,

## 2023-01-31 NOTE — PROGRESS NOTES
Spoke with patient today in regard to smoking cessation progress for 3 month telephone follow up, she states not tobacco free. Patient states smoking about 4 cpd while using the nicotine patch and not ready to return to the program at this time. Commended patient on the accomplishment thus far. Informed patient of benefit period, future follow ups, and contact information if any further help or support is needed. Will complete smart form for 3 month follow up on Quit attempt #1.

## 2023-02-06 ENCOUNTER — TELEPHONE (OUTPATIENT)
Dept: TRANSPLANT | Facility: CLINIC | Age: 58
End: 2023-02-06
Payer: MEDICAID

## 2023-02-06 DIAGNOSIS — R82.5 POSITIVE URINE DRUG SCREEN: ICD-10-CM

## 2023-02-06 DIAGNOSIS — Z01.818 PRE-TRANSPLANT EVALUATION FOR LIVER TRANSPLANT: ICD-10-CM

## 2023-02-06 DIAGNOSIS — K70.30 ALCOHOLIC CIRRHOSIS OF LIVER WITHOUT ASCITES: Primary | ICD-10-CM

## 2023-02-06 DIAGNOSIS — C22.0 HEPATOCELLULAR CARCINOMA: ICD-10-CM

## 2023-02-06 NOTE — TELEPHONE ENCOUNTER
Orders entered and appointment card completed for monthly compliance screens    ----- Message from Shari Arreola MD sent at 1/23/2023  1:25 PM CST -----  Monitor drug screen monthly to follow-up on positive THC.

## 2023-02-06 NOTE — TELEPHONE ENCOUNTER
Patient instructed on zinc replacement therapy. Understanding expressed. Patient says she was given a prescription for a parasitic infection when seen by ID but she has not started the medication. Says she was also told to get the hepatitis A vaccine and Prevnar 20, but these are not available at her local pharmacy. Chart review shows ivermectin was prescribed and sent to [facility]. Patient says she will call the pharmacy today to see if the prescription is read. She has an appointment with her PCP tomorrow. Says she will ask her about administering or ordering the vaccines.     ----- Message from Shari Arreola MD sent at 1/23/2023  1:02 PM CST -----  Have patient take 220 mg of see on a daily basis for 2 weeks, then on Mondays and Thursdays.  Inform infectious disease consultant that strongyloides antigen is positive, some aching in her treatment

## 2023-02-06 NOTE — TELEPHONE ENCOUNTER
"+THC result discussed with patient. Advised alcohol and drug screens are checked randomly throughout the entire pre-transplant phase. Advised of need for abstinence as continued use may be a barrier to transplant and make it difficult to obtain financial clearance, and once listed, if results show become positive, she could be placed on hold or removed from the list. Patient expressed understanding and agrees to abstinence. Says she smokes "pot" to calm her nerves and wants to know of Dr. Arreola would prescribe something. Patient advised to discuss this with her PCP at tomorrow's appointment. Understanding expressed.     ----- Message from Shari Arreola MD sent at 1/23/2023  1:24 PM CST -----  THC positive, needs to stop using it.   "

## 2023-02-07 ENCOUNTER — OFFICE VISIT (OUTPATIENT)
Dept: PRIMARY CARE CLINIC | Facility: CLINIC | Age: 58
End: 2023-02-07
Payer: MEDICAID

## 2023-02-07 ENCOUNTER — LAB VISIT (OUTPATIENT)
Dept: LAB | Facility: HOSPITAL | Age: 58
End: 2023-02-07
Attending: FAMILY MEDICINE
Payer: MEDICAID

## 2023-02-07 VITALS
BODY MASS INDEX: 31.98 KG/M2 | DIASTOLIC BLOOD PRESSURE: 60 MMHG | RESPIRATION RATE: 16 BRPM | HEART RATE: 61 BPM | TEMPERATURE: 99 F | SYSTOLIC BLOOD PRESSURE: 125 MMHG | WEIGHT: 211 LBS | HEIGHT: 68 IN | OXYGEN SATURATION: 96 %

## 2023-02-07 DIAGNOSIS — Z01.818 PRE-TRANSPLANT EVALUATION FOR LIVER TRANSPLANT: ICD-10-CM

## 2023-02-07 DIAGNOSIS — F41.9 ANXIETY DUE TO INVASIVE PROCEDURE: ICD-10-CM

## 2023-02-07 DIAGNOSIS — F39 MOOD DISORDER: ICD-10-CM

## 2023-02-07 DIAGNOSIS — N32.81 OVERACTIVE BLADDER: ICD-10-CM

## 2023-02-07 DIAGNOSIS — Z13.6 ENCOUNTER FOR LIPID SCREENING FOR CARDIOVASCULAR DISEASE: ICD-10-CM

## 2023-02-07 DIAGNOSIS — K70.30 ALCOHOLIC CIRRHOSIS OF LIVER WITHOUT ASCITES: ICD-10-CM

## 2023-02-07 DIAGNOSIS — Z13.220 ENCOUNTER FOR LIPID SCREENING FOR CARDIOVASCULAR DISEASE: ICD-10-CM

## 2023-02-07 DIAGNOSIS — F41.9 ANXIETY: Primary | ICD-10-CM

## 2023-02-07 DIAGNOSIS — B78.9 STRONGYLOIDIASIS: ICD-10-CM

## 2023-02-07 DIAGNOSIS — Z23 IMMUNIZATION DUE: ICD-10-CM

## 2023-02-07 DIAGNOSIS — C22.0 HEPATOCELLULAR CARCINOMA: ICD-10-CM

## 2023-02-07 DIAGNOSIS — R82.5 POSITIVE URINE DRUG SCREEN: ICD-10-CM

## 2023-02-07 DIAGNOSIS — G25.81 RESTLESS LEG SYNDROME: ICD-10-CM

## 2023-02-07 LAB
AMPHET+METHAMPHET UR QL: NEGATIVE
BARBITURATES UR QL SCN>200 NG/ML: NEGATIVE
BENZODIAZ UR QL SCN>200 NG/ML: NEGATIVE
BZE UR QL SCN: NEGATIVE
CANNABINOIDS UR QL SCN: ABNORMAL
CREAT UR-MCNC: 161 MG/DL (ref 15–325)
METHADONE UR QL SCN>300 NG/ML: NEGATIVE
OPIATES UR QL SCN: NEGATIVE
PCP UR QL SCN>25 NG/ML: NEGATIVE
TOXICOLOGY INFORMATION: ABNORMAL

## 2023-02-07 PROCEDURE — 80307 DRUG TEST PRSMV CHEM ANLYZR: CPT | Mod: TXP | Performed by: INTERNAL MEDICINE

## 2023-02-07 PROCEDURE — 99214 OFFICE O/P EST MOD 30 MIN: CPT | Mod: S$PBB,,, | Performed by: STUDENT IN AN ORGANIZED HEALTH CARE EDUCATION/TRAINING PROGRAM

## 2023-02-07 PROCEDURE — 3008F PR BODY MASS INDEX (BMI) DOCUMENTED: ICD-10-PCS | Mod: CPTII,,, | Performed by: STUDENT IN AN ORGANIZED HEALTH CARE EDUCATION/TRAINING PROGRAM

## 2023-02-07 PROCEDURE — 1160F RVW MEDS BY RX/DR IN RCRD: CPT | Mod: CPTII,,, | Performed by: STUDENT IN AN ORGANIZED HEALTH CARE EDUCATION/TRAINING PROGRAM

## 2023-02-07 PROCEDURE — 1159F MED LIST DOCD IN RCRD: CPT | Mod: CPTII,,, | Performed by: STUDENT IN AN ORGANIZED HEALTH CARE EDUCATION/TRAINING PROGRAM

## 2023-02-07 PROCEDURE — 99213 OFFICE O/P EST LOW 20 MIN: CPT | Mod: PBBFAC,PN,NTX | Performed by: STUDENT IN AN ORGANIZED HEALTH CARE EDUCATION/TRAINING PROGRAM

## 2023-02-07 PROCEDURE — 3078F PR MOST RECENT DIASTOLIC BLOOD PRESSURE < 80 MM HG: ICD-10-PCS | Mod: CPTII,,, | Performed by: STUDENT IN AN ORGANIZED HEALTH CARE EDUCATION/TRAINING PROGRAM

## 2023-02-07 PROCEDURE — 99999 PR PBB SHADOW E&M-EST. PATIENT-LVL III: CPT | Mod: PBBFAC,,, | Performed by: STUDENT IN AN ORGANIZED HEALTH CARE EDUCATION/TRAINING PROGRAM

## 2023-02-07 PROCEDURE — 99214 PR OFFICE/OUTPT VISIT, EST, LEVL IV, 30-39 MIN: ICD-10-PCS | Mod: S$PBB,,, | Performed by: STUDENT IN AN ORGANIZED HEALTH CARE EDUCATION/TRAINING PROGRAM

## 2023-02-07 PROCEDURE — 3008F BODY MASS INDEX DOCD: CPT | Mod: CPTII,,, | Performed by: STUDENT IN AN ORGANIZED HEALTH CARE EDUCATION/TRAINING PROGRAM

## 2023-02-07 PROCEDURE — 1159F PR MEDICATION LIST DOCUMENTED IN MEDICAL RECORD: ICD-10-PCS | Mod: CPTII,,, | Performed by: STUDENT IN AN ORGANIZED HEALTH CARE EDUCATION/TRAINING PROGRAM

## 2023-02-07 PROCEDURE — 90677 PCV20 VACCINE IM: CPT | Mod: PBBFAC,PN

## 2023-02-07 PROCEDURE — 3074F SYST BP LT 130 MM HG: CPT | Mod: CPTII,,, | Performed by: STUDENT IN AN ORGANIZED HEALTH CARE EDUCATION/TRAINING PROGRAM

## 2023-02-07 PROCEDURE — 1160F PR REVIEW ALL MEDS BY PRESCRIBER/CLIN PHARMACIST DOCUMENTED: ICD-10-PCS | Mod: CPTII,,, | Performed by: STUDENT IN AN ORGANIZED HEALTH CARE EDUCATION/TRAINING PROGRAM

## 2023-02-07 PROCEDURE — 36415 COLL VENOUS BLD VENIPUNCTURE: CPT | Mod: NTX | Performed by: INTERNAL MEDICINE

## 2023-02-07 PROCEDURE — 3078F DIAST BP <80 MM HG: CPT | Mod: CPTII,,, | Performed by: STUDENT IN AN ORGANIZED HEALTH CARE EDUCATION/TRAINING PROGRAM

## 2023-02-07 PROCEDURE — 3074F PR MOST RECENT SYSTOLIC BLOOD PRESSURE < 130 MM HG: ICD-10-PCS | Mod: CPTII,,, | Performed by: STUDENT IN AN ORGANIZED HEALTH CARE EDUCATION/TRAINING PROGRAM

## 2023-02-07 PROCEDURE — 80321 ALCOHOLS BIOMARKERS 1OR 2: CPT | Mod: TXP | Performed by: INTERNAL MEDICINE

## 2023-02-07 PROCEDURE — 99999 PR PBB SHADOW E&M-EST. PATIENT-LVL III: ICD-10-PCS | Mod: PBBFAC,,, | Performed by: STUDENT IN AN ORGANIZED HEALTH CARE EDUCATION/TRAINING PROGRAM

## 2023-02-07 RX ORDER — CARIPRAZINE 3 MG/1
3 CAPSULE, GELATIN COATED ORAL EVERY MORNING
Qty: 30 CAPSULE | Refills: 0 | Status: SHIPPED | OUTPATIENT
Start: 2023-02-07 | End: 2023-03-09

## 2023-02-07 RX ORDER — ALPRAZOLAM 0.25 MG/1
0.25 TABLET ORAL 2 TIMES DAILY PRN
Qty: 2 TABLET | Refills: 2 | Status: ON HOLD | OUTPATIENT
Start: 2023-02-07 | End: 2023-04-20 | Stop reason: HOSPADM

## 2023-02-07 RX ORDER — FESOTERODINE FUMARATE 4 MG/1
4 TABLET, EXTENDED RELEASE ORAL NIGHTLY
Qty: 30 TABLET | Refills: 2 | Status: ON HOLD | OUTPATIENT
Start: 2023-02-07 | End: 2023-04-20 | Stop reason: HOSPADM

## 2023-02-07 RX ORDER — ROPINIROLE 1 MG/1
1 TABLET, FILM COATED ORAL NIGHTLY
Qty: 90 TABLET | Refills: 1 | Status: SHIPPED | OUTPATIENT
Start: 2023-02-07 | End: 2023-03-08 | Stop reason: SDUPTHER

## 2023-02-07 RX ORDER — LACTULOSE 10 G/15ML
30 SOLUTION ORAL 3 TIMES DAILY
COMMUNITY
Start: 2022-12-30 | End: 2023-06-14

## 2023-02-07 RX ORDER — IVERMECTIN 3 MG/1
200 TABLET ORAL DAILY
Qty: 26 TABLET | Refills: 0 | Status: SHIPPED | OUTPATIENT
Start: 2023-02-07 | End: 2023-02-11

## 2023-02-07 NOTE — PROGRESS NOTES
[facility] Note    HPI:  Shonda Borrego is a 57 y.o. female who presents today for Follow-up (4 month Follow up)  After recent follow up with transplant services and infectious disease specialists. She has been requested to get follow up prevnar 20 and hepatitis A vaccines.   Patient endorses possible scheduled transplant by the end of the year 2023.   Patient is out of [facility] and does not meet with her psychiatrist until 2/13/2023.   She requests anxiolytics for future procedures, she has an upcoming MRI study.   Patient has not been able to obtain her ivermectin therapy for strongyloides treatment and requests RX to be resent to her local pharmacy as pharmacy is declining to fill without associated diagnosis.   Podiatry follow up is coming up, initial toe nail problems have been addressed.   Refill medications for over active bladder and restless leg syndrome.   Overall, no new complaints.       ROS   A review of systems was performed and was negative except as noted above.    I personally reviewed allergies, past medical, surgical, social and family history and updated as appropriate.    Medications:    Current Outpatient Medications:     albuterol (PROVENTIL/VENTOLIN HFA) 90 mcg/actuation inhaler, Inhale into the lungs., Disp: , Rfl:     docusate sodium (COLACE) 100 MG capsule, Take 1 capsule (100 mg total) by mouth 2 (two) times daily., Disp: 30 capsule, Rfl: 0    nicotine (NICODERM CQ) 14 mg/24 hr, Place 1 patch onto the skin once daily., Disp: 28 patch, Rfl: 0    nicotine polacrilex 2 MG Lozg, Take 1 lozenge (2 mg total) by mouth as needed (Take 1 lozenge (2 mg total) by mouth as needed (1-2 per hour in place of cigarette (5-16 daily max)- oral)., Disp: 144 lozenge, Rfl: 0    ondansetron (ZOFRAN) 4 MG tablet, Take 1 tablet (4 mg total) by mouth 2 (two) times daily., Disp: 30 tablet, Rfl: 0    pantoprazole (PROTONIX) 20 MG tablet, Take 1 tablet (20 mg total) by mouth once daily. (Patient  taking differently: Take 20 mg by mouth every evening.), Disp: 30 tablet, Rfl: 3    tiotropium-olodateroL (STIOLTO RESPIMAT) 2.5-2.5 mcg/actuation Mist, 2 puffs, Disp: , Rfl:     ALPRAZolam (XANAX) 0.25 MG tablet, Take 1 tablet (0.25 mg total) by mouth 2 (two) times daily as needed for Anxiety., Disp: 2 tablet, Rfl: 2    CONSTULOSE 10 gram/15 mL solution, Take 30 mLs by mouth 3 (three) times daily., Disp: , Rfl:     fesoterodine (TOVIAZ) 4 mg Tb24, Take 1 tablet (4 mg total) by mouth every evening., Disp: 30 tablet, Rfl: 2    ivermectin (STROMECTOL) 3 mg Tab, Take 6.5 tablets (19,500 mcg total) by mouth once daily. 6.5 tablets x 2 days; repeat dosing regimen in 2 weeks for 4 doses, Disp: 26 tablet, Rfl: 0    rOPINIRole (REQUIP) 1 MG tablet, Take 1 tablet (1 mg total) by mouth every evening., Disp: 90 tablet, Rfl: 1    spironolactone (ALDACTONE) 50 MG tablet, 1 tablet (Patient not taking: Reported on 2/7/2023), Disp: 90 tablet, Rfl: 1    ursodioL (ACTIGALL) 300 mg capsule, Take 1 capsule (300 mg total) by mouth 3 (three) times daily., Disp: 90 capsule, Rfl: 3    VRAYLAR 3 mg Cap, Take 1 capsule (3 mg total) by mouth every morning., Disp: 30 capsule, Rfl: 0     Health Maintenance:  Immunization History   Administered Date(s) Administered    COVID-19 MRNA, LN-S PF (MODERNA HALF 0.25 ML DOSE) 12/09/2021    COVID-19, MRNA, LN-S, PF (MODERNA FULL 0.5 ML DOSE) 05/12/2021    COVID-19, MRNA, LN-S, PF (Pfizer) (Gray Cap) 06/03/2022    COVID-19, MRNA, LN-S, PF (Pfizer) (Purple Cap) 06/09/2021, 09/25/2022    COVID-19, mRNA, LNP-S, bivalent booster, PF (Moderna Omicron) 09/25/2022    Influenza - Quadrivalent - PF *Preferred* (6 months and older) 09/25/2022    Influenza - Trivalent (ADULT) 10/20/2021    Pneumococcal Conjugate - 20 Valent 10/05/2022, 02/07/2023    Tdap 03/03/2022    Zoster Recombinant 03/03/2022, 09/25/2022      Health Maintenance   Topic Date Due    Lipid Panel  Never done    Mammogram  10/24/2023    LDCT Lung  "Screen  01/20/2024    TETANUS VACCINE  03/03/2032    Hepatitis C Screening  Completed     Health Maintenance Topics with due status: Not Due       Topic Last Completion Date    Colorectal Cancer Screening 01/16/2020    Cervical Cancer Screening 09/20/2021    Hemoglobin A1c (Diabetic Prevention Screening) 10/20/2021    TETANUS VACCINE 03/03/2022    Mammogram 10/24/2022    LDCT Lung Screen 01/20/2023     Health Maintenance Due   Topic Date Due    Lipid Panel  Never done       PHYSICAL EXAM:  Vitals:    02/07/23 1029   BP: 125/60   BP Location: Left arm   Patient Position: Sitting   BP Method: Large (Automatic)   Pulse: 61   Resp: 16   Temp: 98.6 °F (37 °C)   TempSrc: Oral   SpO2: 96%   Weight: 95.7 kg (211 lb)   Height: 5' 8" (1.727 m)     Body mass index is 32.08 kg/m².  Physical Exam  Vitals reviewed.   Constitutional:       General: She is not in acute distress.     Appearance: Normal appearance. She is normal weight. She is not ill-appearing or toxic-appearing.   HENT:      Head: Normocephalic and atraumatic.      Right Ear: External ear normal.      Left Ear: External ear normal.      Nose: Nose normal.      Mouth/Throat:      Mouth: Mucous membranes are moist.      Pharynx: Oropharynx is clear.   Eyes:      Extraocular Movements: Extraocular movements intact.      Conjunctiva/sclera: Conjunctivae normal.   Cardiovascular:      Rate and Rhythm: Normal rate and regular rhythm.      Pulses: Normal pulses.      Heart sounds: Normal heart sounds.      Comments: +DP, PT pulses bilaterally  Pulmonary:      Effort: Pulmonary effort is normal. No respiratory distress.      Breath sounds: No stridor. No wheezing, rhonchi or rales.   Abdominal:      General: Abdomen is flat. Bowel sounds are normal. There is no distension.      Palpations: Abdomen is soft.      Tenderness: There is no abdominal tenderness. There is no right CVA tenderness, left CVA tenderness or guarding.      Comments: No fluid sign   Musculoskeletal:    "      General: No swelling or tenderness. Normal range of motion.      Cervical back: Normal range of motion and neck supple. No rigidity or tenderness.      Right lower leg: No edema.      Left lower leg: No edema.   Feet:      Right foot:      Toenail Condition: Right toenails are abnormally thick, long and ingrown.      Left foot:      Toenail Condition: Left toenails are abnormally thick, long and ingrown.   Skin:     General: Skin is warm and dry.      Capillary Refill: Capillary refill takes less than 2 seconds.      Coloration: Skin is not jaundiced.      Comments: ashened   Neurological:      General: No focal deficit present.      Mental Status: She is alert and oriented to person, place, and time. Mental status is at baseline.      Motor: No weakness.      Gait: Gait normal.   Psychiatric:         Mood and Affect: Mood normal.         Behavior: Behavior normal.         Thought Content: Thought content normal.         Judgment: Judgment normal.      ASSESSMENT/PLAN:  1. Anxiety  -     VRAYLAR 3 mg Cap; Take 1 capsule (3 mg total) by mouth every morning.  Dispense: 30 capsule; Refill: 0  -     ALPRAZolam (XANAX) 0.25 MG tablet; Take 1 tablet (0.25 mg total) by mouth 2 (two) times daily as needed for Anxiety.  Dispense: 2 tablet; Refill: 2    2. Mood disorder  -     VRAYLAR 3 mg Cap; Take 1 capsule (3 mg total) by mouth every morning.  Dispense: 30 capsule; Refill: 0    3. Restless leg syndrome  -     rOPINIRole (REQUIP) 1 MG tablet; Take 1 tablet (1 mg total) by mouth every evening.  Dispense: 90 tablet; Refill: 1    4. Alcoholic cirrhosis of liver without ascites  Overview:  9/23: MRI 1.8 x 1.6 cm lesion in the right lobe of the liver suspicious for a primary liver neoplasm.    Orders:  -     ivermectin (STROMECTOL) 3 mg Tab; Take 6.5 tablets (19,500 mcg total) by mouth once daily. 6.5 tablets x 2 days; repeat dosing regimen in 2 weeks for 4 doses  Dispense: 26 tablet; Refill: 0    5. Pre-transplant  evaluation for liver transplant  -     ivermectin (STROMECTOL) 3 mg Tab; Take 6.5 tablets (19,500 mcg total) by mouth once daily. 6.5 tablets x 2 days; repeat dosing regimen in 2 weeks for 4 doses  Dispense: 26 tablet; Refill: 0    6. Strongyloidiasis  -     ivermectin (STROMECTOL) 3 mg Tab; Take 6.5 tablets (19,500 mcg total) by mouth once daily. 6.5 tablets x 2 days; repeat dosing regimen in 2 weeks for 4 doses  Dispense: 26 tablet; Refill: 0    7. Immunization due  -     (In Office Administered) Pneumococcal Conjugate Vaccine (20 Valent) (IM)  -     Cancel: (In Office Administered) Hepatitis B (Recombinant) Adjuvanted, 2 dose    8. Encounter for lipid screening for cardiovascular disease  -     Lipid Panel; Future; Expected date: 02/07/2023    9. Anxiety due to invasive procedure  -     ALPRAZolam (XANAX) 0.25 MG tablet; Take 1 tablet (0.25 mg total) by mouth 2 (two) times daily as needed for Anxiety.  Dispense: 2 tablet; Refill: 2    10. BMI 32.0-32.9,adult  Overview:  Wt Readings from Last 8 Encounters:   02/07/23 95.7 kg (211 lb)   01/25/23 99.8 kg (220 lb)   01/20/23 97.1 kg (214 lb 1.1 oz)   01/20/23 97.1 kg (214 lb 1.1 oz)   01/20/23 97.1 kg (214 lb 1.1 oz)   01/19/23 100.2 kg (221 lb)   12/26/22 100.2 kg (221 lb)   12/14/22 99.8 kg (220 lb)   Recommendations:   Stay physically active. As tolerated alternate resistance training with stretching and cardio. Goal of 150 minutes per week of moderate intensity activity or 7,500 - 10,000 steps per day. Follow the Mediterranean Diet. Include whole fresh fruits, vegetables, olive oil, seeds, nuts, whole grains, cold water fish, salmon, mackerel and lean cuts of meat.  Do not drink sugary/diet carbonated beverages. Decrease portion sizes slightly which will result in an approximately 500-calorie deficit. Avoid fast or fried and processed food, especially canned foods. Avoid refined carbohydrates, white starchy foods, flour, white potato, bread, muffins, and cakes.  Consider substituting one meal a day with a meal replacement such as Slim fast, lean cuisine, or weight watcher's. Follow a healthy diet that includes enough calcium, vitamin D and proteins for bone health.        11. Overactive bladder  -     fesoterodine (TOVIAZ) 4 mg Tb24; Take 1 tablet (4 mg total) by mouth every evening.  Dispense: 30 tablet; Refill: 2      Other than changes above, continue current medications and maintain follow up with specialists.      No follow-ups on file.   Recent Results (from the past 2016 hour(s))   Comprehensive Metabolic Panel    Collection Time: 12/13/22  9:51 AM   Result Value Ref Range    Sodium 140 136 - 145 mmol/L    Potassium 3.9 3.5 - 5.1 mmol/L    Chloride 109 95 - 110 mmol/L    CO2 26 23 - 29 mmol/L    Glucose 190 (H) 70 - 110 mg/dL    BUN 11 6 - 20 mg/dL    Creatinine 1.1 0.5 - 1.4 mg/dL    Calcium 8.8 8.7 - 10.5 mg/dL    Total Protein 7.2 6.0 - 8.4 g/dL    Albumin 2.8 (L) 3.5 - 5.2 g/dL    Total Bilirubin 2.1 (H) 0.1 - 1.0 mg/dL    Alkaline Phosphatase 210 (H) 55 - 135 U/L    AST 48 (H) 10 - 40 U/L    ALT 37 10 - 44 U/L    Anion Gap 5 (L) 8 - 16 mmol/L    eGFR 58.2 (A) >60 mL/min/1.73 m^2   Bilirubin, Direct    Collection Time: 12/13/22  9:51 AM   Result Value Ref Range    Bilirubin, Direct 0.7 (H) 0.1 - 0.3 mg/dL   Protime-INR    Collection Time: 12/13/22  9:51 AM   Result Value Ref Range    Prothrombin Time 11.5 9.0 - 12.5 sec    INR 1.1 0.8 - 1.2   CBC Auto Differential    Collection Time: 12/13/22  9:51 AM   Result Value Ref Range    WBC 6.72 3.90 - 12.70 K/uL    RBC 4.52 4.00 - 5.40 M/uL    Hemoglobin 14.3 12.0 - 16.0 g/dL    Hematocrit 41.6 37.0 - 48.5 %    MCV 92 82 - 98 fL    MCH 31.6 (H) 27.0 - 31.0 pg    MCHC 34.4 32.0 - 36.0 g/dL    RDW 14.8 (H) 11.5 - 14.5 %    Platelets 158 150 - 450 K/uL    MPV 10.3 9.2 - 12.9 fL    Immature Granulocytes 0.3 0.0 - 0.5 %    Gran # (ANC) 3.5 1.8 - 7.7 K/uL    Immature Grans (Abs) 0.02 0.00 - 0.04 K/uL    Lymph # 2.3 1.0 - 4.8  K/uL    Mono # 0.6 0.3 - 1.0 K/uL    Eos # 0.2 0.0 - 0.5 K/uL    Baso # 0.09 0.00 - 0.20 K/uL    nRBC 0 0 /100 WBC    Gran % 52.7 38.0 - 73.0 %    Lymph % 33.6 18.0 - 48.0 %    Mono % 9.4 4.0 - 15.0 %    Eosinophil % 2.7 0.0 - 8.0 %    Basophil % 1.3 0.0 - 1.9 %    Differential Method Automated    POCT urine pregnancy    Collection Time: 12/14/22 11:50 AM   Result Value Ref Range    POC Preg Test, Ur Negative Negative     Acceptable Yes    CBC auto differential    Collection Time: 12/26/22  9:51 AM   Result Value Ref Range    WBC 6.12 3.90 - 12.70 K/uL    RBC 4.62 4.00 - 5.40 M/uL    Hemoglobin 14.6 12.0 - 16.0 g/dL    Hematocrit 41.7 37.0 - 48.5 %    MCV 90 82 - 98 fL    MCH 31.6 (H) 27.0 - 31.0 pg    MCHC 35.0 32.0 - 36.0 g/dL    RDW 14.0 11.5 - 14.5 %    Platelets 175 150 - 450 K/uL    MPV 10.0 9.2 - 12.9 fL    Immature Granulocytes 0.2 0.0 - 0.5 %    Gran # (ANC) 3.7 1.8 - 7.7 K/uL    Immature Grans (Abs) 0.01 0.00 - 0.04 K/uL    Lymph # 1.6 1.0 - 4.8 K/uL    Mono # 0.6 0.3 - 1.0 K/uL    Eos # 0.1 0.0 - 0.5 K/uL    Baso # 0.06 0.00 - 0.20 K/uL    nRBC 0 0 /100 WBC    Gran % 59.7 38.0 - 73.0 %    Lymph % 26.6 18.0 - 48.0 %    Mono % 10.5 4.0 - 15.0 %    Eosinophil % 2.0 0.0 - 8.0 %    Basophil % 1.0 0.0 - 1.9 %    Differential Method Automated    Comprehensive metabolic panel    Collection Time: 12/26/22  9:51 AM   Result Value Ref Range    Sodium 138 136 - 145 mmol/L    Potassium 4.0 3.5 - 5.1 mmol/L    Chloride 103 95 - 110 mmol/L    CO2 33 (H) 23 - 29 mmol/L    Glucose 176 (H) 70 - 110 mg/dL    BUN 11 6 - 20 mg/dL    Creatinine 1.0 0.5 - 1.4 mg/dL    Calcium 8.8 8.7 - 10.5 mg/dL    Total Protein 7.4 6.0 - 8.4 g/dL    Albumin 3.2 (L) 3.5 - 5.2 g/dL    Total Bilirubin 2.4 (H) 0.1 - 1.0 mg/dL    Alkaline Phosphatase 197 (H) 55 - 135 U/L    AST 49 (H) 10 - 40 U/L    ALT 39 10 - 44 U/L    Anion Gap 2 (L) 8 - 16 mmol/L    eGFR >60.0 >60 mL/min/1.73 m^2   APTT    Collection Time: 12/26/22  9:51 AM    Result Value Ref Range    aPTT 27.4 21.0 - 32.0 sec   Protime-INR    Collection Time: 12/26/22  9:51 AM   Result Value Ref Range    Prothrombin Time 11.9 9.0 - 12.5 sec    INR 1.1 0.8 - 1.2   Ammonia    Collection Time: 12/26/22  9:51 AM   Result Value Ref Range    Ammonia 117 (H) 10 - 50 umol/L   Ethanol    Collection Time: 12/26/22  9:51 AM   Result Value Ref Range    Alcohol, Serum <3 <10 mg/dL   CBC Auto Differential    Collection Time: 12/30/22  9:19 AM   Result Value Ref Range    WBC 5.41 3.90 - 12.70 K/uL    RBC 4.79 4.00 - 5.40 M/uL    Hemoglobin 15.3 12.0 - 16.0 g/dL    Hematocrit 43.7 37.0 - 48.5 %    MCV 91 82 - 98 fL    MCH 31.9 (H) 27.0 - 31.0 pg    MCHC 35.0 32.0 - 36.0 g/dL    RDW 14.6 (H) 11.5 - 14.5 %    Platelets 157 150 - 450 K/uL    MPV 10.3 9.2 - 12.9 fL    Immature Granulocytes 0.2 0.0 - 0.5 %    Gran # (ANC) 2.0 1.8 - 7.7 K/uL    Immature Grans (Abs) 0.01 0.00 - 0.04 K/uL    Lymph # 2.5 1.0 - 4.8 K/uL    Mono # 0.6 0.3 - 1.0 K/uL    Eos # 0.3 0.0 - 0.5 K/uL    Baso # 0.09 0.00 - 0.20 K/uL    nRBC 0 0 /100 WBC    Gran % 36.0 (L) 38.0 - 73.0 %    Lymph % 45.7 18.0 - 48.0 %    Mono % 11.6 4.0 - 15.0 %    Eosinophil % 4.8 0.0 - 8.0 %    Basophil % 1.7 0.0 - 1.9 %    Differential Method Automated    Comprehensive Metabolic Panel    Collection Time: 12/30/22  9:19 AM   Result Value Ref Range    Sodium 139 136 - 145 mmol/L    Potassium 3.4 (L) 3.5 - 5.1 mmol/L    Chloride 104 95 - 110 mmol/L    CO2 31 (H) 23 - 29 mmol/L    Glucose 126 (H) 70 - 110 mg/dL    BUN 11 6 - 20 mg/dL    Creatinine 1.0 0.5 - 1.4 mg/dL    Calcium 8.9 8.7 - 10.5 mg/dL    Total Protein 7.3 6.0 - 8.4 g/dL    Albumin 3.3 (L) 3.5 - 5.2 g/dL    Total Bilirubin 3.1 (H) 0.1 - 1.0 mg/dL    Alkaline Phosphatase 169 (H) 55 - 135 U/L    AST 43 (H) 10 - 40 U/L    ALT 36 10 - 44 U/L    Anion Gap 4 (L) 8 - 16 mmol/L    eGFR >60.0 >60 mL/min/1.73 m^2   Protime-INR    Collection Time: 12/30/22  9:19 AM   Result Value Ref Range    Prothrombin  Time 12.9 (H) 9.0 - 12.5 sec    INR 1.2 0.8 - 1.2   AFP Tumor Marker    Collection Time: 12/30/22  9:19 AM   Result Value Ref Range    AFP 2.6 0.0 - 8.4 ng/mL   Research Lab    Collection Time: 01/19/23  8:16 AM   Result Value Ref Range    Research Lab No result necessary    Iron and TIBC    Collection Time: 01/19/23  8:22 AM   Result Value Ref Range    Iron 155 30 - 160 ug/dL    Transferrin 199 (L) 200 - 375 mg/dL    TIBC 295 250 - 450 ug/dL    Saturated Iron 53 (H) 20 - 50 %   Ferritin    Collection Time: 01/19/23  8:22 AM   Result Value Ref Range    Ferritin 89 20.0 - 300.0 ng/mL   Alpha-1-Antitrypsin    Collection Time: 01/19/23  8:22 AM   Result Value Ref Range    A-1 Antitrypsin 156 100 - 190 mg/dL   Varicella Zoster Antibody, IgG    Collection Time: 01/19/23  8:22 AM   Result Value Ref Range    Varicella Zoster IgG >4000.00 AU/ml    Varicella Interpretation Positive    Quantiferon Gold TB    Collection Time: 01/19/23  8:22 AM   Result Value Ref Range    NIL 0.13298 IU/mL    TB1 - Nil -0.003 IU/mL    TB2 - Nil -0.018 IU/mL    Mitogen - Nil 9.954 IU/mL    TB Gold Plus Negative Negative   Ceruloplasmin    Collection Time: 01/19/23  8:22 AM   Result Value Ref Range    Ceruloplasmin 20.0 15.0 - 45.0 mg/dL   Comprehensive Metabolic Panel    Collection Time: 01/19/23  8:22 AM   Result Value Ref Range    Sodium 138 136 - 145 mmol/L    Potassium 3.5 3.5 - 5.1 mmol/L    Chloride 108 95 - 110 mmol/L    CO2 25 23 - 29 mmol/L    Glucose 113 (H) 70 - 110 mg/dL    BUN 7 6 - 20 mg/dL    Creatinine 0.9 0.5 - 1.4 mg/dL    Calcium 9.5 8.7 - 10.5 mg/dL    Total Protein 6.8 6.0 - 8.4 g/dL    Albumin 3.3 (L) 3.5 - 5.2 g/dL    Total Bilirubin 3.2 (H) 0.1 - 1.0 mg/dL    Alkaline Phosphatase 258 (H) 55 - 135 U/L    AST 70 (H) 10 - 40 U/L    ALT 40 10 - 44 U/L    Anion Gap 5 (L) 8 - 16 mmol/L    eGFR >60.0 >60 mL/min/1.73 m^2   CBC Auto Differential    Collection Time: 01/19/23  8:22 AM   Result Value Ref Range    WBC 4.45 3.90 -  12.70 K/uL    RBC 4.56 4.00 - 5.40 M/uL    Hemoglobin 14.2 12.0 - 16.0 g/dL    Hematocrit 43.0 37.0 - 48.5 %    MCV 94 82 - 98 fL    MCH 31.1 (H) 27.0 - 31.0 pg    MCHC 33.0 32.0 - 36.0 g/dL    RDW 13.6 11.5 - 14.5 %    Platelets 117 (L) 150 - 450 K/uL    MPV 11.2 9.2 - 12.9 fL    Immature Granulocytes 0.2 0.0 - 0.5 %    Gran # (ANC) 2.7 1.8 - 7.7 K/uL    Immature Grans (Abs) 0.01 0.00 - 0.04 K/uL    Lymph # 1.2 1.0 - 4.8 K/uL    Mono # 0.4 0.3 - 1.0 K/uL    Eos # 0.1 0.0 - 0.5 K/uL    Baso # 0.04 0.00 - 0.20 K/uL    nRBC 0 0 /100 WBC    Gran % 60.2 38.0 - 73.0 %    Lymph % 26.1 18.0 - 48.0 %    Mono % 9.9 4.0 - 15.0 %    Eosinophil % 2.7 0.0 - 8.0 %    Basophil % 0.9 0.0 - 1.9 %    Differential Method Automated    Protime-INR    Collection Time: 01/19/23  8:22 AM   Result Value Ref Range    Prothrombin Time 14.3 (H) 9.0 - 12.5 sec    INR 1.4 (H) 0.8 - 1.2   Bilirubin, Direct    Collection Time: 01/19/23  8:22 AM   Result Value Ref Range    Bilirubin, Direct 1.2 (H) 0.1 - 0.3 mg/dL   Hepatitis B Surface Ab, Qualitative    Collection Time: 01/19/23  8:22 AM   Result Value Ref Range    Hep B S Ab 28.65 mIU/mL    Hep B S Ab Reactive    Gamma GT    Collection Time: 01/19/23  8:22 AM   Result Value Ref Range    GGT 19 8 - 55 U/L   TSH    Collection Time: 01/19/23  8:22 AM   Result Value Ref Range    TSH 2.308 0.400 - 4.000 uIU/mL   Vitamin D    Collection Time: 01/19/23  8:22 AM   Result Value Ref Range    Vit D, 25-Hydroxy 26 (L) 30 - 96 ng/mL   Vitamin A    Collection Time: 01/19/23  8:22 AM   Result Value Ref Range    Retinol, serum <13 (L) 38 - 106 ug/dL   Zinc    Collection Time: 01/19/23  8:22 AM   Result Value Ref Range    Zinc 41 (L) 60 - 130 ug/dL   Hepatitis A antibody, IgG    Collection Time: 01/19/23  8:22 AM   Result Value Ref Range    Hepatitis A Antibody IgG Non-reactive    Hepatitis B core antibody, total    Collection Time: 01/19/23  8:22 AM   Result Value Ref Range    Hep B Core Total Ab Reactive (A)  Non-reactive   Hepatitis B surface antigen    Collection Time: 01/19/23  8:22 AM   Result Value Ref Range    Hepatitis B Surface Ag Non-reactive Non-reactive   HIV-1 and HIV-2 antibodies    Collection Time: 01/19/23  8:22 AM   Result Value Ref Range    HIV 1/2 Ag/Ab Non-reactive Non-reactive   Hepatitis C antibody    Collection Time: 01/19/23  8:22 AM   Result Value Ref Range    Hepatitis C Ab Reactive (A) Non-reactive   Cytomegalovirus antibody, IgG    Collection Time: 01/19/23  8:22 AM   Result Value Ref Range    CMV IgG Interpretation Reactive (A) Non-Reactive   RPR    Collection Time: 01/19/23  8:22 AM   Result Value Ref Range    RPR Non-reactive Non-reactive   Type & Screen    Collection Time: 01/19/23  8:22 AM   Result Value Ref Range    Group & Rh O POS     Indirect Aleena NEG    Strongyloides IgG Antibodies    Collection Time: 01/19/23  8:22 AM   Result Value Ref Range    Strongyloides Ab IgG Positive (A) Negative   Phosphatidylethanol (PETH)    Collection Time: 01/19/23  8:22 AM   Result Value Ref Range    PEth 16:0/18.1 (POPEth) <10 Cutoff: 10 ng/mL    PEth 16:0/18.2 (PLPEth) <10 Cutoff: 10 ng/mL    PETH INTERPRETATION Negative.    Stress Echo Which stress agent will be used? Pharmacological; Color Flow Doppler? Yes    Collection Time: 01/19/23 10:14 AM   Result Value Ref Range    Ascending aorta 3.12 cm    STJ 3.18 cm    AV mean gradient 10 mmHg    Ao peak tariq 2.07 m/s    Ao VTI 50.35 cm    IVS 0.87 0.6 - 1.1 cm    LA size 3.76 cm    Left Atrium Major Axis 4.83 cm    Left Atrium Minor Axis 4.62 cm    LVIDd 5.64 3.5 - 6.0 cm    LVIDs 3.50 2.1 - 4.0 cm    LVOT diameter 2.30 cm    LVOT peak VTI 36.38 cm    Posterior Wall 0.94 0.6 - 1.1 cm    MV Peak A Tariq 0.87 m/s    E wave deceleration time 275.63 msec    MV Peak E Tariq 0.99 m/s    PV Peak D Tariq 0.51 m/s    PV Peak S Tariq 0.66 m/s    RA Major Axis 4.95 cm    RA Width 3.32 cm    RVDD 3.44 cm    Sinus 2.85 cm    TAPSE 2.67 cm    TR Max Tariq 2.66 m/s    TDI  "LATERAL 0.12 m/s    TDI SEPTAL 0.08 m/s    LA WIDTH 3.56 cm    MV stenosis pressure 1/2 time 79.93 ms    LV Diastolic Volume 156.42 mL    LV Systolic Volume 50.76 mL    RV S' 17.24 cm/s    LVOT peak angelique 1.77 m/s    LA volume (mod) 48.38 cm3    MV "A" wave duration 14.08 msec    LV LATERAL E/E' RATIO 8.25 m/s    LV SEPTAL E/E' RATIO 12.38 m/s    FS 38 %    LA volume 53.73 cm3    LV mass 195.36 g    Left Ventricle Relative Wall Thickness 0.33 cm    AV valve area 3.00 cm2    AV Velocity Ratio 0.86     AV index (prosthetic) 0.72     MV valve area p 1/2 method 2.75 cm2    E/A ratio 1.14     Mean e' 0.10 m/s    Pulm vein S/D ratio 1.29     LVOT area 4.2 cm2    LVOT stroke volume 151.07 cm3    AV peak gradient 17 mmHg    E/E' ratio 9.90 m/s    Triscuspid Valve Regurgitation Peak Gradient 28 mmHg    BSA 2.19 m2    Systolic blood pressure 101 mmHg    Diastolic blood pressure 48 mmHg    HR at rest 56 bpm    RPP 5,656     Peak  bpm    Peak Systolic  mmHg    Peak Diatolic BP 70 mmHg    Peak RPP 31,161     Max Predicted      85% Max Predicted      % Max HR Achieved 91     1 Minute Recovery  bpm    [facility] CV CPX PATIENT IS MALE 0     [facility] CV CPX PATIENT IS FEMALE 1     LV Systolic Volume Index 23.8 mL/m2    LV Diastolic Volume Index 73.44 mL/m2    LA Volume Index 25.2 mL/m2    LV Mass Index 92 g/m2    LA Volume Index (Mod) 22.7 mL/m2    Right Atrial Pressure (from IVC) 3 mmHg    EF 65 %    TV rest pulmonary artery pressure 31 mmHg   Type & Screen    Collection Time: 01/20/23  8:00 AM   Result Value Ref Range    Group & Rh O POS     Indirect Aleena NEG    Urinalysis    Collection Time: 01/20/23 10:30 AM   Result Value Ref Range    Specimen UA Urine, Clean Catch     Color, UA Yellow Yellow, Straw, Joycelyn    Appearance, UA Hazy (A) Clear    pH, UA 7.0 5.0 - 8.0    Specific Gravity, UA 1.025 1.005 - 1.030    Protein, UA 1+ (A) Negative    Glucose, UA Negative Negative    Ketones, UA Trace (A) Negative    " Bilirubin (UA) Negative Negative    Occult Blood UA Trace (A) Negative    Nitrite, UA Positive (A) Negative    Leukocytes, UA 1+ (A) Negative   Toxicology Screen, Urine    Collection Time: 01/20/23 10:30 AM   Result Value Ref Range    Alcohol, Urine <10 <10 mg/dL    Benzodiazepines Negative Negative    Methadone metabolites Negative Negative    Cocaine (Metab.) Negative Negative    Opiate Scrn, Ur Negative Negative    Barbiturate Screen, Ur Negative Negative    Amphetamine Screen, Ur Negative Negative    THC Presumptive Positive (A) Negative    Phencyclidine Negative Negative    Creatinine, Urine 283.0 15.0 - 325.0 mg/dL    Toxicology Information SEE COMMENT    Urinalysis Microscopic    Collection Time: 01/20/23 10:30 AM   Result Value Ref Range    RBC, UA 0 0 - 4 /hpf    WBC, UA 27 (H) 0 - 5 /hpf    Bacteria Many (A) None-Occ /hpf    Yeast, UA Occasional (A) None    Squam Epithel, UA 54 /hpf    Hyaline Casts, UA 0 0-1/lpf /lpf    Ca Oxalate Sanaz, UA Few None-Moderate    Microscopic Comment SEE COMMENT    HEPATITIS B VIRAL DNA, QUANTITATIVE    Collection Time: 01/20/23  1:03 PM   Result Value Ref Range    Hep B Viral DNA IU/ML <10 <10 IU/mL    Log HBV IU/mL <1.00 <1.00 Log (10) IU/mL    Hepatitis B Virus DNA Not detected Not detected   Hepatitis C Genotype    Collection Time: 01/20/23  1:03 PM   Result Value Ref Range    HCV Qualitative Result Not detected Not detected    HCV Quantitative Result <12 <12 IU/mL    HCV Quantitative Log <1.08 <1.08 Log (10) IU/mL    Hepatitis C Virus Genotype Canceled    Hepatitis C RNA, Quantitative, PCR    Collection Time: 01/20/23  1:03 PM   Result Value Ref Range    HCV Quantitative Result Not Detected <12 IU/mL    HCV, Qualitative Not Detected Not Detected   Creatinine Clearance, Timed    Collection Time: 01/23/23  8:40 AM   Result Value Ref Range    Urine Volume 950 mL    Urine Collection Duration 24 Hr    Creatinine, Urine 134.0 15.0 - 325.0 mg/dL    Creatinine Clearance 88 70 -  110 mL/min    Creatinine, Urinr (mg/spec) 1273.0 mg/Spec    Creatinine 1.0 0.5 - 1.4 mg/dL   Protein, urine, timed    Collection Time: 01/23/23  8:40 AM   Result Value Ref Range    Urine Volume 950 mL    Urine Collection Duration 24 Hr    Protein, Urine 11.5 0.0 - 15.0 mg/dL    Urine Protein, Timed 109 (H) 0 - 100 mg/Spec   CBC Auto Differential    Collection Time: 01/23/23  3:36 PM   Result Value Ref Range    WBC 6.18 3.90 - 12.70 K/uL    RBC 4.45 4.00 - 5.40 M/uL    Hemoglobin 14.0 12.0 - 16.0 g/dL    Hematocrit 40.6 37.0 - 48.5 %    MCV 91 82 - 98 fL    MCH 31.5 (H) 27.0 - 31.0 pg    MCHC 34.5 32.0 - 36.0 g/dL    RDW 13.5 11.5 - 14.5 %    Platelets 118 (L) 150 - 450 K/uL    MPV 10.6 9.2 - 12.9 fL    Immature Granulocytes 0.2 0.0 - 0.5 %    Gran # (ANC) 3.1 1.8 - 7.7 K/uL    Immature Grans (Abs) 0.01 0.00 - 0.04 K/uL    Lymph # 2.2 1.0 - 4.8 K/uL    Mono # 0.7 0.3 - 1.0 K/uL    Eos # 0.2 0.0 - 0.5 K/uL    Baso # 0.06 0.00 - 0.20 K/uL    nRBC 0 0 /100 WBC    Gran % 49.5 38.0 - 73.0 %    Lymph % 35.8 18.0 - 48.0 %    Mono % 10.7 4.0 - 15.0 %    Eosinophil % 2.8 0.0 - 8.0 %    Basophil % 1.0 0.0 - 1.9 %    Differential Method Automated    Protime-INR    Collection Time: 01/23/23  3:36 PM   Result Value Ref Range    Prothrombin Time 15.3 (H) 9.0 - 12.5 sec    INR 1.5 (H) 0.8 - 1.2   Comprehensive Metabolic Panel    Collection Time: 01/23/23  3:36 PM   Result Value Ref Range    Sodium 140 136 - 145 mmol/L    Potassium 4.0 3.5 - 5.1 mmol/L    Chloride 109 95 - 110 mmol/L    CO2 28 23 - 29 mmol/L    Glucose 122 (H) 70 - 110 mg/dL    BUN 9 6 - 20 mg/dL    Creatinine 1.1 0.5 - 1.4 mg/dL    Calcium 9.0 8.7 - 10.5 mg/dL    Total Protein 6.6 6.0 - 8.4 g/dL    Albumin 3.0 (L) 3.5 - 5.2 g/dL    Total Bilirubin 2.9 (H) 0.1 - 1.0 mg/dL    Alkaline Phosphatase 242 (H) 55 - 135 U/L    AST 76 (H) 10 - 40 U/L    ALT 46 (H) 10 - 44 U/L    Anion Gap 3 (L) 8 - 16 mmol/L    eGFR 58.2 (A) >60 mL/min/1.73 m^2   POCT urine pregnancy     Collection Time: 01/25/23 10:44 AM   Result Value Ref Range    POC Preg Test, Ur Negative Negative     Acceptable Yes          DO [facility]

## 2023-02-08 ENCOUNTER — TELEPHONE (OUTPATIENT)
Dept: TRANSPLANT | Facility: CLINIC | Age: 58
End: 2023-02-08
Payer: MEDICAID

## 2023-02-08 DIAGNOSIS — K70.30 ALCOHOLIC CIRRHOSIS OF LIVER WITHOUT ASCITES: Primary | ICD-10-CM

## 2023-02-08 DIAGNOSIS — Z01.818 PRE-TRANSPLANT EVALUATION FOR LIVER TRANSPLANT: ICD-10-CM

## 2023-02-08 DIAGNOSIS — R82.5 POSITIVE URINE DRUG SCREEN: ICD-10-CM

## 2023-02-08 DIAGNOSIS — C22.0 HEPATOCELLULAR CARCINOMA: ICD-10-CM

## 2023-02-08 NOTE — TELEPHONE ENCOUNTER
Call returned. Patient advised we have not received any inquiries regarding the Ivermectin. Advised this medication was prescribed by [facility] and any pharmacy inquiries would have probably been routed to them. Understanding expressed. Patient says she will follow-up with [facility].    ----- Message from Mayelin Slaughter sent at 2/8/2023  4:44 PM CST -----  Regarding: Patient advice  Patient is calling regarding Rx ivermectin (STROMECTOL) 3 mg Tab. Patient states pharmacy is waiting for a response. Please advise. Requesting a call back.      Call: [phone]      [facility] [address] [place] [address]  Phone: [phone] Fax: [phone]

## 2023-02-09 ENCOUNTER — TELEPHONE (OUTPATIENT)
Dept: INFECTIOUS DISEASES | Facility: CLINIC | Age: 58
End: 2023-02-09
Payer: MEDICAID

## 2023-02-09 NOTE — TELEPHONE ENCOUNTER
----- Message from Emily Cardozo LPN sent at 2/9/2023 11:19 AM CST -----  Contact: [phone]  Garrett Mohamud  ----- Message -----  From: Candida Munoz MA  Sent: 2/9/2023  10:51 AM CST  To: , #    Pt is calling in regards to receiving her ivermectin (STROMECTOL) 3 mg Tab for exposure to parasites. Please reach out to pt at [phone].       [facility]  Same-Day Surgery   Adult Discharge Orders & Instructions     For 24 hours after surgery    1. Get plenty of rest.  A responsible adult must stay with you for at least 24 hours after you leave the hospital.   2. Do not drive or use heavy equipment.  If you have weakness or tingling, don't drive or use heavy equipment until this feeling goes away.  3. Do not drink alcohol.  4. Avoid strenuous or risky activities.  Ask for help when climbing stairs.   5. You may feel lightheaded.  IF so, sit for a few minutes before standing.  Have someone help you get up.   6. If you have nausea (feel sick to your stomach): Drink only clear liquids such as apple juice, ginger ale, broth or 7-Up.  Rest may also help.  Be sure to drink enough fluids.  Move to a regular diet as you feel able.  7. You may have a slight fever. Call the doctor if your fever is over 100 F (37.7 C) (taken under the tongue) or lasts longer than 24 hours.  8. You may have a dry mouth, a sore throat, muscle aches or trouble sleeping.  These should go away after 24 hours.  9. Do not make important or legal decisions.   Call your doctor for any of the followin.  Signs of infection (fever, growing tenderness at the surgery site, a large amount of drainage or bleeding, severe pain, foul-smelling drainage, redness, swelling).    2. It has been over 8 to 10 hours since surgery and you are still not able to urinate (pass water).    3.  Headache for over 24 hours.      To contact a doctor, call Dr. Guru Navarro @ [phone] or:    x   [phone] and ask for the resident on call for   Gastroenterology (answered 24 hours a day)  x   Emergency Department:    [facility]: [phone]      Dr. Guru Navarro's instructions:    Levaquin 500 mg once daily for 7 days  - CT scan in 10 days  - Further intervention depending on symptoms and response to today's therapy

## 2023-02-11 LAB
CLINICAL BIOCHEMIST REVIEW: NORMAL
PLPETH BLD-MCNC: <10 NG/ML
POPETH BLD-MCNC: <10 NG/ML

## 2023-02-17 DIAGNOSIS — K74.60 HEPATIC CIRRHOSIS, UNSPECIFIED HEPATIC CIRRHOSIS TYPE, UNSPECIFIED WHETHER ASCITES PRESENT: Primary | ICD-10-CM

## 2023-02-20 ENCOUNTER — TELEPHONE (OUTPATIENT)
Dept: INFECTIOUS DISEASES | Facility: CLINIC | Age: 58
End: 2023-02-20
Payer: MEDICAID

## 2023-02-20 RX ORDER — SPIRONOLACTONE 50 MG/1
TABLET, FILM COATED ORAL
Qty: 90 TABLET | Refills: 0 | Status: SHIPPED | OUTPATIENT
Start: 2023-02-20 | End: 2023-05-17

## 2023-02-20 NOTE — TELEPHONE ENCOUNTER
Spoke with pt and explained that a prior authorization had to be completed and submitted to medicaid so they can decide if it is covered. Pt understood and is awaiting the decision.

## 2023-02-20 NOTE — TELEPHONE ENCOUNTER
----- Message from Josseline Carrillo sent at 2/20/2023  1:26 PM CST -----  Contact: Pt  Pt is requesting a callback from provider staff in regards to a medication that she is suppose to be taking for parasites  but never received it from the pharmacy. Please adv pt     Medication:ivermectin (STROMECTOL) 3 mg Tab        [facility] [address] [place] [address]  Phone: [phone] Fax: [phone]      Confirmed contact below:   Contact Name:Shonda Borrego  Phone Number: [phone]

## 2023-02-22 ENCOUNTER — TELEPHONE (OUTPATIENT)
Dept: PRIMARY CARE CLINIC | Facility: CLINIC | Age: 58
End: 2023-02-22
Payer: MEDICAID

## 2023-02-22 NOTE — TELEPHONE ENCOUNTER
Patient called office for same day appt stating she has very bad pain in her feet at the bottom and they are visibly red. Patient rated her pain at a 10 on the pain scale. Patient was advised to go to Emergency Dept. Patient verbally acknowledged and states that she is going to the ED at this time.

## 2023-02-23 ENCOUNTER — OFFICE VISIT (OUTPATIENT)
Dept: PRIMARY CARE CLINIC | Facility: CLINIC | Age: 58
End: 2023-02-23
Payer: MEDICAID

## 2023-02-23 VITALS
WEIGHT: 207 LBS | TEMPERATURE: 98 F | DIASTOLIC BLOOD PRESSURE: 86 MMHG | BODY MASS INDEX: 31.37 KG/M2 | SYSTOLIC BLOOD PRESSURE: 139 MMHG | HEIGHT: 68 IN | HEART RATE: 76 BPM | RESPIRATION RATE: 15 BRPM | OXYGEN SATURATION: 97 %

## 2023-02-23 DIAGNOSIS — M72.2 PLANTAR FASCIITIS OF RIGHT FOOT: Primary | ICD-10-CM

## 2023-02-23 DIAGNOSIS — F17.218 CIGARETTE NICOTINE DEPENDENCE WITH OTHER NICOTINE-INDUCED DISORDER: ICD-10-CM

## 2023-02-23 PROCEDURE — 99213 PR OFFICE/OUTPT VISIT, EST, LEVL III, 20-29 MIN: ICD-10-PCS | Mod: S$PBB,,, | Performed by: STUDENT IN AN ORGANIZED HEALTH CARE EDUCATION/TRAINING PROGRAM

## 2023-02-23 PROCEDURE — 99999 PR PBB SHADOW E&M-EST. PATIENT-LVL IV: ICD-10-PCS | Mod: PBBFAC,,, | Performed by: STUDENT IN AN ORGANIZED HEALTH CARE EDUCATION/TRAINING PROGRAM

## 2023-02-23 PROCEDURE — 99213 OFFICE O/P EST LOW 20 MIN: CPT | Mod: S$PBB,,, | Performed by: STUDENT IN AN ORGANIZED HEALTH CARE EDUCATION/TRAINING PROGRAM

## 2023-02-23 PROCEDURE — 99214 OFFICE O/P EST MOD 30 MIN: CPT | Mod: PBBFAC,PN | Performed by: STUDENT IN AN ORGANIZED HEALTH CARE EDUCATION/TRAINING PROGRAM

## 2023-02-23 PROCEDURE — 99999 PR PBB SHADOW E&M-EST. PATIENT-LVL IV: CPT | Mod: PBBFAC,,, | Performed by: STUDENT IN AN ORGANIZED HEALTH CARE EDUCATION/TRAINING PROGRAM

## 2023-02-23 RX ORDER — CLONAZEPAM 0.5 MG/1
0.5 TABLET ORAL DAILY PRN
Status: ON HOLD | COMMUNITY
Start: 2023-02-13 | End: 2023-04-18

## 2023-02-23 RX ORDER — PREDNISONE 20 MG/1
20 TABLET ORAL DAILY
Qty: 7 TABLET | Refills: 0 | Status: SHIPPED | OUTPATIENT
Start: 2023-02-23 | End: 2023-03-02

## 2023-02-23 RX ORDER — PREDNISONE 20 MG/1
20 TABLET ORAL DAILY
Qty: 7 TABLET | Refills: 0 | Status: SHIPPED | OUTPATIENT
Start: 2023-02-23 | End: 2023-02-23

## 2023-02-23 RX ORDER — MIRTAZAPINE 7.5 MG/1
7.5 TABLET, FILM COATED ORAL NIGHTLY
COMMUNITY
Start: 2023-02-13 | End: 2023-05-26 | Stop reason: SDUPTHER

## 2023-02-23 NOTE — ASSESSMENT & PLAN NOTE
3-4 cigarettes per day smoker. Encouraged continued smoking cessation efforts. Not consistently using gum or lozenges.

## 2023-02-23 NOTE — ASSESSMENT & PLAN NOTE
No joint laxity. Focal pain localized to insertion site of medial calcaneus. Patient wearing flat feet slippers. Counseled on wearing firm shoes with arch support.   - start prednisone 20 mg x 7 days  - daily ice massage with iced water bottle and rolling over plantar surface  - stretch as tolerated  - wear night time splints  - f/u 2-4 weeks

## 2023-02-23 NOTE — PROGRESS NOTES
[facility] Note    HPI:  Shonda Borrego is a 57 y.o. female who presents today for Foot Pain (Foot pain for a week )    ROS   A review of systems was performed and was negative except as noted above.    I personally reviewed allergies, past medical, surgical, social and family history and updated as appropriate.    Medications:    Current Outpatient Medications:     albuterol (PROVENTIL/VENTOLIN HFA) 90 mcg/actuation inhaler, Inhale into the lungs., Disp: , Rfl:     clonazePAM (KLONOPIN) 0.5 MG tablet, Take 0.5 mg by mouth daily as needed., Disp: , Rfl:     CONSTULOSE 10 gram/15 mL solution, Take 30 mLs by mouth 3 (three) times daily., Disp: , Rfl:     docusate sodium (COLACE) 100 MG capsule, Take 1 capsule (100 mg total) by mouth 2 (two) times daily., Disp: 30 capsule, Rfl: 0    fesoterodine (TOVIAZ) 4 mg Tb24, Take 1 tablet (4 mg total) by mouth every evening., Disp: 30 tablet, Rfl: 2    mirtazapine (REMERON) 7.5 MG Tab, Take 7.5 mg by mouth every evening., Disp: , Rfl:     ondansetron (ZOFRAN) 4 MG tablet, Take 1 tablet (4 mg total) by mouth 2 (two) times daily., Disp: 30 tablet, Rfl: 0    pantoprazole (PROTONIX) 20 MG tablet, Take 1 tablet (20 mg total) by mouth once daily. (Patient taking differently: Take 20 mg by mouth every evening.), Disp: 30 tablet, Rfl: 3    rOPINIRole (REQUIP) 1 MG tablet, Take 1 tablet (1 mg total) by mouth every evening., Disp: 90 tablet, Rfl: 1    spironolactone (ALDACTONE) 50 MG tablet, Take 1 tablet by mouth once daily, Disp: 90 tablet, Rfl: 0    tiotropium-olodateroL (STIOLTO RESPIMAT) 2.5-2.5 mcg/actuation Mist, 2 puffs, Disp: , Rfl:     ursodioL (ACTIGALL) 300 mg capsule, Take 1 capsule (300 mg total) by mouth 3 (three) times daily., Disp: 90 capsule, Rfl: 3    VRAYLAR 3 mg Cap, Take 1 capsule (3 mg total) by mouth every morning., Disp: 30 capsule, Rfl: 0    ALPRAZolam (XANAX) 0.25 MG tablet, Take 1 tablet (0.25 mg total) by mouth 2 (two) times daily as needed  for Anxiety., Disp: 2 tablet, Rfl: 2    nicotine (NICODERM CQ) 14 mg/24 hr, Place 1 patch onto the skin once daily. (Patient not taking: Reported on 2/23/2023), Disp: 28 patch, Rfl: 0    nicotine polacrilex 2 MG Lozg, Take 1 lozenge (2 mg total) by mouth as needed (Take 1 lozenge (2 mg total) by mouth as needed (1-2 per hour in place of cigarette (5-16 daily max)- oral). (Patient not taking: Reported on 2/23/2023), Disp: 144 lozenge, Rfl: 0    predniSONE (DELTASONE) 20 MG tablet, Take 1 tablet (20 mg total) by mouth once daily. Take one table with food and water. for 7 days, Disp: 7 tablet, Rfl: 0     Health Maintenance:  Immunization History   Administered Date(s) Administered    COVID-19 MRNA, LN-S PF (MODERNA HALF 0.25 ML DOSE) 12/09/2021    COVID-19, MRNA, LN-S, PF (MODERNA FULL 0.5 ML DOSE) 05/12/2021    COVID-19, MRNA, LN-S, PF (Pfizer) (Gray Cap) 06/03/2022    COVID-19, MRNA, LN-S, PF (Pfizer) (Purple Cap) 06/09/2021, 09/25/2022    COVID-19, mRNA, LNP-S, bivalent booster, PF (Moderna Omicron) 09/25/2022    Influenza - Quadrivalent - PF *Preferred* (6 months and older) 09/25/2022    Influenza - Trivalent (ADULT) 10/20/2021    Pneumococcal Conjugate - 20 Valent 10/05/2022, 02/07/2023    Tdap 03/03/2022    Zoster Recombinant 03/03/2022, 09/25/2022      Health Maintenance   Topic Date Due    Lipid Panel  Never done    Mammogram  10/24/2023    LDCT Lung Screen  01/20/2024    TETANUS VACCINE  03/03/2032    Hepatitis C Screening  Completed     Health Maintenance Topics with due status: Not Due       Topic Last Completion Date    Colorectal Cancer Screening 01/16/2020    Cervical Cancer Screening 09/20/2021    Hemoglobin A1c (Diabetic Prevention Screening) 10/20/2021    TETANUS VACCINE 03/03/2022    Mammogram 10/24/2022    LDCT Lung Screen 01/20/2023     Health Maintenance Due   Topic Date Due    Lipid Panel  Never done       PHYSICAL EXAM:  Vitals:    02/23/23 1602   BP: 139/86   BP Location: Right arm   Patient  "Position: Sitting   BP Method: Large (Automatic)   Pulse: 76   Resp: 15   Temp: 98.4 °F (36.9 °C)   TempSrc: Oral   SpO2: 97%   Weight: 93.9 kg (207 lb)   Height: 5' 8" (1.727 m)     Body mass index is 31.47 kg/m².  Physical Exam  Constitutional:       General: She is not in acute distress.     Appearance: Normal appearance. She is not ill-appearing or toxic-appearing.   Abdominal:      Tenderness: There is no right CVA tenderness or left CVA tenderness.   Musculoskeletal:         General: Tenderness (rigth plantar medial calcaneus) present. No swelling or deformity. Normal range of motion.      Right lower leg: No edema.      Left lower leg: No edema.   Skin:     General: Skin is warm and dry.      Capillary Refill: Capillary refill takes less than 2 seconds.   Neurological:      General: No focal deficit present.      Mental Status: She is alert and oriented to person, place, and time. Mental status is at baseline.      Motor: No weakness.      Gait: Gait normal.   Psychiatric:         Mood and Affect: Mood normal.         Behavior: Behavior normal.         Thought Content: Thought content normal.         Judgment: Judgment normal.      ASSESSMENT/PLAN:  1. Plantar fasciitis of right foot  Assessment & Plan:  No joint laxity. Focal pain localized to insertion site of medial calcaneus. Patient wearing flat feet slippers. Counseled on wearing firm shoes with arch support.   - start prednisone 20 mg x 7 days  - daily ice massage with iced water bottle and rolling over plantar surface  - stretch as tolerated  - wear night time splints  - f/u 2-4 weeks    Orders:  -     GEL ANKLE SPLINT FOR HOME USE  -     Discontinue: predniSONE (DELTASONE) 20 MG tablet; Take 1 tablet (20 mg total) by mouth once daily. for 7 days  Dispense: 7 tablet; Refill: 0  -     predniSONE (DELTASONE) 20 MG tablet; Take 1 tablet (20 mg total) by mouth once daily. Take one table with food and water. for 7 days  Dispense: 7 tablet; Refill: 0    2. " BMI 31.0-31.9,adult  Overview:  Wt Readings from Last 8 Encounters:   02/23/23 93.9 kg (207 lb)   02/07/23 95.7 kg (211 lb)   01/25/23 99.8 kg (220 lb)   01/20/23 97.1 kg (214 lb 1.1 oz)   01/20/23 97.1 kg (214 lb 1.1 oz)   01/20/23 97.1 kg (214 lb 1.1 oz)   01/19/23 100.2 kg (221 lb)   12/26/22 100.2 kg (221 lb)   Recommendations:   Stay physically active. As tolerated alternate resistance training with stretching and cardio. Goal of 150 minutes per week of moderate intensity activity or 7,500 - 10,000 steps per day. Follow the Mediterranean Diet. Include whole fresh fruits, vegetables, olive oil, seeds, nuts, whole grains, cold water fish, salmon, mackerel and lean cuts of meat.  Do not drink sugary/diet carbonated beverages. Decrease portion sizes slightly which will result in an approximately 500-calorie deficit. Avoid fast or fried and processed food, especially canned foods. Avoid refined carbohydrates, white starchy foods, flour, white potato, bread, muffins, and cakes. Consider substituting one meal a day with a meal replacement such as Slim fast, lean cuisine, or weight watcher's. Follow a healthy diet that includes enough calcium, vitamin D and proteins for bone health.      Assessment & Plan:  Continue with above recommendations.       3. Cigarette nicotine dependence with other nicotine-induced disorder  Assessment & Plan:  3-4 cigarettes per day smoker. Encouraged continued smoking cessation efforts. Not consistently using gum or lozenges.           Other than changes above, continue current medications and maintain follow up with specialists.      Follow up in about 4 weeks (around 3/23/2023) for Med recheck.   Recent Results (from the past 2016 hour(s))   Comprehensive Metabolic Panel    Collection Time: 12/13/22  9:51 AM   Result Value Ref Range    Sodium 140 136 - 145 mmol/L    Potassium 3.9 3.5 - 5.1 mmol/L    Chloride 109 95 - 110 mmol/L    CO2 26 23 - 29 mmol/L    Glucose 190 (H) 70 - 110 mg/dL     BUN 11 6 - 20 mg/dL    Creatinine 1.1 0.5 - 1.4 mg/dL    Calcium 8.8 8.7 - 10.5 mg/dL    Total Protein 7.2 6.0 - 8.4 g/dL    Albumin 2.8 (L) 3.5 - 5.2 g/dL    Total Bilirubin 2.1 (H) 0.1 - 1.0 mg/dL    Alkaline Phosphatase 210 (H) 55 - 135 U/L    AST 48 (H) 10 - 40 U/L    ALT 37 10 - 44 U/L    Anion Gap 5 (L) 8 - 16 mmol/L    eGFR 58.2 (A) >60 mL/min/1.73 m^2   Bilirubin, Direct    Collection Time: 12/13/22  9:51 AM   Result Value Ref Range    Bilirubin, Direct 0.7 (H) 0.1 - 0.3 mg/dL   Protime-INR    Collection Time: 12/13/22  9:51 AM   Result Value Ref Range    Prothrombin Time 11.5 9.0 - 12.5 sec    INR 1.1 0.8 - 1.2   CBC Auto Differential    Collection Time: 12/13/22  9:51 AM   Result Value Ref Range    WBC 6.72 3.90 - 12.70 K/uL    RBC 4.52 4.00 - 5.40 M/uL    Hemoglobin 14.3 12.0 - 16.0 g/dL    Hematocrit 41.6 37.0 - 48.5 %    MCV 92 82 - 98 fL    MCH 31.6 (H) 27.0 - 31.0 pg    MCHC 34.4 32.0 - 36.0 g/dL    RDW 14.8 (H) 11.5 - 14.5 %    Platelets 158 150 - 450 K/uL    MPV 10.3 9.2 - 12.9 fL    Immature Granulocytes 0.3 0.0 - 0.5 %    Gran # (ANC) 3.5 1.8 - 7.7 K/uL    Immature Grans (Abs) 0.02 0.00 - 0.04 K/uL    Lymph # 2.3 1.0 - 4.8 K/uL    Mono # 0.6 0.3 - 1.0 K/uL    Eos # 0.2 0.0 - 0.5 K/uL    Baso # 0.09 0.00 - 0.20 K/uL    nRBC 0 0 /100 WBC    Gran % 52.7 38.0 - 73.0 %    Lymph % 33.6 18.0 - 48.0 %    Mono % 9.4 4.0 - 15.0 %    Eosinophil % 2.7 0.0 - 8.0 %    Basophil % 1.3 0.0 - 1.9 %    Differential Method Automated    POCT urine pregnancy    Collection Time: 12/14/22 11:50 AM   Result Value Ref Range    POC Preg Test, Ur Negative Negative     Acceptable Yes    CBC auto differential    Collection Time: 12/26/22  9:51 AM   Result Value Ref Range    WBC 6.12 3.90 - 12.70 K/uL    RBC 4.62 4.00 - 5.40 M/uL    Hemoglobin 14.6 12.0 - 16.0 g/dL    Hematocrit 41.7 37.0 - 48.5 %    MCV 90 82 - 98 fL    MCH 31.6 (H) 27.0 - 31.0 pg    MCHC 35.0 32.0 - 36.0 g/dL    RDW 14.0 11.5 - 14.5 %     Platelets 175 150 - 450 K/uL    MPV 10.0 9.2 - 12.9 fL    Immature Granulocytes 0.2 0.0 - 0.5 %    Gran # (ANC) 3.7 1.8 - 7.7 K/uL    Immature Grans (Abs) 0.01 0.00 - 0.04 K/uL    Lymph # 1.6 1.0 - 4.8 K/uL    Mono # 0.6 0.3 - 1.0 K/uL    Eos # 0.1 0.0 - 0.5 K/uL    Baso # 0.06 0.00 - 0.20 K/uL    nRBC 0 0 /100 WBC    Gran % 59.7 38.0 - 73.0 %    Lymph % 26.6 18.0 - 48.0 %    Mono % 10.5 4.0 - 15.0 %    Eosinophil % 2.0 0.0 - 8.0 %    Basophil % 1.0 0.0 - 1.9 %    Differential Method Automated    Comprehensive metabolic panel    Collection Time: 12/26/22  9:51 AM   Result Value Ref Range    Sodium 138 136 - 145 mmol/L    Potassium 4.0 3.5 - 5.1 mmol/L    Chloride 103 95 - 110 mmol/L    CO2 33 (H) 23 - 29 mmol/L    Glucose 176 (H) 70 - 110 mg/dL    BUN 11 6 - 20 mg/dL    Creatinine 1.0 0.5 - 1.4 mg/dL    Calcium 8.8 8.7 - 10.5 mg/dL    Total Protein 7.4 6.0 - 8.4 g/dL    Albumin 3.2 (L) 3.5 - 5.2 g/dL    Total Bilirubin 2.4 (H) 0.1 - 1.0 mg/dL    Alkaline Phosphatase 197 (H) 55 - 135 U/L    AST 49 (H) 10 - 40 U/L    ALT 39 10 - 44 U/L    Anion Gap 2 (L) 8 - 16 mmol/L    eGFR >60.0 >60 mL/min/1.73 m^2   APTT    Collection Time: 12/26/22  9:51 AM   Result Value Ref Range    aPTT 27.4 21.0 - 32.0 sec   Protime-INR    Collection Time: 12/26/22  9:51 AM   Result Value Ref Range    Prothrombin Time 11.9 9.0 - 12.5 sec    INR 1.1 0.8 - 1.2   Ammonia    Collection Time: 12/26/22  9:51 AM   Result Value Ref Range    Ammonia 117 (H) 10 - 50 umol/L   Ethanol    Collection Time: 12/26/22  9:51 AM   Result Value Ref Range    Alcohol, Serum <3 <10 mg/dL   CBC Auto Differential    Collection Time: 12/30/22  9:19 AM   Result Value Ref Range    WBC 5.41 3.90 - 12.70 K/uL    RBC 4.79 4.00 - 5.40 M/uL    Hemoglobin 15.3 12.0 - 16.0 g/dL    Hematocrit 43.7 37.0 - 48.5 %    MCV 91 82 - 98 fL    MCH 31.9 (H) 27.0 - 31.0 pg    MCHC 35.0 32.0 - 36.0 g/dL    RDW 14.6 (H) 11.5 - 14.5 %    Platelets 157 150 - 450 K/uL    MPV 10.3 9.2 - 12.9 fL     Immature Granulocytes 0.2 0.0 - 0.5 %    Gran # (ANC) 2.0 1.8 - 7.7 K/uL    Immature Grans (Abs) 0.01 0.00 - 0.04 K/uL    Lymph # 2.5 1.0 - 4.8 K/uL    Mono # 0.6 0.3 - 1.0 K/uL    Eos # 0.3 0.0 - 0.5 K/uL    Baso # 0.09 0.00 - 0.20 K/uL    nRBC 0 0 /100 WBC    Gran % 36.0 (L) 38.0 - 73.0 %    Lymph % 45.7 18.0 - 48.0 %    Mono % 11.6 4.0 - 15.0 %    Eosinophil % 4.8 0.0 - 8.0 %    Basophil % 1.7 0.0 - 1.9 %    Differential Method Automated    Comprehensive Metabolic Panel    Collection Time: 12/30/22  9:19 AM   Result Value Ref Range    Sodium 139 136 - 145 mmol/L    Potassium 3.4 (L) 3.5 - 5.1 mmol/L    Chloride 104 95 - 110 mmol/L    CO2 31 (H) 23 - 29 mmol/L    Glucose 126 (H) 70 - 110 mg/dL    BUN 11 6 - 20 mg/dL    Creatinine 1.0 0.5 - 1.4 mg/dL    Calcium 8.9 8.7 - 10.5 mg/dL    Total Protein 7.3 6.0 - 8.4 g/dL    Albumin 3.3 (L) 3.5 - 5.2 g/dL    Total Bilirubin 3.1 (H) 0.1 - 1.0 mg/dL    Alkaline Phosphatase 169 (H) 55 - 135 U/L    AST 43 (H) 10 - 40 U/L    ALT 36 10 - 44 U/L    Anion Gap 4 (L) 8 - 16 mmol/L    eGFR >60.0 >60 mL/min/1.73 m^2   Protime-INR    Collection Time: 12/30/22  9:19 AM   Result Value Ref Range    Prothrombin Time 12.9 (H) 9.0 - 12.5 sec    INR 1.2 0.8 - 1.2   AFP Tumor Marker    Collection Time: 12/30/22  9:19 AM   Result Value Ref Range    AFP 2.6 0.0 - 8.4 ng/mL   Research [facility]    Collection Time: 01/19/23  8:16 AM   Result Value Ref Range    Research Lab No result necessary    Iron and TIBC    Collection Time: 01/19/23  8:22 AM   Result Value Ref Range    Iron 155 30 - 160 ug/dL    Transferrin 199 (L) 200 - 375 mg/dL    TIBC 295 250 - 450 ug/dL    Saturated Iron 53 (H) 20 - 50 %   Ferritin    Collection Time: 01/19/23  8:22 AM   Result Value Ref Range    Ferritin 89 20.0 - 300.0 ng/mL   Alpha-1-Antitrypsin    Collection Time: 01/19/23  8:22 AM   Result Value Ref Range    A-1 Antitrypsin 156 100 - 190 mg/dL   Varicella Zoster Antibody, IgG    Collection Time: 01/19/23  8:22 AM    Result Value Ref Range    Varicella Zoster IgG >4000.00 AU/ml    Varicella Interpretation Positive    Quantiferon Gold TB    Collection Time: 01/19/23  8:22 AM   Result Value Ref Range    NIL 0.35367 IU/mL    TB1 - Nil -0.003 IU/mL    TB2 - Nil -0.018 IU/mL    Mitogen - Nil 9.954 IU/mL    TB Gold Plus Negative Negative   Ceruloplasmin    Collection Time: 01/19/23  8:22 AM   Result Value Ref Range    Ceruloplasmin 20.0 15.0 - 45.0 mg/dL   Comprehensive Metabolic Panel    Collection Time: 01/19/23  8:22 AM   Result Value Ref Range    Sodium 138 136 - 145 mmol/L    Potassium 3.5 3.5 - 5.1 mmol/L    Chloride 108 95 - 110 mmol/L    CO2 25 23 - 29 mmol/L    Glucose 113 (H) 70 - 110 mg/dL    BUN 7 6 - 20 mg/dL    Creatinine 0.9 0.5 - 1.4 mg/dL    Calcium 9.5 8.7 - 10.5 mg/dL    Total Protein 6.8 6.0 - 8.4 g/dL    Albumin 3.3 (L) 3.5 - 5.2 g/dL    Total Bilirubin 3.2 (H) 0.1 - 1.0 mg/dL    Alkaline Phosphatase 258 (H) 55 - 135 U/L    AST 70 (H) 10 - 40 U/L    ALT 40 10 - 44 U/L    Anion Gap 5 (L) 8 - 16 mmol/L    eGFR >60.0 >60 mL/min/1.73 m^2   CBC Auto Differential    Collection Time: 01/19/23  8:22 AM   Result Value Ref Range    WBC 4.45 3.90 - 12.70 K/uL    RBC 4.56 4.00 - 5.40 M/uL    Hemoglobin 14.2 12.0 - 16.0 g/dL    Hematocrit 43.0 37.0 - 48.5 %    MCV 94 82 - 98 fL    MCH 31.1 (H) 27.0 - 31.0 pg    MCHC 33.0 32.0 - 36.0 g/dL    RDW 13.6 11.5 - 14.5 %    Platelets 117 (L) 150 - 450 K/uL    MPV 11.2 9.2 - 12.9 fL    Immature Granulocytes 0.2 0.0 - 0.5 %    Gran # (ANC) 2.7 1.8 - 7.7 K/uL    Immature Grans (Abs) 0.01 0.00 - 0.04 K/uL    Lymph # 1.2 1.0 - 4.8 K/uL    Mono # 0.4 0.3 - 1.0 K/uL    Eos # 0.1 0.0 - 0.5 K/uL    Baso # 0.04 0.00 - 0.20 K/uL    nRBC 0 0 /100 WBC    Gran % 60.2 38.0 - 73.0 %    Lymph % 26.1 18.0 - 48.0 %    Mono % 9.9 4.0 - 15.0 %    Eosinophil % 2.7 0.0 - 8.0 %    Basophil % 0.9 0.0 - 1.9 %    Differential Method Automated    Protime-INR    Collection Time: 01/19/23  8:22 AM   Result Value  Ref Range    Prothrombin Time 14.3 (H) 9.0 - 12.5 sec    INR 1.4 (H) 0.8 - 1.2   Bilirubin, Direct    Collection Time: 01/19/23  8:22 AM   Result Value Ref Range    Bilirubin, Direct 1.2 (H) 0.1 - 0.3 mg/dL   Hepatitis B Surface Ab, Qualitative    Collection Time: 01/19/23  8:22 AM   Result Value Ref Range    Hep B S Ab 28.65 mIU/mL    Hep B S Ab Reactive    Gamma GT    Collection Time: 01/19/23  8:22 AM   Result Value Ref Range    GGT 19 8 - 55 U/L   TSH    Collection Time: 01/19/23  8:22 AM   Result Value Ref Range    TSH 2.308 0.400 - 4.000 uIU/mL   Vitamin D    Collection Time: 01/19/23  8:22 AM   Result Value Ref Range    Vit D, 25-Hydroxy 26 (L) 30 - 96 ng/mL   Vitamin A    Collection Time: 01/19/23  8:22 AM   Result Value Ref Range    Retinol, serum <13 (L) 38 - 106 ug/dL   Zinc    Collection Time: 01/19/23  8:22 AM   Result Value Ref Range    Zinc 41 (L) 60 - 130 ug/dL   Hepatitis A antibody, IgG    Collection Time: 01/19/23  8:22 AM   Result Value Ref Range    Hepatitis A Antibody IgG Non-reactive    Hepatitis B core antibody, total    Collection Time: 01/19/23  8:22 AM   Result Value Ref Range    Hep B Core Total Ab Reactive (A) Non-reactive   Hepatitis B surface antigen    Collection Time: 01/19/23  8:22 AM   Result Value Ref Range    Hepatitis B Surface Ag Non-reactive Non-reactive   HIV-1 and HIV-2 antibodies    Collection Time: 01/19/23  8:22 AM   Result Value Ref Range    HIV 1/2 Ag/Ab Non-reactive Non-reactive   Hepatitis C antibody    Collection Time: 01/19/23  8:22 AM   Result Value Ref Range    Hepatitis C Ab Reactive (A) Non-reactive   Cytomegalovirus antibody, IgG    Collection Time: 01/19/23  8:22 AM   Result Value Ref Range    CMV IgG Interpretation Reactive (A) Non-Reactive   RPR    Collection Time: 01/19/23  8:22 AM   Result Value Ref Range    RPR Non-reactive Non-reactive   Type & Screen    Collection Time: 01/19/23  8:22 AM   Result Value Ref Range    Group & Rh O POS     Indirect Aleena  "NEG    Strongyloides IgG Antibodies    Collection Time: 01/19/23  8:22 AM   Result Value Ref Range    Strongyloides Ab IgG Positive (A) Negative   Phosphatidylethanol (PETH)    Collection Time: 01/19/23  8:22 AM   Result Value Ref Range    PEth 16:0/18.1 (POPEth) <10 Cutoff: 10 ng/mL    PEth 16:0/18.2 (PLPEth) <10 Cutoff: 10 ng/mL    PETH INTERPRETATION Negative.    Stress Echo Which stress agent will be used? Pharmacological; Color Flow Doppler? Yes    Collection Time: 01/19/23 10:14 AM   Result Value Ref Range    Ascending aorta 3.12 cm    STJ 3.18 cm    AV mean gradient 10 mmHg    Ao peak tariq 2.07 m/s    Ao VTI 50.35 cm    IVS 0.87 0.6 - 1.1 cm    LA size 3.76 cm    Left Atrium Major Axis 4.83 cm    Left Atrium Minor Axis 4.62 cm    LVIDd 5.64 3.5 - 6.0 cm    LVIDs 3.50 2.1 - 4.0 cm    LVOT diameter 2.30 cm    LVOT peak VTI 36.38 cm    Posterior Wall 0.94 0.6 - 1.1 cm    MV Peak A Tariq 0.87 m/s    E wave deceleration time 275.63 msec    MV Peak E Tariq 0.99 m/s    PV Peak D Tariq 0.51 m/s    PV Peak S Tariq 0.66 m/s    RA Major Axis 4.95 cm    RA Width 3.32 cm    RVDD 3.44 cm    Sinus 2.85 cm    TAPSE 2.67 cm    TR Max Tariq 2.66 m/s    TDI LATERAL 0.12 m/s    TDI SEPTAL 0.08 m/s    LA WIDTH 3.56 cm    MV stenosis pressure 1/2 time 79.93 ms    LV Diastolic Volume 156.42 mL    LV Systolic Volume 50.76 mL    RV S' 17.24 cm/s    LVOT peak tariq 1.77 m/s    LA volume (mod) 48.38 cm3    MV "A" wave duration 14.08 msec    LV LATERAL E/E' RATIO 8.25 m/s    LV SEPTAL E/E' RATIO 12.38 m/s    FS 38 %    LA volume 53.73 cm3    LV mass 195.36 g    Left Ventricle Relative Wall Thickness 0.33 cm    AV valve area 3.00 cm2    AV Velocity Ratio 0.86     AV index (prosthetic) 0.72     MV valve area p 1/2 method 2.75 cm2    E/A ratio 1.14     Mean e' 0.10 m/s    Pulm vein S/D ratio 1.29     LVOT area 4.2 cm2    LVOT stroke volume 151.07 cm3    AV peak gradient 17 mmHg    E/E' ratio 9.90 m/s    Triscuspid Valve Regurgitation Peak Gradient 28 " mmHg    BSA 2.19 m2    Systolic blood pressure 101 mmHg    Diastolic blood pressure 48 mmHg    HR at rest 56 bpm    RPP 5,656     Peak  bpm    Peak Systolic  mmHg    Peak Diatolic BP 70 mmHg    Peak RPP 31,161     Max Predicted      85% Max Predicted      % Max HR Achieved 91     1 Minute Recovery  bpm    [facility]HS CV CPX PATIENT IS MALE 0     [facility] CV CPX PATIENT IS FEMALE 1     LV Systolic Volume Index 23.8 mL/m2    LV Diastolic Volume Index 73.44 mL/m2    LA Volume Index 25.2 mL/m2    LV Mass Index 92 g/m2    LA Volume Index (Mod) 22.7 mL/m2    Right Atrial Pressure (from IVC) 3 mmHg    EF 65 %    TV rest pulmonary artery pressure 31 mmHg   Type & Screen    Collection Time: 01/20/23  8:00 AM   Result Value Ref Range    Group & Rh O POS     Indirect Aleena NEG    Urinalysis    Collection Time: 01/20/23 10:30 AM   Result Value Ref Range    Specimen UA Urine, Clean Catch     Color, UA Yellow Yellow, Straw, Joycelyn    Appearance, UA Hazy (A) Clear    pH, UA 7.0 5.0 - 8.0    Specific Gravity, UA 1.025 1.005 - 1.030    Protein, UA 1+ (A) Negative    Glucose, UA Negative Negative    Ketones, UA Trace (A) Negative    Bilirubin (UA) Negative Negative    Occult Blood UA Trace (A) Negative    Nitrite, UA Positive (A) Negative    Leukocytes, UA 1+ (A) Negative   Toxicology Screen, Urine    Collection Time: 01/20/23 10:30 AM   Result Value Ref Range    Alcohol, Urine <10 <10 mg/dL    Benzodiazepines Negative Negative    Methadone metabolites Negative Negative    Cocaine (Metab.) Negative Negative    Opiate Scrn, Ur Negative Negative    Barbiturate Screen, Ur Negative Negative    Amphetamine Screen, Ur Negative Negative    THC Presumptive Positive (A) Negative    Phencyclidine Negative Negative    Creatinine, Urine 283.0 15.0 - 325.0 mg/dL    Toxicology Information SEE COMMENT    Urinalysis Microscopic    Collection Time: 01/20/23 10:30 AM   Result Value Ref Range    RBC, UA 0 0 - 4 /hpf    WBC, UA 27 (H) 0  - 5 /hpf    Bacteria Many (A) None-Occ /hpf    Yeast, UA Occasional (A) None    Squam Epithel, UA 54 /hpf    Hyaline Casts, UA 0 0-1/lpf /lpf    Ca Oxalate Sanaz, UA Few None-Moderate    Microscopic Comment SEE COMMENT    HEPATITIS B VIRAL DNA, QUANTITATIVE    Collection Time: 01/20/23  1:03 PM   Result Value Ref Range    Hep B Viral DNA IU/ML <10 <10 IU/mL    Log HBV IU/mL <1.00 <1.00 Log (10) IU/mL    Hepatitis B Virus DNA Not detected Not detected   Hepatitis C Genotype    Collection Time: 01/20/23  1:03 PM   Result Value Ref Range    HCV Qualitative Result Not detected Not detected    HCV Quantitative Result <12 <12 IU/mL    HCV Quantitative Log <1.08 <1.08 Log (10) IU/mL    Hepatitis C Virus Genotype Canceled    Hepatitis C RNA, Quantitative, PCR    Collection Time: 01/20/23  1:03 PM   Result Value Ref Range    HCV Quantitative Result Not Detected <12 IU/mL    HCV, Qualitative Not Detected Not Detected   Creatinine Clearance, Timed    Collection Time: 01/23/23  8:40 AM   Result Value Ref Range    Urine Volume 950 mL    Urine Collection Duration 24 Hr    Creatinine, Urine 134.0 15.0 - 325.0 mg/dL    Creatinine Clearance 88 70 - 110 mL/min    Creatinine, Urinr (mg/spec) 1273.0 mg/Spec    Creatinine 1.0 0.5 - 1.4 mg/dL   Protein, urine, timed    Collection Time: 01/23/23  8:40 AM   Result Value Ref Range    Urine Volume 950 mL    Urine Collection Duration 24 Hr    Protein, Urine 11.5 0.0 - 15.0 mg/dL    Urine Protein, Timed 109 (H) 0 - 100 mg/Spec   CBC Auto Differential    Collection Time: 01/23/23  3:36 PM   Result Value Ref Range    WBC 6.18 3.90 - 12.70 K/uL    RBC 4.45 4.00 - 5.40 M/uL    Hemoglobin 14.0 12.0 - 16.0 g/dL    Hematocrit 40.6 37.0 - 48.5 %    MCV 91 82 - 98 fL    MCH 31.5 (H) 27.0 - 31.0 pg    MCHC 34.5 32.0 - 36.0 g/dL    RDW 13.5 11.5 - 14.5 %    Platelets 118 (L) 150 - 450 K/uL    MPV 10.6 9.2 - 12.9 fL    Immature Granulocytes 0.2 0.0 - 0.5 %    Gran # (ANC) 3.1 1.8 - 7.7 K/uL    Immature  Grans (Abs) 0.01 0.00 - 0.04 K/uL    Lymph # 2.2 1.0 - 4.8 K/uL    Mono # 0.7 0.3 - 1.0 K/uL    Eos # 0.2 0.0 - 0.5 K/uL    Baso # 0.06 0.00 - 0.20 K/uL    nRBC 0 0 /100 WBC    Gran % 49.5 38.0 - 73.0 %    Lymph % 35.8 18.0 - 48.0 %    Mono % 10.7 4.0 - 15.0 %    Eosinophil % 2.8 0.0 - 8.0 %    Basophil % 1.0 0.0 - 1.9 %    Differential Method Automated    Protime-INR    Collection Time: 01/23/23  3:36 PM   Result Value Ref Range    Prothrombin Time 15.3 (H) 9.0 - 12.5 sec    INR 1.5 (H) 0.8 - 1.2   Comprehensive Metabolic Panel    Collection Time: 01/23/23  3:36 PM   Result Value Ref Range    Sodium 140 136 - 145 mmol/L    Potassium 4.0 3.5 - 5.1 mmol/L    Chloride 109 95 - 110 mmol/L    CO2 28 23 - 29 mmol/L    Glucose 122 (H) 70 - 110 mg/dL    BUN 9 6 - 20 mg/dL    Creatinine 1.1 0.5 - 1.4 mg/dL    Calcium 9.0 8.7 - 10.5 mg/dL    Total Protein 6.6 6.0 - 8.4 g/dL    Albumin 3.0 (L) 3.5 - 5.2 g/dL    Total Bilirubin 2.9 (H) 0.1 - 1.0 mg/dL    Alkaline Phosphatase 242 (H) 55 - 135 U/L    AST 76 (H) 10 - 40 U/L    ALT 46 (H) 10 - 44 U/L    Anion Gap 3 (L) 8 - 16 mmol/L    eGFR 58.2 (A) >60 mL/min/1.73 m^2   POCT urine pregnancy    Collection Time: 01/25/23 10:44 AM   Result Value Ref Range    POC Preg Test, Ur Negative Negative     Acceptable Yes    Phosphatidylethanol (PETH)    Collection Time: 02/07/23 12:20 PM   Result Value Ref Range    PEth 16:0/18.1 (POPEth) <10 Cutoff: 10 ng/mL    PEth 16:0/18.2 (PLPEth) <10 Cutoff: 10 ng/mL    PETH INTERPRETATION Negative.    Drug screen panel, in-house    Collection Time: 02/07/23 12:33 PM   Result Value Ref Range    Benzodiazepines Negative Negative    Methadone metabolites Negative Negative    Cocaine (Metab.) Negative Negative    Opiate Scrn, Ur Negative Negative    Barbiturate Screen, Ur Negative Negative    Amphetamine Screen, Ur Negative Negative    THC Presumptive Positive (A) Negative    Phencyclidine Negative Negative    Creatinine, Urine 161.0 15.0 -  325.0 mg/dL    Toxicology Information SEE COMMENT          Geeta Gaines DO  [facility]

## 2023-03-02 ENCOUNTER — CLINICAL SUPPORT (OUTPATIENT)
Dept: INTERVENTIONAL RADIOLOGY/VASCULAR | Facility: CLINIC | Age: 58
End: 2023-03-02
Payer: MEDICAID

## 2023-03-02 DIAGNOSIS — K76.9 LIVER DISEASE, UNSPECIFIED: ICD-10-CM

## 2023-03-02 DIAGNOSIS — C22.0 HCC (HEPATOCELLULAR CARCINOMA): Primary | ICD-10-CM

## 2023-03-02 DIAGNOSIS — Z01.818 PRE-TRANSPLANT EVALUATION FOR LIVER TRANSPLANT: ICD-10-CM

## 2023-03-02 PROCEDURE — 99213 PR OFFICE/OUTPT VISIT, EST, LEVL III, 20-29 MIN: ICD-10-PCS | Mod: 95,TXP,, | Performed by: FAMILY MEDICINE

## 2023-03-02 PROCEDURE — 99213 OFFICE O/P EST LOW 20 MIN: CPT | Mod: 95,TXP,, | Performed by: FAMILY MEDICINE

## 2023-03-02 NOTE — PROGRESS NOTES
Orders entered and appointment card completed for surveillance MR/AFP in 3 months, to be coordinated with Transplant clinic follow-up.

## 2023-03-02 NOTE — PROGRESS NOTES
Subjective:       Patient ID: Shonda Borrego is a 57 y.o. female.    Chief Complaint: Hepatocellular Carcinoma    Virtual visit with patient for follow up of hepatocellular carcinoma recently treated with microwave ablation on 2023. She has a history of alcoholic cirrhosis. She reports feeling well. She denies any abdominal pain or abdominal distention. She had an MRI on 2023.    Review of Systems   Constitutional:  Negative for activity change, appetite change, chills, fatigue and fever.   Respiratory:  Negative for cough, shortness of breath, wheezing and stridor.    Cardiovascular:  Negative for chest pain, palpitations and leg swelling.   Gastrointestinal:  Negative for abdominal distention, abdominal pain, constipation, diarrhea, nausea and vomiting.       Objective:      Physical Exam  Constitutional:       General: She is not in acute distress.     Appearance: She is well-developed. She is not diaphoretic.   HENT:      Head: Normocephalic and atraumatic.   Pulmonary:      Effort: Pulmonary effort is normal. No respiratory distress.   Neurological:      Mental Status: She is alert and oriented to person, place, and time.   Psychiatric:         Behavior: Behavior normal.         Thought Content: Thought content normal.         Judgment: Judgment normal.       ECO  MELD-Na score: 16 at 2023  3:36 PM  MELD score: 16 at 2023  3:36 PM  Calculated from:  Serum Creatinine: 1.1 mg/dL at 2023  3:36 PM  Serum Sodium: 140 mmol/L (Using max of 137 mmol/L) at 2023  3:36 PM  Total Bilirubin: 2.9 mg/dL at 2023  3:36 PM  INR(ratio): 1.5 at 2023  3:36 PM  Age: 57 years  Child Golden: Class B  Transplant Status: in evaluation    MRI 2023  Impression:     2.6 x 1.9 cm focus of abnormal signal intensity in the right lobe of the liver compatible with post ablation changes.     Nodular surface of the liver compatible cirrhosis.     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        Reviewed hepatology/transplant progress note  Assessment:       Problem List Items Addressed This Visit    None      Plan:        The patient location is: [place]  The chief complaint leading to consultation is: HCC    Visit type: audiovisual    Face to Face time with patient: 20 minutes  25 minutes of total time spent on the encounter, which includes face to face time and non-face to face time preparing to see the patient (eg, review of tests), Obtaining and/or reviewing separately obtained history, Documenting clinical information in the electronic or other health record, Independently interpreting results (not separately reported) and communicating results to the patient/family/caregiver, or Care coordination (not separately reported).         Each patient to whom he or she provides medical services by telemedicine is:  (1) informed of the relationship between the physician and patient and the respective role of any other health care provider with respect to management of the patient; and (2) notified that he or she may decline to receive medical services by telemedicine and may withdraw from such care at any time.    Notes:   Reviewed MRI with Dr. Mcclain. Explained to patient MRI shows good response to treatment and no new liver lesions. Recommendation is to continue surveillance with hepatology. Patient tells me she has an appointment with Dr. Arreola on 3/17/2023. Encouraged to keep that appointment. Patient verbalized understanding and agreement. RTC PRN

## 2023-03-07 ENCOUNTER — TELEPHONE (OUTPATIENT)
Dept: TRANSPLANT | Facility: CLINIC | Age: 58
End: 2023-03-07
Payer: MEDICAID

## 2023-03-07 ENCOUNTER — LAB VISIT (OUTPATIENT)
Dept: LAB | Facility: HOSPITAL | Age: 58
End: 2023-03-07
Attending: INTERNAL MEDICINE
Payer: MEDICAID

## 2023-03-07 DIAGNOSIS — R82.5 POSITIVE URINE DRUG SCREEN: ICD-10-CM

## 2023-03-07 DIAGNOSIS — C22.0 HEPATOCELLULAR CARCINOMA: ICD-10-CM

## 2023-03-07 DIAGNOSIS — Z01.818 PRE-TRANSPLANT EVALUATION FOR LIVER TRANSPLANT: ICD-10-CM

## 2023-03-07 DIAGNOSIS — K70.30 ALCOHOLIC CIRRHOSIS OF LIVER WITHOUT ASCITES: ICD-10-CM

## 2023-03-07 LAB
AMPHET+METHAMPHET UR QL: NEGATIVE
BARBITURATES UR QL SCN>200 NG/ML: NEGATIVE
BENZODIAZ UR QL SCN>200 NG/ML: NEGATIVE
BZE UR QL SCN: NEGATIVE
CANNABINOIDS UR QL SCN: ABNORMAL
CREAT UR-MCNC: 151 MG/DL (ref 15–325)
METHADONE UR QL SCN>300 NG/ML: NEGATIVE
OPIATES UR QL SCN: NEGATIVE
PCP UR QL SCN>25 NG/ML: NEGATIVE
TOXICOLOGY INFORMATION: ABNORMAL

## 2023-03-07 PROCEDURE — 80307 DRUG TEST PRSMV CHEM ANLYZR: CPT | Mod: TXP | Performed by: INTERNAL MEDICINE

## 2023-03-08 DIAGNOSIS — K21.9 GASTROESOPHAGEAL REFLUX DISEASE, UNSPECIFIED WHETHER ESOPHAGITIS PRESENT: ICD-10-CM

## 2023-03-08 DIAGNOSIS — G25.81 RESTLESS LEG SYNDROME: ICD-10-CM

## 2023-03-08 RX ORDER — ROPINIROLE 1 MG/1
1 TABLET, FILM COATED ORAL NIGHTLY
Qty: 90 TABLET | Refills: 1 | Status: ON HOLD | OUTPATIENT
Start: 2023-03-08 | End: 2023-04-20 | Stop reason: HOSPADM

## 2023-03-08 RX ORDER — PANTOPRAZOLE SODIUM 20 MG/1
20 TABLET, DELAYED RELEASE ORAL NIGHTLY
Qty: 30 TABLET | Refills: 2 | Status: SHIPPED | OUTPATIENT
Start: 2023-03-08 | End: 2023-05-17

## 2023-03-08 NOTE — TELEPHONE ENCOUNTER
Patient is asking for steroids because her foot still hurts. Patient is calling foot Dr today to make another appointment.

## 2023-03-09 ENCOUNTER — TELEPHONE (OUTPATIENT)
Dept: TRANSPLANT | Facility: CLINIC | Age: 58
End: 2023-03-09
Payer: MEDICAID

## 2023-03-09 NOTE — TELEPHONE ENCOUNTER
----- Message from Nicola Chan sent at 3/9/2023 11:36 AM CST -----    Called and sp to pt; appt with Dr. Arreola has been re/lourdes'ed form 3/17 to 4/6.  .

## 2023-03-09 NOTE — TELEPHONE ENCOUNTER
Call placed to patient to discuss positive UT results. Patient states she has been abstinent for over a month; last use was the day we initially discussed quitting (Feb 6th). Patient says she wants to do whatever is needed to get a transplant and agrees to a repeat test with labs on 3/13. Patient encouraged to remain abstinent. Will follow-up accordingly.     ----- Message from Shari Arreola MD sent at 3/8/2023 10:36 AM CST -----  Please check with SW re: repeated positive THC.  Any recommendations?

## 2023-03-10 ENCOUNTER — TELEPHONE (OUTPATIENT)
Dept: TRANSPLANT | Facility: CLINIC | Age: 58
End: 2023-03-10
Payer: MEDICAID

## 2023-03-10 DIAGNOSIS — Z00.6 RESEARCH STUDY PATIENT: Primary | ICD-10-CM

## 2023-03-10 DIAGNOSIS — C22.0 HCC (HEPATOCELLULAR CARCINOMA): ICD-10-CM

## 2023-03-10 NOTE — TELEPHONE ENCOUNTER
Release of Information faxed to Chas Khan, PMHNP @ [phone]    ----- Message from Racquel Lorenz LCSW sent at 1/20/2023  3:18 PM CST -----  Tish Roman, are you able to request mental health provider records for this patient? She mentioned some issues with follow up recently for smoking cessation and I believe this provider due to having a lot on her plate. I wanted to ensure she is seeing who is supposed to be managing her Bipolar/Depression/Anxiety. I think this is the provider she sees. Thank you!     CHAS KHAN  [phone]; [address] [place] [address]

## 2023-03-10 NOTE — TELEPHONE ENCOUNTER
Call received from Angela, [facility] lab, informing of error on initial report of urine compliance screen. States initial report showed Benzos positive and THC negative. However, Benzos are negative and THC is positive. States report has been corrected. Unable to add THC confirmation d/t insufficient quantity.

## 2023-03-14 NOTE — PROGRESS NOTES
Transplant Surgery Liver Transplant Recipient Evaluation    Referring Physician: Geeta Gaines  Corresponding Physician: Geeta Gaines    Subjective:     Reason for Visit: evaluate liver transplant candidacy    History of Present Illness: Shonda Borrego is a 57 y.o. year old female who is being evaluated for liver transplantation.    Transplant History: N/A    Native Liver Disease (UNOS terminology): Primary Liver Malignancy: Hepatoma (HCC) and Cirrhosis (based on medical records from referral).    Manifestations of liver disease: encephalopathy, esophageal or gastric varices, portal hypertension, thrombocytopenia, and variceal bleeding  MELD-Na score: 16 at 1/23/2023  3:36 PM  MELD score: 16 at 1/23/2023  3:36 PM  Calculated from:  Serum Creatinine: 1.1 mg/dL at 1/23/2023  3:36 PM  Serum Sodium: 140 mmol/L (Using max of 137 mmol/L) at 1/23/2023  3:36 PM  Total Bilirubin: 2.9 mg/dL at 1/23/2023  3:36 PM  INR(ratio): 1.5 at 1/23/2023  3:36 PM  Age: 57 years    External provider notes reviewed: Yes    PMH: reviewed  PSH: reviewed    Review of Systems   Constitutional:  Negative for activity change and chills.   HENT:  Negative for congestion and sore throat.    Eyes:  Negative for discharge and redness.   Respiratory:  Negative for cough and shortness of breath.    Cardiovascular:  Negative for chest pain and palpitations.   Gastrointestinal:  Negative for nausea and vomiting.   Endocrine: Negative for polydipsia and polyuria.   Genitourinary:  Negative for dysuria and frequency.   Musculoskeletal:  Negative for arthralgias and gait problem.   Skin:  Negative for pallor and rash.   Neurological:  Negative for dizziness and light-headedness.   Hematological:  Negative for adenopathy. Does not bruise/bleed easily.   Psychiatric/Behavioral:  Negative for agitation and suicidal ideas.    Objective:     Physical Exam:  Constitutional:   Vitals reviewed: yes   Well-nourished and well-groomed: yes  Eyes:   Sclerae  icteric: no   Extraocular movements intact: yes  GI:    Bowel sounds normal: yes   Tenderness: no    If yes, quadrant/location: not applicable   Palpable masses: no    If yes, quadrant/location: not applicable   Hepatosplenomegaly: no   Ascites: no   Hernia: no    If yes, type/location: not applicable   Surgical scars: no    If yes, type/location: not applicable  Resp:   Effort normal: yes   Breath sounds normal: yes    CV:   Regular rate and rhythm: yes   Heart sounds normal: yes   Femoral pulses normal: yes   Extremities edematous: no  Skin:   Rashes or lesions present: no    If yes, describe:not applicable   Jaundice:: no    Musculoskeletal:   Gait normal: yes   Strength normal: yes  Psych:   Oriented to person, place, and time: yes   Affect and mood normal: yes    Additional comments: not applicable    Diagnostics:  The following labs have been reviewed: CBC  CMP  The following radiology images have been independently reviewed and interpreted: CT Abd/Pelvis         Transplant Surgery - Candidacy   Assessment/Plan:   I see no surgical contraindication to liver transplantation. Based on available information, Shonda Borrego is a suitable liver transplant candidate. Final determination of transplant candidacy will be made once evaluation is complete and reviewed by the Liver Transplant Selection Committee.    Patient advised that it is recommended that all transplant candidates, and their close contacts and household members receive Covid vaccination.    Additional testing to be completed according to Liver : Written Order Guideline for Adult Liver Transplant Evaluation (LI-02)    Interpretation of tests and discussion of patient management involves all members of the multidisciplinary transplant team.    Jovanny Hall MD         Counseling: We provided Shonda Borrego with a group education session today.  We discussed liver transplantation at length with her, including risks, potential complications, and  alternatives in the management of her liver disease.  The discussion included complications related to anesthesia, bleeding, infection, primary nonfunction, and vascular thrombosis.  I discussed the typical postoperative course, length of hospitalization, the need for long-term immunosuppression, and the need for long-term routine follow-up.  I discussed living-donor and -donor transplantation and the relative advantages and disadvantages of each.  I also discussed average waiting times for both living donation and  donation.  I discussed national and center-specific survival rates.  I also mentioned the potential benefit of multicenter listing to candidates listed with centers within more than one organ procurement organization.  All questions were answered.    Coronavirus disease (COVID-19) caused by severe acute respiratory virus coronavirus 2 (SARS-C0V 2) is associated with increased mortality in solid organ transplant recipients (SOT) compared to non-transplant patients. Vaccine responses to vaccination are depressed against SARS-CoV2 compared to normal individuals but improve with third vaccination doses. Vaccination prior to SOT provides both the best opportunity for transplant candidates to develop protective immunity and to reduce the risk of serious COVID19 infections post transplantation. Organ transplant candidates at [facility] will be required to receive SARS-CoV-2 vaccination prior to being listed with a an active status, whenever possible. Exceptions will be made for disability related reasons or for sincerely held [employer] beliefs.     PHS: I discussed the use of organs from donors with PHS risk criteria, including the testing protocols utilized, as well as data from the literature regarding the likelihood of transmission of hepatitis or HIV.  The patient is willing to consider such grafts.  DCD: I discussed the use of organs recovered by donation  after cardiac death (DCD), including slightly decreased graft survival and greater risk of arterial and biliary complications. The potential advantage to the recipient is possibly receiving a transplant sooner by accepting such an organ. The patient is willing to consider such grafts.  HBcAb: I discussed the use of organs from donors with HBcAb in conjunction with long term use of HBV antiviral drugs, such as lamivudine. The small but measurable risk of hepatitis B seroconversion was discussed as well as the potentially life long need to continue antiviral drugs. The patient is willing to consider such grafts.  HCV Non-viremic recipient: I discussed the use of HCV-positive organs in naive recipients, including the risk of viral transmission to the patients or others, potential insurance barriers for antiviral medication coverage, risk for fibrosing cholestatic hepatitis, death or graft loss. The potential advantage to the recipient is the possibility of receiving a transplant sooner with decreased mortality risk by accepting such an organ. The patient is willing to consider such grafts.  LDLT: I discussed the nature of living donor liver transplant, including donor risks and more frequent recipient complications. The patient is willing to consider such grafts.  Covid: I discussed that this donor has tested positive for the covid virus, but with very low levels of virus and no evidence of covid disease. Although the risk of transmission is unknown, we believe this donor is not infectious and use of the abdominal organs is safe.  To date, these organs have been used with no evidence of transmission. The patient is willing to consider such grafts.

## 2023-03-16 ENCOUNTER — CLINICAL SUPPORT (OUTPATIENT)
Dept: SMOKING CESSATION | Facility: CLINIC | Age: 58
End: 2023-03-16
Payer: COMMERCIAL

## 2023-03-16 DIAGNOSIS — F17.200 NICOTINE DEPENDENCE: Primary | ICD-10-CM

## 2023-03-16 PROCEDURE — 99404 PREV MED CNSL INDIV APPRX 60: CPT | Mod: S$GLB,TXP,,

## 2023-03-16 PROCEDURE — 99999 PR PBB SHADOW E&M-EST. PATIENT-LVL II: ICD-10-PCS | Mod: PBBFAC,TXP,,

## 2023-03-16 PROCEDURE — 99999 PR PBB SHADOW E&M-EST. PATIENT-LVL II: CPT | Mod: PBBFAC,TXP,,

## 2023-03-16 PROCEDURE — 99404 PR PREVENT COUNSEL,INDIV,60 MIN: ICD-10-PCS | Mod: S$GLB,TXP,,

## 2023-03-16 RX ORDER — DIPHENHYDRAMINE HCL 25 MG
4 CAPSULE ORAL
Qty: 300 EACH | Refills: 0 | Status: SHIPPED | OUTPATIENT
Start: 2023-03-16 | End: 2024-02-19 | Stop reason: SDUPTHER

## 2023-03-16 RX ORDER — IBUPROFEN 200 MG
1 TABLET ORAL DAILY
Qty: 14 PATCH | Refills: 0 | Status: SHIPPED | OUTPATIENT
Start: 2023-03-16 | End: 2023-05-04 | Stop reason: DRUGHIGH

## 2023-03-17 ENCOUNTER — LAB VISIT (OUTPATIENT)
Dept: LAB | Facility: HOSPITAL | Age: 58
End: 2023-03-17
Attending: INTERNAL MEDICINE
Payer: MEDICAID

## 2023-03-17 DIAGNOSIS — K76.9 LIVER DISEASE, UNSPECIFIED: ICD-10-CM

## 2023-03-17 DIAGNOSIS — Z01.818 PRE-TRANSPLANT EVALUATION FOR LIVER TRANSPLANT: ICD-10-CM

## 2023-03-17 DIAGNOSIS — C22.0 HCC (HEPATOCELLULAR CARCINOMA): ICD-10-CM

## 2023-03-17 LAB
AMPHET+METHAMPHET UR QL: NEGATIVE
BARBITURATES UR QL SCN>200 NG/ML: NEGATIVE
BENZODIAZ UR QL SCN>200 NG/ML: NEGATIVE
BZE UR QL SCN: NEGATIVE
CANNABINOIDS UR QL SCN: ABNORMAL
CREAT UR-MCNC: 88.6 MG/DL (ref 15–325)
METHADONE UR QL SCN>300 NG/ML: NEGATIVE
OPIATES UR QL SCN: NEGATIVE
PCP UR QL SCN>25 NG/ML: NEGATIVE
TOXICOLOGY INFORMATION: ABNORMAL

## 2023-03-17 PROCEDURE — 80307 DRUG TEST PRSMV CHEM ANLYZR: CPT | Mod: TXP | Performed by: INTERNAL MEDICINE

## 2023-03-22 ENCOUNTER — PATIENT OUTREACH (OUTPATIENT)
Dept: ADMINISTRATIVE | Facility: HOSPITAL | Age: 58
End: 2023-03-22
Payer: MEDICAID

## 2023-03-22 DIAGNOSIS — Z12.11 ENCOUNTER FOR COLORECTAL CANCER SCREENING: Primary | ICD-10-CM

## 2023-03-22 DIAGNOSIS — Z12.12 ENCOUNTER FOR COLORECTAL CANCER SCREENING: Primary | ICD-10-CM

## 2023-03-23 ENCOUNTER — CLINICAL SUPPORT (OUTPATIENT)
Dept: SMOKING CESSATION | Facility: CLINIC | Age: 58
End: 2023-03-23
Payer: COMMERCIAL

## 2023-03-23 ENCOUNTER — OFFICE VISIT (OUTPATIENT)
Dept: PRIMARY CARE CLINIC | Facility: CLINIC | Age: 58
End: 2023-03-23
Payer: MEDICAID

## 2023-03-23 VITALS
DIASTOLIC BLOOD PRESSURE: 70 MMHG | BODY MASS INDEX: 31.67 KG/M2 | RESPIRATION RATE: 16 BRPM | OXYGEN SATURATION: 97 % | HEIGHT: 68 IN | TEMPERATURE: 99 F | WEIGHT: 209 LBS | SYSTOLIC BLOOD PRESSURE: 124 MMHG | HEART RATE: 69 BPM

## 2023-03-23 DIAGNOSIS — J44.9 COPD WITHOUT EXACERBATION: ICD-10-CM

## 2023-03-23 DIAGNOSIS — K83.1 CHOLESTASIS: ICD-10-CM

## 2023-03-23 DIAGNOSIS — F17.200 NICOTINE DEPENDENCE: Primary | ICD-10-CM

## 2023-03-23 DIAGNOSIS — L60.2 THICKENED NAIL: ICD-10-CM

## 2023-03-23 DIAGNOSIS — F41.9 ANXIETY: ICD-10-CM

## 2023-03-23 DIAGNOSIS — Z23 NEED FOR HEPATITIS A VACCINATION: Primary | ICD-10-CM

## 2023-03-23 PROCEDURE — 99404 PREV MED CNSL INDIV APPRX 60: CPT | Mod: S$GLB,TXP,,

## 2023-03-23 PROCEDURE — 3008F BODY MASS INDEX DOCD: CPT | Mod: CPTII,,, | Performed by: STUDENT IN AN ORGANIZED HEALTH CARE EDUCATION/TRAINING PROGRAM

## 2023-03-23 PROCEDURE — 3008F PR BODY MASS INDEX (BMI) DOCUMENTED: ICD-10-PCS | Mod: CPTII,,, | Performed by: STUDENT IN AN ORGANIZED HEALTH CARE EDUCATION/TRAINING PROGRAM

## 2023-03-23 PROCEDURE — 1159F PR MEDICATION LIST DOCUMENTED IN MEDICAL RECORD: ICD-10-PCS | Mod: CPTII,,, | Performed by: STUDENT IN AN ORGANIZED HEALTH CARE EDUCATION/TRAINING PROGRAM

## 2023-03-23 PROCEDURE — 99214 OFFICE O/P EST MOD 30 MIN: CPT | Mod: PBBFAC,PN | Performed by: STUDENT IN AN ORGANIZED HEALTH CARE EDUCATION/TRAINING PROGRAM

## 2023-03-23 PROCEDURE — 99999 PR PBB SHADOW E&M-EST. PATIENT-LVL I: CPT | Mod: PBBFAC,TXP,,

## 2023-03-23 PROCEDURE — 99999 PR PBB SHADOW E&M-EST. PATIENT-LVL IV: ICD-10-PCS | Mod: PBBFAC,,, | Performed by: STUDENT IN AN ORGANIZED HEALTH CARE EDUCATION/TRAINING PROGRAM

## 2023-03-23 PROCEDURE — 3078F PR MOST RECENT DIASTOLIC BLOOD PRESSURE < 80 MM HG: ICD-10-PCS | Mod: CPTII,,, | Performed by: STUDENT IN AN ORGANIZED HEALTH CARE EDUCATION/TRAINING PROGRAM

## 2023-03-23 PROCEDURE — 1159F MED LIST DOCD IN RCRD: CPT | Mod: CPTII,,, | Performed by: STUDENT IN AN ORGANIZED HEALTH CARE EDUCATION/TRAINING PROGRAM

## 2023-03-23 PROCEDURE — 3078F DIAST BP <80 MM HG: CPT | Mod: CPTII,,, | Performed by: STUDENT IN AN ORGANIZED HEALTH CARE EDUCATION/TRAINING PROGRAM

## 2023-03-23 PROCEDURE — 99214 PR OFFICE/OUTPT VISIT, EST, LEVL IV, 30-39 MIN: ICD-10-PCS | Mod: S$PBB,,, | Performed by: STUDENT IN AN ORGANIZED HEALTH CARE EDUCATION/TRAINING PROGRAM

## 2023-03-23 PROCEDURE — 99214 OFFICE O/P EST MOD 30 MIN: CPT | Mod: S$PBB,,, | Performed by: STUDENT IN AN ORGANIZED HEALTH CARE EDUCATION/TRAINING PROGRAM

## 2023-03-23 PROCEDURE — 1160F RVW MEDS BY RX/DR IN RCRD: CPT | Mod: CPTII,,, | Performed by: STUDENT IN AN ORGANIZED HEALTH CARE EDUCATION/TRAINING PROGRAM

## 2023-03-23 PROCEDURE — 99999 PR PBB SHADOW E&M-EST. PATIENT-LVL I: ICD-10-PCS | Mod: PBBFAC,TXP,,

## 2023-03-23 PROCEDURE — 90632 HEPA VACCINE ADULT IM: CPT | Mod: PBBFAC,PN

## 2023-03-23 PROCEDURE — 99999 PR PBB SHADOW E&M-EST. PATIENT-LVL IV: CPT | Mod: PBBFAC,,, | Performed by: STUDENT IN AN ORGANIZED HEALTH CARE EDUCATION/TRAINING PROGRAM

## 2023-03-23 PROCEDURE — 3074F PR MOST RECENT SYSTOLIC BLOOD PRESSURE < 130 MM HG: ICD-10-PCS | Mod: CPTII,,, | Performed by: STUDENT IN AN ORGANIZED HEALTH CARE EDUCATION/TRAINING PROGRAM

## 2023-03-23 PROCEDURE — 90471 IMMUNIZATION ADMIN: CPT | Mod: PBBFAC,PN

## 2023-03-23 PROCEDURE — 3074F SYST BP LT 130 MM HG: CPT | Mod: CPTII,,, | Performed by: STUDENT IN AN ORGANIZED HEALTH CARE EDUCATION/TRAINING PROGRAM

## 2023-03-23 PROCEDURE — 99404 PR PREVENT COUNSEL,INDIV,60 MIN: ICD-10-PCS | Mod: S$GLB,TXP,,

## 2023-03-23 PROCEDURE — 1160F PR REVIEW ALL MEDS BY PRESCRIBER/CLIN PHARMACIST DOCUMENTED: ICD-10-PCS | Mod: CPTII,,, | Performed by: STUDENT IN AN ORGANIZED HEALTH CARE EDUCATION/TRAINING PROGRAM

## 2023-03-23 RX ORDER — ALBUTEROL SULFATE 90 UG/1
2 AEROSOL, METERED RESPIRATORY (INHALATION) EVERY 6 HOURS PRN
Qty: 18 G | Refills: 3 | Status: SHIPPED | OUTPATIENT
Start: 2023-03-23 | End: 2023-09-19

## 2023-03-23 RX ORDER — URSODIOL 300 MG/1
300 CAPSULE ORAL 3 TIMES DAILY
Qty: 90 CAPSULE | Refills: 3 | Status: SHIPPED | OUTPATIENT
Start: 2023-03-23 | End: 2023-04-25 | Stop reason: SDUPTHER

## 2023-03-23 RX ORDER — CARIPRAZINE 3 MG/1
3 CAPSULE, GELATIN COATED ORAL
COMMUNITY
Start: 2023-03-13

## 2023-03-23 NOTE — PROGRESS NOTES
Individual Follow-Up Form    3/23/2023    Quit Date: TBD    Clinical Status of Patient: Outpatient    Length of Service: 60 minutes    Continuing Medication: yes  Patches and nicotine gum    Other Medications: none     Target Symptoms: Withdrawal and medication side effects. The following were  rated moderate (3) to severe (4) on TCRS:  Moderate (3): none  Severe (4): none    Comments: Patient seen in clinic regarding smoking cessation follow up. Patient reports that she had 1 cigarette yesterday morning. Before that, she was able to go 3.5 days smoke free. Commended pt for effort made thus far. Discussed yesterday's slip at length. Encouraged pt to address the feelings behind the slip and discussed plan to combat strong urges and cravings. Pt remains on NRT 21 mg nicotine patch QD and 4 mg nicotine gum PRN (1-2 in place of cigarette.) No adverse reactions noted at this time. Goal is to wean patch as tolerated by patient. ?Identified patient personal reason for wanting to quit as health- pt is awaiting liver transplant.?Reviewed learned addiction model, triggers, cues, and short/long term consequences of tobacco use. Discussed personal triggers. Pt's exhaled carbon monoxide level was?6?ppm as per [employer]oker[employer]. (non- smoker = 0-5 ppm.)? Pt to continue with counseling in?1?week.    Diagnosis: F17.200    Next Visit: 1 week

## 2023-03-23 NOTE — PROGRESS NOTES
[facility] Note    HPI:  Shonda Borrego is a 57 y.o. female who presents today for Follow-up (4 week follow up ) and Immunizations (Hepatitis A Vaccine )  57 year old female with liver cirrhosis s/p TIPS under the care of liver transplant services, GERD, COPD, PVD, MDD and anxiety presents for first of her series of hepatitis vaccine shots.   Recently followed up with podiatry with steroids injections in foot.     Denies recent sick contacts, fever, chills, vision changes, headache, constipation, diarrhea, chest pain, palpitations, shortness of breath, abdominal pain, abdominal swelling, nausea, vomiting.  ROS   A review of systems was performed and was negative except as noted above.    I personally reviewed allergies, past medical, surgical, social and family history and updated as appropriate.    Medications:    Current Outpatient Medications:     clonazePAM (KLONOPIN) 0.5 MG tablet, Take 0.5 mg by mouth daily as needed., Disp: , Rfl:     CONSTULOSE 10 gram/15 mL solution, Take 30 mLs by mouth 3 (three) times daily., Disp: , Rfl:     docusate sodium (COLACE) 100 MG capsule, Take 1 capsule (100 mg total) by mouth 2 (two) times daily., Disp: 30 capsule, Rfl: 0    fesoterodine (TOVIAZ) 4 mg Tb24, Take 1 tablet (4 mg total) by mouth every evening., Disp: 30 tablet, Rfl: 2    mirtazapine (REMERON) 7.5 MG Tab, Take 7.5 mg by mouth every evening., Disp: , Rfl:     nicotine (NICODERM CQ) 21 mg/24 hr, Place 1 patch onto the skin once daily., Disp: 14 patch, Rfl: 0    nicotine, polacrilex, (NICORETTE) 4 MG Gum, Take 1 each (4 mg total) by mouth as needed (Take 1 each (4 mg total) by mouth as needed for tobacco cessation. 1-2 per hour in the place of cigarettes. (5-16 daily max) - Oral)., Disp: 300 each, Rfl: 0    ondansetron (ZOFRAN) 4 MG tablet, Take 1 tablet (4 mg total) by mouth 2 (two) times daily., Disp: 30 tablet, Rfl: 0    pantoprazole (PROTONIX) 20 MG tablet, Take 1 tablet (20 mg total) by mouth  every evening., Disp: 30 tablet, Rfl: 2    rOPINIRole (REQUIP) 1 MG tablet, Take 1 tablet (1 mg total) by mouth every evening., Disp: 90 tablet, Rfl: 1    spironolactone (ALDACTONE) 50 MG tablet, Take 1 tablet by mouth once daily, Disp: 90 tablet, Rfl: 0    VRAYLAR 3 mg Cap, Take 3 mg by mouth., Disp: , Rfl:     albuterol (PROVENTIL/VENTOLIN HFA) 90 mcg/actuation inhaler, Inhale 2 puffs into the lungs every 6 (six) hours as needed for Wheezing or Shortness of Breath., Disp: 18 g, Rfl: 3    ALPRAZolam (XANAX) 0.25 MG tablet, Take 1 tablet (0.25 mg total) by mouth 2 (two) times daily as needed for Anxiety., Disp: 2 tablet, Rfl: 2    ursodioL (ACTIGALL) 300 mg capsule, Take 1 capsule (300 mg total) by mouth 3 (three) times daily. for 14 days, Disp: 90 capsule, Rfl: 3     Health Maintenance:  Immunization History   Administered Date(s) Administered    COVID-19 MRNA, LN-S PF (MODERNA HALF 0.25 ML DOSE) 12/09/2021    COVID-19, MRNA, LN-S, PF (MODERNA FULL 0.5 ML DOSE) 05/12/2021    COVID-19, MRNA, LN-S, PF (Pfizer) (Gray Cap) 06/03/2022    COVID-19, MRNA, LN-S, PF (Pfizer) (Purple Cap) 06/09/2021, 09/25/2022    COVID-19, mRNA, LNP-S, bivalent booster, PF (Moderna Omicron) 09/25/2022    Hepatitis A, Adult 03/23/2023    Influenza - Quadrivalent - PF *Preferred* (6 months and older) 09/25/2022    Influenza - Trivalent (ADULT) 10/20/2021    Pneumococcal Conjugate - 20 Valent 10/05/2022, 02/07/2023    Tdap 03/03/2022    Zoster Recombinant 03/03/2022, 09/25/2022      Health Maintenance   Topic Date Due    Lipid Panel  Never done    Mammogram  10/24/2023    LDCT Lung Screen  01/20/2024    TETANUS VACCINE  03/03/2032    Hepatitis C Screening  Completed     Health Maintenance Topics with due status: Not Due       Topic Last Completion Date    Colorectal Cancer Screening 01/16/2020    Cervical Cancer Screening 09/20/2021    Hemoglobin A1c (Diabetic Prevention Screening) 10/20/2021    TETANUS VACCINE 03/03/2022    Mammogram  "10/24/2022    LDCT Lung Screen 01/20/2023     Health Maintenance Due   Topic Date Due    Lipid Panel  Never done     PHYSICAL EXAM:  Vitals:    03/23/23 0848   BP: 124/70   BP Location: Right arm   Patient Position: Sitting   BP Method: Large (Automatic)   Pulse: 69   Resp: 16   Temp: 98.5 °F (36.9 °C)   TempSrc: Oral   SpO2: 97%   Weight: 94.8 kg (209 lb)   Height: 5' 8" (1.727 m)     Body mass index is 31.78 kg/m².  Physical Exam  Constitutional:       General: She is not in acute distress.     Appearance: Normal appearance. She is not ill-appearing or toxic-appearing.   Abdominal:      Tenderness: There is no right CVA tenderness or left CVA tenderness.   Musculoskeletal:         General: No swelling, tenderness or deformity. Normal range of motion.      Right lower leg: No edema.      Left lower leg: No edema.   Skin:     General: Skin is warm and dry.      Capillary Refill: Capillary refill takes less than 2 seconds.   Neurological:      General: No focal deficit present.      Mental Status: She is alert and oriented to person, place, and time. Mental status is at baseline.      Motor: No weakness.      Gait: Gait normal.   Psychiatric:         Mood and Affect: Mood normal.         Behavior: Behavior normal.         Thought Content: Thought content normal.         Judgment: Judgment normal.      ASSESSMENT/PLAN:  1. Need for hepatitis A vaccination    2. Cholestasis  Assessment & Plan:  Per patient running out of ursodial prior to seeing hepatologist. Refill for two weeks and patient understands to follow up with specialist.       Orders:  -     ursodioL (ACTIGALL) 300 mg capsule; Take 1 capsule (300 mg total) by mouth 3 (three) times daily. for 14 days  Dispense: 90 capsule; Refill: 3    3. COPD without exacerbation  -     albuterol (PROVENTIL/VENTOLIN HFA) 90 mcg/actuation inhaler; Inhale 2 puffs into the lungs every 6 (six) hours as needed for Wheezing or Shortness of Breath.  Dispense: 18 g; Refill: 3    4. " Thickened nail  Assessment & Plan:  Follow up podiatry care.       5. Anxiety  Assessment & Plan:  Denies mood fluctuations, agitation and irritability.   - counseled at length on building coping skills  - demonstrated diaphragmatic breathing and help relax muscle groups  - when mind drifts off, then focus back to breathing  - discussed mindfulness at home   - free relaxation exercises to read and listen at [email or web address]/audio          6. BMI 31.0-31.9,adult  Overview:  Wt Readings from Last 8 Encounters:   03/23/23 94.8 kg (209 lb)   02/23/23 93.9 kg (207 lb)   02/07/23 95.7 kg (211 lb)   01/25/23 99.8 kg (220 lb)   01/20/23 97.1 kg (214 lb 1.1 oz)   01/20/23 97.1 kg (214 lb 1.1 oz)   01/20/23 97.1 kg (214 lb 1.1 oz)   01/19/23 100.2 kg (221 lb)   Recommendations:   Stay physically active. As tolerated alternate resistance training with stretching and cardio. Goal of 150 minutes per week of moderate intensity activity or 7,500 - 10,000 steps per day. Follow the Mediterranean Diet. Include whole fresh fruits, vegetables, olive oil, seeds, nuts, whole grains, cold water fish, salmon, mackerel and lean cuts of meat.  Do not drink sugary/diet carbonated beverages. Decrease portion sizes slightly which will result in an approximately 500-calorie deficit. Avoid fast or fried and processed food, especially canned foods. Avoid refined carbohydrates, white starchy foods, flour, white potato, bread, muffins, and cakes. Consider substituting one meal a day with a meal replacement such as Slim fast, lean cuisine, or weight watcher's. Follow a healthy diet that includes enough calcium, vitamin D and proteins for bone health.      Assessment & Plan:  Stable weight, continue with above recommendations.         Other orders  -     (In Office Administered) Hepatitis A Vaccine (Adult) (IM)        Other than changes above, continue current medications and maintain follow up with specialists.      No follow-ups on file.    Recent Results (from the past 2016 hour(s))   Research Lab    Collection Time: 01/19/23  8:16 AM   Result Value Ref Range    Research Lab No result necessary    Iron and TIBC    Collection Time: 01/19/23  8:22 AM   Result Value Ref Range    Iron 155 30 - 160 ug/dL    Transferrin 199 (L) 200 - 375 mg/dL    TIBC 295 250 - 450 ug/dL    Saturated Iron 53 (H) 20 - 50 %   Ferritin    Collection Time: 01/19/23  8:22 AM   Result Value Ref Range    Ferritin 89 20.0 - 300.0 ng/mL   Alpha-1-Antitrypsin    Collection Time: 01/19/23  8:22 AM   Result Value Ref Range    A-1 Antitrypsin 156 100 - 190 mg/dL   Varicella Zoster Antibody, IgG    Collection Time: 01/19/23  8:22 AM   Result Value Ref Range    Varicella Zoster IgG >4000.00 AU/ml    Varicella Interpretation Positive    Quantiferon Gold TB    Collection Time: 01/19/23  8:22 AM   Result Value Ref Range    NIL 0.65625 IU/mL    TB1 - Nil -0.003 IU/mL    TB2 - Nil -0.018 IU/mL    Mitogen - Nil 9.954 IU/mL    TB Gold Plus Negative Negative   Ceruloplasmin    Collection Time: 01/19/23  8:22 AM   Result Value Ref Range    Ceruloplasmin 20.0 15.0 - 45.0 mg/dL   Comprehensive Metabolic Panel    Collection Time: 01/19/23  8:22 AM   Result Value Ref Range    Sodium 138 136 - 145 mmol/L    Potassium 3.5 3.5 - 5.1 mmol/L    Chloride 108 95 - 110 mmol/L    CO2 25 23 - 29 mmol/L    Glucose 113 (H) 70 - 110 mg/dL    BUN 7 6 - 20 mg/dL    Creatinine 0.9 0.5 - 1.4 mg/dL    Calcium 9.5 8.7 - 10.5 mg/dL    Total Protein 6.8 6.0 - 8.4 g/dL    Albumin 3.3 (L) 3.5 - 5.2 g/dL    Total Bilirubin 3.2 (H) 0.1 - 1.0 mg/dL    Alkaline Phosphatase 258 (H) 55 - 135 U/L    AST 70 (H) 10 - 40 U/L    ALT 40 10 - 44 U/L    Anion Gap 5 (L) 8 - 16 mmol/L    eGFR >60.0 >60 mL/min/1.73 m^2   CBC Auto Differential    Collection Time: 01/19/23  8:22 AM   Result Value Ref Range    WBC 4.45 3.90 - 12.70 K/uL    RBC 4.56 4.00 - 5.40 M/uL    Hemoglobin 14.2 12.0 - 16.0 g/dL    Hematocrit 43.0 37.0 - 48.5 %    MCV  94 82 - 98 fL    MCH 31.1 (H) 27.0 - 31.0 pg    MCHC 33.0 32.0 - 36.0 g/dL    RDW 13.6 11.5 - 14.5 %    Platelets 117 (L) 150 - 450 K/uL    MPV 11.2 9.2 - 12.9 fL    Immature Granulocytes 0.2 0.0 - 0.5 %    Gran # (ANC) 2.7 1.8 - 7.7 K/uL    Immature Grans (Abs) 0.01 0.00 - 0.04 K/uL    Lymph # 1.2 1.0 - 4.8 K/uL    Mono # 0.4 0.3 - 1.0 K/uL    Eos # 0.1 0.0 - 0.5 K/uL    Baso # 0.04 0.00 - 0.20 K/uL    nRBC 0 0 /100 WBC    Gran % 60.2 38.0 - 73.0 %    Lymph % 26.1 18.0 - 48.0 %    Mono % 9.9 4.0 - 15.0 %    Eosinophil % 2.7 0.0 - 8.0 %    Basophil % 0.9 0.0 - 1.9 %    Differential Method Automated    Protime-INR    Collection Time: 01/19/23  8:22 AM   Result Value Ref Range    Prothrombin Time 14.3 (H) 9.0 - 12.5 sec    INR 1.4 (H) 0.8 - 1.2   Bilirubin, Direct    Collection Time: 01/19/23  8:22 AM   Result Value Ref Range    Bilirubin, Direct 1.2 (H) 0.1 - 0.3 mg/dL   Hepatitis B Surface Ab, Qualitative    Collection Time: 01/19/23  8:22 AM   Result Value Ref Range    Hep B S Ab 28.65 mIU/mL    Hep B S Ab Reactive    Gamma GT    Collection Time: 01/19/23  8:22 AM   Result Value Ref Range    GGT 19 8 - 55 U/L   TSH    Collection Time: 01/19/23  8:22 AM   Result Value Ref Range    TSH 2.308 0.400 - 4.000 uIU/mL   Vitamin D    Collection Time: 01/19/23  8:22 AM   Result Value Ref Range    Vit D, 25-Hydroxy 26 (L) 30 - 96 ng/mL   Vitamin A    Collection Time: 01/19/23  8:22 AM   Result Value Ref Range    Retinol, serum <13 (L) 38 - 106 ug/dL   Zinc    Collection Time: 01/19/23  8:22 AM   Result Value Ref Range    Zinc 41 (L) 60 - 130 ug/dL   Hepatitis A antibody, IgG    Collection Time: 01/19/23  8:22 AM   Result Value Ref Range    Hepatitis A Antibody IgG Non-reactive    Hepatitis B core antibody, total    Collection Time: 01/19/23  8:22 AM   Result Value Ref Range    Hep B Core Total Ab Reactive (A) Non-reactive   Hepatitis B surface antigen    Collection Time: 01/19/23  8:22 AM   Result Value Ref Range    Hepatitis  B Surface Ag Non-reactive Non-reactive   HIV-1 and HIV-2 antibodies    Collection Time: 01/19/23  8:22 AM   Result Value Ref Range    HIV 1/2 Ag/Ab Non-reactive Non-reactive   Hepatitis C antibody    Collection Time: 01/19/23  8:22 AM   Result Value Ref Range    Hepatitis C Ab Reactive (A) Non-reactive   Cytomegalovirus antibody, IgG    Collection Time: 01/19/23  8:22 AM   Result Value Ref Range    CMV IgG Interpretation Reactive (A) Non-Reactive   RPR    Collection Time: 01/19/23  8:22 AM   Result Value Ref Range    RPR Non-reactive Non-reactive   Type & Screen    Collection Time: 01/19/23  8:22 AM   Result Value Ref Range    Group & Rh O POS     Indirect Aleena NEG    Strongyloides IgG Antibodies    Collection Time: 01/19/23  8:22 AM   Result Value Ref Range    Strongyloides Ab IgG Positive (A) Negative   Phosphatidylethanol (PETH)    Collection Time: 01/19/23  8:22 AM   Result Value Ref Range    PEth 16:0/18.1 (POPEth) <10 Cutoff: 10 ng/mL    PEth 16:0/18.2 (PLPEth) <10 Cutoff: 10 ng/mL    PETH INTERPRETATION Negative.    Stress Echo Which stress agent will be used? Pharmacological; Color Flow Doppler? Yes    Collection Time: 01/19/23 10:14 AM   Result Value Ref Range    Ascending aorta 3.12 cm    STJ 3.18 cm    AV mean gradient 10 mmHg    Ao peak tariq 2.07 m/s    Ao VTI 50.35 cm    IVS 0.87 0.6 - 1.1 cm    LA size 3.76 cm    Left Atrium Major Axis 4.83 cm    Left Atrium Minor Axis 4.62 cm    LVIDd 5.64 3.5 - 6.0 cm    LVIDs 3.50 2.1 - 4.0 cm    LVOT diameter 2.30 cm    LVOT peak VTI 36.38 cm    Posterior Wall 0.94 0.6 - 1.1 cm    MV Peak A Tariq 0.87 m/s    E wave deceleration time 275.63 msec    MV Peak E Tariq 0.99 m/s    PV Peak D Tariq 0.51 m/s    PV Peak S Tariq 0.66 m/s    RA Major Axis 4.95 cm    RA Width 3.32 cm    RVDD 3.44 cm    Sinus 2.85 cm    TAPSE 2.67 cm    TR Max Tariq 2.66 m/s    TDI LATERAL 0.12 m/s    TDI SEPTAL 0.08 m/s    LA WIDTH 3.56 cm    MV stenosis pressure 1/2 time 79.93 ms    LV Diastolic Volume  "156.42 mL    LV Systolic Volume 50.76 mL    RV S' 17.24 cm/s    LVOT peak angelique 1.77 m/s    LA volume (mod) 48.38 cm3    MV "A" wave duration 14.08 msec    LV LATERAL E/E' RATIO 8.25 m/s    LV SEPTAL E/E' RATIO 12.38 m/s    FS 38 %    LA volume 53.73 cm3    LV mass 195.36 g    Left Ventricle Relative Wall Thickness 0.33 cm    AV valve area 3.00 cm2    AV Velocity Ratio 0.86     AV index (prosthetic) 0.72     MV valve area p 1/2 method 2.75 cm2    E/A ratio 1.14     Mean e' 0.10 m/s    Pulm vein S/D ratio 1.29     LVOT area 4.2 cm2    LVOT stroke volume 151.07 cm3    AV peak gradient 17 mmHg    E/E' ratio 9.90 m/s    Triscuspid Valve Regurgitation Peak Gradient 28 mmHg    BSA 2.19 m2    Systolic blood pressure 101 mmHg    Diastolic blood pressure 48 mmHg    HR at rest 56 bpm    RPP 5,656     Peak  bpm    Peak Systolic  mmHg    Peak Diatolic BP 70 mmHg    Peak RPP 31,161     Max Predicted      85% Max Predicted      % Max HR Achieved 91     1 Minute Recovery  bpm    OHS CV CPX PATIENT IS MALE 0     OHS CV CPX PATIENT IS FEMALE 1     LV Systolic Volume Index 23.8 mL/m2    LV Diastolic Volume Index 73.44 mL/m2    LA Volume Index 25.2 mL/m2    LV Mass Index 92 g/m2    LA Volume Index (Mod) 22.7 mL/m2    Right Atrial Pressure (from IVC) 3 mmHg    EF 65 %    TV rest pulmonary artery pressure 31 mmHg   Type & Screen    Collection Time: 01/20/23  8:00 AM   Result Value Ref Range    Group & Rh O POS     Indirect Aleena NEG    Urinalysis    Collection Time: 01/20/23 10:30 AM   Result Value Ref Range    Specimen UA Urine, Clean Catch     Color, UA Yellow Yellow, Straw, Joycelyn    Appearance, UA Hazy (A) Clear    pH, UA 7.0 5.0 - 8.0    Specific Gravity, UA 1.025 1.005 - 1.030    Protein, UA 1+ (A) Negative    Glucose, UA Negative Negative    Ketones, UA Trace (A) Negative    Bilirubin (UA) Negative Negative    Occult Blood UA Trace (A) Negative    Nitrite, UA Positive (A) Negative    Leukocytes, UA 1+ " (A) Negative   Toxicology Screen, Urine    Collection Time: 01/20/23 10:30 AM   Result Value Ref Range    Alcohol, Urine <10 <10 mg/dL    Benzodiazepines Negative Negative    Methadone metabolites Negative Negative    Cocaine (Metab.) Negative Negative    Opiate Scrn, Ur Negative Negative    Barbiturate Screen, Ur Negative Negative    Amphetamine Screen, Ur Negative Negative    THC Presumptive Positive (A) Negative    Phencyclidine Negative Negative    Creatinine, Urine 283.0 15.0 - 325.0 mg/dL    Toxicology Information SEE COMMENT    Urinalysis Microscopic    Collection Time: 01/20/23 10:30 AM   Result Value Ref Range    RBC, UA 0 0 - 4 /hpf    WBC, UA 27 (H) 0 - 5 /hpf    Bacteria Many (A) None-Occ /hpf    Yeast, UA Occasional (A) None    Squam Epithel, UA 54 /hpf    Hyaline Casts, UA 0 0-1/lpf /lpf    Ca Oxalate Sanaz, UA Few None-Moderate    Microscopic Comment SEE COMMENT    HEPATITIS B VIRAL DNA, QUANTITATIVE    Collection Time: 01/20/23  1:03 PM   Result Value Ref Range    Hep B Viral DNA IU/ML <10 <10 IU/mL    Log HBV IU/mL <1.00 <1.00 Log (10) IU/mL    Hepatitis B Virus DNA Not detected Not detected   Hepatitis C Genotype    Collection Time: 01/20/23  1:03 PM   Result Value Ref Range    HCV Qualitative Result Not detected Not detected    HCV Quantitative Result <12 <12 IU/mL    HCV Quantitative Log <1.08 <1.08 Log (10) IU/mL    Hepatitis C Virus Genotype Canceled    Hepatitis C RNA, Quantitative, PCR    Collection Time: 01/20/23  1:03 PM   Result Value Ref Range    HCV Quantitative Result Not Detected <12 IU/mL    HCV, Qualitative Not Detected Not Detected   Creatinine Clearance, Timed    Collection Time: 01/23/23  8:40 AM   Result Value Ref Range    Urine Volume 950 mL    Urine Collection Duration 24 Hr    Creatinine, Urine 134.0 15.0 - 325.0 mg/dL    Creatinine Clearance 88 70 - 110 mL/min    Creatinine, Urinr (mg/spec) 1273.0 mg/Spec    Creatinine 1.0 0.5 - 1.4 mg/dL   Protein, urine, timed    Collection  Time: 01/23/23  8:40 AM   Result Value Ref Range    Urine Volume 950 mL    Urine Collection Duration 24 Hr    Protein, Urine 11.5 0.0 - 15.0 mg/dL    Urine Protein, Timed 109 (H) 0 - 100 mg/Spec   CBC Auto Differential    Collection Time: 01/23/23  3:36 PM   Result Value Ref Range    WBC 6.18 3.90 - 12.70 K/uL    RBC 4.45 4.00 - 5.40 M/uL    Hemoglobin 14.0 12.0 - 16.0 g/dL    Hematocrit 40.6 37.0 - 48.5 %    MCV 91 82 - 98 fL    MCH 31.5 (H) 27.0 - 31.0 pg    MCHC 34.5 32.0 - 36.0 g/dL    RDW 13.5 11.5 - 14.5 %    Platelets 118 (L) 150 - 450 K/uL    MPV 10.6 9.2 - 12.9 fL    Immature Granulocytes 0.2 0.0 - 0.5 %    Gran # (ANC) 3.1 1.8 - 7.7 K/uL    Immature Grans (Abs) 0.01 0.00 - 0.04 K/uL    Lymph # 2.2 1.0 - 4.8 K/uL    Mono # 0.7 0.3 - 1.0 K/uL    Eos # 0.2 0.0 - 0.5 K/uL    Baso # 0.06 0.00 - 0.20 K/uL    nRBC 0 0 /100 WBC    Gran % 49.5 38.0 - 73.0 %    Lymph % 35.8 18.0 - 48.0 %    Mono % 10.7 4.0 - 15.0 %    Eosinophil % 2.8 0.0 - 8.0 %    Basophil % 1.0 0.0 - 1.9 %    Differential Method Automated    Protime-INR    Collection Time: 01/23/23  3:36 PM   Result Value Ref Range    Prothrombin Time 15.3 (H) 9.0 - 12.5 sec    INR 1.5 (H) 0.8 - 1.2   Comprehensive Metabolic Panel    Collection Time: 01/23/23  3:36 PM   Result Value Ref Range    Sodium 140 136 - 145 mmol/L    Potassium 4.0 3.5 - 5.1 mmol/L    Chloride 109 95 - 110 mmol/L    CO2 28 23 - 29 mmol/L    Glucose 122 (H) 70 - 110 mg/dL    BUN 9 6 - 20 mg/dL    Creatinine 1.1 0.5 - 1.4 mg/dL    Calcium 9.0 8.7 - 10.5 mg/dL    Total Protein 6.6 6.0 - 8.4 g/dL    Albumin 3.0 (L) 3.5 - 5.2 g/dL    Total Bilirubin 2.9 (H) 0.1 - 1.0 mg/dL    Alkaline Phosphatase 242 (H) 55 - 135 U/L    AST 76 (H) 10 - 40 U/L    ALT 46 (H) 10 - 44 U/L    Anion Gap 3 (L) 8 - 16 mmol/L    eGFR 58.2 (A) >60 mL/min/1.73 m^2   POCT urine pregnancy    Collection Time: 01/25/23 10:44 AM   Result Value Ref Range    POC Preg Test, Ur Negative Negative     Acceptable Yes     Phosphatidylethanol (PETH)    Collection Time: 02/07/23 12:20 PM   Result Value Ref Range    PEth 16:0/18.1 (POPEth) <10 Cutoff: 10 ng/mL    PEth 16:0/18.2 (PLPEth) <10 Cutoff: 10 ng/mL    PETH INTERPRETATION Negative.    Drug screen panel, in-house    Collection Time: 02/07/23 12:33 PM   Result Value Ref Range    Benzodiazepines Negative Negative    Methadone metabolites Negative Negative    Cocaine (Metab.) Negative Negative    Opiate Scrn, Ur Negative Negative    Barbiturate Screen, Ur Negative Negative    Amphetamine Screen, Ur Negative Negative    THC Presumptive Positive (A) Negative    Phencyclidine Negative Negative    Creatinine, Urine 161.0 15.0 - 325.0 mg/dL    Toxicology Information SEE COMMENT    Drug screen panel, in-house    Collection Time: 03/07/23 10:13 AM   Result Value Ref Range    Benzodiazepines Negative Negative    Methadone metabolites Negative Negative    Cocaine (Metab.) Negative Negative    Opiate Scrn, Ur Negative Negative    Barbiturate Screen, Ur Negative Negative    Amphetamine Screen, Ur Negative Negative    THC Presumptive Positive (A) Negative    Phencyclidine Negative Negative    Creatinine, Urine 151.0 15.0 - 325.0 mg/dL    Toxicology Information SEE COMMENT    AFP Tumor Marker    Collection Time: 03/17/23  9:51 AM   Result Value Ref Range    AFP 4.2 0.0 - 8.4 ng/mL   Drug screen panel, in-house    Collection Time: 03/17/23  9:54 AM   Result Value Ref Range    Benzodiazepines Negative Negative    Methadone metabolites Negative Negative    Cocaine (Metab.) Negative Negative    Opiate Scrn, Ur Negative Negative    Barbiturate Screen, Ur Negative Negative    Amphetamine Screen, Ur Negative Negative    THC Presumptive Positive (A) Negative    Phencyclidine Negative Negative    Creatinine, Urine 88.6 15.0 - 325.0 mg/dL    Toxicology Information SEE COMMENT          Kazumi G Yoshinaga, DO [facility]

## 2023-03-24 PROBLEM — Z23 NEED FOR HEPATITIS A VACCINATION: Status: ACTIVE | Noted: 2023-03-24

## 2023-03-24 PROBLEM — K83.1 CHOLESTASIS: Status: ACTIVE | Noted: 2023-03-24

## 2023-03-25 NOTE — ASSESSMENT & PLAN NOTE
Per patient running out of ursodial prior to seeing hepatologist. Refill for two weeks and patient understands to follow up with specialist.

## 2023-03-25 NOTE — ASSESSMENT & PLAN NOTE
Denies mood fluctuations, agitation and irritability.   - counseled at length on building coping skills  - demonstrated diaphragmatic breathing and help relax muscle groups  - when mind drifts off, then focus back to breathing  - discussed mindfulness at home   - free relaxation exercises to read and listen at [email or web address]

## 2023-03-28 ENCOUNTER — PATIENT OUTREACH (OUTPATIENT)
Dept: ADMINISTRATIVE | Facility: HOSPITAL | Age: 58
End: 2023-03-28
Payer: MEDICAID

## 2023-04-03 ENCOUNTER — TELEPHONE (OUTPATIENT)
Dept: SMOKING CESSATION | Facility: CLINIC | Age: 58
End: 2023-04-03
Payer: MEDICAID

## 2023-04-03 DIAGNOSIS — C22.0 HCC (HEPATOCELLULAR CARCINOMA): ICD-10-CM

## 2023-04-03 DIAGNOSIS — Z00.6 RESEARCH STUDY PATIENT: Primary | ICD-10-CM

## 2023-04-03 NOTE — TELEPHONE ENCOUNTER
Attempted to contact patient regarding today's scheduled smoking cessation appointment. There was no answer at this time. Voicemail is currently unavailable.

## 2023-04-06 ENCOUNTER — TELEPHONE (OUTPATIENT)
Dept: TRANSPLANT | Facility: CLINIC | Age: 58
End: 2023-04-06
Payer: MEDICAID

## 2023-04-06 NOTE — TELEPHONE ENCOUNTER
----- Message from Nicola Chan sent at 4/6/2023  3:05 PM CDT -----  Regarding: FW: [facility]    Returned call to pt daisyd she told me that she was unable to login in to her pt portal for her appt today. Pt appt has been re/lourdes'ed to 4/28 and she will call the [phone] number about accessing her pt portal.  .  ----- Message -----  From: Shane Young  Sent: 4/6/2023  11:59 AM CDT  To: [facility] Pre-Liver Transplant Non-Clinical  Subject: Reschedule Virtual                               Patient requested a call back from  to reschedule her virtual visit with pre liver txp. Pt had old moneymeets alessio; login was unsuccessful. Assisted patient with accessing updated alessio to ensure ability to have virtual appt going forward.                    Contact: [phone]

## 2023-04-11 ENCOUNTER — TELEPHONE (OUTPATIENT)
Dept: SMOKING CESSATION | Facility: CLINIC | Age: 58
End: 2023-04-11
Payer: MEDICAID

## 2023-04-11 ENCOUNTER — TELEPHONE (OUTPATIENT)
Dept: TRANSPLANT | Facility: HOSPITAL | Age: 58
End: 2023-04-11
Payer: MEDICAID

## 2023-04-11 NOTE — TELEPHONE ENCOUNTER
SW attempted to reach pt at c: [phone], no answer and voicemail is full. SW called s/o, Campbell Mccormick, c: [phone], he stated she is visiting her sister and that he would call and provide her with SW number to return call. SW calling to find out if pt has maintained sobriety and engaged in any AA or other treatment. Pt s/o did state she has maintained sobriety and to his knowledge is not engaged in any AA.    SW also tried to call pt's back-up caregiver to confirm their role. SW has been unable to reach them in the past. Pt's daughter, Do, is listed as back-up, but to reach Do pt instructed us to call her granddaughter, Ken, c: [phone]. SW called, there was no answer, SW left a voicemail.     Pt remains high risk due to less than a year sobriety time and SW unable to reach back-up caregiver.

## 2023-04-12 ENCOUNTER — RESEARCH ENCOUNTER (OUTPATIENT)
Dept: RESEARCH | Facility: HOSPITAL | Age: 58
End: 2023-04-12
Payer: MEDICAID

## 2023-04-12 ENCOUNTER — TELEPHONE (OUTPATIENT)
Dept: TRANSPLANT | Facility: CLINIC | Age: 58
End: 2023-04-12
Payer: MEDICAID

## 2023-04-12 ENCOUNTER — LAB VISIT (OUTPATIENT)
Dept: LAB | Facility: HOSPITAL | Age: 58
End: 2023-04-12
Attending: INTERNAL MEDICINE
Payer: MEDICAID

## 2023-04-12 DIAGNOSIS — R82.5 POSITIVE URINE DRUG SCREEN: ICD-10-CM

## 2023-04-12 DIAGNOSIS — Z01.818 PRE-TRANSPLANT EVALUATION FOR LIVER TRANSPLANT: ICD-10-CM

## 2023-04-12 DIAGNOSIS — Z00.6 RESEARCH STUDY PATIENT: ICD-10-CM

## 2023-04-12 DIAGNOSIS — K76.9 LIVER DISEASE, UNSPECIFIED: ICD-10-CM

## 2023-04-12 DIAGNOSIS — C22.0 HCC (HEPATOCELLULAR CARCINOMA): ICD-10-CM

## 2023-04-12 DIAGNOSIS — I85.00 ESOPHAGEAL VARICES WITHOUT BLEEDING, UNSPECIFIED ESOPHAGEAL VARICES TYPE: ICD-10-CM

## 2023-04-12 DIAGNOSIS — K70.30 ALCOHOLIC CIRRHOSIS OF LIVER WITHOUT ASCITES: ICD-10-CM

## 2023-04-12 DIAGNOSIS — C22.0 HEPATOCELLULAR CARCINOMA: Primary | ICD-10-CM

## 2023-04-12 DIAGNOSIS — Z95.828 S/P TIPS (TRANSJUGULAR INTRAHEPATIC PORTOSYSTEMIC SHUNT): ICD-10-CM

## 2023-04-12 LAB
ALBUMIN SERPL BCP-MCNC: 3.6 G/DL (ref 3.5–5.2)
ALP SERPL-CCNC: 195 U/L (ref 55–135)
ALT SERPL W/O P-5'-P-CCNC: 35 U/L (ref 10–44)
ANION GAP SERPL CALC-SCNC: 11 MMOL/L (ref 8–16)
AST SERPL-CCNC: 46 U/L (ref 10–40)
BASOPHILS # BLD AUTO: 0.1 K/UL (ref 0–0.2)
BASOPHILS NFR BLD: 1.1 % (ref 0–1.9)
BILIRUB SERPL-MCNC: 5.3 MG/DL (ref 0.1–1)
BUN SERPL-MCNC: 7 MG/DL (ref 6–20)
CALCIUM SERPL-MCNC: 10.1 MG/DL (ref 8.7–10.5)
CHLORIDE SERPL-SCNC: 103 MMOL/L (ref 95–110)
CO2 SERPL-SCNC: 24 MMOL/L (ref 23–29)
CREAT SERPL-MCNC: 0.9 MG/DL (ref 0.5–1.4)
DIFFERENTIAL METHOD: ABNORMAL
EOSINOPHIL # BLD AUTO: 0.2 K/UL (ref 0–0.5)
EOSINOPHIL NFR BLD: 1.7 % (ref 0–8)
ERYTHROCYTE [DISTWIDTH] IN BLOOD BY AUTOMATED COUNT: 17.1 % (ref 11.5–14.5)
EST. GFR  (NO RACE VARIABLE): >60 ML/MIN/1.73 M^2
GLUCOSE SERPL-MCNC: 125 MG/DL (ref 70–110)
HCT VFR BLD AUTO: 50.3 % (ref 37–48.5)
HGB BLD-MCNC: 16.8 G/DL (ref 12–16)
IMM GRANULOCYTES # BLD AUTO: 0.02 K/UL (ref 0–0.04)
IMM GRANULOCYTES NFR BLD AUTO: 0.2 % (ref 0–0.5)
INR PPP: 1.2 (ref 0.8–1.2)
LYMPHOCYTES # BLD AUTO: 3 K/UL (ref 1–4.8)
LYMPHOCYTES NFR BLD: 33.9 % (ref 18–48)
MCH RBC QN AUTO: 30.3 PG (ref 27–31)
MCHC RBC AUTO-ENTMCNC: 33.4 G/DL (ref 32–36)
MCV RBC AUTO: 91 FL (ref 82–98)
MONOCYTES # BLD AUTO: 0.9 K/UL (ref 0.3–1)
MONOCYTES NFR BLD: 9.9 % (ref 4–15)
NEUTROPHILS # BLD AUTO: 4.6 K/UL (ref 1.8–7.7)
NEUTROPHILS NFR BLD: 53.2 % (ref 38–73)
NRBC BLD-RTO: 0 /100 WBC
PLATELET # BLD AUTO: 168 K/UL (ref 150–450)
PMV BLD AUTO: 10 FL (ref 9.2–12.9)
POTASSIUM SERPL-SCNC: 3.8 MMOL/L (ref 3.5–5.1)
PROT SERPL-MCNC: 7.3 G/DL (ref 6–8.4)
PROTHROMBIN TIME: 12 SEC (ref 9–12.5)
RBC # BLD AUTO: 5.54 M/UL (ref 4–5.4)
RESEARCH LAB: NORMAL
SODIUM SERPL-SCNC: 138 MMOL/L (ref 136–145)
WBC # BLD AUTO: 8.72 K/UL (ref 3.9–12.7)

## 2023-04-12 PROCEDURE — 80053 COMPREHEN METABOLIC PANEL: CPT | Mod: TXP | Performed by: INTERNAL MEDICINE

## 2023-04-12 PROCEDURE — 85025 COMPLETE CBC W/AUTO DIFF WBC: CPT | Mod: TXP | Performed by: INTERNAL MEDICINE

## 2023-04-12 PROCEDURE — 36415 COLL VENOUS BLD VENIPUNCTURE: CPT | Mod: TXP | Performed by: SURGERY

## 2023-04-12 PROCEDURE — 85610 PROTHROMBIN TIME: CPT | Mod: TXP | Performed by: INTERNAL MEDICINE

## 2023-04-12 PROCEDURE — 80321 ALCOHOLS BIOMARKERS 1OR 2: CPT | Mod: TXP | Performed by: INTERNAL MEDICINE

## 2023-04-12 PROCEDURE — G0480 DRUG TEST DEF 1-7 CLASSES: HCPCS | Mod: TXP | Performed by: INTERNAL MEDICINE

## 2023-04-12 NOTE — TELEPHONE ENCOUNTER
"Transplant SW contacted patient for follow up. SW was able to verify patient's secondary caregiver, Do Morrison (ph: [phone]). Patient's daughter confirms availability as needed, drives and has her own vehicle.      In regards to tobacco and alcohol use:   Patient reported she is still smoking cigarettes. Patient has not been able to quit use. Patient was referred to Smoking Cessation. Patient has documented call [facility] from Smoking Cessation program yesterday regarding missed follow up appointment.     Patient is a poor historian regarding alcohol use history and continued to drink after liver diagnosis. During today's call, patient reported last alcohol use was "6 months ago". Patient was unable to give exact date/month. During initial transplant evaluation in January 2023, patient also reported last use as "6 months ago". SW inquired if patient engaged in any AA or IOP. Patient reports "why do I need to do AA". SW informed patient that with less than a year of sobriety, it is recommended for patient so engage in IOP and AA for support to prevent relapse. Patient reports "I don't plan to relapse". SW reiterated importance of showing committment to transplant by following recommendations. Patient states she may not have any meetings available near her. SW to mail patient resource list for local AA meetings as well as virtual options. Patient verbalizes understanding.       PM UPDATE:  After speaking with patient, SW was notified patient had a Drug Screen panel that was positive for THC and Amphetamine. Patient had not previously disclosed any illicit drug use besides marijuana. SW recommends patient has consult with Addiction Psychiatry for additional assessment of substance use history and recent positive tox screen. SW also awaiting psychiatric records from patient's mental health provider that was requested. Patient has history of Bipolar Disorder, Anxiety, Depression and Panic Disorder.   "

## 2023-04-12 NOTE — PROGRESS NOTES
RESEARCH STUDY FOLLOW-UP SPECIMEN COLLECTION ENCOUNTER  ORGAN TRANSPLANT  [facility] RAMANDEEP VIVAS    Study Title: Role of Tumor-Induced Immune Tolerance in the Patient Response to Locoregional Therapy: Implications in Assessment Risk of Hepatocellular Carcinoma Recurrence Following Liver Transplantation    IRB #: [id number]    IRB Approval Date: 6/8/2016    : Ramiro Calvert MD  Sub-investigator: Guanako Garner, PhD    Patient Number: [id number]    In accordance with the study protocol, Research Lab orders were placed and follow-up specimens were collected on (date: 04/12/2023) in:  [facility] VNP: YES/NO: no  [facility] LABTX: YES/NO: yes  [facility] LAB IM: YES/NO: no    Brit Owens  Admin Research- Liver Transplant

## 2023-04-14 ENCOUNTER — TELEPHONE (OUTPATIENT)
Dept: TRANSPLANT | Facility: CLINIC | Age: 58
End: 2023-04-14
Payer: MEDICAID

## 2023-04-14 ENCOUNTER — HOSPITAL ENCOUNTER (OUTPATIENT)
Dept: PREADMISSION TESTING | Facility: HOSPITAL | Age: 58
Discharge: HOME | End: 2023-04-14
Attending: INTERNAL MEDICINE
Payer: MEDICAID

## 2023-04-14 DIAGNOSIS — I85.00 ESOPHAGEAL VARICES WITHOUT BLEEDING, UNSPECIFIED ESOPHAGEAL VARICES TYPE: ICD-10-CM

## 2023-04-14 DIAGNOSIS — R82.5 POSITIVE URINE DRUG SCREEN: ICD-10-CM

## 2023-04-14 DIAGNOSIS — K70.30 ALCOHOLIC CIRRHOSIS OF LIVER WITHOUT ASCITES: ICD-10-CM

## 2023-04-14 DIAGNOSIS — C22.0 HEPATOCELLULAR CARCINOMA: ICD-10-CM

## 2023-04-14 DIAGNOSIS — Z95.828 S/P TIPS (TRANSJUGULAR INTRAHEPATIC PORTOSYSTEMIC SHUNT): ICD-10-CM

## 2023-04-14 DIAGNOSIS — Z01.818 PRE-TRANSPLANT EVALUATION FOR LIVER TRANSPLANT: ICD-10-CM

## 2023-04-14 NOTE — TELEPHONE ENCOUNTER
notified    ----- Message from Shari Arreola MD sent at 4/13/2023 10:40 PM CDT -----  Positive amphetamine and THC. Pl inform SW.

## 2023-04-16 LAB
CLINICAL BIOCHEMIST REVIEW: NORMAL
PLPETH BLD-MCNC: <10 NG/ML
POPETH BLD-MCNC: <10 NG/ML

## 2023-04-17 ENCOUNTER — TELEPHONE (OUTPATIENT)
Dept: TRANSPLANT | Facility: CLINIC | Age: 58
End: 2023-04-17
Payer: MEDICAID

## 2023-04-17 ENCOUNTER — CLINICAL SUPPORT (OUTPATIENT)
Dept: SMOKING CESSATION | Facility: CLINIC | Age: 58
End: 2023-04-17
Payer: COMMERCIAL

## 2023-04-17 DIAGNOSIS — F17.200 NICOTINE DEPENDENCE: Primary | ICD-10-CM

## 2023-04-17 PROCEDURE — 99406 PR TOBACCO USE CESSATION INTERMEDIATE 3-10 MINUTES: ICD-10-PCS | Mod: S$GLB,TXP,,

## 2023-04-17 PROCEDURE — 99406 BEHAV CHNG SMOKING 3-10 MIN: CPT | Mod: S$GLB,TXP,,

## 2023-04-17 NOTE — PROGRESS NOTES
Spoke with patient today in regard to smoking cessation progress for 6 month phone follow up on Quit 1.  Patient not tobacco free at this time but stated that she is close to Quitting.  Commended patient on their progress thus far.  Patient currently enrolled in program for Quit 2 and has an appointment scheduled with her CTTS.  Patient states a lot of stress right now, but she is ready to recommit to her quit.  Informed patient of benefit period, future follow ups, and contact information if any further help or support is needed.  Quit attempt #1 is completed and resolved.

## 2023-04-17 NOTE — TELEPHONE ENCOUNTER
Call returned with no answer. Unable to leave a voice message d/t the mailbox is full.     ----- Message -----  From: Deyanira Cronin MA  Sent: 4/14/2023   1:45 PM CDT  To: [facility] Pre-Liver Transplant Clinical  Subject: FW: Pt [place] [facility]             ----- Message -----  From: Alfonso Oconnor MA  Sent: 4/14/2023  10:02 AM CDT  To: [facility] Staff  Subject: Pt requesting [facility]               Patient is requesting to have Colonoscopy and EGD in [place] instead of [place]. Please call and advise the patient.

## 2023-04-18 ENCOUNTER — HOSPITAL ENCOUNTER (INPATIENT)
Facility: HOSPITAL | Age: 58
LOS: 2 days | Discharge: HOME | DRG: 642 | End: 2023-04-20
Attending: EMERGENCY MEDICINE | Admitting: STUDENT IN AN ORGANIZED HEALTH CARE EDUCATION/TRAINING PROGRAM
Payer: COMMERCIAL

## 2023-04-18 DIAGNOSIS — E72.20 HYPERAMMONEMIA: ICD-10-CM

## 2023-04-18 DIAGNOSIS — K70.30 ALCOHOLIC CIRRHOSIS, UNSPECIFIED WHETHER ASCITES PRESENT: ICD-10-CM

## 2023-04-18 DIAGNOSIS — R41.82 ALTERED MENTAL STATUS, UNSPECIFIED ALTERED MENTAL STATUS TYPE: Primary | ICD-10-CM

## 2023-04-18 DIAGNOSIS — K76.82 HEPATIC ENCEPHALOPATHY: ICD-10-CM

## 2023-04-18 DIAGNOSIS — F19.10 MULTIPLE SUBSTANCE ABUSE: ICD-10-CM

## 2023-04-18 DIAGNOSIS — F19.10 SUBSTANCE ABUSE: ICD-10-CM

## 2023-04-18 LAB
ALBUMIN SERPL BCP-MCNC: 3.1 G/DL (ref 3.5–5.2)
ALP SERPL-CCNC: 228 U/L (ref 55–135)
ALT SERPL W/O P-5'-P-CCNC: 41 U/L (ref 10–44)
AMMONIA PLAS-SCNC: 114 UMOL/L (ref 10–50)
AMPHET+METHAMPHET UR QL: ABNORMAL
ANION GAP SERPL CALC-SCNC: 3 MMOL/L (ref 8–16)
AST SERPL-CCNC: 36 U/L (ref 10–40)
BACTERIA #/AREA URNS HPF: ABNORMAL /HPF
BARBITURATES UR QL SCN>200 NG/ML: NEGATIVE
BASOPHILS # BLD AUTO: 0.03 K/UL (ref 0–0.2)
BASOPHILS NFR BLD: 0.3 % (ref 0–1.9)
BENZODIAZ UR QL SCN>200 NG/ML: NEGATIVE
BILIRUB SERPL-MCNC: 2.9 MG/DL (ref 0.1–1)
BILIRUB UR QL STRIP: NEGATIVE
BUN SERPL-MCNC: 11 MG/DL (ref 6–20)
BZE UR QL SCN: NEGATIVE
CALCIUM SERPL-MCNC: 9.7 MG/DL (ref 8.7–10.5)
CANNABINOIDS UR QL SCN: ABNORMAL
CHLORIDE SERPL-SCNC: 115 MMOL/L (ref 95–110)
CLARITY UR: ABNORMAL
CO2 SERPL-SCNC: 24 MMOL/L (ref 23–29)
COLOR UR: YELLOW
COMMENTS: ABNORMAL
CREAT SERPL-MCNC: 0.9 MG/DL (ref 0.5–1.4)
CREAT UR-MCNC: 78 MG/DL (ref 15–325)
DIFFERENTIAL METHOD: ABNORMAL
EOSINOPHIL # BLD AUTO: 0.1 K/UL (ref 0–0.5)
EOSINOPHIL NFR BLD: 0.4 % (ref 0–8)
ERYTHROCYTE [DISTWIDTH] IN BLOOD BY AUTOMATED COUNT: 16.3 % (ref 11.5–14.5)
EST. GFR  (NO RACE VARIABLE): >60 ML/MIN/1.73 M^2
ETHANOL SERPL-MCNC: <3 MG/DL
FIO2: 21 %
GLUCOSE SERPL-MCNC: 127 MG/DL (ref 70–110)
GLUCOSE UR QL STRIP: NEGATIVE
HCT VFR BLD AUTO: 48.5 % (ref 37–48.5)
HGB BLD-MCNC: 16.3 G/DL (ref 12–16)
HGB UR QL STRIP: NEGATIVE
HYALINE CASTS #/AREA URNS LPF: 0 /LPF
IMM GRANULOCYTES # BLD AUTO: 0.04 K/UL (ref 0–0.04)
IMM GRANULOCYTES NFR BLD AUTO: 0.3 % (ref 0–0.5)
KETONES UR QL STRIP: NEGATIVE
LACTATE SERPL-SCNC: 1.9 MMOL/L (ref 0.5–2.2)
LEUKOCYTE ESTERASE UR QL STRIP: NEGATIVE
LIPASE SERPL-CCNC: 54 U/L (ref 23–300)
LYMPHOCYTES # BLD AUTO: 2.3 K/UL (ref 1–4.8)
LYMPHOCYTES NFR BLD: 19.3 % (ref 18–48)
MCH RBC QN AUTO: 30.9 PG (ref 27–31)
MCHC RBC AUTO-ENTMCNC: 33.6 G/DL (ref 32–36)
MCV RBC AUTO: 92 FL (ref 82–98)
METHADONE UR QL SCN>300 NG/ML: NEGATIVE
MICROSCOPIC COMMENT: ABNORMAL
MODIFIED ALLEN'S TEST: ABNORMAL
MONOCYTES # BLD AUTO: 0.8 K/UL (ref 0.3–1)
MONOCYTES NFR BLD: 6.6 % (ref 4–15)
NEUTROPHILS # BLD AUTO: 8.6 K/UL (ref 1.8–7.7)
NEUTROPHILS NFR BLD: 73.1 % (ref 38–73)
NITRITE UR QL STRIP: NEGATIVE
NRBC BLD-RTO: 0 /100 WBC
O2DEVICE: ABNORMAL
OPIATES UR QL SCN: NEGATIVE
PCO2 BLDA: 32.7 MMHG (ref 35–45)
PCP UR QL SCN>25 NG/ML: NEGATIVE
PH SMN: 7.44 [PH] (ref 7.34–7.45)
PH UR STRIP: 8 [PH] (ref 5–8)
PLATELET # BLD AUTO: 149 K/UL (ref 150–450)
PMV BLD AUTO: 9.9 FL (ref 9.2–12.9)
PO2 BLDA: 78.4 MMHG (ref 80–100)
POC BASE DEFICIT: -1.9 MMOL/L (ref -2–2)
POC HCO3: 23.5 MMOL/L (ref 24–28)
POC PERFORMED BY: ABNORMAL
POC SATURATED O2: 96.1 % (ref 95–100)
POC TEMPERATURE: 37 C
POTASSIUM SERPL-SCNC: 4.4 MMOL/L (ref 3.5–5.1)
PROT SERPL-MCNC: 7 G/DL (ref 6–8.4)
PROT UR QL STRIP: NEGATIVE
RBC # BLD AUTO: 5.28 M/UL (ref 4–5.4)
RBC #/AREA URNS HPF: 2 /HPF (ref 0–4)
SODIUM SERPL-SCNC: 142 MMOL/L (ref 136–145)
SP GR UR STRIP: 1.01 (ref 1–1.03)
SPECIMEN SOURCE: ABNORMAL
SQUAMOUS #/AREA URNS HPF: 3 /HPF
TOXICOLOGY INFORMATION: ABNORMAL
URN SPEC COLLECT METH UR: ABNORMAL
UROBILINOGEN UR STRIP-ACNC: 1 EU/DL
WBC # BLD AUTO: 11.78 K/UL (ref 3.9–12.7)
WBC #/AREA URNS HPF: 2 /HPF (ref 0–5)

## 2023-04-18 PROCEDURE — 36600 WITHDRAWAL OF ARTERIAL BLOOD: CPT | Mod: NTX

## 2023-04-18 PROCEDURE — 25000003 PHARM REV CODE 250: Mod: NTX | Performed by: EMERGENCY MEDICINE

## 2023-04-18 PROCEDURE — 87040 BLOOD CULTURE FOR BACTERIA: CPT | Mod: NTX | Performed by: EMERGENCY MEDICINE

## 2023-04-18 PROCEDURE — 25000003 PHARM REV CODE 250: Mod: NTX | Performed by: STUDENT IN AN ORGANIZED HEALTH CARE EDUCATION/TRAINING PROGRAM

## 2023-04-18 PROCEDURE — 99285 EMERGENCY DEPT VISIT HI MDM: CPT | Mod: NTX

## 2023-04-18 PROCEDURE — 11000001 HC ACUTE MED/SURG PRIVATE ROOM: Mod: NTX

## 2023-04-18 PROCEDURE — 80053 COMPREHEN METABOLIC PANEL: CPT | Mod: NTX | Performed by: EMERGENCY MEDICINE

## 2023-04-18 PROCEDURE — 81000 URINALYSIS NONAUTO W/SCOPE: CPT | Mod: 59,NTX | Performed by: EMERGENCY MEDICINE

## 2023-04-18 PROCEDURE — 80307 DRUG TEST PRSMV CHEM ANLYZR: CPT | Mod: NTX | Performed by: EMERGENCY MEDICINE

## 2023-04-18 PROCEDURE — 82140 ASSAY OF AMMONIA: CPT | Mod: NTX | Performed by: EMERGENCY MEDICINE

## 2023-04-18 PROCEDURE — 85025 COMPLETE CBC W/AUTO DIFF WBC: CPT | Mod: NTX | Performed by: EMERGENCY MEDICINE

## 2023-04-18 PROCEDURE — 63600175 PHARM REV CODE 636 W HCPCS: Mod: NTX | Performed by: EMERGENCY MEDICINE

## 2023-04-18 PROCEDURE — 99900035 HC TECH TIME PER 15 MIN (STAT): Mod: NTX

## 2023-04-18 PROCEDURE — 99223 PR INITIAL HOSPITAL CARE,LEVL III: ICD-10-PCS | Mod: NTX,,, | Performed by: STUDENT IN AN ORGANIZED HEALTH CARE EDUCATION/TRAINING PROGRAM

## 2023-04-18 PROCEDURE — 82077 ASSAY SPEC XCP UR&BREATH IA: CPT | Mod: NTX | Performed by: EMERGENCY MEDICINE

## 2023-04-18 PROCEDURE — 83690 ASSAY OF LIPASE: CPT | Mod: NTX | Performed by: EMERGENCY MEDICINE

## 2023-04-18 PROCEDURE — 82803 BLOOD GASES ANY COMBINATION: CPT | Mod: NTX

## 2023-04-18 PROCEDURE — 99223 1ST HOSP IP/OBS HIGH 75: CPT | Mod: NTX,,, | Performed by: STUDENT IN AN ORGANIZED HEALTH CARE EDUCATION/TRAINING PROGRAM

## 2023-04-18 PROCEDURE — 96372 THER/PROPH/DIAG INJ SC/IM: CPT | Performed by: EMERGENCY MEDICINE

## 2023-04-18 PROCEDURE — 36415 COLL VENOUS BLD VENIPUNCTURE: CPT | Mod: NTX | Performed by: EMERGENCY MEDICINE

## 2023-04-18 PROCEDURE — 83605 ASSAY OF LACTIC ACID: CPT | Mod: NTX | Performed by: EMERGENCY MEDICINE

## 2023-04-18 RX ORDER — SPIRONOLACTONE 25 MG/1
25 TABLET ORAL 2 TIMES DAILY
Status: DISCONTINUED | OUTPATIENT
Start: 2023-04-18 | End: 2023-04-20 | Stop reason: HOSPADM

## 2023-04-18 RX ORDER — LACTULOSE 10 G/15ML
40 SOLUTION ORAL
Status: COMPLETED | OUTPATIENT
Start: 2023-04-18 | End: 2023-04-18

## 2023-04-18 RX ORDER — MIRTAZAPINE 7.5 MG/1
7.5 TABLET, FILM COATED ORAL NIGHTLY
Status: DISCONTINUED | OUTPATIENT
Start: 2023-04-18 | End: 2023-04-20 | Stop reason: HOSPADM

## 2023-04-18 RX ORDER — RISPERIDONE 1 MG/1
1 TABLET ORAL DAILY
Status: DISCONTINUED | OUTPATIENT
Start: 2023-04-19 | End: 2023-04-20 | Stop reason: HOSPADM

## 2023-04-18 RX ORDER — SODIUM CHLORIDE 9 MG/ML
INJECTION, SOLUTION INTRAVENOUS CONTINUOUS
Status: DISCONTINUED | OUTPATIENT
Start: 2023-04-18 | End: 2023-04-20 | Stop reason: HOSPADM

## 2023-04-18 RX ORDER — RISPERIDONE 1 MG/1
1 TABLET ORAL ONCE AS NEEDED
Status: DISCONTINUED | OUTPATIENT
Start: 2023-04-18 | End: 2023-04-19

## 2023-04-18 RX ORDER — HALOPERIDOL 5 MG/ML
5 INJECTION INTRAMUSCULAR
Status: COMPLETED | OUTPATIENT
Start: 2023-04-18 | End: 2023-04-18

## 2023-04-18 RX ORDER — LACTULOSE 10 G/15ML
200 SOLUTION ORAL; RECTAL ONCE
Status: DISCONTINUED | OUTPATIENT
Start: 2023-04-18 | End: 2023-04-19

## 2023-04-18 RX ORDER — ACETAMINOPHEN 325 MG/1
650 TABLET ORAL EVERY 8 HOURS PRN
Status: DISCONTINUED | OUTPATIENT
Start: 2023-04-19 | End: 2023-04-20 | Stop reason: HOSPADM

## 2023-04-18 RX ORDER — FAMOTIDINE 20 MG/1
20 TABLET, FILM COATED ORAL 2 TIMES DAILY
Status: DISCONTINUED | OUTPATIENT
Start: 2023-04-18 | End: 2023-04-20 | Stop reason: HOSPADM

## 2023-04-18 RX ORDER — LACTULOSE 10 G/15ML
40 SOLUTION ORAL 3 TIMES DAILY
Status: DISCONTINUED | OUTPATIENT
Start: 2023-04-18 | End: 2023-04-20 | Stop reason: HOSPADM

## 2023-04-18 RX ORDER — DIPHENHYDRAMINE HYDROCHLORIDE 50 MG/ML
25 INJECTION INTRAMUSCULAR; INTRAVENOUS
Status: COMPLETED | OUTPATIENT
Start: 2023-04-18 | End: 2023-04-18

## 2023-04-18 RX ORDER — SODIUM CHLORIDE 0.9 % (FLUSH) 0.9 %
10 SYRINGE (ML) INJECTION
Status: DISCONTINUED | OUTPATIENT
Start: 2023-04-18 | End: 2023-04-20 | Stop reason: HOSPADM

## 2023-04-18 RX ORDER — ONDANSETRON 2 MG/ML
4 INJECTION INTRAMUSCULAR; INTRAVENOUS EVERY 8 HOURS PRN
Status: DISCONTINUED | OUTPATIENT
Start: 2023-04-18 | End: 2023-04-20 | Stop reason: HOSPADM

## 2023-04-18 RX ORDER — IBUPROFEN 400 MG/1
400 TABLET ORAL EVERY 6 HOURS PRN
Status: DISCONTINUED | OUTPATIENT
Start: 2023-04-18 | End: 2023-04-18

## 2023-04-18 RX ADMIN — HALOPERIDOL LACTATE 5 MG: 5 INJECTION, SOLUTION INTRAMUSCULAR at 01:04

## 2023-04-18 RX ADMIN — LACTULOSE 40 G: 20 SOLUTION ORAL at 01:04

## 2023-04-18 RX ADMIN — MIRTAZAPINE 7.5 MG: 7.5 TABLET, FILM COATED ORAL at 10:04

## 2023-04-18 RX ADMIN — DIPHENHYDRAMINE HYDROCHLORIDE 25 MG: 50 INJECTION, SOLUTION INTRAMUSCULAR; INTRAVENOUS at 01:04

## 2023-04-18 RX ADMIN — SPIRONOLACTONE 25 MG: 25 TABLET ORAL at 10:04

## 2023-04-18 RX ADMIN — SODIUM CHLORIDE: 9 INJECTION, SOLUTION INTRAVENOUS at 03:04

## 2023-04-18 RX ADMIN — FAMOTIDINE 20 MG: 20 TABLET ORAL at 10:04

## 2023-04-18 RX ADMIN — LACTULOSE 40 G: 20 SOLUTION ORAL at 10:04

## 2023-04-18 NOTE — HPI
"Chief Complaint   Patient presents with    Altered Mental Status       EMS reports, pt from home with c/o AMS, Hx of Cirrhosis of the Liver. Pt crying and talking to people that aren't thre upon arrival. [id number], 20G Left hand, fluids hanging       57-year-old female with hepatocellular carcinoma, alcoholic cirrhosis, hyperammonemia presents emergency department with altered mental status, unknown duration, history from EMS personnel. No signs of trauma.  History of this in the past her ammonia level is elevated.  Patient is crying, talking to people that are not there.  Afebrile.    ED course:   On arrival vital signs /89 mmHg, , RR 20, SpO2 98% on room air, temp 97.9F. Labs notable for ammonia 114, lipase 54, AST/ALT 36/41, Tbili 2.9, Na 142, K 4.4, Cl 115, CO2 24, Glucose 127. Hg 16.3, HCT 48.5, WBC 11.78, , Lactate 1.9. Urine positive for presumptive THC and amphetamine. For agitation, patient was given benadryl and haldol. Given lactulose x1 and started on IVF.     Patient examined resting comfortably receiving IVF.    She is arousable with gentle tap and calling out, but responds with one word "yessir" to all questions.  Patient's significant other, Odette Mccormick at home called EMS concerned with patient's mental status changes.He states behavior changes started about a week ago after she visited her sister. He adds there were memory lapses and blank responses during this past week. This morning, patient did not get up until 10 AM and the  could not get patient out of bed and noticed an increase in hand tremors, along with her unable to recognize him or respond coherently.  Patient to be admitted to medicine service for continued IVF hydration, lactulose treatment and monitoring of possible withdrawal symptoms and improvement in mentation.     "

## 2023-04-18 NOTE — ED PROVIDER NOTES
Encounter Date: 4/18/2023       History     Chief Complaint   Patient presents with    Altered Mental Status     EMS reports, pt from home with c/o AMS, Hx of Cirrhosis of the Liver. Pt crying and talking to people that aren't thre upon arrival. [id number], 20G Left hand, fluids hanging      57-year-old female with a history HCC, alcoholic cirrhosis, hyperammonemia presents emergency department with altered mental status, unknown duration, history from EMS personnel.  No signs of trauma.  History of this in the past her ammonia level is elevated.  Patient is crying, talking to people that are not there.  Afebrile.    Review of patient's allergies indicates:  No Known Allergies  Past Medical History:   Diagnosis Date    Anxiety     Bipolar disorder     Cirrhosis     COPD (chronic obstructive pulmonary disease)     GERD (gastroesophageal reflux disease)     Restless leg syndrome     Urinary frequency      Past Surgical History:   Procedure Laterality Date    TIPS PROCEDURE       Family History   Problem Relation Age of Onset    Lung cancer Mother     No Known Problems Father     Cancer Sister     Lung cancer Sister     No Known Problems Daughter     No Known Problems Maternal Aunt     No Known Problems Maternal Uncle     No Known Problems Paternal Aunt     No Known Problems Paternal Uncle     No Known Problems Maternal Grandmother     No Known Problems Maternal Grandfather     No Known Problems Paternal Grandmother     No Known Problems Paternal Grandfather     Breast cancer Neg Hx     Ovarian cancer Neg Hx     BRCA 1/2 Neg Hx      Social History     Tobacco Use    Smoking status: Some Days     Packs/day: 1.50     Years: 43.00     Pack years: 64.50     Types: Cigarettes     Passive exposure: Past    Smokeless tobacco: Never    Tobacco comments:     Pt active in [facility] smoking cessation program   Substance Use Topics    Alcohol use: Not Currently     Comment: socially    Drug use: Yes     Types: Marijuana     Review of  Systems   Unable to perform ROS: Mental status change   All other systems reviewed and are negative.    Physical Exam     Initial Vitals   BP Pulse Resp Temp SpO2   04/18/23 1230 04/18/23 1230 04/18/23 1230 04/18/23 1241 04/18/23 1230   (!) 146/89 109 20 97.9 °F (36.6 °C) 98 %      MAP       --                Physical Exam    Constitutional: She appears well-nourished. She is not diaphoretic. No distress.   HENT:   Head: Normocephalic and atraumatic.   Mouth/Throat: Oropharynx is clear and moist.   Eyes: EOM are normal. Pupils are equal, round, and reactive to light. Scleral icterus is present.   Neck: Neck supple.   Normal range of motion.  Cardiovascular:  Normal rate, regular rhythm, normal heart sounds and intact distal pulses.           Pulmonary/Chest: Breath sounds normal. No respiratory distress. She has no wheezes. She has no rhonchi. She has no rales.   Abdominal: Abdomen is soft. She exhibits no distension. There is no abdominal tenderness. There is no rebound and no guarding.   Musculoskeletal:         General: Normal range of motion.      Cervical back: Normal range of motion and neck supple.     Neurological: She is alert.   Skin: Skin is warm. Capillary refill takes less than 2 seconds.       ED Course   Procedures  Labs Reviewed   CBC W/ AUTO DIFFERENTIAL - Abnormal; Notable for the following components:       Result Value    Hemoglobin 16.3 (*)     RDW 16.3 (*)     Platelets 149 (*)     Gran # (ANC) 8.6 (*)     Gran % 73.1 (*)     All other components within normal limits   COMPREHENSIVE METABOLIC PANEL - Abnormal; Notable for the following components:    Chloride 115 (*)     Glucose 127 (*)     Albumin 3.1 (*)     Total Bilirubin 2.9 (*)     Alkaline Phosphatase 228 (*)     Anion Gap 3 (*)     All other components within normal limits   DRUG SCREEN PANEL, URINE EMERGENCY - Abnormal; Notable for the following components:    Amphetamine Screen, Ur Presumptive Positive (*)     THC Presumptive Positive  (*)     All other components within normal limits    Narrative:     Preferred Collection Type->Urine, Clean Catch  Specimen Source->Urine   URINALYSIS, REFLEX TO URINE CULTURE - Abnormal; Notable for the following components:    Appearance, UA Cloudy (*)     All other components within normal limits    Narrative:     Preferred Collection Type->Urine, Clean Catch  Specimen Source->Urine   AMMONIA - Abnormal; Notable for the following components:    Ammonia 114 (*)     All other components within normal limits    Narrative:     Recoll. [id number] by [facility] at 04/18/2023 13:19, reason: Specimen   hemolyzed   URINALYSIS MICROSCOPIC - Abnormal; Notable for the following components:    Bacteria Moderate (*)     Hyaline Casts, UA 0.0 (*)     All other components within normal limits    Narrative:     Preferred Collection Type->Urine, Clean Catch  Specimen Source->Urine   CULTURE, BLOOD   CULTURE, BLOOD   LIPASE   ALCOHOL,MEDICAL (ETHANOL)   LACTIC ACID, PLASMA          Imaging Results    None          Medications   lactulose 20 gram/30 mL solution Soln 40 g (40 g Oral Given 4/18/23 1324)   haloperidol lactate injection 5 mg (5 mg Intramuscular Given 4/18/23 1324)   diphenhydrAMINE injection 25 mg (25 mg Intramuscular Given 4/18/23 1325)     Medical Decision Making:   Differential Diagnosis:   Alcoholic cirrhosis, hyperammonemia, altered mental status           ED Course as of 04/18/23 1408   Tue Apr 18, 2023   1404 Discussed case with  - will admit due to altered mental status, hyperammonemia [SD]      ED Course User Index  [SD] Nabil Rees MD                 Clinical Impression:   Final diagnoses:  [R41.82] Altered mental status, unspecified altered mental status type (Primary)  [E72.20] Hyperammonemia  [K70.30] Alcoholic cirrhosis, unspecified whether ascites present  [F19.10] Substance abuse        ED Disposition Condition    Admit Stable                Nabil Rees MD  04/18/23 5163

## 2023-04-19 LAB
ALBUMIN SERPL BCP-MCNC: 2.8 G/DL (ref 3.5–5.2)
ALP SERPL-CCNC: 172 U/L (ref 55–135)
ALT SERPL W/O P-5'-P-CCNC: 33 U/L (ref 10–44)
AMMONIA PLAS-SCNC: 29 UMOL/L (ref 10–50)
ANION GAP SERPL CALC-SCNC: 5 MMOL/L (ref 8–16)
AST SERPL-CCNC: 27 U/L (ref 10–40)
BASOPHILS # BLD AUTO: 0.06 K/UL (ref 0–0.2)
BASOPHILS NFR BLD: 0.7 % (ref 0–1.9)
BILIRUB SERPL-MCNC: 3.5 MG/DL (ref 0.1–1)
BUN SERPL-MCNC: 11 MG/DL (ref 6–20)
CALCIUM SERPL-MCNC: 9 MG/DL (ref 8.7–10.5)
CHLORIDE SERPL-SCNC: 115 MMOL/L (ref 95–110)
CO2 SERPL-SCNC: 25 MMOL/L (ref 23–29)
CREAT SERPL-MCNC: 0.8 MG/DL (ref 0.5–1.4)
DIFFERENTIAL METHOD: ABNORMAL
EOSINOPHIL # BLD AUTO: 0.2 K/UL (ref 0–0.5)
EOSINOPHIL NFR BLD: 1.8 % (ref 0–8)
ERYTHROCYTE [DISTWIDTH] IN BLOOD BY AUTOMATED COUNT: 16.3 % (ref 11.5–14.5)
EST. GFR  (NO RACE VARIABLE): >60 ML/MIN/1.73 M^2
GLUCOSE SERPL-MCNC: 108 MG/DL (ref 70–110)
HCT VFR BLD AUTO: 44.5 % (ref 37–48.5)
HGB BLD-MCNC: 14.9 G/DL (ref 12–16)
IMM GRANULOCYTES # BLD AUTO: 0.02 K/UL (ref 0–0.04)
IMM GRANULOCYTES NFR BLD AUTO: 0.2 % (ref 0–0.5)
INR PPP: 1.3 (ref 0.8–1.2)
LYMPHOCYTES # BLD AUTO: 2.9 K/UL (ref 1–4.8)
LYMPHOCYTES NFR BLD: 32.9 % (ref 18–48)
MCH RBC QN AUTO: 31 PG (ref 27–31)
MCHC RBC AUTO-ENTMCNC: 33.5 G/DL (ref 32–36)
MCV RBC AUTO: 93 FL (ref 82–98)
MONOCYTES # BLD AUTO: 0.8 K/UL (ref 0.3–1)
MONOCYTES NFR BLD: 9.5 % (ref 4–15)
NEUTROPHILS # BLD AUTO: 4.8 K/UL (ref 1.8–7.7)
NEUTROPHILS NFR BLD: 54.9 % (ref 38–73)
NRBC BLD-RTO: 0 /100 WBC
PLATELET # BLD AUTO: 142 K/UL (ref 150–450)
PMV BLD AUTO: 10.4 FL (ref 9.2–12.9)
POTASSIUM SERPL-SCNC: 3.4 MMOL/L (ref 3.5–5.1)
PROT SERPL-MCNC: 6.1 G/DL (ref 6–8.4)
PROTHROMBIN TIME: 14.1 SEC (ref 9–12.5)
RBC # BLD AUTO: 4.8 M/UL (ref 4–5.4)
SODIUM SERPL-SCNC: 145 MMOL/L (ref 136–145)
WBC # BLD AUTO: 8.75 K/UL (ref 3.9–12.7)

## 2023-04-19 PROCEDURE — 25000003 PHARM REV CODE 250: Mod: NTX | Performed by: STUDENT IN AN ORGANIZED HEALTH CARE EDUCATION/TRAINING PROGRAM

## 2023-04-19 PROCEDURE — 85025 COMPLETE CBC W/AUTO DIFF WBC: CPT | Mod: NTX | Performed by: STUDENT IN AN ORGANIZED HEALTH CARE EDUCATION/TRAINING PROGRAM

## 2023-04-19 PROCEDURE — 80053 COMPREHEN METABOLIC PANEL: CPT | Mod: NTX | Performed by: STUDENT IN AN ORGANIZED HEALTH CARE EDUCATION/TRAINING PROGRAM

## 2023-04-19 PROCEDURE — 97162 PT EVAL MOD COMPLEX 30 MIN: CPT | Mod: NTX

## 2023-04-19 PROCEDURE — 85610 PROTHROMBIN TIME: CPT | Mod: NTX | Performed by: STUDENT IN AN ORGANIZED HEALTH CARE EDUCATION/TRAINING PROGRAM

## 2023-04-19 PROCEDURE — 82140 ASSAY OF AMMONIA: CPT | Mod: NTX | Performed by: STUDENT IN AN ORGANIZED HEALTH CARE EDUCATION/TRAINING PROGRAM

## 2023-04-19 PROCEDURE — 11000001 HC ACUTE MED/SURG PRIVATE ROOM: Mod: NTX

## 2023-04-19 PROCEDURE — 99223 PR INITIAL HOSPITAL CARE,LEVL III: ICD-10-PCS | Mod: NTX,,, | Performed by: PSYCHIATRY & NEUROLOGY

## 2023-04-19 PROCEDURE — 36415 COLL VENOUS BLD VENIPUNCTURE: CPT | Mod: NTX | Performed by: STUDENT IN AN ORGANIZED HEALTH CARE EDUCATION/TRAINING PROGRAM

## 2023-04-19 PROCEDURE — 90833 PSYTX W PT W E/M 30 MIN: CPT | Mod: NTX,,, | Performed by: PSYCHIATRY & NEUROLOGY

## 2023-04-19 PROCEDURE — 90833 PR PSYCHOTHERAPY W/PATIENT W/E&M, 30 MIN (ADD ON): ICD-10-PCS | Mod: NTX,,, | Performed by: PSYCHIATRY & NEUROLOGY

## 2023-04-19 PROCEDURE — 99223 1ST HOSP IP/OBS HIGH 75: CPT | Mod: NTX,,, | Performed by: PSYCHIATRY & NEUROLOGY

## 2023-04-19 RX ORDER — POTASSIUM CHLORIDE 20 MEQ/1
20 TABLET, EXTENDED RELEASE ORAL ONCE
Status: COMPLETED | OUTPATIENT
Start: 2023-04-19 | End: 2023-04-19

## 2023-04-19 RX ADMIN — SODIUM CHLORIDE: 9 INJECTION, SOLUTION INTRAVENOUS at 09:04

## 2023-04-19 RX ADMIN — POTASSIUM CHLORIDE 20 MEQ: 1500 TABLET, EXTENDED RELEASE ORAL at 01:04

## 2023-04-19 RX ADMIN — SPIRONOLACTONE 25 MG: 25 TABLET ORAL at 08:04

## 2023-04-19 RX ADMIN — LACTULOSE 40 G: 20 SOLUTION ORAL at 08:04

## 2023-04-19 RX ADMIN — MIRTAZAPINE 7.5 MG: 7.5 TABLET, FILM COATED ORAL at 08:04

## 2023-04-19 RX ADMIN — SPIRONOLACTONE 25 MG: 25 TABLET ORAL at 09:04

## 2023-04-19 RX ADMIN — FAMOTIDINE 20 MG: 20 TABLET ORAL at 09:04

## 2023-04-19 RX ADMIN — LACTULOSE 40 G: 20 SOLUTION ORAL at 09:04

## 2023-04-19 RX ADMIN — LACTULOSE 40 G: 20 SOLUTION ORAL at 02:04

## 2023-04-19 RX ADMIN — RISPERIDONE 1 MG: 1 TABLET ORAL at 09:04

## 2023-04-19 RX ADMIN — FAMOTIDINE 20 MG: 20 TABLET ORAL at 08:04

## 2023-04-19 NOTE — SUBJECTIVE & OBJECTIVE
Interval History: patient seen and examined. Mentating at her baseline answering questions appropriately.      Review of Systems   Constitutional:  Positive for activity change and appetite change. Negative for fatigue and fever.   Eyes:  Negative for visual disturbance.   Respiratory:  Negative for cough, chest tightness, shortness of breath and wheezing.    Cardiovascular:  Negative for chest pain, palpitations and leg swelling.   Gastrointestinal:  Positive for constipation. Negative for abdominal pain, diarrhea, nausea and vomiting.   Genitourinary:  Negative for dyspareunia, dysuria and flank pain.   Musculoskeletal:  Negative for arthralgias, back pain, gait problem, joint swelling, myalgias and neck pain.   Neurological:  Negative for tremors, seizures, syncope, speech difficulty, light-headedness, numbness and headaches.   Psychiatric/Behavioral:  Negative for agitation, behavioral problems, confusion, decreased concentration and dysphoric mood.    Objective:     Vital Signs (Most Recent):  Temp: 98.6 °F (37 °C) (04/19/23 0714)  Pulse: 93 (04/19/23 0714)  Resp: 18 (04/19/23 0714)  BP: 134/67 (04/19/23 0714)  SpO2: 96 % (04/19/23 0714) Vital Signs (24h Range):  Temp:  [97.6 °F (36.4 °C)-99.3 °F (37.4 °C)] 98.6 °F (37 °C)  Pulse:  [] 93  Resp:  [16-24] 18  SpO2:  [94 %-100 %] 96 %  BP: (109-151)/(66-91) 134/67     Weight: 94.8 kg (209 lb)  Body mass index is 31.78 kg/m².    Intake/Output Summary (Last 24 hours) at 4/19/2023 0934  Last data filed at 4/19/2023 0709  Gross per 24 hour   Intake 220 ml   Output 900 ml   Net -680 ml      Physical Exam  Vitals and nursing note reviewed.   Constitutional:       General: She is not in acute distress.     Appearance: She is not ill-appearing, toxic-appearing or diaphoretic.   HENT:      Head: Normocephalic and atraumatic.   Cardiovascular:      Rate and Rhythm: Normal rate and regular rhythm.      Pulses: Normal pulses.      Heart sounds: Normal heart sounds. No  murmur heard.  Pulmonary:      Effort: Pulmonary effort is normal. No respiratory distress.      Breath sounds: Normal breath sounds. No stridor. No wheezing, rhonchi or rales.   Abdominal:      General: Abdomen is flat.      Palpations: Abdomen is soft.      Tenderness: There is no abdominal tenderness. There is no right CVA tenderness or left CVA tenderness.   Musculoskeletal:         General: No tenderness.      Cervical back: Neck supple. No rigidity or tenderness.   Skin:     General: Skin is warm and dry.      Capillary Refill: Capillary refill takes less than 2 seconds.   Psychiatric:         Attention and Perception: She does not perceive auditory or visual hallucinations.         Mood and Affect: Mood and affect normal. Mood is not anxious.         Speech: Speech normal.         Behavior: Behavior normal. Behavior is not agitated. Behavior is cooperative.         Thought Content: Thought content is not paranoid. Thought content does not include homicidal or suicidal ideation.         Cognition and Memory: Memory is not impaired.         Judgment: Judgment is not impulsive.      Comments: Baseline     Significant Labs: All pertinent labs within the past 24 hours have been reviewed.  Bilirubin:   Recent Labs   Lab 04/12/23  0800 04/18/23  1328 04/19/23  0520   BILITOT 5.3* 2.9* 3.5*     Blood Culture:   Recent Labs   Lab 04/18/23  1307 04/18/23  1328   LABBLOO No Growth to date No Growth to date     BMP:   Recent Labs   Lab 04/19/23  0520         K 3.4*   *   CO2 25   BUN 11   CREATININE 0.8   CALCIUM 9.0     CBC:   Recent Labs   Lab 04/18/23  1328 04/19/23  0520   WBC 11.78 8.75   HGB 16.3* 14.9   HCT 48.5 44.5   * 142*     CMP:   Recent Labs   Lab 04/18/23  1328 04/19/23  0520    145   K 4.4 3.4*   * 115*   CO2 24 25   * 108   BUN 11 11   CREATININE 0.9 0.8   CALCIUM 9.7 9.0   PROT 7.0 6.1   ALBUMIN 3.1* 2.8*   BILITOT 2.9* 3.5*   ALKPHOS 228* 172*   AST 36 27    ALT 41 33   ANIONGAP 3* 5*     Coagulation:   Recent Labs   Lab 04/19/23  0520   INR 1.3*     Lactic Acid:   Recent Labs   Lab 04/18/23  1328   LACTATE 1.9     TSH:   Recent Labs   Lab 01/19/23  0822   TSH 2.308     Urine Studies:   Recent Labs   Lab 04/18/23  1309   COLORU Yellow   APPEARANCEUA Cloudy*   PHUR 8.0   SPECGRAV 1.010   PROTEINUA Negative   GLUCUA Negative   KETONESU Negative   BILIRUBINUA Negative   OCCULTUA Negative   NITRITE Negative   UROBILINOGEN 1.0   LEUKOCYTESUR Negative   RBCUA 2   WBCUA 2   BACTERIA Moderate*   SQUAMEPITHEL 3   HYALINECASTS 0.0*     Significant Imaging: I have reviewed all pertinent imaging results/findings within the past 24 hours.

## 2023-04-19 NOTE — PLAN OF CARE
Problem: Physical Therapy  Goal: Physical Therapy Goal  Description: Goals to be met by: 2023     Patient will increase functional independence with mobility by performin. Supine to sit with Modified Independent.  2. Sit to supine with Modified Independent.  3. Bed to chair transfer with Modified Independent with proper A.D.  using Step Transfer technique.  4. Sit to Stand with Modified Independent with proper A.D. .  5. Gait  x 800  feet with Supervision or Set-up Assistance with proper A.D. .  6. Lower extremity exercise program x10 reps, with assistance as needed.     Outcome: Plan of care established

## 2023-04-19 NOTE — PLAN OF CARE
POC reviewed with pt, educated pt on medication administered, CB use, fall risk.       Problem: Infection  Goal: Absence of Infection Signs and Symptoms  Outcome: Ongoing, Progressing     Problem: Adult Inpatient Plan of Care  Goal: Plan of Care Review  Outcome: Ongoing, Progressing  Goal: Patient-Specific Goal (Individualized)  Outcome: Ongoing, Progressing  Goal: Absence of Hospital-Acquired Illness or Injury  Outcome: Ongoing, Progressing  Goal: Optimal Comfort and Wellbeing  Outcome: Ongoing, Progressing  Goal: Readiness for Transition of Care  Outcome: Ongoing, Progressing     Problem: Skin Injury Risk Increased  Goal: Skin Health and Integrity  Outcome: Ongoing, Progressing

## 2023-04-19 NOTE — SUBJECTIVE & OBJECTIVE
Past Medical History:   Diagnosis Date    Anxiety     Bipolar disorder     Cirrhosis     COPD (chronic obstructive pulmonary disease)     GERD (gastroesophageal reflux disease)     Restless leg syndrome     Urinary frequency        Past Surgical History:   Procedure Laterality Date    TIPS PROCEDURE         Review of patient's allergies indicates:  No Known Allergies    No current facility-administered medications on file prior to encounter.     Current Outpatient Medications on File Prior to Encounter   Medication Sig    albuterol (PROVENTIL/VENTOLIN HFA) 90 mcg/actuation inhaler Inhale 2 puffs into the lungs every 6 (six) hours as needed for Wheezing or Shortness of Breath.    ALPRAZolam (XANAX) 0.25 MG tablet Take 1 tablet (0.25 mg total) by mouth 2 (two) times daily as needed for Anxiety.    clonazePAM (KLONOPIN) 0.5 MG tablet Take 0.5 mg by mouth daily as needed.    CONSTULOSE 10 gram/15 mL solution Take 30 mLs by mouth 3 (three) times daily.    docusate sodium (COLACE) 100 MG capsule Take 1 capsule (100 mg total) by mouth 2 (two) times daily.    fesoterodine (TOVIAZ) 4 mg Tb24 Take 1 tablet (4 mg total) by mouth every evening.    mirtazapine (REMERON) 7.5 MG Tab Take 7.5 mg by mouth every evening.    nicotine (NICODERM CQ) 21 mg/24 hr Place 1 patch onto the skin once daily.    nicotine, polacrilex, (NICORETTE) 4 MG Gum Take 1 each (4 mg total) by mouth as needed (Take 1 each (4 mg total) by mouth as needed for tobacco cessation. 1-2 per hour in the place of cigarettes. (5-16 daily max) - Oral).    ondansetron (ZOFRAN) 4 MG tablet Take 1 tablet (4 mg total) by mouth 2 (two) times daily.    pantoprazole (PROTONIX) 20 MG tablet Take 1 tablet (20 mg total) by mouth every evening.    rOPINIRole (REQUIP) 1 MG tablet Take 1 tablet (1 mg total) by mouth every evening.    spironolactone (ALDACTONE) 50 MG tablet Take 1 tablet by mouth once daily    ursodioL (ACTIGALL) 300 mg capsule Take 1 capsule (300 mg total) by  mouth 3 (three) times daily. for 14 days    VRAYLAR 3 mg Cap Take 3 mg by mouth.     Family History       Problem Relation (Age of Onset)    Cancer Sister    Lung cancer Mother, Sister    No Known Problems Father, Daughter, Maternal Aunt, Maternal Uncle, Paternal Aunt, Paternal Uncle, Maternal Grandmother, Maternal Grandfather, Paternal Grandmother, Paternal Grandfather          Tobacco Use    Smoking status: Some Days     Packs/day: 1.50     Years: 43.00     Pack years: 64.50     Types: Cigarettes     Passive exposure: Past    Smokeless tobacco: Never    Tobacco comments:     Pt active in [facility] smoking cessation program   Substance and Sexual Activity    Alcohol use: Not Currently     Comment: socially    Drug use: Yes     Types: Marijuana    Sexual activity: Not Currently     Review of Systems   Unable to perform ROS: Acuity of condition   Objective:     Vital Signs (Most Recent):  Temp: 97.6 °F (36.4 °C) (04/18/23 1913)  Pulse: 88 (04/18/23 1913)  Resp: 20 (04/18/23 1913)  BP: 131/69 (04/18/23 1913)  SpO2: 95 % (04/18/23 1913) Vital Signs (24h Range):  Temp:  [97.6 °F (36.4 °C)-98.4 °F (36.9 °C)] 97.6 °F (36.4 °C)  Pulse:  [] 88  Resp:  [16-24] 20  SpO2:  [95 %-100 %] 95 %  BP: (109-146)/(66-91) 131/69     Weight: 94.8 kg (209 lb)  Body mass index is 31.78 kg/m².    Physical Exam  Vitals and nursing note reviewed.   Constitutional:       General: She is not in acute distress.     Appearance: She is not ill-appearing, toxic-appearing or diaphoretic.   HENT:      Head: Normocephalic and atraumatic.   Cardiovascular:      Rate and Rhythm: Normal rate and regular rhythm.      Pulses: Normal pulses.      Heart sounds: Normal heart sounds. No murmur heard.  Pulmonary:      Effort: Pulmonary effort is normal. No respiratory distress.      Breath sounds: Normal breath sounds. No stridor. No wheezing, rhonchi or rales.   Abdominal:      General: Abdomen is flat.      Palpations: Abdomen is soft.      Tenderness:  There is no abdominal tenderness. There is no right CVA tenderness or left CVA tenderness.   Musculoskeletal:         General: No tenderness.      Cervical back: Neck supple. No rigidity or tenderness.   Skin:     General: Skin is warm and dry.      Capillary Refill: Capillary refill takes less than 2 seconds.   Psychiatric:      Comments: Stuporous, arousable with voice and touch           Significant Labs: All pertinent labs within the past 24 hours have been reviewed.  A1C: No results for input(s): HGBA1C in the last 4320 hours.  ABGs:   Recent Labs   Lab 04/18/23  1327   PH 7.440   PCO2 32.7*   HCO3 23.5*   POCSATURATED 96.1   PO2 78.4*     Bilirubin:   Recent Labs   Lab 04/12/23  0800 04/18/23  1328   BILITOT 5.3* 2.9*     BMP:   Recent Labs   Lab 04/18/23  1328   *      K 4.4   *   CO2 24   BUN 11   CREATININE 0.9   CALCIUM 9.7     CBC:   Recent Labs   Lab 04/18/23  1328   WBC 11.78   HGB 16.3*   HCT 48.5   *     CMP:   Recent Labs   Lab 04/18/23  1328      K 4.4   *   CO2 24   *   BUN 11   CREATININE 0.9   CALCIUM 9.7   PROT 7.0   ALBUMIN 3.1*   BILITOT 2.9*   ALKPHOS 228*   AST 36   ALT 41   ANIONGAP 3*     Lactic Acid:   Recent Labs   Lab 04/18/23  1328   LACTATE 1.9     TSH:   Recent Labs   Lab 01/19/23  0822   TSH 2.308     Urine Studies:   Recent Labs   Lab 04/18/23  1309   COLORU Yellow   APPEARANCEUA Cloudy*   PHUR 8.0   SPECGRAV 1.010   PROTEINUA Negative   GLUCUA Negative   KETONESU Negative   BILIRUBINUA Negative   OCCULTUA Negative   NITRITE Negative   UROBILINOGEN 1.0   LEUKOCYTESUR Negative   RBCUA 2   WBCUA 2   BACTERIA Moderate*   SQUAMEPITHEL 3   HYALINECASTS 0.0*     Significant Imaging: I have reviewed all pertinent imaging results/findings within the past 24 hours.

## 2023-04-19 NOTE — ASSESSMENT & PLAN NOTE
Ammonia level 114, family reports asterixis like tremors occurring at home during the past week and adds patient had been complaining of constipation.   - lactulose 40 g Q8H  - rifaximin 550 mg BID  - IVF resuscitation with NS 127ml/hr

## 2023-04-19 NOTE — NURSING
Called 7th floor to notify of consult for psych. Spoke with Cyrus. Will notify Jaime Winn NP who is making rounds now.

## 2023-04-19 NOTE — PROGRESS NOTES
"[facility] Medicine  Progress Note    Patient Name: Shonda Borrego  MRN: [id number]  Patient Class: IP- Inpatient   Admission Date: 4/18/2023  Length of Stay: 1 days  Attending Physician: Geeta Gaines DO  Primary Care Provider: Geeta Gaines DO    Subjective:     Principal Problem:Hepatic encephalopathy        HPI:  Chief Complaint   Patient presents with    Altered Mental Status       EMS reports, pt from home with c/o AMS, Hx of Cirrhosis of the Liver. Pt crying and talking to people that aren't thre upon arrival. [id number], 20G Left hand, fluids hanging       57-year-old female with hepatocellular carcinoma, alcoholic cirrhosis, hyperammonemia presents emergency department with altered mental status, unknown duration, history from EMS personnel. No signs of trauma.  History of this in the past her ammonia level is elevated.  Patient is crying, talking to people that are not there.  Afebrile.    ED course:   On arrival vital signs /89 mmHg, , RR 20, SpO2 98% on room air, temp 97.9F. Labs notable for ammonia 114, lipase 54, AST/ALT 36/41, Tbili 2.9, Na 142, K 4.4, Cl 115, CO2 24, Glucose 127. Hg 16.3, HCT 48.5, WBC 11.78, , Lactate 1.9. Urine positive for presumptive THC and amphetamine. For agitation, patient was given benadryl and haldol. Given lactulose x1 and started on IVF.     Patient examined resting comfortably receiving IVF.    She is arousable with gentle tap and calling out, but responds with one word "yessir" to all questions.  Patient's significant other, Odette Mccormick at home called EMS concerned with patient's mental status changes.He states behavior changes started about a week ago after she visited her sister. He adds there were memory lapses and blank responses during this past week. This morning, patient did not get up until 10 AM and the  could not get patient out of bed and noticed an increase in hand tremors, along with her unable " to recognize him or respond coherently.  Patient to be admitted to medicine service for continued IVF hydration, lactulose treatment and monitoring of possible withdrawal symptoms and improvement in mentation.         Overview/Hospital Course:  4/19 Alert and awake, patient endorses not feeling at her baseline and overall embarrassed for not recalling the described agitation she exhibited in the ER yesterday. No bowel movements recorded since admission, but voiding effectively. Will replete electrolytes, continue with IVF hydration. Patient agreeable to psychiatry evaluation.       Interval History: patient seen and examined. Mentating at her baseline answering questions appropriately.      Review of Systems   Constitutional:  Positive for activity change and appetite change. Negative for fatigue and fever.   Eyes:  Negative for visual disturbance.   Respiratory:  Negative for cough, chest tightness, shortness of breath and wheezing.    Cardiovascular:  Negative for chest pain, palpitations and leg swelling.   Gastrointestinal:  Positive for constipation. Negative for abdominal pain, diarrhea, nausea and vomiting.   Genitourinary:  Negative for dyspareunia, dysuria and flank pain.   Musculoskeletal:  Negative for arthralgias, back pain, gait problem, joint swelling, myalgias and neck pain.   Neurological:  Negative for tremors, seizures, syncope, speech difficulty, light-headedness, numbness and headaches.   Psychiatric/Behavioral:  Negative for agitation, behavioral problems, confusion, decreased concentration and dysphoric mood.    Objective:     Vital Signs (Most Recent):  Temp: 98.6 °F (37 °C) (04/19/23 0714)  Pulse: 93 (04/19/23 0714)  Resp: 18 (04/19/23 0714)  BP: 134/67 (04/19/23 0714)  SpO2: 96 % (04/19/23 0714) Vital Signs (24h Range):  Temp:  [97.6 °F (36.4 °C)-99.3 °F (37.4 °C)] 98.6 °F (37 °C)  Pulse:  [] 93  Resp:  [16-24] 18  SpO2:  [94 %-100 %] 96 %  BP: (109-151)/(66-91) 134/67     Weight: 94.8  kg (209 lb)  Body mass index is 31.78 kg/m².    Intake/Output Summary (Last 24 hours) at 4/19/2023 0934  Last data filed at 4/19/2023 0709  Gross per 24 hour   Intake 220 ml   Output 900 ml   Net -680 ml      Physical Exam  Vitals and nursing note reviewed.   Constitutional:       General: She is not in acute distress.     Appearance: She is not ill-appearing, toxic-appearing or diaphoretic.   HENT:      Head: Normocephalic and atraumatic.   Cardiovascular:      Rate and Rhythm: Normal rate and regular rhythm.      Pulses: Normal pulses.      Heart sounds: Normal heart sounds. No murmur heard.  Pulmonary:      Effort: Pulmonary effort is normal. No respiratory distress.      Breath sounds: Normal breath sounds. No stridor. No wheezing, rhonchi or rales.   Abdominal:      General: Abdomen is flat.      Palpations: Abdomen is soft.      Tenderness: There is no abdominal tenderness. There is no right CVA tenderness or left CVA tenderness.   Musculoskeletal:         General: No tenderness.      Cervical back: Neck supple. No rigidity or tenderness.   Skin:     General: Skin is warm and dry.      Capillary Refill: Capillary refill takes less than 2 seconds.   Psychiatric:         Attention and Perception: She does not perceive auditory or visual hallucinations.         Mood and Affect: Mood and affect normal. Mood is not anxious.         Speech: Speech normal.         Behavior: Behavior normal. Behavior is not agitated. Behavior is cooperative.         Thought Content: Thought content is not paranoid. Thought content does not include homicidal or suicidal ideation.         Cognition and Memory: Memory is not impaired.         Judgment: Judgment is not impulsive.      Comments: Baseline     Significant Labs: All pertinent labs within the past 24 hours have been reviewed.  Bilirubin:   Recent Labs   Lab 04/12/23  0800 04/18/23  1328 04/19/23  0520   BILITOT 5.3* 2.9* 3.5*     Blood Culture:   Recent Labs   Lab  04/18/23  1307 04/18/23  1328   LABBLOO No Growth to date No Growth to date     BMP:   Recent Labs   Lab 04/19/23  0520         K 3.4*   *   CO2 25   BUN 11   CREATININE 0.8   CALCIUM 9.0     CBC:   Recent Labs   Lab 04/18/23  1328 04/19/23  0520   WBC 11.78 8.75   HGB 16.3* 14.9   HCT 48.5 44.5   * 142*     CMP:   Recent Labs   Lab 04/18/23  1328 04/19/23  0520    145   K 4.4 3.4*   * 115*   CO2 24 25   * 108   BUN 11 11   CREATININE 0.9 0.8   CALCIUM 9.7 9.0   PROT 7.0 6.1   ALBUMIN 3.1* 2.8*   BILITOT 2.9* 3.5*   ALKPHOS 228* 172*   AST 36 27   ALT 41 33   ANIONGAP 3* 5*     Coagulation:   Recent Labs   Lab 04/19/23  0520   INR 1.3*     Lactic Acid:   Recent Labs   Lab 04/18/23  1328   LACTATE 1.9     TSH:   Recent Labs   Lab 01/19/23  0822   TSH 2.308     Urine Studies:   Recent Labs   Lab 04/18/23  1309   COLORU Yellow   APPEARANCEUA Cloudy*   PHUR 8.0   SPECGRAV 1.010   PROTEINUA Negative   GLUCUA Negative   KETONESU Negative   BILIRUBINUA Negative   OCCULTUA Negative   NITRITE Negative   UROBILINOGEN 1.0   LEUKOCYTESUR Negative   RBCUA 2   WBCUA 2   BACTERIA Moderate*   SQUAMEPITHEL 3   HYALINECASTS 0.0*     Significant Imaging: I have reviewed all pertinent imaging results/findings within the past 24 hours.      Assessment/Plan:      * Hepatic encephalopathy  Likely secondary to hyperammonemia worsened with use of amphetamine and THC. Treatment with lactulose. Monitor for withdrawal signs.     Multiple substance abuse   endorses patient at home does not drink alcohol. Has been made aware of marijuana use requiring cessation as they are aware how this could affect her status of liver transplant list.   On admission urine study positive for presumptive THC and amphetamine.   4/19 awake and alert, back to baseline mentation  - consult psychiatry for evaluation      Hyperammonemia  Ammonia level back to baseline from 114. No bowel movement reported. Will  continue with regimen. No visible tremors.   - lactulose 40 g Q8H  - IVF resuscitation with NS 125ml/hr          Alcoholic cirrhosis of liver without ascites  Treat hyperammonemia and monitor mental status changes.         VTE Risk Mitigation (From admission, onward)         Ordered     IP VTE HIGH RISK PATIENT  Once         04/18/23 1511     Place sequential compression device  Until discontinued         04/18/23 1511     Place CAMPOS hose  Until discontinued         04/18/23 1511                Discharge Planning   ALEJANDRO:      Code Status: Full Code   Is the patient medically ready for discharge?:     Reason for patient still in hospital (select all that apply): Patient trending condition, Treatment and Consult recommendations  Discharge Plan A: Home, Home with family          Geeta Gaines DO  Department of Hospital Medicine   [facility]

## 2023-04-19 NOTE — H&P
"[facility] Medicine  History & Physical    Patient Name: Shonda Borrego  MRN: [id number]  Patient Class: IP- Inpatient  Admission Date: 4/18/2023  Attending Physician: Geeta Gaines DO   Primary Care Provider: Geeta Gaines DO         Patient information was obtained from spouse/SO and ER records.     Subjective:     Principal Problem:Hyperammonemia    Chief Complaint:   Chief Complaint   Patient presents with    Altered Mental Status     EMS reports, pt from home with c/o AMS, Hx of Cirrhosis of the Liver. Pt crying and talking to people that aren't thre upon arrival. [id number], 20G Left hand, fluids hanging         HPI:   Chief Complaint   Patient presents with    Altered Mental Status       EMS reports, pt from home with c/o AMS, Hx of Cirrhosis of the Liver. Pt crying and talking to people that aren't thre upon arrival. [id number], 20G Left hand, fluids hanging       57-year-old female with hepatocellular carcinoma, alcoholic cirrhosis, hyperammonemia presents emergency department with altered mental status, unknown duration, history from EMS personnel. No signs of trauma.  History of this in the past her ammonia level is elevated.  Patient is crying, talking to people that are not there.  Afebrile.    ED course:   On arrival vital signs /89 mmHg, , RR 20, SpO2 98% on room air, temp 97.9F. Labs notable for ammonia 114, lipase 54, AST/ALT 36/41, Tbili 2.9, Na 142, K 4.4, Cl 115, CO2 24, Glucose 127. Hg 16.3, HCT 48.5, WBC 11.78, , Lactate 1.9. Urine positive for presumptive THC and amphetamine. For agitation, patient was given benadryl and haldol. Given lactulose x1 and started on IVF.     Patient examined resting comfortably receiving IVF.    She is arousable with gentle tap and calling out, but responds with one word "yessir" to all questions.  Patient's significant other, Odette Jace at home called EMS concerned with patient's mental status changes.He states " behavior changes started about a week ago after she visited her sister. He adds there were memory lapses and blank responses during this past week. This morning, patient did not get up until 10 AM and the  could not get patient out of bed and noticed an increase in hand tremors, along with her unable to recognize him or respond coherently.  Patient to be admitted to medicine service for continued IVF hydration, lactulose treatment and monitoring of possible withdrawal symptoms and improvement in mentation.         Past Medical History:   Diagnosis Date    Anxiety     Bipolar disorder     Cirrhosis     COPD (chronic obstructive pulmonary disease)     GERD (gastroesophageal reflux disease)     Restless leg syndrome     Urinary frequency        Past Surgical History:   Procedure Laterality Date    TIPS PROCEDURE         Review of patient's allergies indicates:  No Known Allergies    No current facility-administered medications on file prior to encounter.     Current Outpatient Medications on File Prior to Encounter   Medication Sig    albuterol (PROVENTIL/VENTOLIN HFA) 90 mcg/actuation inhaler Inhale 2 puffs into the lungs every 6 (six) hours as needed for Wheezing or Shortness of Breath.    ALPRAZolam (XANAX) 0.25 MG tablet Take 1 tablet (0.25 mg total) by mouth 2 (two) times daily as needed for Anxiety.    clonazePAM (KLONOPIN) 0.5 MG tablet Take 0.5 mg by mouth daily as needed.    CONSTULOSE 10 gram/15 mL solution Take 30 mLs by mouth 3 (three) times daily.    docusate sodium (COLACE) 100 MG capsule Take 1 capsule (100 mg total) by mouth 2 (two) times daily.    fesoterodine (TOVIAZ) 4 mg Tb24 Take 1 tablet (4 mg total) by mouth every evening.    mirtazapine (REMERON) 7.5 MG Tab Take 7.5 mg by mouth every evening.    nicotine (NICODERM CQ) 21 mg/24 hr Place 1 patch onto the skin once daily.    nicotine, polacrilex, (NICORETTE) 4 MG Gum Take 1 each (4 mg total) by mouth as needed (Take 1 each  (4 mg total) by mouth as needed for tobacco cessation. 1-2 per hour in the place of cigarettes. (5-16 daily max) - Oral).    ondansetron (ZOFRAN) 4 MG tablet Take 1 tablet (4 mg total) by mouth 2 (two) times daily.    pantoprazole (PROTONIX) 20 MG tablet Take 1 tablet (20 mg total) by mouth every evening.    rOPINIRole (REQUIP) 1 MG tablet Take 1 tablet (1 mg total) by mouth every evening.    spironolactone (ALDACTONE) 50 MG tablet Take 1 tablet by mouth once daily    ursodioL (ACTIGALL) 300 mg capsule Take 1 capsule (300 mg total) by mouth 3 (three) times daily. for 14 days    VRAYLAR 3 mg Cap Take 3 mg by mouth.     Family History       Problem Relation (Age of Onset)    Cancer Sister    Lung cancer Mother, Sister    No Known Problems Father, Daughter, Maternal Aunt, Maternal Uncle, Paternal Aunt, Paternal Uncle, Maternal Grandmother, Maternal Grandfather, Paternal Grandmother, Paternal Grandfather          Tobacco Use    Smoking status: Some Days     Packs/day: 1.50     Years: 43.00     Pack years: 64.50     Types: Cigarettes     Passive exposure: Past    Smokeless tobacco: Never    Tobacco comments:     Pt active in [facility] smoking cessation program   Substance and Sexual Activity    Alcohol use: Not Currently     Comment: socially    Drug use: Yes     Types: Marijuana    Sexual activity: Not Currently     Review of Systems   Unable to perform ROS: Acuity of condition   Objective:     Vital Signs (Most Recent):  Temp: 97.6 °F (36.4 °C) (04/18/23 1913)  Pulse: 88 (04/18/23 1913)  Resp: 20 (04/18/23 1913)  BP: 131/69 (04/18/23 1913)  SpO2: 95 % (04/18/23 1913) Vital Signs (24h Range):  Temp:  [97.6 °F (36.4 °C)-98.4 °F (36.9 °C)] 97.6 °F (36.4 °C)  Pulse:  [] 88  Resp:  [16-24] 20  SpO2:  [95 %-100 %] 95 %  BP: (109-146)/(66-91) 131/69     Weight: 94.8 kg (209 lb)  Body mass index is 31.78 kg/m².    Physical Exam  Vitals and nursing note reviewed.   Constitutional:       General: She is not in  acute distress.     Appearance: She is not ill-appearing, toxic-appearing or diaphoretic.   HENT:      Head: Normocephalic and atraumatic.   Cardiovascular:      Rate and Rhythm: Normal rate and regular rhythm.      Pulses: Normal pulses.      Heart sounds: Normal heart sounds. No murmur heard.  Pulmonary:      Effort: Pulmonary effort is normal. No respiratory distress.      Breath sounds: Normal breath sounds. No stridor. No wheezing, rhonchi or rales.   Abdominal:      General: Abdomen is flat.      Palpations: Abdomen is soft.      Tenderness: There is no abdominal tenderness. There is no right CVA tenderness or left CVA tenderness.   Musculoskeletal:         General: No tenderness.      Cervical back: Neck supple. No rigidity or tenderness.   Skin:     General: Skin is warm and dry.      Capillary Refill: Capillary refill takes less than 2 seconds.   Psychiatric:      Comments: Stuporous, arousable with voice and touch           Significant Labs: All pertinent labs within the past 24 hours have been reviewed.  A1C: No results for input(s): HGBA1C in the last 4320 hours.  ABGs:   Recent Labs   Lab 04/18/23  1327   PH 7.440   PCO2 32.7*   HCO3 23.5*   POCSATURATED 96.1   PO2 78.4*     Bilirubin:   Recent Labs   Lab 04/12/23  0800 04/18/23  1328   BILITOT 5.3* 2.9*     BMP:   Recent Labs   Lab 04/18/23  1328   *      K 4.4   *   CO2 24   BUN 11   CREATININE 0.9   CALCIUM 9.7     CBC:   Recent Labs   Lab 04/18/23  1328   WBC 11.78   HGB 16.3*   HCT 48.5   *     CMP:   Recent Labs   Lab 04/18/23  1328      K 4.4   *   CO2 24   *   BUN 11   CREATININE 0.9   CALCIUM 9.7   PROT 7.0   ALBUMIN 3.1*   BILITOT 2.9*   ALKPHOS 228*   AST 36   ALT 41   ANIONGAP 3*     Lactic Acid:   Recent Labs   Lab 04/18/23  1328   LACTATE 1.9     TSH:   Recent Labs   Lab 01/19/23  0822   TSH 2.308     Urine Studies:   Recent Labs   Lab 04/18/23  1309   COLORU Yellow   APPEARANCEUA Cloudy*   PHUR  8.0   SPECGRAV 1.010   PROTEINUA Negative   GLUCUA Negative   KETONESU Negative   BILIRUBINUA Negative   OCCULTUA Negative   NITRITE Negative   UROBILINOGEN 1.0   LEUKOCYTESUR Negative   RBCUA 2   WBCUA 2   BACTERIA Moderate*   SQUAMEPITHEL 3   HYALINECASTS 0.0*     Significant Imaging: I have reviewed all pertinent imaging results/findings within the past 24 hours.    Assessment/Plan:     * Hyperammonemia  Ammonia level 114, family reports asterixis like tremors occurring at home during the past week and adds patient had been complaining of constipation.   - lactulose 40 g Q8H  - rifaximin 550 mg BID  - IVF resuscitation with NS 127ml/hr          Altered mental status  Likely secondary to hyperammonemia worsened with use of amphetamine and THC. Treatment with lactulose. Monitor for withdrawal signs.     Multiple substance abuse   endorses patient at home does not drink alcohol. Has been made aware of marijuana use requiring cessation as they are aware how this could affect her status of liver transplant list.   On admission urine study positive for presumptive THC and amphetamine.   - monitor to withdrawal symptoms and treat   - consult psychiatry for evaluation      Alcoholic cirrhosis of liver without ascites  Treat hyperammonemia and monitor mental status changes.         VTE Risk Mitigation (From admission, onward)         Ordered     IP VTE HIGH RISK PATIENT  Once         04/18/23 1511     Place sequential compression device  Until discontinued         04/18/23 1511     Place CAMPOS hose  Until discontinued         04/18/23 1511                           Geeta Gaines DO  Department of Hospital Medicine  [facility]

## 2023-04-19 NOTE — ASSESSMENT & PLAN NOTE
Ammonia level back to baseline from 114. No bowel movement reported. Will continue with regimen. No visible tremors.   - lactulose 40 g Q8H  - IVF resuscitation with NS 125ml/hr

## 2023-04-19 NOTE — ASSESSMENT & PLAN NOTE
endorses patient at home does not drink alcohol. Has been made aware of marijuana use requiring cessation as they are aware how this could affect her status of liver transplant list.   On admission urine study positive for presumptive THC and amphetamine.   4/19 awake and alert, back to baseline mentation  - consult psychiatry for evaluation

## 2023-04-19 NOTE — PT/OT/SLP EVAL
"Physical Therapy Evaluation     Patient Name: Shonda Borrego   MRN: [id number]  Recent Surgery: * No surgery found *      Recommendations:     Discharge Recommendations: home   Discharge Equipment Recommendations: none   Barriers to discharge: None    Assessment:     Shonda Borrego is a 57 y.o. female admitted with a medical diagnosis of Hepatic encephalopathy. She presents with the following impairments/functional limitations: weakness, impaired functional mobility, impaired cognition, impaired cardiopulmonary response to activity, impaired endurance, gait instability, impaired balance, impaired muscle length, impaired self care skills, decreased lower extremity function. Patient is currently on the regular med-surg floor, slow to process information.  She reports that she was independent with basic ADL's and ambulatory without A.D. prior to this hospital admission.  Patient is waiting for liver transplant per patient.  At the time of evaluation, patient required contact guard assistance with supine <> sit, contact guard assistance with sit <> stand from the bed and from the regular toilet with and without a rolling walker, she ambulated ~796 feet with rolling walker with minimal assistance, slow cayden and difficulty with turning.  We will work with patient while she is in the hospital to increase functional mobility endurance.  Recommend discharge to home with no DME and no follow from therapy once medically stable.     Rehab Prognosis: Good and Fair; patient would benefit from acute PT services to address these deficits and reach maximum level of function.    Plan:     During this hospitalization, patient to be seen 5 x/week to address the above listed problems via gait training, therapeutic activities, therapeutic exercises, neuromuscular re-education    Plan of Care Expires: 04/27/23    Subjective     Chief Complaint: "I need to use the bathroom but they want me to use this (referring to the [facility]). " Patient calling asking to be seen.  She has had her period for 3 weeks.  She currently reports bleeding is controlled and a pad or tampon lasts many hours, so ok to not be seen today.    Transferred to scheduling for future visit.  Not an emergency at this time.    Yasmin Lovett RN  [facility] Nurse Advisor    Additional Information    Negative: SEVERE vaginal bleeding (e.g., continuous red blood from vagina, or large blood clots) and very weak (can't stand)    Negative: Passed out (i.e., fainted, collapsed and was not responding)    Negative: Difficult to awaken or acting confused (e.g., disoriented, slurred speech)    Negative: Shock suspected (e.g., cold/pale/clammy skin, too weak to stand, low BP, rapid pulse)    Negative: Sounds like a life-threatening emergency to the triager    Negative: Pregnant > 20 weeks (5 months or more)    Negative: Pregnant < 20 weeks (less than 5 months)    Negative: Postpartum < 1 month since delivery ('Did you recently give birth?')    Negative: Vaginal discharge is the main symptom and bleeding is slight    Negative: SEVERE abdominal pain (e.g., excruciating)    Negative: SEVERE dizziness (e.g., unable to stand, requires support to walk, feels like passing out now)    Negative: SEVERE vaginal bleeding (i.e., soaking 2 pads or tampons per hour and present 2 or more hours; 1 menstrual cup every 2 hours)    Negative: MODERATE vaginal bleeding (i.e., soaking pad or tampon per hour and present > 6 hours; 1 menstrual cup every 6 hours)    Negative: Pale skin (pallor) of new onset or worsening    Negative: Constant abdominal pain lasting > 2 hours    Negative: Patient sounds very sick or weak to the triager    Negative: Taking Coumadin (warfarin) or other strong blood thinner, or known bleeding disorder (e.g., thrombocytopenia)    Negative: Skin bruises or nosebleed and not caused by an injury    Negative: Bleeding/spotting after procedure (e.g., biopsy) or pelvic examination    Patient Comments/Goals: Get better to go home.   Pain/Comfort:  Pain Rating 1: 0/10  Pain Rating Post-Intervention 1: 0/10    Social History:  Living Environment: Patient lives with their spouse in a mobile home with number of outside stair(s): 4-5 with side rail   Prior Level of Function: Prior to admission, patient was modified independent  Equipment Used at Home: none  DME owned (not currently used): none  Assistance Upon Discharge: significant other    Objective:     Communicated with nurse and patient  prior to session. Patient found HOB elevated with peripheral IV, PureWick upon PT entry to room.    General Precautions: Standard, fall   Orthopedic Precautions: N/A   Braces: N/A    Respiratory Status: Room air    Exams:  Cognition: Patient is oriented to Person, Place, and Situation  RLE ROM: WFL  RLE Strength: WFL  LLE ROM: WFL  LLE Strength: WFL  Gross Motor Coordination: WFL  Skin Integrity/Edema:     -       Skin integrity: Visible skin intact    Functional Mobility:  Gait belt applied - Yes  Bed Mobility  Rolling Left: contact guard assistance  Rolling Right: contact guard assistance  Supine to Sit: contact guard assistance for trunk management  Sit to Supine: contact guard assistance for LE management  Transfers  Sit to Stand: contact guard assistance with rolling walker  Toilet Transfer: contact guard assistance with rolling walker using Step Transfer  Gait  Patient ambulated 796 feet  with rolling walker and contact guard assistance and minimum assistance. Patient demonstrates decreased step length and decreased cayden. .. All lines remained intact throughout ambulation trail.  Balance  Sitting: supervision  Standing: supervision      Therapeutic Activities and Exercises:   Patient educated on role of acute care PT and PT POC, safety while in hospital including calling nurse for mobility, and call light usage  Patient educated about importance of OOB mobility and remaining up in chair most of the  (e.g., pap smear) that persists > 3 days    Negative: Patient wants to be seen    Negative: Passed tissue (e.g., gray-white)    Periods last > 7 days    Negative: Periods with > 6 soaked pads or tampons per day    Protocols used: VAGINAL BLEEDING - [id number]       day.  roles and goals of PT, transfer training, bed mobility, gait training, balance training, safety awareness, body mechanics, assistive device, bed positioning, strengthening exercises, and fall prevention    AM-PAC 6 CLICK MOBILITY  Total Score:18    Patient left HOB elevated with all lines intact, call button in reach, RN notified, and bed alarm on.    GOALS:   Multidisciplinary Problems       Physical Therapy Goals          Problem: Physical Therapy    Goal Priority Disciplines Outcome Goal Variances Interventions   Physical Therapy Goal     PT, PT/OT Ongoing, Progressing     Description: Goals to be met by: 2023     Patient will increase functional independence with mobility by performin. Supine to sit with Modified Independent.  2. Sit to supine with Modified Independent.  3. Bed to chair transfer with Modified Independent with proper A.D.  using Step Transfer technique.  4. Sit to Stand with Modified Independent with proper A.D. .  5. Gait  x 800  feet with Supervision or Set-up Assistance with proper A.D. .  6. Lower extremity exercise program x10 reps, with assistance as needed.                          History:     Past Medical History:   Diagnosis Date    Anxiety     Bipolar disorder     Cirrhosis     COPD (chronic obstructive pulmonary disease)     GERD (gastroesophageal reflux disease)     Restless leg syndrome     Urinary frequency        Past Surgical History:   Procedure Laterality Date    TIPS PROCEDURE         Time Tracking:     PT Received On: 23  PT Start Time: 1430  PT Stop Time: 1456  PT Total Time (min): 26 min     Billable Minutes: Evaluation moderate complexity     2023

## 2023-04-19 NOTE — PLAN OF CARE
POC reviewed w/ pt and purposeful rounding complete. Meds administered per MAR. IVF maintained per order. Pt disoriented to time and able to ambulate w/ a walker. Safety precautions intact; no injuries or falls this shift. Pt denies needs at this time; will continue to monitor.     Problem: Infection  Goal: Absence of Infection Signs and Symptoms  Outcome: Ongoing, Progressing     Problem: Adult Inpatient Plan of Care  Goal: Plan of Care Review  Outcome: Ongoing, Progressing  Goal: Patient-Specific Goal (Individualized)  Outcome: Ongoing, Progressing  Goal: Absence of Hospital-Acquired Illness or Injury  Outcome: Ongoing, Progressing  Goal: Optimal Comfort and Wellbeing  Outcome: Ongoing, Progressing  Goal: Readiness for Transition of Care  Outcome: Ongoing, Progressing     Problem: Skin Injury Risk Increased  Goal: Skin Health and Integrity  Outcome: Ongoing, Progressing

## 2023-04-19 NOTE — HOSPITAL COURSE
4/19 Alert and awake, patient endorses not feeling at her baseline and overall embarrassed for not recalling the described agitation she exhibited in the ER yesterday. No bowel movements recorded since admission, but voiding effectively. Will replete electrolytes, continue with IVF hydration. Patient agreeable to psychiatry evaluation.   4/20 No acute events overnight. Stable mentation. Tolerating PO intake and ambulating. Counseled on monitoring of regular bowel movements and adjustments to daily lactulose. Patient endorses making arrangements to follow up with [facility]. Still on board with smoking cessation.

## 2023-04-19 NOTE — CONSULTS
PSYCHIATRY CONSULT    [phone]  ita    4/19/2023 8:54 AM   Shonda Borrego   1965   [id number]         DATE OF ADMISSION: 4/18/2023 12:28 PM    SITE: [facility]    CURRENT LEGAL STATUS: PEC and/or CEC      HISTORY    CHIEF COMPLAINT   Shonda Borrego is a 57 y.o. female with a past psychiatric history of bipolar currently admitted to the inpatient unit with the following chief complaint: altered mental status    HPI   The patient was seen and examined. The chart was reviewed.    The patient presented to the ER on 4/18/2023 .    Per ED/Hospital medicine:  [facility] Medicine  Progress Note     Patient Name: Shonda Borrego  MRN: [id number]  Patient Class: IP- Inpatient     Admission Date: 4/18/2023  Length of Stay: 1 days  Attending Physician: Geeta Gaines DO  Primary Care Provider: Geeta Gaines DO     Subjective:      Principal Problem:Hepatic encephalopathy           HPI:       Chief Complaint   Patient presents with    Altered Mental Status       EMS reports, pt from home with c/o AMS, Hx of Cirrhosis of the Liver. Pt crying and talking to people that aren't thre upon arrival. [id number], 20G Left hand, fluids hanging       57-year-old female with hepatocellular carcinoma, alcoholic cirrhosis, hyperammonemia presents emergency department with altered mental status, unknown duration, history from EMS personnel. No signs of trauma.  History of this in the past her ammonia level is elevated.  Patient is crying, talking to people that are not there.  Afebrile.     ED course:   On arrival vital signs /89 mmHg, , RR 20, SpO2 98% on room air, temp 97.9F. Labs notable for ammonia 114, lipase 54, AST/ALT 36/41, Tbili 2.9, Na 142, K 4.4, Cl 115, CO2 24, Glucose 127. Hg 16.3, HCT 48.5, WBC 11.78, , Lactate 1.9. Urine positive for presumptive THC and amphetamine. For agitation, patient was given benadryl and haldol. Given lactulose x1 and started on IVF.     "  Patient examined resting comfortably receiving IVF.    She is arousable with gentle tap and calling out, but responds with one word "yessir" to all questions.  Patient's significant other, Odette Mccormick at home called EMS concerned with patient's mental status changes.He states behavior changes started about a week ago after she visited her sister. He adds there were memory lapses and blank responses during this past week. This morning, patient did not get up until 10 AM and the  could not get patient out of bed and noticed an increase in hand tremors, along with her unable to recognize him or respond coherently.  Patient to be admitted to medicine service for continued IVF hydration, lactulose treatment and monitoring of possible withdrawal symptoms and improvement in mentation.            Overview/Hospital Course:  4/19 Alert and awake, patient endorses not feeling at her baseline and overall embarrassed for not recalling the described agitation she exhibited in the ER yesterday. No bowel movements recorded since admission, but voiding effectively. Will replete electrolytes, continue with IVF hydration. Patient agreeable to psychiatry evaluation.         Interval History: patient seen and examined. Mentating at her baseline answering questions appropriately.       Review of Systems   Constitutional:  Positive for activity change and appetite change. Negative for fatigue and fever.   Eyes:  Negative for visual disturbance.   Respiratory:  Negative for cough, chest tightness, shortness of breath and wheezing.    Cardiovascular:  Negative for chest pain, palpitations and leg swelling.   Gastrointestinal:  Positive for constipation. Negative for abdominal pain, diarrhea, nausea and vomiting.   Genitourinary:  Negative for dyspareunia, dysuria and flank pain.   Musculoskeletal:  Negative for arthralgias, back pain, gait problem, joint swelling, myalgias and neck pain.   Neurological:  Negative for " tremors, seizures, syncope, speech difficulty, light-headedness, numbness and headaches.   Psychiatric/Behavioral:  Negative for agitation, behavioral problems, confusion, decreased concentration and dysphoric mood.    Objective:      Vital Signs (Most Recent):  Temp: 98.6 °F (37 °C) (04/19/23 0714)  Pulse: 93 (04/19/23 0714)  Resp: 18 (04/19/23 0714)  BP: 134/67 (04/19/23 0714)  SpO2: 96 % (04/19/23 0714) Vital Signs (24h Range):  Temp:  [97.6 °F (36.4 °C)-99.3 °F (37.4 °C)] 98.6 °F (37 °C)  Pulse:  [] 93  Resp:  [16-24] 18  SpO2:  [94 %-100 %] 96 %  BP: (109-151)/(66-91) 134/67      Weight: 94.8 kg (209 lb)  Body mass index is 31.78 kg/m².     Intake/Output Summary (Last 24 hours) at 4/19/2023 0934  Last data filed at 4/19/2023 0709      Gross per 24 hour   Intake 220 ml   Output 900 ml   Net -680 ml      Physical Exam  Vitals and nursing note reviewed.   Constitutional:       General: She is not in acute distress.     Appearance: She is not ill-appearing, toxic-appearing or diaphoretic.   HENT:      Head: Normocephalic and atraumatic.   Cardiovascular:      Rate and Rhythm: Normal rate and regular rhythm.      Pulses: Normal pulses.      Heart sounds: Normal heart sounds. No murmur heard.  Pulmonary:      Effort: Pulmonary effort is normal. No respiratory distress.      Breath sounds: Normal breath sounds. No stridor. No wheezing, rhonchi or rales.   Abdominal:      General: Abdomen is flat.      Palpations: Abdomen is soft.      Tenderness: There is no abdominal tenderness. There is no right CVA tenderness or left CVA tenderness.   Musculoskeletal:         General: No tenderness.      Cervical back: Neck supple. No rigidity or tenderness.   Skin:     General: Skin is warm and dry.      Capillary Refill: Capillary refill takes less than 2 seconds.   Psychiatric:         Attention and Perception: She does not perceive auditory or visual hallucinations.         Mood and Affect: Mood and affect normal. Mood is  not anxious.         Speech: Speech normal.         Behavior: Behavior normal. Behavior is not agitated. Behavior is cooperative.         Thought Content: Thought content is not paranoid. Thought content does not include homicidal or suicidal ideation.         Cognition and Memory: Memory is not impaired.         Judgment: Judgment is not impulsive.      Comments: Baseline      Significant Labs: All pertinent labs within the past 24 hours have been reviewed.  Bilirubin:         Recent Labs   Lab 04/12/23  0800 04/18/23  1328 04/19/23  0520   BILITOT 5.3* 2.9* 3.5*      Blood Culture:        Recent Labs   Lab 04/18/23  1307 04/18/23  1328   LABBLOO No Growth to date No Growth to date      BMP:       Recent Labs   Lab 04/19/23  0520         K 3.4*   *   CO2 25   BUN 11   CREATININE 0.8   CALCIUM 9.0      CBC:        Recent Labs   Lab 04/18/23  1328 04/19/23  0520   WBC 11.78 8.75   HGB 16.3* 14.9   HCT 48.5 44.5   * 142*      CMP:        Recent Labs   Lab 04/18/23  1328 04/19/23  0520    145   K 4.4 3.4*   * 115*   CO2 24 25   * 108   BUN 11 11   CREATININE 0.9 0.8   CALCIUM 9.7 9.0   PROT 7.0 6.1   ALBUMIN 3.1* 2.8*   BILITOT 2.9* 3.5*   ALKPHOS 228* 172*   AST 36 27   ALT 41 33   ANIONGAP 3* 5*      Coagulation:       Recent Labs   Lab 04/19/23  0520   INR 1.3*      Lactic Acid:       Recent Labs   Lab 04/18/23  1328   LACTATE 1.9      TSH:       Recent Labs   Lab 01/19/23  0822   TSH 2.308      Urine Studies:       Recent Labs   Lab 04/18/23  1309   COLORU Yellow   APPEARANCEUA Cloudy*   PHUR 8.0   SPECGRAV 1.010   PROTEINUA Negative   GLUCUA Negative   KETONESU Negative   BILIRUBINUA Negative   OCCULTUA Negative   NITRITE Negative   UROBILINOGEN 1.0   LEUKOCYTESUR Negative   RBCUA 2   WBCUA 2   BACTERIA Moderate*   SQUAMEPITHEL 3   HYALINECASTS 0.0*      Significant Imaging: I have reviewed all pertinent imaging results/findings within the past 24 hours.       "  Assessment/Plan:      * Hepatic encephalopathy  Likely secondary to hyperammonemia worsened with use of amphetamine and THC. Treatment with lactulose. Monitor for withdrawal signs.      Multiple substance abuse   endorses patient at home does not drink alcohol. Has been made aware of marijuana use requiring cessation as they are aware how this could affect her status of liver transplant list.   On admission urine study positive for presumptive THC and amphetamine.   4/19 awake and alert, back to baseline mentation  - consult psychiatry for evaluation        Hyperammonemia  Ammonia level back to baseline from 114. No bowel movement reported. Will continue with regimen. No visible tremors.   - lactulose 40 g Q8H  - IVF resuscitation with NS 125ml/hr              Alcoholic cirrhosis of liver without ascites  Treat hyperammonemia and monitor mental status changes.                VTE Risk Mitigation (From admission, onward)           Ordered       IP VTE HIGH RISK PATIENT  Once         04/18/23 1511       Place sequential compression device  Until discontinued         04/18/23 1511       Place CAMPOS hose  Until discontinued         04/18/23 1511                    Discharge Planning   ALEJANDRO:      Code Status: Full Code   Is the patient medically ready for discharge?:     Reason for patient still in hospital (select all that apply): Patient trending condition, Treatment and Consult recommendations  Discharge Plan A: Home, Home with family         Geeta Gaines DO  Department of Hospital Medicine   [place] [facility]              Per initial psychiatric assessment:    Patient seen today for psychiatric consultation secondary to new onset altered mental status. Per patient, she was brought to ED yesterday by boyfriend due to "going crazy." Pt is unable to recall much about the episode other than "feeling confused." Notes indicate crying and potential auditory hallucinations upon presentation to ER.     During " "today's assessment, pt is aaox4, calm, neurologically intact. She reports history of bipolar diagnosis- of note, based on assessment, patient does not seem to have ever experienced a manic episode. Previous episodes of hypomanic characteristics (decreased need for sleep, grandiosity, hallucinations) seem to have only been present while intoxicated (hx of ETOH, methamphetamine, and cannabis abuse). Patient denies every being hospitalized for psychiatric reasons.     She endorses periods of intermittent depression without suicidal ideation or attempt. Reports that her moods have been "pretty good" for several months which she attributes at least in part to being prescribed Vraylar. Reports intermittent anxiety with racing thoughts and persistent insomnia without recent panic attacks. Pt reports sleeping an average of about 5-6 hours per night.     As noted, pt reports hx of substance abuse. She reports that she "was an alcoholic" and that her last drink was about 1 year ago. Pt denies ever having attended rehab, states "I quit on my own." She denies hx of withdrawal. Pt reports previous methamphetamine use which she states she partakes in rarely. She reports last use was over a week ago. Of note, drug screen yesterday is positive for THC and amphetamine. She denies need for inpatient or outpatient rehab at this time.     Pt has multiple medical problems including hepatitis. She is followed by hepatologist dr. Aguilar in [place]. Reports hx of restless leg syndrome and is current prescribed requip.               Symptoms of Depression: diminished mood - Yes, loss of interest/anhedonia - Yes;  recurrent - Yes, >14 days - No, diminished energy - No, change in sleep - Yes, change in appetite - No, diminished concentration or cognition or indecisiveness - No, PMA/R -  No, excessive guilt or hopelessness or worthlessness - No, suicidal ideations - No    Changes in Sleep: trouble with initiation- Yes, maintenance, - Yes " "early morning awakening with inability to return to sleep - No, hypersomnolence - Yes    Suicidal- active/passive ideations - No, organized plans, future intentions - No    Homicidal ideations: active/passive ideations - No, organized plans, future intentions - No    Symptoms of psychosis: hallucinations -Yes, delusions - No, disorganized speech - Yes, disorganized behavior or abnormal motor behavior - No, or negative symptoms (diminshed emotional expression, avolition, anhedonia, alogia, asociality) - No, active phase symptoms >1 month - No, continuous signs of illness > 6 months - No, since onset of illness decreased level of functioning present - No    Symptoms of anamaria or hypomania: elevated, expansive, or irritable mood with increased energy or activity - No; > 4 days - No,  >7 days - No; with inflated self-esteem or grandiosity - No, decreased need for sleep - No, increased rate of speech - No, FOI or racing thoughts - No, distractibility - No, increased goal directed activity or PMA - No, risky/disinhibited behavior - No    Symptoms of AKILAH: excessive anxiety/worry/fear, more days than not, about numerous issues - Yes, ongoing for >6 months - Yes, difficult to control - Yes, with restlessness - Yes, fatigue - Yes, poor concentration - Yes, irritability - No, muscle tension - No, sleep disturbance - Yes; causes functionally impairing distress - No    Symptoms of Panic Disorder: recurrent panic attacks (palpitations/heart racing, sweating, shakiness, dyspnea, choking, chest pain/discomfort, Gi symptoms, dizzy/lightheadedness, hot/col flashes, paresthesias, derealization, fear of losing control or fear of dying or fear of "going crazy") - No, precipitated - Yes, un-precipitated - No, source of worry and/or behavioral changes secondary for 1 month or longer- No, agoraphobia - No    Symptoms of PTSD: h/o trauma exposure - No; re-experiencing/intrusive symptoms - No, avoidant behavior - No, 2 or more negative " alterations in cognition or mood - No, 2 or more hyperarousal symptoms - No; with dissociative symptoms - No, ongoing for 1 or more  months - No    Symptoms of OCD: obsessions (recurrent thoughts/urges/images; intrusive and/or unwanted; uses other thoughts/actions to suppress) - No; compulsions (repetitive behaviors used to lower distress/anxiety/obsessions) - No, time-consuming (over 1 hour per day) or cause significant distress/impairment - - No    Symptoms of Anorexia: restriction of caloric intake leading to significantly low body weight - No, intense fear of gaining weight or persistent behavior that interferes with weight gain even thought at a significantly low weight - No, disturbance in the way in which one's body weight or shape is experienced, undue influence of body weight or shape on self evaluation, or persistent lack of recognition of the seriousness of the current low body weight - No    Symptoms of Bulimia: recurrent episodes of binge eating (definitely larger amount  than what others would eat and lack of a sense of control over eating during episode) - No, recurrent inappropriate compensatory behaviors in order to prevent weight gain (fasting, medications, exercise, vomiting) - No, binges and compensatory behaviors both occur on average at least once a week for 3 months - No, self evaluations is unduly influenced by body shape/weight- - No    Symptoms of Binge eating: recurrent episodes of binge eating (definitely larger amount than what others would eat and lack of a sense of control over eating during episode) - No, 3 or more of following (eating much more rapidly, eating until uncomfortably full, large amounts when not hungry, eating alone because of embarrassed by how much,  feeling disgusted with oneself, depressed or very guilty afterward) - No, distress regarding binges - No, binges occur on average at least once a week for 3 months - No      Substance/s:  Taken in larger amounts or over  longer periods than intended: Yes,  Persistent desire or unsuccessful attempts to cut down or stop: Yes,  Great deal of time spent seeking, using or recovering from: Yes,  Craving or strong desire to use: Yes,  Recurrent use despite failure to meet major role obligation: Yes,  Continued use despite persistent or recurrent social/interparsonal issues due to use: Pt denies, however recent drug screen positive for THC and amphetamine,  Important social/work/recreational activities given up due to use: Yes,  Recurrent use in physically hazardous situations: No,  Continued use despite knowledge of persistent physical or psychological problem: No,  Tolerance (either increased need or diminished effect): No,      Psychotherapy:  Target symptoms: substance abuse, psychosis  Why chosen therapy is appropriate versus another modality: relevant to diagnosis, evidence based practice  Outcome monitoring methods: self-report, observation  Therapeutic intervention type: insight oriented psychotherapy, supportive psychotherapy  Topics discussed/themes: building skills sets for symptom management, symptom recognition  The patient's response to the intervention is accepting. The patient's progress toward treatment goals is fair.   Duration of intervention: 16 minutes.      PAST PSYCHIATRIC HISTORY  Previous Psychiatric Hospitalizations: No  Previous SI/HI: No,  Previous Suicide Attempts: No,   Previous Medication Trials: Yes  Psychiatric Care (current & past): No,  History of Psychotherapy: No,  History of Violence: No,  History of sexual/physical abuse: No,    PAST MEDICAL & SURGICAL HISTORY   Past Medical History:   Diagnosis Date    Anxiety     Bipolar disorder     Cirrhosis     COPD (chronic obstructive pulmonary disease)     GERD (gastroesophageal reflux disease)     Restless leg syndrome     Urinary frequency      Past Surgical History:   Procedure Laterality Date    TIPS PROCEDURE           CURRENT PSYCH MEDICATION REGIMEN    Vraylar, mirtazapine, alprazolam  Current Medication side effects:  no  Current Medication compliance:  yes    Previous psych meds trials      Home Meds:   Prior to Admission medications    Medication Sig Start Date End Date Taking? Authorizing Provider   albuterol (PROVENTIL/VENTOLIN HFA) 90 mcg/actuation inhaler Inhale 2 puffs into the lungs every 6 (six) hours as needed for Wheezing or Shortness of Breath. 3/23/23 9/19/23  Geeta Gaines DO   ALPRAZolam (XANAX) 0.25 MG tablet Take 1 tablet (0.25 mg total) by mouth 2 (two) times daily as needed for Anxiety. 2/7/23 2/10/23  Geeta Gaines DO   CONSTULOSE 10 gram/15 mL solution Take 30 mLs by mouth 3 (three) times daily. 12/30/22   Historical Provider   docusate sodium (COLACE) 100 MG capsule Take 1 capsule (100 mg total) by mouth 2 (two) times daily. 1/25/23   Michael Moreland DO   fesoterodine (TOVIAZ) 4 mg Tb24 Take 1 tablet (4 mg total) by mouth every evening. 2/7/23 5/8/23  Geeta Gaines DO   mirtazapine (REMERON) 7.5 MG Tab Take 7.5 mg by mouth every evening. 2/13/23   Historical Provider   nicotine (NICODERM CQ) 21 mg/24 hr Place 1 patch onto the skin once daily. 3/16/23   Marianne Chu MD   nicotine, polacrilex, (NICORETTE) 4 MG Gum Take 1 each (4 mg total) by mouth as needed (Take 1 each (4 mg total) by mouth as needed for tobacco cessation. 1-2 per hour in the place of cigarettes. (5-16 daily max) - Oral). 3/16/23   Marianne Chu MD   ondansetron (ZOFRAN) 4 MG tablet Take 1 tablet (4 mg total) by mouth 2 (two) times daily. 1/25/23   Michael Moreland DO   pantoprazole (PROTONIX) 20 MG tablet Take 1 tablet (20 mg total) by mouth every evening. 3/8/23 6/6/23  Kazumi G. Yoshinaga, DO   rOPINIRole (REQUIP) 1 MG tablet Take 1 tablet (1 mg total) by mouth every evening. 3/8/23 9/4/23  Geeta Gaines,    spironolactone (ALDACTONE) 50 MG tablet Take 1 tablet by mouth once daily 2/20/23   Geeta Gaines,    ursodioL  (ACTIGALL) 300 mg capsule Take 1 capsule (300 mg total) by mouth 3 (three) times daily. for 14 days 3/23/23 4/6/23  Geeta Gaines DO   VRAYLAR 3 mg Cap Take 3 mg by mouth. 3/13/23   Historical Provider         OTC Meds: as noted    Scheduled Meds:    famotidine  20 mg Oral BID    lactulose  200 g Rectal Once    lactulose  40 g Oral TID    mirtazapine  7.5 mg Oral QHS    potassium bicarbonate  50 mEq Oral Once    potassium chloride  20 mEq Oral Once    risperiDONE  1 mg Oral Daily    spironolactone  25 mg Oral BID      PRN Meds: acetaminophen, ondansetron, sodium chloride 0.9%   Psychotherapeutics (From admission, onward)      Start     Stop Route Frequency Ordered    04/19/23 0900  risperiDONE tablet 1 mg         -- Oral Daily 04/18/23 1537    04/18/23 2100  mirtazapine tablet 7.5 mg         -- Oral Nightly 04/18/23 1511            ALLERGIES   Review of patient's allergies indicates:  No Known Allergies    NEUROLOGIC HISTORY  Seizures: No  Head trauma: No    SOCIAL HISTORY:  Developmental/Childhood:Denies   Education:11th grade    Employment Status/Finances:Lives with [facility]    Relationship Status/Sexual Orientation: in a committed relationship  Children: 3  Housing Status: [facility]    history:  NO   Access to Firearms: NO ;  Locked up? NO  Tenriism:Non-spiritual  Recreational activities:Time with family    SUBSTANCE ABUSE HISTORY   Recreational Drugs: marijuana and methamphetamines   Use of Alcohol: Yes  Rehab History:no   Tobacco Use:yes, 1/2 pack/day    LEGAL HISTORY:   Past charges/incarcerations: NO  Pending charges:NO    FAMILY PSYCHIATRIC HISTORY   Family History   Problem Relation Age of Onset    Lung cancer Mother     No Known Problems Father     Cancer Sister     Lung cancer Sister     No Known Problems Daughter     No Known Problems Maternal Aunt     No Known Problems Maternal Uncle     No Known Problems Paternal Aunt     No Known Problems Paternal Uncle     No Known Problems Maternal  Grandmother     No Known Problems Maternal Grandfather     No Known Problems Paternal Grandmother     No Known Problems Paternal Grandfather     Breast cancer Neg Hx     Ovarian cancer Neg Hx     BRCA 1/2 Neg Hx               ROS  Review of Systems   Constitutional: Negative.    HENT: Negative.     Eyes: Negative.    Respiratory: Negative.     Cardiovascular: Negative.    Gastrointestinal: Negative.    Genitourinary: Negative.    Musculoskeletal: Negative.    Skin: Negative.    Neurological: Negative.    Endo/Heme/Allergies: Negative.    Psychiatric/Behavioral:          As noted       EXAMINATION    PHYSICAL EXAM  Reviewed note/exam by Dr. Gaines from      VITALS   Vitals:    04/19/23 0714   BP: 134/67   Pulse: 93   Resp: 18   Temp: 98.6 °F (37 °C)        Body mass index is 31.78 kg/m².        PAIN  0/10  Subjective report of pain matches objective signs and symptoms: No    LABORATORY DATA   Recent Results (from the past 72 hour(s))   Blood culture #1 **CANNOT BE ORDERED STAT**    Collection Time: 04/18/23  1:07 PM    Specimen: Blood   Result Value Ref Range    Blood Culture, Routine No Growth to date    Drug screen panel, emergency    Collection Time: 04/18/23  1:09 PM   Result Value Ref Range    Benzodiazepines Negative Negative    Methadone metabolites Negative Negative    Cocaine (Metab.) Negative Negative    Opiate Scrn, Ur Negative Negative    Barbiturate Screen, Ur Negative Negative    Amphetamine Screen, Ur Presumptive Positive (A) Negative    THC Presumptive Positive (A) Negative    Phencyclidine Negative Negative    Creatinine, Urine 78.0 15.0 - 325.0 mg/dL    Toxicology Information SEE COMMENT    Urinalysis, Reflex to Urine Culture Urine, Clean Catch    Collection Time: 04/18/23  1:09 PM    Specimen: Urine, Catheterized   Result Value Ref Range    Specimen UA Urine, Catheterized     Color, UA Yellow Yellow, Straw, Joycelyn    Appearance, UA Cloudy (A) Clear    pH, UA 8.0 5.0 - 8.0    Specific Gravity,  UA 1.010 1.005 - 1.030    Protein, UA Negative Negative    Glucose, UA Negative Negative    Ketones, UA Negative Negative    Bilirubin (UA) Negative Negative    Occult Blood UA Negative Negative    Nitrite, UA Negative Negative    Urobilinogen, UA 1.0 <2.0 EU/dL    Leukocytes, UA Negative Negative   Urinalysis Microscopic    Collection Time: 04/18/23  1:09 PM   Result Value Ref Range    RBC, UA 2 0 - 4 /hpf    WBC, UA 2 0 - 5 /hpf    Bacteria Moderate (A) None-Occ /hpf    Squam Epithel, UA 3 /hpf    Hyaline Casts, UA 0.0 (A) 0-1/lpf /lpf    Microscopic Comment SEE COMMENT    POCT ARTERIAL BLOOD GAS    Collection Time: 04/18/23  1:27 PM   Result Value Ref Range    POC PH 7.440 7.345 - 7.450    POC PCO2 32.7 (L) 35.0 - 45.0 mmHg    POC PO2 78.4 (L) 80.0 - 100 mmHg    POC SATURATED O2 96.1 95.0 - 100.0 %    POC HCO3 23.5 (L) 24.0 - 28.0 mmol/l    Base Deficit -1.9 -2.0 - 2.0 mmol/l    POC Temp 37.0 C    Specimen source Arterial     Performed By: Vargas     MODIFIED BRAYAN'S TEST NA     FiO2 21.0 %    O2DEVICE Room Air     Comments DRAWN PER GINI SMALLS RRT    CBC auto differential    Collection Time: 04/18/23  1:28 PM   Result Value Ref Range    WBC 11.78 3.90 - 12.70 K/uL    RBC 5.28 4.00 - 5.40 M/uL    Hemoglobin 16.3 (H) 12.0 - 16.0 g/dL    Hematocrit 48.5 37.0 - 48.5 %    MCV 92 82 - 98 fL    MCH 30.9 27.0 - 31.0 pg    MCHC 33.6 32.0 - 36.0 g/dL    RDW 16.3 (H) 11.5 - 14.5 %    Platelets 149 (L) 150 - 450 K/uL    MPV 9.9 9.2 - 12.9 fL    Immature Granulocytes 0.3 0.0 - 0.5 %    Gran # (ANC) 8.6 (H) 1.8 - 7.7 K/uL    Immature Grans (Abs) 0.04 0.00 - 0.04 K/uL    Lymph # 2.3 1.0 - 4.8 K/uL    Mono # 0.8 0.3 - 1.0 K/uL    Eos # 0.1 0.0 - 0.5 K/uL    Baso # 0.03 0.00 - 0.20 K/uL    nRBC 0 0 /100 WBC    Gran % 73.1 (H) 38.0 - 73.0 %    Lymph % 19.3 18.0 - 48.0 %    Mono % 6.6 4.0 - 15.0 %    Eosinophil % 0.4 0.0 - 8.0 %    Basophil % 0.3 0.0 - 1.9 %    Differential Method Automated    Comprehensive metabolic panel     Collection Time: 04/18/23  1:28 PM   Result Value Ref Range    Sodium 142 136 - 145 mmol/L    Potassium 4.4 3.5 - 5.1 mmol/L    Chloride 115 (H) 95 - 110 mmol/L    CO2 24 23 - 29 mmol/L    Glucose 127 (H) 70 - 110 mg/dL    BUN 11 6 - 20 mg/dL    Creatinine 0.9 0.5 - 1.4 mg/dL    Calcium 9.7 8.7 - 10.5 mg/dL    Total Protein 7.0 6.0 - 8.4 g/dL    Albumin 3.1 (L) 3.5 - 5.2 g/dL    Total Bilirubin 2.9 (H) 0.1 - 1.0 mg/dL    Alkaline Phosphatase 228 (H) 55 - 135 U/L    AST 36 10 - 40 U/L    ALT 41 10 - 44 U/L    Anion Gap 3 (L) 8 - 16 mmol/L    eGFR >60.0 >60 mL/min/1.73 m^2   Lipase    Collection Time: 04/18/23  1:28 PM   Result Value Ref Range    Lipase Result 54 23 - 300 U/L   Ethanol    Collection Time: 04/18/23  1:28 PM   Result Value Ref Range    Alcohol, Serum <3 <10 mg/dL   Ammonia    Collection Time: 04/18/23  1:28 PM   Result Value Ref Range    Ammonia 114 (H) 10 - 50 umol/L   Lactic acid, plasma    Collection Time: 04/18/23  1:28 PM   Result Value Ref Range    Lactate (Lactic Acid) 1.9 0.5 - 2.2 mmol/L   Blood culture #2 **CANNOT BE ORDERED STAT**    Collection Time: 04/18/23  1:28 PM    Specimen: Blood   Result Value Ref Range    Blood Culture, Routine No Growth to date    CBC auto differential    Collection Time: 04/19/23  5:20 AM   Result Value Ref Range    WBC 8.75 3.90 - 12.70 K/uL    RBC 4.80 4.00 - 5.40 M/uL    Hemoglobin 14.9 12.0 - 16.0 g/dL    Hematocrit 44.5 37.0 - 48.5 %    MCV 93 82 - 98 fL    MCH 31.0 27.0 - 31.0 pg    MCHC 33.5 32.0 - 36.0 g/dL    RDW 16.3 (H) 11.5 - 14.5 %    Platelets 142 (L) 150 - 450 K/uL    MPV 10.4 9.2 - 12.9 fL    Immature Granulocytes 0.2 0.0 - 0.5 %    Gran # (ANC) 4.8 1.8 - 7.7 K/uL    Immature Grans (Abs) 0.02 0.00 - 0.04 K/uL    Lymph # 2.9 1.0 - 4.8 K/uL    Mono # 0.8 0.3 - 1.0 K/uL    Eos # 0.2 0.0 - 0.5 K/uL    Baso # 0.06 0.00 - 0.20 K/uL    nRBC 0 0 /100 WBC    Gran % 54.9 38.0 - 73.0 %    Lymph % 32.9 18.0 - 48.0 %    Mono % 9.5 4.0 - 15.0 %    Eosinophil %  "1.8 0.0 - 8.0 %    Basophil % 0.7 0.0 - 1.9 %    Differential Method Automated    Comprehensive metabolic panel    Collection Time: 04/19/23  5:20 AM   Result Value Ref Range    Sodium 145 136 - 145 mmol/L    Potassium 3.4 (L) 3.5 - 5.1 mmol/L    Chloride 115 (H) 95 - 110 mmol/L    CO2 25 23 - 29 mmol/L    Glucose 108 70 - 110 mg/dL    BUN 11 6 - 20 mg/dL    Creatinine 0.8 0.5 - 1.4 mg/dL    Calcium 9.0 8.7 - 10.5 mg/dL    Total Protein 6.1 6.0 - 8.4 g/dL    Albumin 2.8 (L) 3.5 - 5.2 g/dL    Total Bilirubin 3.5 (H) 0.1 - 1.0 mg/dL    Alkaline Phosphatase 172 (H) 55 - 135 U/L    AST 27 10 - 40 U/L    ALT 33 10 - 44 U/L    Anion Gap 5 (L) 8 - 16 mmol/L    eGFR >60.0 >60 mL/min/1.73 m^2   Ammonia    Collection Time: 04/19/23  5:20 AM   Result Value Ref Range    Ammonia 29 10 - 50 umol/L   Protime-INR    Collection Time: 04/19/23  5:20 AM   Result Value Ref Range    Prothrombin Time 14.1 (H) 9.0 - 12.5 sec    INR 1.3 (H) 0.8 - 1.2      No results found for: PHENYTOIN, PHENOBARB, VALPROATE, CBMZ        CONSTITUTIONAL  General Appearance: unremarkable, age appropriate, lying in bed, overweight    MUSCULOSKELETAL  Muscle Strength and Tone:no tremor, no tic  Abnormal Involuntary Movements: No  Gait and Station: non-ataxic    PSYCHIATRIC   Level of Consciousness: awake, alert , and oriented  Orientation: person, place, time, and situation  Grooming: Casually dressed and Well groomed  Psychomotor Behavior: normal, cooperative, friendly and cooperative  Speech: normal tone, normal rate, normal pitch, normal volume  Language: grossly intact  Mood: "Ok"  Affect: Appropriate, mood-congruent  Thought Process: linear, logical  Associations: intact   Thought Content: denies SI, denies HI, and no delusions  Perceptions: less AH  Memory: Able to recall past events, Remote intact, and Recent intact  Attention:Attends to interview without distraction  Fund of Knowledge: Aware of current events and Vocabulary appropriate   Estimate if " Intelligence:  Average based on work/education history, vocabulary and mental status exam  Insight: limited awareness of illness  Judgment: behavior is adequate to circumstances      PSYCHOSOCIAL    PSYCHOSOCIAL STRESSORS   health    FUNCTIONING RELATIONSHIPS   good support system    STRENGTHS AND LIABILITIES   Liability: Patient has poor health., Liability: Patient has poor judgment    Is the patient aware of the biomedical complications associated with substance abuse and mental illness? yes    Does the patient have an Advance Directive for Mental Health treatment? no  (If yes, inform patient to bring copy.)        MEDICAL DECISION MAKING        ASSESSMENT       Altered mental status  Bipolar disorder, unspecified  Generalized anxiety disorder  Polysubstance abuse      PROBLEM LIST AND MANAGEMENT PLANS    Altered mental status:  -Pt counseled, appears to have resolved  -Drug-induced psychosis vs encephalopathy (ammonia levels elevated at time of admission, currently WNL)  -Delirium precautions  -Consider DC requip as this may exacerbate psychotic symptoms    Bipolar disorder:  -Patient counseled  -Ok to continue vraylar-if non-formulary, ok to take home meds if someone is able to bring them. If not, ok to continue risperdal    AKILAH:  -Patient counseled  -Mirtazapine off-label, increase to 15 mg qhs  - Recommend DC benzos (drug screen negative, however pt reports alprazolam as home med) in favor of lyrica off-label    Polysubstance abuse:  -Patient counseled  -Denies recent ETOH use  -Denies habitual methamphetamine use despite recent positive drug screen.  -Encourage cessation   -Encourage rehab/outpatient counseling  -Patient interested in trial of chantix, however this is not on hospital formulary. REcommend initiating upon discharge in addition to continued outpatient smoking cessation counseling.       PRESCRIPTION DRUG MANAGEMENT    Discussed diagnosis, risks and benefits of proposed treatment vs alternative  treatments vs no treatment, potential side effects of these treatments and the inherent unpredictability of treatment. The patient expresses understanding of the above and displays the capacity to agree with this treatment given said understanding. Patient also agrees that, currently, the benefits outweigh the risks and would like to pursue/continue treatment at this time.    Any medications being used off-label were discussed with the patient inclusive of the evidence base for the use of the medications and consent was obtained for the off-label use of the medication.         DIAGNOSTIC TESTING  Labs reviewed with patient; follow up pending labs    Disposition:   Inpatient psychiatric hospitalization not indicated at this time.         Jaime Heaton, PMHNP-BC

## 2023-04-19 NOTE — ASSESSMENT & PLAN NOTE
endorses patient at home does not drink alcohol. Has been made aware of marijuana use requiring cessation as they are aware how this could affect her status of liver transplant list.   On admission urine study positive for presumptive THC and amphetamine.   - monitor to withdrawal symptoms and treat   - consult psychiatry for evaluation

## 2023-04-19 NOTE — PLAN OF CARE
[facility]  Initial Discharge Assessment       Primary Care Provider: Geeta Gaines DO    Admission Diagnosis: Substance abuse [F19.10]  Hyperammonemia [E72.20]  Altered mental status, unspecified altered mental status type [R41.82]  Alcoholic cirrhosis, unspecified whether ascites present [K70.30]    Admission Date: 4/18/2023  Expected Discharge Date:     Discharge Barriers Identified: Substance Abuse (The patient had a presumptive positive drug screening for THC and Amphetamine.)    Payor: MEDICAID / Plan: [facility] ([place] MEDICAID) / Product Type: Managed Medicaid /     Extended Emergency Contact Information  Primary Emergency Contact: ita moya  Mobile Phone: [phone]  Relation: Significant other   needed? No    Discharge Plan A: Home, Home with family  Discharge Plan B: Other (TBD)      [facility] [address] [place] [address] [address] [facility] [address] [facility]  [address] [facility] [address] [facility] [address]  Phone: [phone] Fax: [phone]      Initial Assessment (most recent)       Adult Discharge Assessment - 04/19/23 0738          Discharge Assessment    Assessment Type Discharge Planning Assessment     Confirmed/corrected address, phone number and insurance Yes     Confirmed Demographics Correct on Facesheet     Source of Information patient     Reason For Admission Hepatic encephalopathy     People in Home significant other     Do you expect to return to your current living situation? --   TBD    Prior to hospitilization cognitive status: Unable to Assess     Current cognitive status: Alert/Oriented     Home Accessibility stairs to enter home   The patient lives in a mobile home    Number of Stairs, Main Entrance four     Stair Railings, Main Entrance railing on right side (ascending)     Home Layout Able to live on 1st floor     Equipment Currently Used at Home none     Readmission within 30 days? No     Patient currently being followed by outpatient case management?  No     Do you currently have service(s) that help you manage your care at home? No     Do you take prescription medications? Yes     Do you have prescription coverage? Yes     Coverage MEDICAID - [facility] ([place] MEDICAID)     Do you have any problems affording any of your prescribed medications? No     Is the patient taking medications as prescribed? yes     How do you get to doctors appointments? family or friend will provide     Are you on dialysis? No     Do you take coumadin? No     Discharge Plan A Home;Home with family     Discharge Plan B Other   TBD    DME Needed Upon Discharge  other (see comments)   TBD    Discharge Plan discussed with: Patient     Discharge Barriers Identified Substance Abuse   The patient had a presumptive positive drug screening for THC and Amphetamine.       Physical Activity    On average, how many days per week do you engage in moderate to strenuous exercise (like a brisk walk)? 2 days   The patient expressed that she likes to work in her garden.    On average, how many minutes do you engage in exercise at this level? 120 min        Financial Resource Strain    How hard is it for you to pay for the very basics like food, housing, medical care, and heating? Not hard at all   The patient stated that her significant other helps with the financials       Housing Stability    In the last 12 months, was there a time when you were not able to pay the mortgage or rent on time? No     In the last 12 months, how many places have you lived? 1     In the last 12 months, was there a time when you did not have a steady place to sleep or slept in a shelter (including now)? No        Transportation Needs    In the past 12 months, has lack of transportation kept you from medical appointments or from getting medications? No        Food Insecurity    Within the past 12 months, you worried that your food would run out before you got the money to buy more. Never true     Within the  past 12 months, the food you bought just didn't last and you didn't have money to get more. Never true        Stress    Do you feel stress - tense, restless, nervous, or anxious, or unable to sleep at night because your mind is troubled all the time - these days? Rather much   The patient is concerned about her current diagnosis.       Social Connections    In a typical week, how many times do you talk on the phone with family, friends, or neighbors? Twice a week     How often do you attend Hoahaoism or Congregation services? 1 to 4 times per year     Do you belong to any clubs or organizations such as Hoahaoism groups, unions, fraternal or athletic groups, or school groups? No     How often do you attend meetings of the clubs or organizations you belong to? Never     Are you , , , , never , or living with a partner? Living with partner        Alcohol Use    Q1: How often do you have a drink containing alcohol? Never        OTHER    Name(s) of People in Home Dane (Significant other)   [phone]                 Initial discharge assessment completed. I spoke to the patient in her room. She was awake, alert, and able to answer my questions. The patient informed me that she lives with her significant other. She also stated that she has children and family that lives in [place], [place]. The patient stated that she currently does not utilize any DME for her care. I spoke to the patient about her discharge plan of care. The patient was open to community resources. CM/SW will remain available. Contact information was left in the patient's room.

## 2023-04-19 NOTE — ASSESSMENT & PLAN NOTE
Likely secondary to hyperammonemia worsened with use of amphetamine and THC. Treatment with lactulose. Monitor for withdrawal signs.

## 2023-04-20 ENCOUNTER — TELEPHONE (OUTPATIENT)
Dept: SMOKING CESSATION | Facility: CLINIC | Age: 58
End: 2023-04-20
Payer: MEDICAID

## 2023-04-20 VITALS
SYSTOLIC BLOOD PRESSURE: 122 MMHG | HEART RATE: 88 BPM | TEMPERATURE: 98 F | WEIGHT: 209 LBS | RESPIRATION RATE: 18 BRPM | DIASTOLIC BLOOD PRESSURE: 59 MMHG | BODY MASS INDEX: 31.67 KG/M2 | HEIGHT: 68 IN | OXYGEN SATURATION: 100 %

## 2023-04-20 LAB
ALBUMIN SERPL BCP-MCNC: 2.6 G/DL (ref 3.5–5.2)
ALP SERPL-CCNC: 168 U/L (ref 55–135)
ALT SERPL W/O P-5'-P-CCNC: 35 U/L (ref 10–44)
ANION GAP SERPL CALC-SCNC: 2 MMOL/L (ref 8–16)
AST SERPL-CCNC: 26 U/L (ref 10–40)
BASOPHILS # BLD AUTO: 0.06 K/UL (ref 0–0.2)
BASOPHILS NFR BLD: 0.8 % (ref 0–1.9)
BILIRUB SERPL-MCNC: 3.7 MG/DL (ref 0.1–1)
BUN SERPL-MCNC: 10 MG/DL (ref 6–20)
CALCIUM SERPL-MCNC: 8.8 MG/DL (ref 8.7–10.5)
CHLORIDE SERPL-SCNC: 113 MMOL/L (ref 95–110)
CO2 SERPL-SCNC: 26 MMOL/L (ref 23–29)
CREAT SERPL-MCNC: 0.9 MG/DL (ref 0.5–1.4)
DIFFERENTIAL METHOD: ABNORMAL
EOSINOPHIL # BLD AUTO: 0.2 K/UL (ref 0–0.5)
EOSINOPHIL NFR BLD: 2.2 % (ref 0–8)
ERYTHROCYTE [DISTWIDTH] IN BLOOD BY AUTOMATED COUNT: 16.3 % (ref 11.5–14.5)
EST. GFR  (NO RACE VARIABLE): >60 ML/MIN/1.73 M^2
GLUCOSE SERPL-MCNC: 131 MG/DL (ref 70–110)
HCT VFR BLD AUTO: 42 % (ref 37–48.5)
HGB BLD-MCNC: 14.2 G/DL (ref 12–16)
IMM GRANULOCYTES # BLD AUTO: 0.03 K/UL (ref 0–0.04)
IMM GRANULOCYTES NFR BLD AUTO: 0.4 % (ref 0–0.5)
LYMPHOCYTES # BLD AUTO: 2.2 K/UL (ref 1–4.8)
LYMPHOCYTES NFR BLD: 29.7 % (ref 18–48)
MCH RBC QN AUTO: 31.8 PG (ref 27–31)
MCHC RBC AUTO-ENTMCNC: 33.8 G/DL (ref 32–36)
MCV RBC AUTO: 94 FL (ref 82–98)
MONOCYTES # BLD AUTO: 0.8 K/UL (ref 0.3–1)
MONOCYTES NFR BLD: 11.5 % (ref 4–15)
NEUTROPHILS # BLD AUTO: 4.1 K/UL (ref 1.8–7.7)
NEUTROPHILS NFR BLD: 55.4 % (ref 38–73)
NRBC BLD-RTO: 0 /100 WBC
PLATELET # BLD AUTO: 117 K/UL (ref 150–450)
PMV BLD AUTO: 10.3 FL (ref 9.2–12.9)
POTASSIUM SERPL-SCNC: 3.8 MMOL/L (ref 3.5–5.1)
PROT SERPL-MCNC: 5.9 G/DL (ref 6–8.4)
RBC # BLD AUTO: 4.47 M/UL (ref 4–5.4)
SODIUM SERPL-SCNC: 141 MMOL/L (ref 136–145)
WBC # BLD AUTO: 7.33 K/UL (ref 3.9–12.7)

## 2023-04-20 PROCEDURE — 80053 COMPREHEN METABOLIC PANEL: CPT | Mod: NTX | Performed by: STUDENT IN AN ORGANIZED HEALTH CARE EDUCATION/TRAINING PROGRAM

## 2023-04-20 PROCEDURE — 36415 COLL VENOUS BLD VENIPUNCTURE: CPT | Mod: NTX | Performed by: STUDENT IN AN ORGANIZED HEALTH CARE EDUCATION/TRAINING PROGRAM

## 2023-04-20 PROCEDURE — 97530 THERAPEUTIC ACTIVITIES: CPT | Mod: NTX

## 2023-04-20 PROCEDURE — 99238 PR HOSPITAL DISCHARGE DAY,<30 MIN: ICD-10-PCS | Mod: NTX,,, | Performed by: STUDENT IN AN ORGANIZED HEALTH CARE EDUCATION/TRAINING PROGRAM

## 2023-04-20 PROCEDURE — 99238 HOSP IP/OBS DSCHRG MGMT 30/<: CPT | Mod: NTX,,, | Performed by: STUDENT IN AN ORGANIZED HEALTH CARE EDUCATION/TRAINING PROGRAM

## 2023-04-20 PROCEDURE — 85025 COMPLETE CBC W/AUTO DIFF WBC: CPT | Mod: NTX | Performed by: STUDENT IN AN ORGANIZED HEALTH CARE EDUCATION/TRAINING PROGRAM

## 2023-04-20 RX ORDER — IBUPROFEN 200 MG
1 TABLET ORAL DAILY
Status: DISCONTINUED | OUTPATIENT
Start: 2023-04-20 | End: 2023-04-20 | Stop reason: HOSPADM

## 2023-04-20 RX ORDER — POTASSIUM CHLORIDE 20 MEQ/1
20 TABLET, EXTENDED RELEASE ORAL
Status: DISCONTINUED | OUTPATIENT
Start: 2023-04-20 | End: 2023-04-20 | Stop reason: HOSPADM

## 2023-04-20 NOTE — PROGRESS NOTES
"[facility]  Progress Note    Patient Name: Shonda Borrego  MRN: [id number]  Patient Class: IP- Inpatient   Admission Date: 4/18/2023  Length of Stay: 2 days  Attending Physician: Geeta Gaines DO  Primary Care Provider: Geeta Gaines DO    Subjective:     Principal Problem:Hepatic encephalopathy    HPI:  Chief Complaint   Patient presents with    Altered Mental Status       EMS reports, pt from home with c/o AMS, Hx of Cirrhosis of the Liver. Pt crying and talking to people that aren't thre upon arrival. [id number], 20G Left hand, fluids hanging       57-year-old female with hepatocellular carcinoma, alcoholic cirrhosis, hyperammonemia presents emergency department with altered mental status, unknown duration, history from EMS personnel. No signs of trauma.  History of this in the past her ammonia level is elevated.  Patient is crying, talking to people that are not there.  Afebrile.    ED course:   On arrival vital signs /89 mmHg, , RR 20, SpO2 98% on room air, temp 97.9F. Labs notable for ammonia 114, lipase 54, AST/ALT 36/41, Tbili 2.9, Na 142, K 4.4, Cl 115, CO2 24, Glucose 127. Hg 16.3, HCT 48.5, WBC 11.78, , Lactate 1.9. Urine positive for presumptive THC and amphetamine. For agitation, patient was given benadryl and haldol. Given lactulose x1 and started on IVF.     Patient examined resting comfortably receiving IVF.    She is arousable with gentle tap and calling out, but responds with one word "yessir" to all questions.  Patient's significant other, Odette Mccormick at home called EMS concerned with patient's mental status changes.He states behavior changes started about a week ago after she visited her sister. He adds there were memory lapses and blank responses during this past week. This morning, patient did not get up until 10 AM and the  could not get patient out of bed and noticed an increase in hand tremors, along with her unable to " recognize him or respond coherently.  Patient to be admitted to medicine service for continued IVF hydration, lactulose treatment and monitoring of possible withdrawal symptoms and improvement in mentation.         Overview/Hospital Course:  4/19 Alert and awake, patient endorses not feeling at her baseline and overall embarrassed for not recalling the described agitation she exhibited in the ER yesterday. No bowel movements recorded since admission, but voiding effectively. Will replete electrolytes, continue with IVF hydration. Patient agreeable to psychiatry evaluation.   4/20 No acute events overnight. Stable mentation. Tolerating PO intake and ambulating. Counseled on monitoring of regular bowel movements and adjustments to daily lactulose. Patient endorses making arrangements to follow up with [facility]. Still on board with smoking cessation.       Interval History: Patient seen and examined.     Review of Systems   Constitutional:  Negative for activity change, appetite change, fatigue and fever.   Eyes:  Negative for visual disturbance.   Respiratory:  Negative for cough, chest tightness, shortness of breath and wheezing.    Cardiovascular:  Negative for chest pain, palpitations and leg swelling.   Gastrointestinal:  Negative for abdominal pain, constipation, diarrhea, nausea and vomiting.   Genitourinary:  Negative for dyspareunia, dysuria and flank pain.   Musculoskeletal:  Negative for arthralgias, back pain, gait problem, joint swelling, myalgias and neck pain.   Neurological:  Negative for tremors, seizures, syncope, speech difficulty, light-headedness, numbness and headaches.   Psychiatric/Behavioral:  Negative for agitation, behavioral problems, confusion, decreased concentration and dysphoric mood.    Objective:     Vital Signs (Most Recent):  Temp: 98.4 °F (36.9 °C) (04/20/23 0721)  Pulse: 88 (04/20/23 0721)  Resp: 18 (04/20/23 0721)  BP: (!) 122/59 (04/20/23 0721)  SpO2: 100 %  (04/20/23 0721)   Vital Signs (24h Range):  Temp:  [97.9 °F (36.6 °C)-98.6 °F (37 °C)] 98.4 °F (36.9 °C)  Pulse:  [] 88  Resp:  [18-20] 18  SpO2:  [95 %-100 %] 100 %  BP: (119-139)/(59-83) 122/59     Weight: 94.8 kg (209 lb)  Body mass index is 31.78 kg/m².    Intake/Output Summary (Last 24 hours) at 4/20/2023 1010  Last data filed at 4/20/2023 0617  Gross per 24 hour   Intake 2484 ml   Output 900 ml   Net 1584 ml      Physical Exam  Vitals and nursing note reviewed.   Constitutional:       General: She is not in acute distress.     Appearance: She is not ill-appearing, toxic-appearing or diaphoretic.   HENT:      Head: Normocephalic and atraumatic.      Mouth/Throat:      Mouth: Mucous membranes are moist.      Pharynx: Oropharynx is clear.   Eyes:      Extraocular Movements: Extraocular movements intact.   Cardiovascular:      Rate and Rhythm: Normal rate and regular rhythm.      Pulses: Normal pulses.      Heart sounds: Normal heart sounds. No murmur heard.  Pulmonary:      Effort: Pulmonary effort is normal.   Abdominal:      General: Abdomen is flat.      Palpations: Abdomen is soft.      Tenderness: There is no abdominal tenderness. There is no right CVA tenderness or left CVA tenderness.   Musculoskeletal:         General: No tenderness. Normal range of motion.      Cervical back: Normal range of motion and neck supple. No rigidity or tenderness.      Right lower leg: No edema.      Left lower leg: No edema.   Lymphadenopathy:      Cervical: No cervical adenopathy.   Skin:     General: Skin is warm and dry.      Capillary Refill: Capillary refill takes less than 2 seconds.   Neurological:      General: No focal deficit present.      Mental Status: She is oriented to person, place, and time. Mental status is at baseline.      Gait: Gait normal.      Deep Tendon Reflexes: Reflexes normal.   Psychiatric:         Attention and Perception: She does not perceive auditory or visual hallucinations.         Mood  and Affect: Mood and affect normal. Mood is not anxious.         Speech: Speech normal.         Behavior: Behavior normal. Behavior is not agitated. Behavior is cooperative.         Thought Content: Thought content normal. Thought content is not paranoid. Thought content does not include homicidal or suicidal ideation.         Cognition and Memory: Memory is not impaired.         Judgment: Judgment normal. Judgment is not impulsive.     Significant Labs: All pertinent labs within the past 24 hours have been reviewed.  Bilirubin:   Recent Labs   Lab 04/12/23  0800 04/18/23  1328 04/19/23  0520 04/20/23  0648   BILITOT 5.3* 2.9* 3.5* 3.7*     Blood Culture:   Recent Labs   Lab 04/18/23  1307 04/18/23  1328   LABBLOO No Growth to date  No Growth to date No Growth to date  No Growth to date     BMP:   Recent Labs   Lab 04/20/23  0648   *      K 3.8   *   CO2 26   BUN 10   CREATININE 0.9   CALCIUM 8.8     CBC:   Recent Labs   Lab 04/18/23  1328 04/19/23  0520 04/20/23  0648   WBC 11.78 8.75 7.33   HGB 16.3* 14.9 14.2   HCT 48.5 44.5 42.0   * 142* 117*     CMP:   Recent Labs   Lab 04/18/23  1328 04/19/23  0520 04/20/23  0648    145 141   K 4.4 3.4* 3.8   * 115* 113*   CO2 24 25 26   * 108 131*   BUN 11 11 10   CREATININE 0.9 0.8 0.9   CALCIUM 9.7 9.0 8.8   PROT 7.0 6.1 5.9*   ALBUMIN 3.1* 2.8* 2.6*   BILITOT 2.9* 3.5* 3.7*   ALKPHOS 228* 172* 168*   AST 36 27 26   ALT 41 33 35   ANIONGAP 3* 5* 2*     Coagulation:   Recent Labs   Lab 04/19/23  0520   INR 1.3*     Lactic Acid:   Recent Labs   Lab 04/18/23  1328   LACTATE 1.9     TSH:   Recent Labs   Lab 01/19/23  0822   TSH 2.308     Urine Studies:   Recent Labs   Lab 04/18/23  1309   COLORU Yellow   APPEARANCEUA Cloudy*   PHUR 8.0   SPECGRAV 1.010   PROTEINUA Negative   GLUCUA Negative   KETONESU Negative   BILIRUBINUA Negative   OCCULTUA Negative   NITRITE Negative   UROBILINOGEN 1.0   LEUKOCYTESUR Negative   RBCUA 2   WBCUA  2   BACTERIA Moderate*   SQUAMEPITHEL 3   HYALINECASTS 0.0*     Significant Imaging: I have reviewed all pertinent imaging results/findings within the past 24 hours.      Assessment/Plan:      * Hepatic encephalopathy  Resolved. Likely secondary to hyperammonemia worsened with use of amphetamine and THC. Treatment with lactulose. Monitor for withdrawal signs.   - ammonia level within normal range    Multiple substance abuse   endorses patient at home does not drink alcohol. Has been made aware of marijuana use requiring cessation as they are aware how this could affect her status of liver transplant list.   On admission urine study positive for presumptive THC and amphetamine.   4/19 awake and alert, back to baseline mentation  - consult psychiatry for evaluation      Hyperammonemia  Ammonia level back to baseline from 114. No bowel movement reported. Will continue with regimen. No visible tremors.   - lactulose 40 g Q8H            Alcoholic cirrhosis of liver without ascites  Treat hyperammonemia and monitor mental status changes.   Continue with daily lactulose regimen, goal 3-4 bowel movements.         VTE Risk Mitigation (From admission, onward)         Ordered     IP VTE HIGH RISK PATIENT  Once         04/18/23 1511     Place sequential compression device  Until discontinued         04/18/23 1511     Place CAMPOS hose  Until discontinued         04/18/23 1511                Discharge Planning   ALEJANDRO: 4/20/2023     Code Status: Full Code   Is the patient medically ready for discharge?:     Reason for patient still in hospital (select all that apply): Consult recommendations  Discharge Plan A: Home, Home with family          Geeta Gaines DO  Department of Hospital Medicine   [facility]

## 2023-04-20 NOTE — ASSESSMENT & PLAN NOTE
Ammonia level back to baseline from 114. No bowel movement reported. Will continue with regimen. No visible tremors.   - lactulose 40 g Q8H

## 2023-04-20 NOTE — PLAN OF CARE
Attempt multiple times to call [facility] with schedule to reschedule Dexa bone scan and US that  due to patient being hospitalized. Message left as there was no answer.

## 2023-04-20 NOTE — ASSESSMENT & PLAN NOTE
Treat hyperammonemia and monitor mental status changes.   Continue with daily lactulose regimen, goal 3-4 bowel movements.

## 2023-04-20 NOTE — PT/OT/SLP PROGRESS
"Physical Therapy Treatment    Patient Name:  Shonda Borrego   MRN:  [id number]    Recommendations:     Discharge Recommendations: home  Discharge Equipment Recommendations: none  Barriers to discharge: None    Assessment:     Shonda Borrego is a 57 y.o. female admitted with a medical diagnosis of Hepatic encephalopathy.  She presents with the following impairments/functional limitations: impaired cardiopulmonary response to activity, impaired endurance Patient has made significant progress  with functional mobility from yesterday to today.  She ambulated ~1128 feet without any assistive device with set up assistance and she climbed up and down 1 flight using right side rail with set up assistance.  Anticipate discharge to home today.     Rehab Prognosis: Good; patient would benefit from acute skilled PT services to address these deficits and reach maximum level of function.    Recent Surgery: * No surgery found *      Plan:     During this hospitalization, patient to be seen 5 x/week to address the identified rehab impairments via gait training, therapeutic activities, therapeutic exercises, neuromuscular re-education and progress toward the following goals:    Plan of Care Expires:  04/27/23    Subjective     Chief Complaint: "Thank you for coming to check on me."   Patient/Family Comments/goals: Get some help.   Pain/Comfort:  Pain Rating 1: 0/10  Pain Rating Post-Intervention 1: 0/10      Objective:     Communicated with nurse and patient  prior to session.  Patient found up in chair with peripheral IV, telemetry upon PT entry to room.     General Precautions: Standard, fall  Orthopedic Precautions: N/A  Braces: N/A  Respiratory Status: Room air     Functional Mobility:  Bed Mobility:     Rolling Left:  independence  Rolling Right: independence  Supine to Sit: independence  Sit to Supine: independence  Transfers:     Sit to Stand:  independence with no AD  Gait: 1128 feet without A.D. with set up assistance , no " LOB or SOB   Balance: independent with static sitting, set uo with static standing without assistive device   Stairs:  Pt ascended/descended 12 steps (6 inch)  stair(s) with reciprocal going up and non-reciprocal going down  with right handrail with Supervision or Set-up Assistance.       AM-PAC 6 CLICK MOBILITY  Turning over in bed (including adjusting bedclothes, sheets and blankets)?: 4  Sitting down on and standing up from a chair with arms (e.g., wheelchair, bedside commode, etc.): 4  Moving from lying on back to sitting on the side of the bed?: 4  Moving to and from a bed to a chair (including a wheelchair)?: 4  Need to walk in hospital room?: 4  Climbing 3-5 steps with a railing?: 3  Basic Mobility Total Score: 23       Treatment & Education:  roles and goals of PT, transfer training, bed mobility, gait training, stair training, balance training, safety awareness, body mechanics,  strengthening exercises, and fall prevention    Patient left up in chair with all lines intact, call button in reach, and nurse notified..    GOALS:   Multidisciplinary Problems       Physical Therapy Goals          Problem: Physical Therapy    Goal Priority Disciplines Outcome Goal Variances Interventions   Physical Therapy Goal     PT, PT/OT Adequate for Care Transition     Description: Goals to be met by: 2023     Patient will increase functional independence with mobility by performin. Supine to sit with Modified Independent.  2. Sit to supine with Modified Independent.  3. Bed to chair transfer with Modified Independent with proper A.D.  using Step Transfer technique.  4. Sit to Stand with Modified Independent with proper A.D. .  5. Gait  x 800  feet with Supervision or Set-up Assistance with proper A.D. .  6. Lower extremity exercise program x10 reps, with assistance as needed.                          Time Tracking:     PT Received On: 23  PT Start Time: 840     PT Stop Time: 858  PT Total Time (min): 18  min     Billable Minutes: Gait Training 13 and Therapeutic Activity 5    Treatment Type: Treatment  PT/PTA: PT           04/20/2023

## 2023-04-20 NOTE — DISCHARGE SUMMARY
"[facility] Medicine  Discharge Summary      Patient Name: Shonda Borrego  MRN: [id number]  GELY: [id number]  Patient Class: IP- Inpatient  Admission Date: 4/18/2023  Hospital Length of Stay: 2 days  Discharge Date and Time:  04/20/2023 12:49 PM  Attending Physician: No att. providers found   Discharging Provider: Geeta Gaines DO  Primary Care Provider: Geeta Gaines DO    Primary Care Team: Networked reference to record PCT     HPI:   Chief Complaint   Patient presents with    Altered Mental Status       EMS reports, pt from home with c/o AMS, Hx of Cirrhosis of the Liver. Pt crying and talking to people that aren't thre upon arrival. [facility], 20G Left hand, fluids hanging       57-year-old female with hepatocellular carcinoma, alcoholic cirrhosis, hyperammonemia presents emergency department with altered mental status, unknown duration, history from EMS personnel. No signs of trauma.  History of this in the past her ammonia level is elevated.  Patient is crying, talking to people that are not there.  Afebrile.    ED course:   On arrival vital signs /89 mmHg, , RR 20, SpO2 98% on room air, temp 97.9F. Labs notable for ammonia 114, lipase 54, AST/ALT 36/41, Tbili 2.9, Na 142, K 4.4, Cl 115, CO2 24, Glucose 127. Hg 16.3, HCT 48.5, WBC 11.78, , Lactate 1.9. Urine positive for presumptive THC and amphetamine. For agitation, patient was given benadryl and haldol. Given lactulose x1 and started on IVF.     Patient examined resting comfortably receiving IVF.    She is arousable with gentle tap and calling out, but responds with one word "yessir" to all questions.  Patient's significant other, Odette Mccormick at home called EMS concerned with patient's mental status changes.He states behavior changes started about a week ago after she visited her sister. He adds there were memory lapses and blank responses during this past week. This morning, patient did not get up until " 10 AM and the  could not get patient out of bed and noticed an increase in hand tremors, along with her unable to recognize him or respond coherently.  Patient to be admitted to medicine service for continued IVF hydration, lactulose treatment and monitoring of possible withdrawal symptoms and improvement in mentation.       * No surgery found *      Hospital Course:   4/19 Alert and awake, patient endorses not feeling at her baseline and overall embarrassed for not recalling the described agitation she exhibited in the ER yesterday. No bowel movements recorded since admission, but voiding effectively. Will replete electrolytes, continue with IVF hydration. Patient agreeable to psychiatry evaluation.   4/20 No acute events overnight. Stable mentation. Tolerating PO intake and ambulating. Counseled on monitoring of regular bowel movements and adjustments to daily lactulose. Patient endorses making arrangements to follow up with [facility]. Still on board with smoking cessation.        Goals of Care Treatment Preferences:  Code Status: Full Code      Consults:   Consults (From admission, onward)        Status Ordering Provider     Inpatient consult to Psychiatry  Once        Provider:  Jaime Heaton NP    Completed GEETA SAPP          No new Assessment & Plan notes have been filed under this hospital service since the last note was generated.  Service: Hospital Medicine    Final Active Diagnoses:    Diagnosis Date Noted POA    PRINCIPAL PROBLEM:  Hepatic encephalopathy [K76.82] 04/17/2023 Yes    Multiple substance abuse [F19.10] 04/18/2023 Yes    Hyperammonemia [E72.20] 04/21/2022 Yes    Alcoholic cirrhosis of liver without ascites [K70.30] 10/15/2021 Yes      Problems Resolved During this Admission:       Discharged Condition: stable    Disposition: Home or Self Care    Follow Up:   Follow-up Information     Geeta Sapp,  Follow up on 4/24/2023.    Specialty: Family  Medicine  Why: at 820am  Contact information:  [address]  [address] [place] [id number]  [phone]                       Patient Instructions:   Follow up PCP and specialists as scheduled.     Significant Diagnostic Studies: Labs:   BMP:   Recent Labs   Lab 04/18/23  1328 04/19/23  0520 04/20/23  0648   * 108 131*    145 141   K 4.4 3.4* 3.8   * 115* 113*   CO2 24 25 26   BUN 11 11 10   CREATININE 0.9 0.8 0.9   CALCIUM 9.7 9.0 8.8   , CMP   Recent Labs   Lab 04/18/23  1328 04/19/23  0520 04/20/23  0648    145 141   K 4.4 3.4* 3.8   * 115* 113*   CO2 24 25 26   * 108 131*   BUN 11 11 10   CREATININE 0.9 0.8 0.9   CALCIUM 9.7 9.0 8.8   PROT 7.0 6.1 5.9*   ALBUMIN 3.1* 2.8* 2.6*   BILITOT 2.9* 3.5* 3.7*   ALKPHOS 228* 172* 168*   AST 36 27 26   ALT 41 33 35   ANIONGAP 3* 5* 2*   , CBC   Recent Labs   Lab 04/18/23  1328 04/19/23  0520 04/20/23  0648   WBC 11.78 8.75 7.33   HGB 16.3* 14.9 14.2   HCT 48.5 44.5 42.0   * 142* 117*   , INR   Lab Results   Component Value Date    INR 1.3 (H) 04/19/2023    INR 1.2 04/12/2023    INR 1.5 (H) 01/23/2023     Microbiology:   Blood Culture   Lab Results   Component Value Date    LABBLOO No Growth to date 04/18/2023    LABBLOO No Growth to date 04/18/2023   , Sputum Culture No results found for: GSRESP, RESPIRATORYC and Urine Culture      Pending Diagnostic Studies:     None         Medications:  Reconciled Home Medications:      Medication List      CONTINUE taking these medications    albuterol 90 mcg/actuation inhaler  Commonly known as: PROVENTIL/VENTOLIN HFA  Inhale 2 puffs into the lungs every 6 (six) hours as needed for Wheezing or Shortness of Breath.     CONSTULOSE 10 gram/15 mL solution  Generic drug: lactulose  Take 30 mLs by mouth 3 (three) times daily.     mirtazapine 7.5 MG Tab  Commonly known as: REMERON  Take 7.5 mg by mouth every evening.     nicotine 21 mg/24 hr  Commonly known as: NICODERM CQ  Place 1 patch  onto the skin once daily.     nicotine polacrilex 4 MG Gum  Commonly known as: NICORETTE  Take 1 each (4 mg total) by mouth as needed (Take 1 each (4 mg total) by mouth as needed for tobacco cessation. 1-2 per hour in the place of cigarettes. (5-16 daily max) - Oral).     ondansetron 4 MG tablet  Commonly known as: ZOFRAN  Take 1 tablet (4 mg total) by mouth 2 (two) times daily.     pantoprazole 20 MG tablet  Commonly known as: PROTONIX  Take 1 tablet (20 mg total) by mouth every evening.     spironolactone 50 MG tablet  Commonly known as: ALDACTONE  Take 1 tablet by mouth once daily     ursodioL 300 mg capsule  Commonly known as: ACTIGALL  Take 1 capsule (300 mg total) by mouth 3 (three) times daily. for 14 days     VRAYLAR 3 mg Cap  Generic drug: cariprazine  Take 3 mg by mouth.        STOP taking these medications    ALPRAZolam 0.25 MG tablet  Commonly known as: XANAX     docusate sodium 100 MG capsule  Commonly known as: COLACE     fesoterodine 4 mg Tb24  Commonly known as: TOVIAZ     rOPINIRole 1 MG tablet  Commonly known as: REQUIP            Indwelling Lines/Drains at time of discharge:   Lines/Drains/Airways     None                 Time spent on the discharge of patient: 15 minutes    Geeta Gaines DO  Department of Hospital Medicine  [facility]

## 2023-04-20 NOTE — PLAN OF CARE
AAO x 4. Meds administered per MAR. Pt had loose stools twice during the night. Denied any pains or discomfort.

## 2023-04-20 NOTE — PT/OT/SLP DISCHARGE
Physical Therapy Discharge Summary    Name: Shonda Borrego  MRN: [id number]   Principal Problem: Hepatic encephalopathy     Patient Discharged from acute Physical Therapy on 2023 .  Please refer to prior PT noted date on 2023  for functional status.     Assessment:     Patient appropriate for care in another setting.    Objective:     GOALS:   Multidisciplinary Problems       Physical Therapy Goals          Problem: Physical Therapy    Goal Priority Disciplines Outcome Goal Variances Interventions   Physical Therapy Goal     PT, PT/OT Adequate for Care Transition     Description: Goals to be met by: 2023     Patient will increase functional independence with mobility by performin. Supine to sit with Modified Independent.  2. Sit to supine with Modified Independent.  3. Bed to chair transfer with Modified Independent with proper A.D.  using Step Transfer technique.  4. Sit to Stand with Modified Independent with proper A.D. .  5. Gait  x 800  feet with Supervision or Set-up Assistance with proper A.D. .  6. Lower extremity exercise program x10 reps, with assistance as needed.                          Reasons for Discontinuation of Therapy Services  Transfer to alternate level of care.      Plan:     Patient Discharged to: Home no PT services needed.      2023

## 2023-04-20 NOTE — TELEPHONE ENCOUNTER
I spoke with Ms Merchant this morning. She is currently admitted to [facility]. Smoking cessation appointment to be rescheduled at a later date.

## 2023-04-20 NOTE — NURSING
AVS reviewed w/ pt and rounding complete. IV and telemetry discontinued per discharge order. Pt being discharged home.

## 2023-04-20 NOTE — ASSESSMENT & PLAN NOTE
Resolved. Likely secondary to hyperammonemia worsened with use of amphetamine and THC. Treatment with lactulose. Monitor for withdrawal signs.   - ammonia level within normal range

## 2023-04-23 LAB
BACTERIA BLD CULT: NORMAL
BACTERIA BLD CULT: NORMAL

## 2023-04-24 ENCOUNTER — OFFICE VISIT (OUTPATIENT)
Dept: PRIMARY CARE CLINIC | Facility: CLINIC | Age: 58
End: 2023-04-24
Payer: MEDICAID

## 2023-04-24 VITALS
RESPIRATION RATE: 16 BRPM | WEIGHT: 208 LBS | SYSTOLIC BLOOD PRESSURE: 137 MMHG | DIASTOLIC BLOOD PRESSURE: 72 MMHG | HEIGHT: 68 IN | TEMPERATURE: 99 F | OXYGEN SATURATION: 99 % | HEART RATE: 80 BPM | BODY MASS INDEX: 31.52 KG/M2

## 2023-04-24 DIAGNOSIS — G25.81 RESTLESS LEG SYNDROME: ICD-10-CM

## 2023-04-24 DIAGNOSIS — K74.60 HEPATIC CIRRHOSIS, UNSPECIFIED HEPATIC CIRRHOSIS TYPE, UNSPECIFIED WHETHER ASCITES PRESENT: ICD-10-CM

## 2023-04-24 DIAGNOSIS — Z23 NEED FOR HEPATITIS B VACCINATION: ICD-10-CM

## 2023-04-24 DIAGNOSIS — K83.1 CHOLESTASIS: ICD-10-CM

## 2023-04-24 DIAGNOSIS — K70.30 ALCOHOLIC CIRRHOSIS OF LIVER WITHOUT ASCITES: ICD-10-CM

## 2023-04-24 DIAGNOSIS — Z23 NEED FOR HEPATITIS A VACCINATION: Primary | ICD-10-CM

## 2023-04-24 PROCEDURE — 3075F PR MOST RECENT SYSTOLIC BLOOD PRESS GE 130-139MM HG: ICD-10-PCS | Mod: CPTII,,, | Performed by: STUDENT IN AN ORGANIZED HEALTH CARE EDUCATION/TRAINING PROGRAM

## 2023-04-24 PROCEDURE — 99213 PR OFFICE/OUTPT VISIT, EST, LEVL III, 20-29 MIN: ICD-10-PCS | Mod: S$PBB,,, | Performed by: STUDENT IN AN ORGANIZED HEALTH CARE EDUCATION/TRAINING PROGRAM

## 2023-04-24 PROCEDURE — 99999 PR PBB SHADOW E&M-EST. PATIENT-LVL III: CPT | Mod: PBBFAC,,, | Performed by: STUDENT IN AN ORGANIZED HEALTH CARE EDUCATION/TRAINING PROGRAM

## 2023-04-24 PROCEDURE — 99213 OFFICE O/P EST LOW 20 MIN: CPT | Mod: PBBFAC,PN | Performed by: STUDENT IN AN ORGANIZED HEALTH CARE EDUCATION/TRAINING PROGRAM

## 2023-04-24 PROCEDURE — 3008F BODY MASS INDEX DOCD: CPT | Mod: CPTII,,, | Performed by: STUDENT IN AN ORGANIZED HEALTH CARE EDUCATION/TRAINING PROGRAM

## 2023-04-24 PROCEDURE — 99213 OFFICE O/P EST LOW 20 MIN: CPT | Mod: S$PBB,,, | Performed by: STUDENT IN AN ORGANIZED HEALTH CARE EDUCATION/TRAINING PROGRAM

## 2023-04-24 PROCEDURE — 1159F MED LIST DOCD IN RCRD: CPT | Mod: CPTII,,, | Performed by: STUDENT IN AN ORGANIZED HEALTH CARE EDUCATION/TRAINING PROGRAM

## 2023-04-24 PROCEDURE — 1159F PR MEDICATION LIST DOCUMENTED IN MEDICAL RECORD: ICD-10-PCS | Mod: CPTII,,, | Performed by: STUDENT IN AN ORGANIZED HEALTH CARE EDUCATION/TRAINING PROGRAM

## 2023-04-24 PROCEDURE — 3008F PR BODY MASS INDEX (BMI) DOCUMENTED: ICD-10-PCS | Mod: CPTII,,, | Performed by: STUDENT IN AN ORGANIZED HEALTH CARE EDUCATION/TRAINING PROGRAM

## 2023-04-24 PROCEDURE — 3075F SYST BP GE 130 - 139MM HG: CPT | Mod: CPTII,,, | Performed by: STUDENT IN AN ORGANIZED HEALTH CARE EDUCATION/TRAINING PROGRAM

## 2023-04-24 PROCEDURE — 1160F RVW MEDS BY RX/DR IN RCRD: CPT | Mod: CPTII,,, | Performed by: STUDENT IN AN ORGANIZED HEALTH CARE EDUCATION/TRAINING PROGRAM

## 2023-04-24 PROCEDURE — 3078F PR MOST RECENT DIASTOLIC BLOOD PRESSURE < 80 MM HG: ICD-10-PCS | Mod: CPTII,,, | Performed by: STUDENT IN AN ORGANIZED HEALTH CARE EDUCATION/TRAINING PROGRAM

## 2023-04-24 PROCEDURE — 90746 HEPB VACCINE 3 DOSE ADULT IM: CPT | Mod: PBBFAC,PN

## 2023-04-24 PROCEDURE — 1160F PR REVIEW ALL MEDS BY PRESCRIBER/CLIN PHARMACIST DOCUMENTED: ICD-10-PCS | Mod: CPTII,,, | Performed by: STUDENT IN AN ORGANIZED HEALTH CARE EDUCATION/TRAINING PROGRAM

## 2023-04-24 PROCEDURE — 1111F DSCHRG MED/CURRENT MED MERGE: CPT | Mod: CPTII,,, | Performed by: STUDENT IN AN ORGANIZED HEALTH CARE EDUCATION/TRAINING PROGRAM

## 2023-04-24 PROCEDURE — 99999 PR PBB SHADOW E&M-EST. PATIENT-LVL III: ICD-10-PCS | Mod: PBBFAC,,, | Performed by: STUDENT IN AN ORGANIZED HEALTH CARE EDUCATION/TRAINING PROGRAM

## 2023-04-24 PROCEDURE — 3078F DIAST BP <80 MM HG: CPT | Mod: CPTII,,, | Performed by: STUDENT IN AN ORGANIZED HEALTH CARE EDUCATION/TRAINING PROGRAM

## 2023-04-24 PROCEDURE — 1111F PR DISCHARGE MEDS RECONCILED W/ CURRENT OUTPATIENT MED LIST: ICD-10-PCS | Mod: CPTII,,, | Performed by: STUDENT IN AN ORGANIZED HEALTH CARE EDUCATION/TRAINING PROGRAM

## 2023-04-24 PROCEDURE — 90471 IMMUNIZATION ADMIN: CPT | Mod: PBBFAC,PN

## 2023-04-25 RX ORDER — SPIRONOLACTONE 25 MG/1
25 TABLET ORAL DAILY
Qty: 30 TABLET | Refills: 11 | Status: SHIPPED | OUTPATIENT
Start: 2023-04-25 | End: 2023-05-17

## 2023-04-25 RX ORDER — GABAPENTIN 300 MG/1
300 CAPSULE ORAL NIGHTLY
Qty: 30 CAPSULE | Refills: 2 | Status: SHIPPED | OUTPATIENT
Start: 2023-04-25 | End: 2023-07-12

## 2023-04-25 RX ORDER — ROPINIROLE 1 MG/1
1 TABLET, FILM COATED ORAL NIGHTLY
Qty: 90 TABLET | Refills: 1 | Status: SHIPPED | OUTPATIENT
Start: 2023-04-25 | End: 2023-10-22

## 2023-04-25 RX ORDER — URSODIOL 300 MG/1
300 CAPSULE ORAL 3 TIMES DAILY
Qty: 90 CAPSULE | Refills: 3 | Status: SHIPPED | OUTPATIENT
Start: 2023-04-25 | End: 2023-08-09

## 2023-04-26 ENCOUNTER — OFFICE VISIT (OUTPATIENT)
Dept: OBSTETRICS AND GYNECOLOGY | Facility: CLINIC | Age: 58
End: 2023-04-26
Payer: MEDICAID

## 2023-04-26 VITALS
HEART RATE: 80 BPM | SYSTOLIC BLOOD PRESSURE: 161 MMHG | DIASTOLIC BLOOD PRESSURE: 84 MMHG | WEIGHT: 205 LBS | BODY MASS INDEX: 31.07 KG/M2 | HEIGHT: 68 IN

## 2023-04-26 DIAGNOSIS — Z01.818 PRE-TRANSPLANT EVALUATION FOR LIVER TRANSPLANT: ICD-10-CM

## 2023-04-26 DIAGNOSIS — K70.30 ALCOHOLIC CIRRHOSIS OF LIVER WITHOUT ASCITES: ICD-10-CM

## 2023-04-26 DIAGNOSIS — Z01.419 ENCOUNTER FOR ANNUAL ROUTINE GYNECOLOGICAL EXAMINATION: Primary | ICD-10-CM

## 2023-04-26 DIAGNOSIS — Z95.828 S/P TIPS (TRANSJUGULAR INTRAHEPATIC PORTOSYSTEMIC SHUNT): ICD-10-CM

## 2023-04-26 DIAGNOSIS — C22.0 HEPATOCELLULAR CARCINOMA: ICD-10-CM

## 2023-04-26 DIAGNOSIS — R82.5 POSITIVE URINE DRUG SCREEN: ICD-10-CM

## 2023-04-26 DIAGNOSIS — Z12.4 SCREENING FOR MALIGNANT NEOPLASM OF THE CERVIX: ICD-10-CM

## 2023-04-26 DIAGNOSIS — I85.00 ESOPHAGEAL VARICES WITHOUT BLEEDING, UNSPECIFIED ESOPHAGEAL VARICES TYPE: ICD-10-CM

## 2023-04-26 PROBLEM — K74.60 CIRRHOSIS: Status: ACTIVE | Noted: 2023-04-26

## 2023-04-26 PROCEDURE — 99386 PREV VISIT NEW AGE 40-64: CPT | Mod: S$PBB,TXP,, | Performed by: OBSTETRICS & GYNECOLOGY

## 2023-04-26 PROCEDURE — 99386 PR PREVENTIVE VISIT,NEW,40-64: ICD-10-PCS | Mod: S$PBB,TXP,, | Performed by: OBSTETRICS & GYNECOLOGY

## 2023-04-26 PROCEDURE — 99999 PR PBB SHADOW E&M-EST. PATIENT-LVL III: CPT | Mod: PBBFAC,TXP,, | Performed by: OBSTETRICS & GYNECOLOGY

## 2023-04-26 PROCEDURE — 99213 OFFICE O/P EST LOW 20 MIN: CPT | Mod: PBBFAC,TXP | Performed by: OBSTETRICS & GYNECOLOGY

## 2023-04-26 PROCEDURE — 99999 PR PBB SHADOW E&M-EST. PATIENT-LVL III: ICD-10-PCS | Mod: PBBFAC,TXP,, | Performed by: OBSTETRICS & GYNECOLOGY

## 2023-04-26 NOTE — PROGRESS NOTES
Subjective:    Patient ID: Shonda Borrego is a 57 y.o. female.     Chief Complaint: Annual Well Woman Exam and pre-transplant eval    History of Present Illness:  Shonda presents today for Annual Well Woman exam.  She is currently preparing for liver transplant and needs screening Pap and gyn exam.  Reports some history of abnormal Pap. No LMP recorded. Patient is postmenopausal.. She is currently using no hormone therapy and she reports no problems with hot flashes, night sweats, irritability or vaginal dryness. She denies breast tenderness, denies masses, denies nipple discharge. She denies difficulty with urination. Bowel movements have not significantly changed.       Menstrual History:   No LMP recorded. Patient is postmenopausal..     OB History          3    Para   3    Term   3            AB        Living             SAB        IAB        Ectopic        Multiple        Live Births                     Review of Systems   Constitutional:  Negative for chills, fatigue and fever.   Respiratory:  Negative for shortness of breath.    Cardiovascular:  Negative for chest pain.   Gastrointestinal:  Negative for abdominal pain, constipation, diarrhea and nausea.   Genitourinary:  Negative for bladder incontinence, dysuria, hot flashes, pelvic pain and vaginal bleeding.   Neurological:  Negative for headaches.   Psychiatric/Behavioral:  Negative for depression.        Objective:    Vital Signs:  Vitals:    23 0944   BP: (!) 161/84   Pulse: 80       Physical Exam:  General:  alert, no distress   Skin:  Skin color, texture, turgor normal. No rashes or lesions   HEENT:  conjunctivae/corneas clear. PERRL.   Neck: supple, trachea midline, no adenopathy or thyromegaly   Respiratory:  clear to auscultation bilaterally   Heart:  regular rate and rhythm, S1, S2 normal, no murmur, click, rub or gallop   Breasts: Left Breast: Nipple protruding. No nipple discharge. No palpable masses, erythema, skin changes,  tenderness, or adenopathy.  Right Breast: Nipple protruding. No nipple discharge. No palpable masses, erythema, skin changes, tenderness, or adenopathy.   Abdomen:   Soft, mildly distended, nontender   Pelvis: External genitalia: normal general appearance  Urinary system: urethral meatus normal, bladder nontender  Vaginal: atrophic mucosa  Cervix: normal appearance, thin prep PAP obtained  Uterus: normal single, nontender  Adnexa: normal bimanual exam   Extremities: Normal ROM; no edema, no cyanosis   Neurological: Normal strength and tone. No focal numbness or weakness. Reflexes 2+ and equal.   Psychiatric: Flat affect, normal mood, speech, dress, and thought processes             Assessment:      1. Encounter for annual routine gynecological examination    2. Alcoholic cirrhosis of liver without ascites    3. Screening for malignant neoplasm of the cervix          Plan:      Encounter for annual routine gynecological examination    Alcoholic cirrhosis of liver without ascites    Screening for malignant neoplasm of the cervix  -     Liquid-Based Pap Smear, Screening        Health Maintenance and Screening:   Shonda was counseled on A.C.O.G. Pap guidelines and recommendations for yearly pelvic exams in addition to recommendations for yearly mammograms and monthly self breast exams, and adequate calcium and vitamin D in her diet.     A discussion of needed Health Maintenance Screening was done with Shonda. She was counseled that she is overdue for Mammogram: She will schedule:  Was performed October 2022 and was within normal limits.      Farzana Mccallum MD FACOG    04/26/2023  9:54 AM

## 2023-04-28 ENCOUNTER — PATIENT MESSAGE (OUTPATIENT)
Dept: TRANSPLANT | Facility: CLINIC | Age: 58
End: 2023-04-28
Payer: MEDICAID

## 2023-04-28 ENCOUNTER — TELEPHONE (OUTPATIENT)
Dept: TRANSPLANT | Facility: CLINIC | Age: 58
End: 2023-04-28
Payer: MEDICAID

## 2023-04-28 NOTE — TELEPHONE ENCOUNTER
"----- Message from Nicola Chan sent at 4/28/2023 10:59 AM CDT -----  Regarding: FW: Speak to staff    Returned call to pt and she told me that she logged into her appt with Dr. Arreola, but I do not see that she checked into the appt on our end. Pt told me that after she did her epre check in she was able to start the visit and it told her "waiting for provider" , but Dr. Arreola never logged in. Told her that I will see if we can get her re/lourdes'ed to see Dr. Arreola.  .  ----- Message -----  From: Marianna Cortez  Sent: 4/28/2023  10:37 AM CDT  To: [facility] Pre-Liver Transplant Non-Clinical  Subject: Speak to staff                                   Pt is  calling to speak to staff in regards to a virtual  she had for 9:30 this morning, she states that she sat there 10: 30 but no one ever came on. Please advise. Requesting a call back.        [phone] (home)         "

## 2023-04-30 PROBLEM — K59.00 CONSTIPATION: Status: RESOLVED | Noted: 2022-07-26 | Resolved: 2023-04-30

## 2023-04-30 PROBLEM — K76.82 HEPATIC ENCEPHALOPATHY: Status: RESOLVED | Noted: 2023-04-17 | Resolved: 2023-04-30

## 2023-04-30 PROBLEM — E72.20 HYPERAMMONEMIA: Status: RESOLVED | Noted: 2022-04-21 | Resolved: 2023-04-30

## 2023-04-30 NOTE — ASSESSMENT & PLAN NOTE
Stable weight. Recommendations:   Stay physically active. As tolerated alternate resistance training with stretching and cardio. Goal of 150 minutes per week of moderate intensity activity or 7,500 - 10,000 steps per day. Follow the Mediterranean Diet. Include whole fresh fruits, vegetables, olive oil, seeds, nuts, whole grains, cold water fish, salmon, mackerel and lean cuts of meat.  Do not drink sugary/diet carbonated beverages. Decrease portion sizes slightly which will result in an approximately 500-calorie deficit. Avoid fast or fried and processed food, especially canned foods. Avoid refined carbohydrates, white starchy foods, flour, white potato, bread, muffins, and cakes. Consider substituting one meal a day with a meal replacement such as Slim fast, lean cuisine, or weight watcher's. Follow a healthy diet that includes enough calcium, vitamin D and proteins for bone health.

## 2023-04-30 NOTE — PROGRESS NOTES
[facility]    HPI:  Shonda Borrego is a 57 y.o. female who presents today for Follow-up (Hospital follow up.  ) and Immunizations (1st Hep B vaccine)  57-year-old female with hepatocellular carcinoma, alcoholic cirrhosis recent discharge from hospital for AMS secondary to hyperammonemia triggered like from use of methamphetamine.   Since discharge her  endorses no use of alcohol or street drug use including marijuana.  Per patient, she will be staying away from her sister's home where street drugs are accessible.   Since discharge, feeling well without mood fluctuations. She states will be following up with [facility] and finding local AA meetings to attend and find a sponsor.     Has follow up work up DEXA scan, ultrasound of liver and colonoscopy evaluation scheduled per liver transplant pre work up.   Here to complete hepatitis vaccine series.   Patient would like to resume nicotine patches to help with smoking cessation.      ROS   A review of systems was performed and was negative except as noted above.    I personally reviewed allergies, past medical, surgical, social and family history and updated as appropriate.    Medications:    Current Outpatient Medications:     albuterol (PROVENTIL/VENTOLIN HFA) 90 mcg/actuation inhaler, Inhale 2 puffs into the lungs every 6 (six) hours as needed for Wheezing or Shortness of Breath., Disp: 18 g, Rfl: 3    CONSTULOSE 10 gram/15 mL solution, Take 30 mLs by mouth 3 (three) times daily., Disp: , Rfl:     mirtazapine (REMERON) 7.5 MG Tab, Take 7.5 mg by mouth every evening., Disp: , Rfl:     nicotine (NICODERM CQ) 21 mg/24 hr, Place 1 patch onto the skin once daily., Disp: 14 patch, Rfl: 0    nicotine, polacrilex, (NICORETTE) 4 MG Gum, Take 1 each (4 mg total) by mouth as needed (Take 1 each (4 mg total) by mouth as needed for tobacco cessation. 1-2 per hour in the place of cigarettes. (5-16 daily max) - Oral)., Disp: 300 each,  Rfl: 0    ondansetron (ZOFRAN) 4 MG tablet, Take 1 tablet (4 mg total) by mouth 2 (two) times daily., Disp: 30 tablet, Rfl: 0    pantoprazole (PROTONIX) 20 MG tablet, Take 1 tablet (20 mg total) by mouth every evening., Disp: 30 tablet, Rfl: 2    spironolactone (ALDACTONE) 50 MG tablet, Take 1 tablet by mouth once daily, Disp: 90 tablet, Rfl: 0    VRAYLAR 3 mg Cap, Take 3 mg by mouth., Disp: , Rfl:     gabapentin (NEURONTIN) 300 MG capsule, Take 1 capsule (300 mg total) by mouth every evening., Disp: 30 capsule, Rfl: 2    rOPINIRole (REQUIP) 1 MG tablet, Take 1 tablet (1 mg total) by mouth every evening., Disp: 90 tablet, Rfl: 1    spironolactone (ALDACTONE) 25 MG tablet, Take 1 tablet (25 mg total) by mouth once daily. (Patient not taking: Reported on 4/26/2023), Disp: 30 tablet, Rfl: 11    ursodioL (ACTIGALL) 300 mg capsule, Take 1 capsule (300 mg total) by mouth 3 (three) times daily. for 14 days, Disp: 90 capsule, Rfl: 3     Health Maintenance:  Immunization History   Administered Date(s) Administered    COVID-19 MRNA, LN-S PF (MODERNA HALF 0.25 ML DOSE) 12/09/2021    COVID-19, MRNA, LN-S, PF (MODERNA FULL 0.5 ML DOSE) 05/12/2021    COVID-19, MRNA, LN-S, PF (Pfizer) (Gray Cap) 06/03/2022    COVID-19, MRNA, LN-S, PF (Pfizer) (Purple Cap) 06/09/2021, 09/25/2022    COVID-19, mRNA, LNP-S, bivalent booster, PF (Moderna Omicron) 09/25/2022    Hepatitis A, Adult 03/23/2023    Hepatitis B, Adult 04/24/2023    Influenza - Quadrivalent - PF *Preferred* (6 months and older) 09/25/2022    Influenza - Trivalent (ADULT) 10/20/2021    Pneumococcal Conjugate - 20 Valent 10/05/2022, 02/07/2023    Tdap 03/03/2022    Zoster Recombinant 03/03/2022, 09/25/2022      Health Maintenance   Topic Date Due    Lipid Panel  Never done    Mammogram  10/24/2023    LDCT Lung Screen  01/20/2024    Abdominal Aortic Aneurysm Screening  08/21/2029    TETANUS VACCINE  03/03/2032    Hepatitis C Screening  Completed     Health Maintenance Topics with  "due status: Not Due       Topic Last Completion Date    Cervical Cancer Screening 09/20/2021    TETANUS VACCINE 03/03/2022    Abdominal Aortic Aneurysm Screening 04/22/2022    Mammogram 10/24/2022    LDCT Lung Screen 01/20/2023    Colorectal Cancer Screening 04/14/2023     Health Maintenance Due   Topic Date Due    Lipid Panel  Never done    Hemoglobin A1c (Diabetic Prevention Screening)  Never done       PHYSICAL EXAM:  Vitals:    04/24/23 0856   BP: 137/72   BP Location: Right arm   Patient Position: Sitting   BP Method: Large (Automatic)   Pulse: 80   Resp: 16   Temp: 98.6 °F (37 °C)   TempSrc: Oral   SpO2: 99%   Weight: 94.3 kg (208 lb)   Height: 5' 8" (1.727 m)     Body mass index is 31.63 kg/m².  Physical Exam  Constitutional:       General: She is not in acute distress.     Appearance: Normal appearance. She is not ill-appearing or toxic-appearing.   Abdominal:      Tenderness: There is no right CVA tenderness or left CVA tenderness.   Musculoskeletal:         General: No swelling, tenderness or deformity. Normal range of motion.      Right lower leg: No edema.      Left lower leg: No edema.   Skin:     General: Skin is warm and dry.      Capillary Refill: Capillary refill takes less than 2 seconds.   Neurological:      General: No focal deficit present.      Mental Status: She is alert and oriented to person, place, and time. Mental status is at baseline.      Motor: No weakness.      Gait: Gait normal.   Psychiatric:         Mood and Affect: Mood normal.         Behavior: Behavior normal.         Thought Content: Thought content normal.         Judgment: Judgment normal.        ASSESSMENT/PLAN:  1. Need for hepatitis A vaccination  -     (In Office Administered) Hepatitis B Vaccine (Adult) (IM)    2. Need for hepatitis B vaccination  -     (In Office Administered) Hepatitis B Vaccine (Adult) (IM)    3. Cholestasis  -     ursodioL (ACTIGALL) 300 mg capsule; Take 1 capsule (300 mg total) by mouth 3 (three) " times daily. for 14 days  Dispense: 90 capsule; Refill: 3    4. Restless leg syndrome  -     rOPINIRole (REQUIP) 1 MG tablet; Take 1 tablet (1 mg total) by mouth every evening.  Dispense: 90 tablet; Refill: 1  -     gabapentin (NEURONTIN) 300 MG capsule; Take 1 capsule (300 mg total) by mouth every evening.  Dispense: 30 capsule; Refill: 2    5. Hepatic cirrhosis, unspecified hepatic cirrhosis type, unspecified whether ascites present    6. BMI 31.0-31.9,adult  Overview:  Wt Readings from Last 8 Encounters:   04/24/23 94.3 kg (208 lb)   04/18/23 94.8 kg (209 lb)   03/23/23 94.8 kg (209 lb)   02/23/23 93.9 kg (207 lb)   02/07/23 95.7 kg (211 lb)   01/25/23 99.8 kg (220 lb)   01/20/23 97.1 kg (214 lb 1.1 oz)       Assessment & Plan:  Stable weight. Recommendations:   Stay physically active. As tolerated alternate resistance training with stretching and cardio. Goal of 150 minutes per week of moderate intensity activity or 7,500 - 10,000 steps per day. Follow the Mediterranean Diet. Include whole fresh fruits, vegetables, olive oil, seeds, nuts, whole grains, cold water fish, salmon, mackerel and lean cuts of meat.  Do not drink sugary/diet carbonated beverages. Decrease portion sizes slightly which will result in an approximately 500-calorie deficit. Avoid fast or fried and processed food, especially canned foods. Avoid refined carbohydrates, white starchy foods, flour, white potato, bread, muffins, and cakes. Consider substituting one meal a day with a meal replacement such as Slim fast, lean cuisine, or weight watcher's. Follow a healthy diet that includes enough calcium, vitamin D and proteins for bone health.        7. Alcoholic cirrhosis of liver without ascites  Overview:  9/23: MRI 1.8 x 1.6 cm lesion in the right lobe of the liver suspicious for a primary liver neoplasm.    Assessment & Plan:  Continue with liver transplant team visits.       Other orders  -     spironolactone (ALDACTONE) 25 MG tablet; Take 1  tablet (25 mg total) by mouth once daily. (Patient not taking: Reported on 4/26/2023)  Dispense: 30 tablet; Refill: 11        Other than changes above, continue current medications and maintain follow up with specialists.      No follow-ups on file.   Recent Results (from the past 2016 hour(s))   Phosphatidylethanol (PETH)    Collection Time: 02/07/23 12:20 PM   Result Value Ref Range    PEth 16:0/18.1 (POPEth) <10 Cutoff: 10 ng/mL    PEth 16:0/18.2 (PLPEth) <10 Cutoff: 10 ng/mL    PETH INTERPRETATION Negative.    Drug screen panel, in-house    Collection Time: 02/07/23 12:33 PM   Result Value Ref Range    Benzodiazepines Negative Negative    Methadone metabolites Negative Negative    Cocaine (Metab.) Negative Negative    Opiate Scrn, Ur Negative Negative    Barbiturate Screen, Ur Negative Negative    Amphetamine Screen, Ur Negative Negative    THC Presumptive Positive (A) Negative    Phencyclidine Negative Negative    Creatinine, Urine 161.0 15.0 - 325.0 mg/dL    Toxicology Information SEE COMMENT    Drug screen panel, in-house    Collection Time: 03/07/23 10:13 AM   Result Value Ref Range    Benzodiazepines Negative Negative    Methadone metabolites Negative Negative    Cocaine (Metab.) Negative Negative    Opiate Scrn, Ur Negative Negative    Barbiturate Screen, Ur Negative Negative    Amphetamine Screen, Ur Negative Negative    THC Presumptive Positive (A) Negative    Phencyclidine Negative Negative    Creatinine, Urine 151.0 15.0 - 325.0 mg/dL    Toxicology Information SEE COMMENT    AFP Tumor Marker    Collection Time: 03/17/23  9:51 AM   Result Value Ref Range    AFP 4.2 0.0 - 8.4 ng/mL   Drug screen panel, in-house    Collection Time: 03/17/23  9:54 AM   Result Value Ref Range    Benzodiazepines Negative Negative    Methadone metabolites Negative Negative    Cocaine (Metab.) Negative Negative    Opiate Scrn, Ur Negative Negative    Barbiturate Screen, Ur Negative Negative    Amphetamine Screen, Ur Negative  Negative    THC Presumptive Positive (A) Negative    Phencyclidine Negative Negative    Creatinine, Urine 88.6 15.0 - 325.0 mg/dL    Toxicology Information SEE COMMENT    [facility]    Collection Time: 04/12/23  7:54 AM   Result Value Ref Range    [facility] No result necessary    Drug screen panel, in-house    Collection Time: 04/12/23  7:56 AM   Result Value Ref Range    Benzodiazepines Negative Negative    Methadone metabolites Negative Negative    Cocaine (Metab.) Negative Negative    Opiate Scrn, Ur Negative Negative    Barbiturate Screen, Ur Negative Negative    Amphetamine Screen, Ur Presumptive Positive (A) Negative    THC Presumptive Positive (A) Negative    Phencyclidine Negative Negative    Creatinine, Urine 92.0 15.0 - 325.0 mg/dL    Toxicology Information SEE COMMENT    Phosphatidylethanol (PETH)    Collection Time: 04/12/23  8:00 AM   Result Value Ref Range    PEth 16:0/18.1 (POPEth) <10 Cutoff: 10 ng/mL    PEth 16:0/18.2 (PLPEth) <10 Cutoff: 10 ng/mL    PETH INTERPRETATION Negative.    Comprehensive Metabolic Panel    Collection Time: 04/12/23  8:00 AM   Result Value Ref Range    Sodium 138 136 - 145 mmol/L    Potassium 3.8 3.5 - 5.1 mmol/L    Chloride 103 95 - 110 mmol/L    CO2 24 23 - 29 mmol/L    Glucose 125 (H) 70 - 110 mg/dL    BUN 7 6 - 20 mg/dL    Creatinine 0.9 0.5 - 1.4 mg/dL    Calcium 10.1 8.7 - 10.5 mg/dL    Total Protein 7.3 6.0 - 8.4 g/dL    Albumin 3.6 3.5 - 5.2 g/dL    Total Bilirubin 5.3 (H) 0.1 - 1.0 mg/dL    Alkaline Phosphatase 195 (H) 55 - 135 U/L    AST 46 (H) 10 - 40 U/L    ALT 35 10 - 44 U/L    Anion Gap 11 8 - 16 mmol/L    eGFR >60.0 >60 mL/min/1.73 m^2   CBC Auto Differential    Collection Time: 04/12/23  8:00 AM   Result Value Ref Range    WBC 8.72 3.90 - 12.70 K/uL    RBC 5.54 (H) 4.00 - 5.40 M/uL    Hemoglobin 16.8 (H) 12.0 - 16.0 g/dL    Hematocrit 50.3 (H) 37.0 - 48.5 %    MCV 91 82 - 98 fL    MCH 30.3 27.0 - 31.0 pg    MCHC 33.4 32.0 - 36.0 g/dL    RDW 17.1 (H) 11.5  - 14.5 %    Platelets 168 150 - 450 K/uL    MPV 10.0 9.2 - 12.9 fL    Immature Granulocytes 0.2 0.0 - 0.5 %    Gran # (ANC) 4.6 1.8 - 7.7 K/uL    Immature Grans (Abs) 0.02 0.00 - 0.04 K/uL    Lymph # 3.0 1.0 - 4.8 K/uL    Mono # 0.9 0.3 - 1.0 K/uL    Eos # 0.2 0.0 - 0.5 K/uL    Baso # 0.10 0.00 - 0.20 K/uL    nRBC 0 0 /100 WBC    Gran % 53.2 38.0 - 73.0 %    Lymph % 33.9 18.0 - 48.0 %    Mono % 9.9 4.0 - 15.0 %    Eosinophil % 1.7 0.0 - 8.0 %    Basophil % 1.1 0.0 - 1.9 %    Differential Method Automated    Protime-INR    Collection Time: 04/12/23  8:00 AM   Result Value Ref Range    Prothrombin Time 12.0 9.0 - 12.5 sec    INR 1.2 0.8 - 1.2   Blood culture #1 **CANNOT BE ORDERED STAT**    Collection Time: 04/18/23  1:07 PM    Specimen: Blood   Result Value Ref Range    Blood Culture, Routine No growth after 5 days.    Drug screen panel, emergency    Collection Time: 04/18/23  1:09 PM   Result Value Ref Range    Benzodiazepines Negative Negative    Methadone metabolites Negative Negative    Cocaine (Metab.) Negative Negative    Opiate Scrn, Ur Negative Negative    Barbiturate Screen, Ur Negative Negative    Amphetamine Screen, Ur Presumptive Positive (A) Negative    THC Presumptive Positive (A) Negative    Phencyclidine Negative Negative    Creatinine, Urine 78.0 15.0 - 325.0 mg/dL    Toxicology Information SEE COMMENT    Urinalysis, Reflex to Urine Culture Urine, Clean Catch    Collection Time: 04/18/23  1:09 PM    Specimen: Urine, Catheterized   Result Value Ref Range    Specimen UA Urine, Catheterized     Color, UA Yellow Yellow, Straw, Joycelyn    Appearance, UA Cloudy (A) Clear    pH, UA 8.0 5.0 - 8.0    Specific Gravity, UA 1.010 1.005 - 1.030    Protein, UA Negative Negative    Glucose, UA Negative Negative    Ketones, UA Negative Negative    Bilirubin (UA) Negative Negative    Occult Blood UA Negative Negative    Nitrite, UA Negative Negative    Urobilinogen, UA 1.0 <2.0 EU/dL    Leukocytes, UA Negative Negative    Urinalysis Microscopic    Collection Time: 04/18/23  1:09 PM   Result Value Ref Range    RBC, UA 2 0 - 4 /hpf    WBC, UA 2 0 - 5 /hpf    Bacteria Moderate (A) None-Occ /hpf    Squam Epithel, UA 3 /hpf    Hyaline Casts, UA 0.0 (A) 0-1/lpf /lpf    Microscopic Comment SEE COMMENT    POCT ARTERIAL BLOOD GAS    Collection Time: 04/18/23  1:27 PM   Result Value Ref Range    POC PH 7.440 7.345 - 7.450    POC PCO2 32.7 (L) 35.0 - 45.0 mmHg    POC PO2 78.4 (L) 80.0 - 100 mmHg    POC SATURATED O2 96.1 95.0 - 100.0 %    POC HCO3 23.5 (L) 24.0 - 28.0 mmol/l    Base Deficit -1.9 -2.0 - 2.0 mmol/l    POC Temp 37.0 C    Specimen source Arterial     Performed By: Vargas     MODIFIED BRAYAN'S TEST NA     FiO2 21.0 %    O2DEVICE Room Air     Comments DRAWN PER GINI SMALLS RRT    CBC auto differential    Collection Time: 04/18/23  1:28 PM   Result Value Ref Range    WBC 11.78 3.90 - 12.70 K/uL    RBC 5.28 4.00 - 5.40 M/uL    Hemoglobin 16.3 (H) 12.0 - 16.0 g/dL    Hematocrit 48.5 37.0 - 48.5 %    MCV 92 82 - 98 fL    MCH 30.9 27.0 - 31.0 pg    MCHC 33.6 32.0 - 36.0 g/dL    RDW 16.3 (H) 11.5 - 14.5 %    Platelets 149 (L) 150 - 450 K/uL    MPV 9.9 9.2 - 12.9 fL    Immature Granulocytes 0.3 0.0 - 0.5 %    Gran # (ANC) 8.6 (H) 1.8 - 7.7 K/uL    Immature Grans (Abs) 0.04 0.00 - 0.04 K/uL    Lymph # 2.3 1.0 - 4.8 K/uL    Mono # 0.8 0.3 - 1.0 K/uL    Eos # 0.1 0.0 - 0.5 K/uL    Baso # 0.03 0.00 - 0.20 K/uL    nRBC 0 0 /100 WBC    Gran % 73.1 (H) 38.0 - 73.0 %    Lymph % 19.3 18.0 - 48.0 %    Mono % 6.6 4.0 - 15.0 %    Eosinophil % 0.4 0.0 - 8.0 %    Basophil % 0.3 0.0 - 1.9 %    Differential Method Automated    Comprehensive metabolic panel    Collection Time: 04/18/23  1:28 PM   Result Value Ref Range    Sodium 142 136 - 145 mmol/L    Potassium 4.4 3.5 - 5.1 mmol/L    Chloride 115 (H) 95 - 110 mmol/L    CO2 24 23 - 29 mmol/L    Glucose 127 (H) 70 - 110 mg/dL    BUN 11 6 - 20 mg/dL    Creatinine 0.9 0.5 - 1.4 mg/dL    Calcium 9.7 8.7 -  10.5 mg/dL    Total Protein 7.0 6.0 - 8.4 g/dL    Albumin 3.1 (L) 3.5 - 5.2 g/dL    Total Bilirubin 2.9 (H) 0.1 - 1.0 mg/dL    Alkaline Phosphatase 228 (H) 55 - 135 U/L    AST 36 10 - 40 U/L    ALT 41 10 - 44 U/L    Anion Gap 3 (L) 8 - 16 mmol/L    eGFR >60.0 >60 mL/min/1.73 m^2   Lipase    Collection Time: 04/18/23  1:28 PM   Result Value Ref Range    Lipase Result 54 23 - 300 U/L   Ethanol    Collection Time: 04/18/23  1:28 PM   Result Value Ref Range    Alcohol, Serum <3 <10 mg/dL   Ammonia    Collection Time: 04/18/23  1:28 PM   Result Value Ref Range    Ammonia 114 (H) 10 - 50 umol/L   Lactic acid, plasma    Collection Time: 04/18/23  1:28 PM   Result Value Ref Range    Lactate (Lactic Acid) 1.9 0.5 - 2.2 mmol/L   Blood culture #2 **CANNOT BE ORDERED STAT**    Collection Time: 04/18/23  1:28 PM    Specimen: Blood   Result Value Ref Range    Blood Culture, Routine No growth after 5 days.    CBC auto differential    Collection Time: 04/19/23  5:20 AM   Result Value Ref Range    WBC 8.75 3.90 - 12.70 K/uL    RBC 4.80 4.00 - 5.40 M/uL    Hemoglobin 14.9 12.0 - 16.0 g/dL    Hematocrit 44.5 37.0 - 48.5 %    MCV 93 82 - 98 fL    MCH 31.0 27.0 - 31.0 pg    MCHC 33.5 32.0 - 36.0 g/dL    RDW 16.3 (H) 11.5 - 14.5 %    Platelets 142 (L) 150 - 450 K/uL    MPV 10.4 9.2 - 12.9 fL    Immature Granulocytes 0.2 0.0 - 0.5 %    Gran # (ANC) 4.8 1.8 - 7.7 K/uL    Immature Grans (Abs) 0.02 0.00 - 0.04 K/uL    Lymph # 2.9 1.0 - 4.8 K/uL    Mono # 0.8 0.3 - 1.0 K/uL    Eos # 0.2 0.0 - 0.5 K/uL    Baso # 0.06 0.00 - 0.20 K/uL    nRBC 0 0 /100 WBC    Gran % 54.9 38.0 - 73.0 %    Lymph % 32.9 18.0 - 48.0 %    Mono % 9.5 4.0 - 15.0 %    Eosinophil % 1.8 0.0 - 8.0 %    Basophil % 0.7 0.0 - 1.9 %    Differential Method Automated    Comprehensive metabolic panel    Collection Time: 04/19/23  5:20 AM   Result Value Ref Range    Sodium 145 136 - 145 mmol/L    Potassium 3.4 (L) 3.5 - 5.1 mmol/L    Chloride 115 (H) 95 - 110 mmol/L    CO2 25 23 -  29 mmol/L    Glucose 108 70 - 110 mg/dL    BUN 11 6 - 20 mg/dL    Creatinine 0.8 0.5 - 1.4 mg/dL    Calcium 9.0 8.7 - 10.5 mg/dL    Total Protein 6.1 6.0 - 8.4 g/dL    Albumin 2.8 (L) 3.5 - 5.2 g/dL    Total Bilirubin 3.5 (H) 0.1 - 1.0 mg/dL    Alkaline Phosphatase 172 (H) 55 - 135 U/L    AST 27 10 - 40 U/L    ALT 33 10 - 44 U/L    Anion Gap 5 (L) 8 - 16 mmol/L    eGFR >60.0 >60 mL/min/1.73 m^2   Ammonia    Collection Time: 04/19/23  5:20 AM   Result Value Ref Range    Ammonia 29 10 - 50 umol/L   Protime-INR    Collection Time: 04/19/23  5:20 AM   Result Value Ref Range    Prothrombin Time 14.1 (H) 9.0 - 12.5 sec    INR 1.3 (H) 0.8 - 1.2   Comprehensive Metabolic Panel    Collection Time: 04/20/23  6:48 AM   Result Value Ref Range    Sodium 141 136 - 145 mmol/L    Potassium 3.8 3.5 - 5.1 mmol/L    Chloride 113 (H) 95 - 110 mmol/L    CO2 26 23 - 29 mmol/L    Glucose 131 (H) 70 - 110 mg/dL    BUN 10 6 - 20 mg/dL    Creatinine 0.9 0.5 - 1.4 mg/dL    Calcium 8.8 8.7 - 10.5 mg/dL    Total Protein 5.9 (L) 6.0 - 8.4 g/dL    Albumin 2.6 (L) 3.5 - 5.2 g/dL    Total Bilirubin 3.7 (H) 0.1 - 1.0 mg/dL    Alkaline Phosphatase 168 (H) 55 - 135 U/L    AST 26 10 - 40 U/L    ALT 35 10 - 44 U/L    Anion Gap 2 (L) 8 - 16 mmol/L    eGFR >60.0 >60 mL/min/1.73 m^2   CBC Auto Differential    Collection Time: 04/20/23  6:48 AM   Result Value Ref Range    WBC 7.33 3.90 - 12.70 K/uL    RBC 4.47 4.00 - 5.40 M/uL    Hemoglobin 14.2 12.0 - 16.0 g/dL    Hematocrit 42.0 37.0 - 48.5 %    MCV 94 82 - 98 fL    MCH 31.8 (H) 27.0 - 31.0 pg    MCHC 33.8 32.0 - 36.0 g/dL    RDW 16.3 (H) 11.5 - 14.5 %    Platelets 117 (L) 150 - 450 K/uL    MPV 10.3 9.2 - 12.9 fL    Immature Granulocytes 0.4 0.0 - 0.5 %    Gran # (ANC) 4.1 1.8 - 7.7 K/uL    Immature Grans (Abs) 0.03 0.00 - 0.04 K/uL    Lymph # 2.2 1.0 - 4.8 K/uL    Mono # 0.8 0.3 - 1.0 K/uL    Eos # 0.2 0.0 - 0.5 K/uL    Baso # 0.06 0.00 - 0.20 K/uL    nRBC 0 0 /100 WBC    Gran % 55.4 38.0 - 73.0 %     Lymph % 29.7 18.0 - 48.0 %    Mono % 11.5 4.0 - 15.0 %    Eosinophil % 2.2 0.0 - 8.0 %    Basophil % 0.8 0.0 - 1.9 %    Differential Method Automated          DO Doug Suarezsshira [facility]

## 2023-05-01 ENCOUNTER — HOSPITAL ENCOUNTER (OUTPATIENT)
Dept: RADIOLOGY | Facility: HOSPITAL | Age: 58
Discharge: HOME | End: 2023-05-01
Attending: INTERNAL MEDICINE
Payer: MEDICAID

## 2023-05-01 DIAGNOSIS — C22.0 HEPATOCELLULAR CARCINOMA: ICD-10-CM

## 2023-05-01 DIAGNOSIS — I73.9 CLAUDICATION OF BOTH LOWER EXTREMITIES: ICD-10-CM

## 2023-05-01 DIAGNOSIS — J44.9 COPD WITHOUT EXACERBATION: ICD-10-CM

## 2023-05-01 DIAGNOSIS — F17.219 CIGARETTE NICOTINE DEPENDENCE WITH NICOTINE-INDUCED DISORDER: ICD-10-CM

## 2023-05-01 DIAGNOSIS — K70.30 ALCOHOLIC CIRRHOSIS OF LIVER WITHOUT ASCITES: ICD-10-CM

## 2023-05-01 DIAGNOSIS — Z95.828 S/P TIPS (TRANSJUGULAR INTRAHEPATIC PORTOSYSTEMIC SHUNT): ICD-10-CM

## 2023-05-01 DIAGNOSIS — Z01.818 PRE-TRANSPLANT EVALUATION FOR LIVER TRANSPLANT: ICD-10-CM

## 2023-05-01 LAB
HPV16+18+H RISK 12 DNA CVX-IMP: NORMAL
LIQUID BASED PAP SMEAR, SCREENING: NORMAL

## 2023-05-01 PROCEDURE — 77080 DXA BONE DENSITY AXIAL: CPT | Mod: TC,TXP

## 2023-05-03 ENCOUNTER — TELEPHONE (OUTPATIENT)
Dept: TRANSPLANT | Facility: CLINIC | Age: 58
End: 2023-05-03
Payer: MEDICAID

## 2023-05-03 NOTE — TELEPHONE ENCOUNTER
Patient notified via [employer]    ----- Message from Shari Arreola MD sent at 4/18/2023  8:23 AM CDT -----  Please inform patient results are OK.

## 2023-05-04 ENCOUNTER — CLINICAL SUPPORT (OUTPATIENT)
Dept: SMOKING CESSATION | Facility: CLINIC | Age: 58
End: 2023-05-04
Payer: COMMERCIAL

## 2023-05-04 DIAGNOSIS — F17.200 NICOTINE DEPENDENCE: Primary | ICD-10-CM

## 2023-05-04 PROCEDURE — 99404 PREV MED CNSL INDIV APPRX 60: CPT | Mod: S$GLB,TXP,,

## 2023-05-04 PROCEDURE — 99999 PR PBB SHADOW E&M-EST. PATIENT-LVL I: CPT | Mod: PBBFAC,TXP,,

## 2023-05-04 PROCEDURE — 99999 PR PBB SHADOW E&M-EST. PATIENT-LVL I: ICD-10-PCS | Mod: PBBFAC,TXP,,

## 2023-05-04 PROCEDURE — 99404 PR PREVENT COUNSEL,INDIV,60 MIN: ICD-10-PCS | Mod: S$GLB,TXP,,

## 2023-05-04 RX ORDER — IBUPROFEN 200 MG
1 TABLET ORAL DAILY
Qty: 28 PATCH | Refills: 0 | Status: SHIPPED | OUTPATIENT
Start: 2023-05-04 | End: 2023-05-18 | Stop reason: DRUGHIGH

## 2023-05-04 NOTE — PROGRESS NOTES
Individual Follow-Up Form    5/4/2023    Quit Date: TBD    Clinical Status of Patient: Outpatient    Length of Service: 60 minutes    Continuing Medication: yes  Patches and nicotine gum    Other Medications: none     Target Symptoms: Withdrawal and medication side effects. The following were  rated moderate (3) to severe (4) on TCRS:  Moderate (3): none  Severe (4): none    Comments: Patient seen in clinic regarding smoking cessation follow up. Ms. Merchant was recently hospitalized and states that she is feeling much better. She reports smoking about 5 cpd. New goal is to decrease down to 3 cpd and also to attempt a 24 hour dry run. Pt has agreed to limit smoking to outside only between noon and 5 pm. I have encouraged pt to set a new quit date at our next meeting. She has brought her partner with her today who is also ready to quit. Pt remains on NRT 21 mg nicotine patch QD and 4 mg nicotine gum PRN (1-2 in place of cigarette.) No adverse reactions noted at this time. Dose adjustment made to 14 mg nicotine patch QD. Reviewed consistency, habitual behavior, effort, and controlling environment. Pt's exhaled carbon monoxide level was?7?ppm as per [employer]. (non- smoker = 0-5 ppm.)? Pt to continue with counseling in?1?week.    Diagnosis: F17.200    Next Visit: 1 week

## 2023-05-05 ENCOUNTER — HOSPITAL ENCOUNTER (OUTPATIENT)
Dept: RADIOLOGY | Facility: HOSPITAL | Age: 58
Discharge: HOME | End: 2023-05-05
Attending: STUDENT IN AN ORGANIZED HEALTH CARE EDUCATION/TRAINING PROGRAM
Payer: MEDICAID

## 2023-05-05 DIAGNOSIS — K70.30 ALCOHOLIC CIRRHOSIS OF LIVER WITHOUT ASCITES: ICD-10-CM

## 2023-05-05 PROCEDURE — 76705 ECHO EXAM OF ABDOMEN: CPT | Mod: TC,TXP

## 2023-05-08 ENCOUNTER — LAB VISIT (OUTPATIENT)
Dept: LAB | Facility: HOSPITAL | Age: 58
End: 2023-05-08
Attending: INTERNAL MEDICINE
Payer: MEDICAID

## 2023-05-08 DIAGNOSIS — R82.5 POSITIVE URINE DRUG SCREEN: ICD-10-CM

## 2023-05-08 DIAGNOSIS — Z01.818 PRE-TRANSPLANT EVALUATION FOR LIVER TRANSPLANT: ICD-10-CM

## 2023-05-08 DIAGNOSIS — C22.0 HEPATOCELLULAR CARCINOMA: ICD-10-CM

## 2023-05-08 DIAGNOSIS — K70.30 ALCOHOLIC CIRRHOSIS OF LIVER WITHOUT ASCITES: ICD-10-CM

## 2023-05-08 LAB
AMPHET+METHAMPHET UR QL: NEGATIVE
BARBITURATES UR QL SCN>200 NG/ML: NEGATIVE
BENZODIAZ UR QL SCN>200 NG/ML: ABNORMAL
BZE UR QL SCN: NEGATIVE
CANNABINOIDS UR QL SCN: ABNORMAL
CREAT UR-MCNC: 539 MG/DL (ref 15–325)
METHADONE UR QL SCN>300 NG/ML: NEGATIVE
OPIATES UR QL SCN: NEGATIVE
PCP UR QL SCN>25 NG/ML: NEGATIVE
TOXICOLOGY INFORMATION: ABNORMAL

## 2023-05-08 PROCEDURE — 80307 DRUG TEST PRSMV CHEM ANLYZR: CPT | Mod: TXP | Performed by: INTERNAL MEDICINE

## 2023-05-11 ENCOUNTER — TELEPHONE (OUTPATIENT)
Dept: SMOKING CESSATION | Facility: CLINIC | Age: 58
End: 2023-05-11
Payer: MEDICAID

## 2023-05-11 ENCOUNTER — TELEPHONE (OUTPATIENT)
Dept: TRANSPLANT | Facility: CLINIC | Age: 58
End: 2023-05-11
Payer: MEDICAID

## 2023-05-11 NOTE — TELEPHONE ENCOUNTER
Attempted to contact patient regarding smoking cessation follow up. There was no answer at this time. Voicemail box is full.

## 2023-05-11 NOTE — TELEPHONE ENCOUNTER
Patient notified via [employer]    ----- Message from Shari Arreola MD sent at 5/4/2023  4:22 AM CDT -----  Thinning of bones. Take calcium 1000 mg daily and vitamin D 1000 units daily.

## 2023-05-11 NOTE — TELEPHONE ENCOUNTER
Patient notified via [employer]    ----- Message from Shari Arreola MD sent at 5/8/2023  8:31 PM CDT -----  Persistent positive THC. Amphetamine negative this time, previously positive. Now benzodiazepine positive.  Please have patient meet with  to see what help she needs to get to eliminate amphetamine, THC, benzos.

## 2023-05-11 NOTE — TELEPHONE ENCOUNTER
Last mri done 2/27, reviewed by Interventional Radiology with recommendation to repeat in 3 months. Order entered and appointment card completed March 2023 for surveillance study to be coordinated with clinic follow-up.     RTC's scheduled June 2nd with pre-visit labs, including AFP, May 29th. Registration Coordinators messaged to add mri to June 2nd or locally prior to appointment, if no availability on June 2nd.     ----- Message from Shari Arreola MD sent at 5/5/2023  7:36 PM CDT -----  Please check with IR when they want a follow-up MRI with/wo contrast. It has been 4 months, prob should get one now. Also get AFP with the next imaging study..

## 2023-05-12 ENCOUNTER — TELEPHONE (OUTPATIENT)
Dept: TRANSPLANT | Facility: CLINIC | Age: 58
End: 2023-05-12
Payer: MEDICAID

## 2023-05-12 ENCOUNTER — TELEPHONE (OUTPATIENT)
Dept: PSYCHIATRY | Facility: CLINIC | Age: 58
End: 2023-05-12
Payer: MEDICAID

## 2023-05-12 RX ORDER — CLONAZEPAM 0.5 MG/1
0.5 TABLET ORAL DAILY
COMMUNITY
End: 2023-05-17

## 2023-05-12 NOTE — TELEPHONE ENCOUNTER
----- Message from Nicola Chan sent at 5/12/2023  3:14 PM CDT -----    Called pt at [phone] (Mobile) and sp to her So; told him that we will keep her appts [facility]'ed for 6/2. Also informed him that the pt would be getting another call to [facility] an appt and that she could do it virtually, but pts So said that he can bring her to [facility] for the appts.  .  .

## 2023-05-12 NOTE — TELEPHONE ENCOUNTER
----- Message from Nicola Chan sent at 5/12/2023  3:13 PM CDT -----    Message sent to addiction psych for an appt.  .

## 2023-05-12 NOTE — TELEPHONE ENCOUNTER
Call placed to patient to check status of pending tests. Patient reports completed bmd and gyn/pap, and says egd/colonoscopy are scheduled with local provider. Ultrasound on 5/5 did not include doppler assessment of TIPS; will need to reschedule.     Future appointments reviewed, including labs 5/29 and mri, clinic follow-up 6/2.     Discussed persistently positive urine compliance screens. Patient admits to prior amphetamine use and expressed shame/remorse. However, she's unsure why the tests remain positive for THC, as she reports discontinuing use when initially discussed a few months ago. Reports being given a new prescription for klonopin 0.5mg daily. Medication card updated.

## 2023-05-12 NOTE — TELEPHONE ENCOUNTER
"Spoke to SO. He reports pt is sleeping but was able to schedule psych eval for 6/8/23 at 3pm with Dr Gilliland. He reports pt has been restless and urinating on herself. Went to use the toilet and did not pull down her pants. States she was in the hospital one week ago. States she is "making sense when she speaks but takes longer to think about what she wants to say". He wanted her to go get checked out but she is refusing. Advised if he feels she needs immediate care, please call 911 for assistance. States he will do that if thinks she is getting worse.     "

## 2023-05-15 ENCOUNTER — TELEPHONE (OUTPATIENT)
Dept: PRIMARY CARE CLINIC | Facility: CLINIC | Age: 58
End: 2023-05-15
Payer: MEDICAID

## 2023-05-15 DIAGNOSIS — R82.5 POSITIVE URINE DRUG SCREEN: ICD-10-CM

## 2023-05-15 DIAGNOSIS — C22.0 HEPATOCELLULAR CARCINOMA: Primary | ICD-10-CM

## 2023-05-15 DIAGNOSIS — Z01.818 PRE-TRANSPLANT EVALUATION FOR LIVER TRANSPLANT: ICD-10-CM

## 2023-05-15 DIAGNOSIS — K70.30 ALCOHOLIC CIRRHOSIS OF LIVER WITHOUT ASCITES: ICD-10-CM

## 2023-05-15 PROBLEM — Z13.6 ENCOUNTER FOR LIPID SCREENING FOR CARDIOVASCULAR DISEASE: Status: RESOLVED | Noted: 2023-02-07 | Resolved: 2023-05-15

## 2023-05-15 PROBLEM — Z13.220 ENCOUNTER FOR LIPID SCREENING FOR CARDIOVASCULAR DISEASE: Status: RESOLVED | Noted: 2023-02-07 | Resolved: 2023-05-15

## 2023-05-15 NOTE — TELEPHONE ENCOUNTER
Pt states she needs something called in to help get rid of the swelling in her feet. Would like meds sent to the pharmacy at [facility] in [facility]

## 2023-05-17 ENCOUNTER — OFFICE VISIT (OUTPATIENT)
Dept: PRIMARY CARE CLINIC | Facility: CLINIC | Age: 58
End: 2023-05-17
Payer: MEDICAID

## 2023-05-17 VITALS
DIASTOLIC BLOOD PRESSURE: 68 MMHG | WEIGHT: 206 LBS | HEART RATE: 86 BPM | RESPIRATION RATE: 16 BRPM | BODY MASS INDEX: 31.22 KG/M2 | OXYGEN SATURATION: 95 % | TEMPERATURE: 99 F | SYSTOLIC BLOOD PRESSURE: 129 MMHG | HEIGHT: 68 IN

## 2023-05-17 DIAGNOSIS — K74.60 HEPATIC CIRRHOSIS, UNSPECIFIED HEPATIC CIRRHOSIS TYPE, UNSPECIFIED WHETHER ASCITES PRESENT: ICD-10-CM

## 2023-05-17 DIAGNOSIS — F19.10 MULTIPLE SUBSTANCE ABUSE: ICD-10-CM

## 2023-05-17 DIAGNOSIS — E66.9 CLASS 1 OBESITY WITH SERIOUS COMORBIDITY AND BODY MASS INDEX (BMI) OF 31.0 TO 31.9 IN ADULT, UNSPECIFIED OBESITY TYPE: ICD-10-CM

## 2023-05-17 DIAGNOSIS — F17.218 CIGARETTE NICOTINE DEPENDENCE WITH OTHER NICOTINE-INDUCED DISORDER: ICD-10-CM

## 2023-05-17 DIAGNOSIS — G25.81 RESTLESS LEG SYNDROME: ICD-10-CM

## 2023-05-17 DIAGNOSIS — K70.30 ALCOHOLIC CIRRHOSIS OF LIVER WITHOUT ASCITES: ICD-10-CM

## 2023-05-17 DIAGNOSIS — N32.81 OVERACTIVE BLADDER: Primary | ICD-10-CM

## 2023-05-17 PROBLEM — E66.811 CLASS 1 OBESITY WITH SERIOUS COMORBIDITY AND BODY MASS INDEX (BMI) OF 31.0 TO 31.9 IN ADULT: Status: ACTIVE | Noted: 2023-02-23

## 2023-05-17 PROCEDURE — 1160F RVW MEDS BY RX/DR IN RCRD: CPT | Mod: CPTII,,, | Performed by: STUDENT IN AN ORGANIZED HEALTH CARE EDUCATION/TRAINING PROGRAM

## 2023-05-17 PROCEDURE — 1159F PR MEDICATION LIST DOCUMENTED IN MEDICAL RECORD: ICD-10-PCS | Mod: CPTII,,, | Performed by: STUDENT IN AN ORGANIZED HEALTH CARE EDUCATION/TRAINING PROGRAM

## 2023-05-17 PROCEDURE — 1159F MED LIST DOCD IN RCRD: CPT | Mod: CPTII,,, | Performed by: STUDENT IN AN ORGANIZED HEALTH CARE EDUCATION/TRAINING PROGRAM

## 2023-05-17 PROCEDURE — 3074F SYST BP LT 130 MM HG: CPT | Mod: CPTII,,, | Performed by: STUDENT IN AN ORGANIZED HEALTH CARE EDUCATION/TRAINING PROGRAM

## 2023-05-17 PROCEDURE — 3078F PR MOST RECENT DIASTOLIC BLOOD PRESSURE < 80 MM HG: ICD-10-PCS | Mod: CPTII,,, | Performed by: STUDENT IN AN ORGANIZED HEALTH CARE EDUCATION/TRAINING PROGRAM

## 2023-05-17 PROCEDURE — 99214 OFFICE O/P EST MOD 30 MIN: CPT | Mod: S$PBB,,, | Performed by: STUDENT IN AN ORGANIZED HEALTH CARE EDUCATION/TRAINING PROGRAM

## 2023-05-17 PROCEDURE — 3074F PR MOST RECENT SYSTOLIC BLOOD PRESSURE < 130 MM HG: ICD-10-PCS | Mod: CPTII,,, | Performed by: STUDENT IN AN ORGANIZED HEALTH CARE EDUCATION/TRAINING PROGRAM

## 2023-05-17 PROCEDURE — 99999 PR PBB SHADOW E&M-EST. PATIENT-LVL IV: CPT | Mod: PBBFAC,,, | Performed by: STUDENT IN AN ORGANIZED HEALTH CARE EDUCATION/TRAINING PROGRAM

## 2023-05-17 PROCEDURE — 1160F PR REVIEW ALL MEDS BY PRESCRIBER/CLIN PHARMACIST DOCUMENTED: ICD-10-PCS | Mod: CPTII,,, | Performed by: STUDENT IN AN ORGANIZED HEALTH CARE EDUCATION/TRAINING PROGRAM

## 2023-05-17 PROCEDURE — 99214 PR OFFICE/OUTPT VISIT, EST, LEVL IV, 30-39 MIN: ICD-10-PCS | Mod: S$PBB,,, | Performed by: STUDENT IN AN ORGANIZED HEALTH CARE EDUCATION/TRAINING PROGRAM

## 2023-05-17 PROCEDURE — 1111F DSCHRG MED/CURRENT MED MERGE: CPT | Mod: CPTII,,, | Performed by: STUDENT IN AN ORGANIZED HEALTH CARE EDUCATION/TRAINING PROGRAM

## 2023-05-17 PROCEDURE — 1111F PR DISCHARGE MEDS RECONCILED W/ CURRENT OUTPATIENT MED LIST: ICD-10-PCS | Mod: CPTII,,, | Performed by: STUDENT IN AN ORGANIZED HEALTH CARE EDUCATION/TRAINING PROGRAM

## 2023-05-17 PROCEDURE — 3008F PR BODY MASS INDEX (BMI) DOCUMENTED: ICD-10-PCS | Mod: CPTII,,, | Performed by: STUDENT IN AN ORGANIZED HEALTH CARE EDUCATION/TRAINING PROGRAM

## 2023-05-17 PROCEDURE — 99214 OFFICE O/P EST MOD 30 MIN: CPT | Mod: PBBFAC,PN | Performed by: STUDENT IN AN ORGANIZED HEALTH CARE EDUCATION/TRAINING PROGRAM

## 2023-05-17 PROCEDURE — 3078F DIAST BP <80 MM HG: CPT | Mod: CPTII,,, | Performed by: STUDENT IN AN ORGANIZED HEALTH CARE EDUCATION/TRAINING PROGRAM

## 2023-05-17 PROCEDURE — 3008F BODY MASS INDEX DOCD: CPT | Mod: CPTII,,, | Performed by: STUDENT IN AN ORGANIZED HEALTH CARE EDUCATION/TRAINING PROGRAM

## 2023-05-17 PROCEDURE — 99999 PR PBB SHADOW E&M-EST. PATIENT-LVL IV: ICD-10-PCS | Mod: PBBFAC,,, | Performed by: STUDENT IN AN ORGANIZED HEALTH CARE EDUCATION/TRAINING PROGRAM

## 2023-05-17 RX ORDER — OXYBUTYNIN CHLORIDE 5 MG/1
5 TABLET, EXTENDED RELEASE ORAL DAILY
Qty: 30 TABLET | Refills: 11 | Status: SHIPPED | OUTPATIENT
Start: 2023-05-17 | End: 2024-05-16

## 2023-05-17 RX ORDER — SPIRONOLACTONE 50 MG/1
TABLET, FILM COATED ORAL
Qty: 90 TABLET | Refills: 1 | Status: SHIPPED | OUTPATIENT
Start: 2023-05-17 | End: 2023-07-10 | Stop reason: DRUGHIGH

## 2023-05-17 NOTE — ASSESSMENT & PLAN NOTE
Daily patch use with nicotine gum. Patient states following up with counseling with smoking cessation specialist.

## 2023-05-17 NOTE — ASSESSMENT & PLAN NOTE
Denies dysuria. Per patient voiding clear colored urine with no gross blood. Start ditropan 5 mg daily and monitor symptoms. Follow up 4 weeks.

## 2023-05-17 NOTE — ASSESSMENT & PLAN NOTE
Per patient has urine studies at [facility] every 2 weeks. Working on smoking cessation with nicotine patches and nicotine gum.

## 2023-05-17 NOTE — ASSESSMENT & PLAN NOTE
Nightly Requip to 1 mg helping with leg pain during the night. Continue to monitor symptom resolution.

## 2023-05-17 NOTE — PROGRESS NOTES
[facility]    HPI:  Shonda Borrego is a 57 y.o. female who presents today for Leg Swelling (Patient states her legs had swollen up for one day, then self resolved. Denies in any changes to PO intake, bowel movements, abdominal pain, swelling. )  57-year-old female with hepatocellular carcinoma, alcoholic cirrhosis with recent discharge from hospital for hyperammonemia triggered from use of methamphetamine.   Since her discharge she has been consistently taking lactulose to maintain her daily bowel movements. She is also on nicotine patch, 14mg daily with regular follow up with smoking cessation specialist. Currently on patient drug use monitoring and counseling with [facility].   She endorses has started taking daily supplements for bone health.  Patient to return to Dr. Juarez's office to schedule colonoscopy as requested by liver transplant team.  Patient endorses she has not been able to hold her urine at night time with frequent leakage before she can get out of bed to use the bathroom. Denies stool incontinence, low back pain, frequent urination with burning or tingling.   Denies fever, chills, headaches, vision changes, dizziness, shortness of breath, chest pain, abdominal pain, nausea, vomiting.     ROS   A review of systems was performed and was negative except as noted above.    I personally reviewed allergies, past medical, surgical, social and family history and updated as appropriate.    Medications:    Current Outpatient Medications:     albuterol (PROVENTIL/VENTOLIN HFA) 90 mcg/actuation inhaler, Inhale 2 puffs into the lungs every 6 (six) hours as needed for Wheezing or Shortness of Breath., Disp: 18 g, Rfl: 3    CONSTULOSE 10 gram/15 mL solution, Take 30 mLs by mouth 3 (three) times daily., Disp: , Rfl:     gabapentin (NEURONTIN) 300 MG capsule, Take 1 capsule (300 mg total) by mouth every evening., Disp: 30 capsule, Rfl: 2    mirtazapine (REMERON) 7.5 MG Tab,  Take 7.5 mg by mouth every evening., Disp: , Rfl:     nicotine (NICODERM CQ) 14 mg/24 hr, Place 1 patch onto the skin once daily., Disp: 28 patch, Rfl: 0    nicotine, polacrilex, (NICORETTE) 4 MG Gum, Take 1 each (4 mg total) by mouth as needed (Take 1 each (4 mg total) by mouth as needed for tobacco cessation. 1-2 per hour in the place of cigarettes. (5-16 daily max) - Oral)., Disp: 300 each, Rfl: 0    ondansetron (ZOFRAN) 4 MG tablet, Take 1 tablet (4 mg total) by mouth 2 (two) times daily., Disp: 30 tablet, Rfl: 0    rOPINIRole (REQUIP) 1 MG tablet, Take 1 tablet (1 mg total) by mouth every evening., Disp: 90 tablet, Rfl: 1    VRAYLAR 3 mg Cap, Take 3 mg by mouth., Disp: , Rfl:     oxybutynin (DITROPAN-XL) 5 MG TR24, Take 1 tablet (5 mg total) by mouth once daily., Disp: 30 tablet, Rfl: 11    spironolactone (ALDACTONE) 50 MG tablet, Take 1 tablet by mouth once daily, Disp: 90 tablet, Rfl: 1    ursodioL (ACTIGALL) 300 mg capsule, Take 1 capsule (300 mg total) by mouth 3 (three) times daily. for 14 days, Disp: 90 capsule, Rfl: 3     Health Maintenance:  Immunization History   Administered Date(s) Administered    COVID-19 MRNA, LN-S PF (MODERNA HALF 0.25 ML DOSE) 12/09/2021    COVID-19, MRNA, LN-S, PF (MODERNA FULL 0.5 ML DOSE) 05/12/2021    COVID-19, MRNA, LN-S, PF (Pfizer) (Gray Cap) 06/03/2022    COVID-19, MRNA, LN-S, PF (Pfizer) (Purple Cap) 06/09/2021, 09/25/2022    COVID-19, mRNA, LNP-S, bivalent booster, PF (Moderna Omicron) 09/25/2022    Hepatitis A, Adult 03/23/2023    Hepatitis B, Adult 04/24/2023    Influenza - Quadrivalent - PF *Preferred* (6 months and older) 09/25/2022    Influenza - Trivalent (ADULT) 10/20/2021    Pneumococcal Conjugate - 20 Valent 10/05/2022, 02/07/2023    Tdap 03/03/2022    Zoster Recombinant 03/03/2022, 09/25/2022      Health Maintenance   Topic Date Due    Lipid Panel  Never done    Mammogram  10/24/2023    LDCT Lung Screen  01/20/2024    TETANUS VACCINE  03/03/2032    Hepatitis C  "Screening  Completed     Health Maintenance Topics with due status: Not Due       Topic Last Completion Date    TETANUS VACCINE 03/03/2022    Mammogram 10/24/2022    LDCT Lung Screen 01/20/2023    Colorectal Cancer Screening 04/14/2023    Cervical Cancer Screening 04/26/2023     Health Maintenance Due   Topic Date Due    Lipid Panel  Never done    Hemoglobin A1c (Diabetic Prevention Screening)  Never done       PHYSICAL EXAM:  Vitals:    05/17/23 1047   BP: 129/68   BP Location: Right arm   Patient Position: Sitting   BP Method: Large (Automatic)   Pulse: 86   Resp: 16   Temp: 98.5 °F (36.9 °C)   TempSrc: Oral   SpO2: 95%   Weight: 93.4 kg (206 lb)   Height: 5' 8" (1.727 m)     Body mass index is 31.32 kg/m².  Physical Exam  Constitutional:       General: She is not in acute distress.     Appearance: Normal appearance. She is not ill-appearing or toxic-appearing.   HENT:      Head: Normocephalic.      Right Ear: External ear normal.      Left Ear: External ear normal.      Mouth/Throat:      Mouth: Mucous membranes are dry.      Pharynx: Oropharynx is clear. No oropharyngeal exudate or posterior oropharyngeal erythema.   Eyes:      Extraocular Movements: Extraocular movements intact.      Conjunctiva/sclera: Conjunctivae normal.   Neck:      Vascular: No carotid bruit.   Cardiovascular:      Rate and Rhythm: Normal rate and regular rhythm.      Pulses: Normal pulses.      Heart sounds: No murmur heard.  Pulmonary:      Effort: Pulmonary effort is normal. No respiratory distress.      Breath sounds: No wheezing, rhonchi or rales.   Abdominal:      General: Abdomen is flat. There is no distension.      Palpations: Abdomen is soft.      Tenderness: There is no abdominal tenderness. There is no right CVA tenderness, left CVA tenderness or guarding.   Musculoskeletal:         General: No swelling, tenderness or deformity. Normal range of motion.      Cervical back: Normal range of motion and neck supple. No rigidity or " tenderness.      Right lower leg: No edema.      Left lower leg: No edema.   Lymphadenopathy:      Cervical: No cervical adenopathy.   Skin:     General: Skin is warm and dry.      Capillary Refill: Capillary refill takes less than 2 seconds.      Findings: No erythema or rash.      Comments: Bronzed throughout   Neurological:      General: No focal deficit present.      Mental Status: She is alert and oriented to person, place, and time. Mental status is at baseline.      Motor: No weakness.      Gait: Gait normal.   Psychiatric:         Mood and Affect: Mood normal.         Behavior: Behavior normal.         Thought Content: Thought content normal.         Judgment: Judgment normal.        ASSESSMENT/PLAN:  1. Overactive bladder  Assessment & Plan:  Denies dysuria. Per patient voiding clear colored urine with no gross blood. Start ditropan 5 mg daily and monitor symptoms. Follow up 4 weeks.      Orders:  -     oxybutynin (DITROPAN-XL) 5 MG TR24; Take 1 tablet (5 mg total) by mouth once daily.  Dispense: 30 tablet; Refill: 11    2. Restless leg syndrome  Assessment & Plan:  Nightly Requip to 1 mg helping with leg pain during the night. Continue to monitor symptom resolution.        3. Multiple substance abuse  Assessment & Plan:  Per patient has urine studies at [facility] every 2 weeks. Working on smoking cessation with nicotine patches and nicotine gum.       4. Alcoholic cirrhosis of liver without ascites  Overview:  9/23: MRI 1.8 x 1.6 cm lesion in the right lobe of the liver suspicious for a primary liver neoplasm.    Assessment & Plan:  Has an upcoming MRI scheduled in June 2023. Patient to schedule colonoscopy with Dr. Juarez's office today.   - f/u liver transplant team       5. Hepatic cirrhosis, unspecified hepatic cirrhosis type, unspecified whether ascites present  -     spironolactone (ALDACTONE) 50 MG tablet; Take 1 tablet by mouth once daily  Dispense: 90 tablet; Refill: 1    6. Class 1  obesity with serious comorbidity and body mass index (BMI) of 31.0 to 31.9 in adult, unspecified obesity type  Overview:  Wt Readings from Last 8 Encounters:   05/17/23 93.4 kg (206 lb)   04/26/23 93 kg (205 lb)   04/24/23 94.3 kg (208 lb)   04/18/23 94.8 kg (209 lb)   03/23/23 94.8 kg (209 lb)   02/23/23 93.9 kg (207 lb)   02/07/23 95.7 kg (211 lb)   01/25/23 99.8 kg (220 lb)       Assessment & Plan:  Stable weight. Recommendations:   Stay physically active. As tolerated alternate resistance training with stretching and cardio. Goal of 150 minutes per week of moderate intensity activity or 7,500 - 10,000 steps per day. Follow the Mediterranean Diet. Include whole fresh fruits, vegetables, olive oil, seeds, nuts, whole grains, cold water fish, salmon, mackerel and lean cuts of meat.  Do not drink sugary/diet carbonated beverages. Decrease portion sizes slightly which will result in an approximately 500-calorie deficit. Avoid fast or fried and processed food, especially canned foods. Avoid refined carbohydrates, white starchy foods, flour, white potato, bread, muffins, and cakes. Consider substituting one meal a day with a meal replacement such as Slim fast, lean cuisine, or weight watcher's. Follow a healthy diet that includes enough calcium, vitamin D and proteins for bone health.        7. Cigarette nicotine dependence with other nicotine-induced disorder  Assessment & Plan:  Daily patch use with nicotine gum. Patient states following up with counseling with smoking cessation specialist.           Other than changes above, continue current medications and maintain follow up with specialists.      Follow up in about 4 weeks (around 6/14/2023) for Med recheck, Lab review.   Recent Results (from the past 2016 hour(s))   Drug screen panel, in-house    Collection Time: 03/07/23 10:13 AM   Result Value Ref Range    Benzodiazepines Negative Negative    Methadone metabolites Negative Negative    Cocaine (Metab.) Negative  Negative    Opiate Scrn, Ur Negative Negative    Barbiturate Screen, Ur Negative Negative    Amphetamine Screen, Ur Negative Negative    THC Presumptive Positive (A) Negative    Phencyclidine Negative Negative    Creatinine, Urine 151.0 15.0 - 325.0 mg/dL    Toxicology Information SEE COMMENT    AFP Tumor Marker    Collection Time: 03/17/23  9:51 AM   Result Value Ref Range    AFP 4.2 0.0 - 8.4 ng/mL   Drug screen panel, in-house    Collection Time: 03/17/23  9:54 AM   Result Value Ref Range    Benzodiazepines Negative Negative    Methadone metabolites Negative Negative    Cocaine (Metab.) Negative Negative    Opiate Scrn, Ur Negative Negative    Barbiturate Screen, Ur Negative Negative    Amphetamine Screen, Ur Negative Negative    THC Presumptive Positive (A) Negative    Phencyclidine Negative Negative    Creatinine, Urine 88.6 15.0 - 325.0 mg/dL    Toxicology Information SEE COMMENT    [facility]    Collection Time: 04/12/23  7:54 AM   Result Value Ref Range    [facility] No result necessary    Drug screen panel, in-house    Collection Time: 04/12/23  7:56 AM   Result Value Ref Range    Benzodiazepines Negative Negative    Methadone metabolites Negative Negative    Cocaine (Metab.) Negative Negative    Opiate Scrn, Ur Negative Negative    Barbiturate Screen, Ur Negative Negative    Amphetamine Screen, Ur Presumptive Positive (A) Negative    THC Presumptive Positive (A) Negative    Phencyclidine Negative Negative    Creatinine, Urine 92.0 15.0 - 325.0 mg/dL    Toxicology Information SEE COMMENT    Phosphatidylethanol (PETH)    Collection Time: 04/12/23  8:00 AM   Result Value Ref Range    PEth 16:0/18.1 (POPEth) <10 Cutoff: 10 ng/mL    PEth 16:0/18.2 (PLPEth) <10 Cutoff: 10 ng/mL    PETH INTERPRETATION Negative.    Comprehensive Metabolic Panel    Collection Time: 04/12/23  8:00 AM   Result Value Ref Range    Sodium 138 136 - 145 mmol/L    Potassium 3.8 3.5 - 5.1 mmol/L    Chloride 103 95 - 110 mmol/L    CO2  24 23 - 29 mmol/L    Glucose 125 (H) 70 - 110 mg/dL    BUN 7 6 - 20 mg/dL    Creatinine 0.9 0.5 - 1.4 mg/dL    Calcium 10.1 8.7 - 10.5 mg/dL    Total Protein 7.3 6.0 - 8.4 g/dL    Albumin 3.6 3.5 - 5.2 g/dL    Total Bilirubin 5.3 (H) 0.1 - 1.0 mg/dL    Alkaline Phosphatase 195 (H) 55 - 135 U/L    AST 46 (H) 10 - 40 U/L    ALT 35 10 - 44 U/L    Anion Gap 11 8 - 16 mmol/L    eGFR >60.0 >60 mL/min/1.73 m^2   CBC Auto Differential    Collection Time: 04/12/23  8:00 AM   Result Value Ref Range    WBC 8.72 3.90 - 12.70 K/uL    RBC 5.54 (H) 4.00 - 5.40 M/uL    Hemoglobin 16.8 (H) 12.0 - 16.0 g/dL    Hematocrit 50.3 (H) 37.0 - 48.5 %    MCV 91 82 - 98 fL    MCH 30.3 27.0 - 31.0 pg    MCHC 33.4 32.0 - 36.0 g/dL    RDW 17.1 (H) 11.5 - 14.5 %    Platelets 168 150 - 450 K/uL    MPV 10.0 9.2 - 12.9 fL    Immature Granulocytes 0.2 0.0 - 0.5 %    Gran # (ANC) 4.6 1.8 - 7.7 K/uL    Immature Grans (Abs) 0.02 0.00 - 0.04 K/uL    Lymph # 3.0 1.0 - 4.8 K/uL    Mono # 0.9 0.3 - 1.0 K/uL    Eos # 0.2 0.0 - 0.5 K/uL    Baso # 0.10 0.00 - 0.20 K/uL    nRBC 0 0 /100 WBC    Gran % 53.2 38.0 - 73.0 %    Lymph % 33.9 18.0 - 48.0 %    Mono % 9.9 4.0 - 15.0 %    Eosinophil % 1.7 0.0 - 8.0 %    Basophil % 1.1 0.0 - 1.9 %    Differential Method Automated    Protime-INR    Collection Time: 04/12/23  8:00 AM   Result Value Ref Range    Prothrombin Time 12.0 9.0 - 12.5 sec    INR 1.2 0.8 - 1.2   Blood culture #1 **CANNOT BE ORDERED STAT**    Collection Time: 04/18/23  1:07 PM    Specimen: Blood   Result Value Ref Range    Blood Culture, Routine No growth after 5 days.    Drug screen panel, emergency    Collection Time: 04/18/23  1:09 PM   Result Value Ref Range    Benzodiazepines Negative Negative    Methadone metabolites Negative Negative    Cocaine (Metab.) Negative Negative    Opiate Scrn, Ur Negative Negative    Barbiturate Screen, Ur Negative Negative    Amphetamine Screen, Ur Presumptive Positive (A) Negative    THC Presumptive Positive (A)  Negative    Phencyclidine Negative Negative    Creatinine, Urine 78.0 15.0 - 325.0 mg/dL    Toxicology Information SEE COMMENT    Urinalysis, Reflex to Urine Culture Urine, Clean Catch    Collection Time: 04/18/23  1:09 PM    Specimen: Urine, Catheterized   Result Value Ref Range    Specimen UA Urine, Catheterized     Color, UA Yellow Yellow, Straw, Joycelyn    Appearance, UA Cloudy (A) Clear    pH, UA 8.0 5.0 - 8.0    Specific Gravity, UA 1.010 1.005 - 1.030    Protein, UA Negative Negative    Glucose, UA Negative Negative    Ketones, UA Negative Negative    Bilirubin (UA) Negative Negative    Occult Blood UA Negative Negative    Nitrite, UA Negative Negative    Urobilinogen, UA 1.0 <2.0 EU/dL    Leukocytes, UA Negative Negative   Urinalysis Microscopic    Collection Time: 04/18/23  1:09 PM   Result Value Ref Range    RBC, UA 2 0 - 4 /hpf    WBC, UA 2 0 - 5 /hpf    Bacteria Moderate (A) None-Occ /hpf    Squam Epithel, UA 3 /hpf    Hyaline Casts, UA 0.0 (A) 0-1/lpf /lpf    Microscopic Comment SEE COMMENT    POCT ARTERIAL BLOOD GAS    Collection Time: 04/18/23  1:27 PM   Result Value Ref Range    POC PH 7.440 7.345 - 7.450    POC PCO2 32.7 (L) 35.0 - 45.0 mmHg    POC PO2 78.4 (L) 80.0 - 100 mmHg    POC SATURATED O2 96.1 95.0 - 100.0 %    POC HCO3 23.5 (L) 24.0 - 28.0 mmol/l    Base Deficit -1.9 -2.0 - 2.0 mmol/l    POC Temp 37.0 C    Specimen source Arterial     Performed By: Vargas     MODIFIED BRAYAN'S TEST NA     FiO2 21.0 %    O2DEVICE Room Air     Comments DRAWN PER GINI SMALLS RRT    CBC auto differential    Collection Time: 04/18/23  1:28 PM   Result Value Ref Range    WBC 11.78 3.90 - 12.70 K/uL    RBC 5.28 4.00 - 5.40 M/uL    Hemoglobin 16.3 (H) 12.0 - 16.0 g/dL    Hematocrit 48.5 37.0 - 48.5 %    MCV 92 82 - 98 fL    MCH 30.9 27.0 - 31.0 pg    MCHC 33.6 32.0 - 36.0 g/dL    RDW 16.3 (H) 11.5 - 14.5 %    Platelets 149 (L) 150 - 450 K/uL    MPV 9.9 9.2 - 12.9 fL    Immature Granulocytes 0.3 0.0 - 0.5 %    Gran #  (ANC) 8.6 (H) 1.8 - 7.7 K/uL    Immature Grans (Abs) 0.04 0.00 - 0.04 K/uL    Lymph # 2.3 1.0 - 4.8 K/uL    Mono # 0.8 0.3 - 1.0 K/uL    Eos # 0.1 0.0 - 0.5 K/uL    Baso # 0.03 0.00 - 0.20 K/uL    nRBC 0 0 /100 WBC    Gran % 73.1 (H) 38.0 - 73.0 %    Lymph % 19.3 18.0 - 48.0 %    Mono % 6.6 4.0 - 15.0 %    Eosinophil % 0.4 0.0 - 8.0 %    Basophil % 0.3 0.0 - 1.9 %    Differential Method Automated    Comprehensive metabolic panel    Collection Time: 04/18/23  1:28 PM   Result Value Ref Range    Sodium 142 136 - 145 mmol/L    Potassium 4.4 3.5 - 5.1 mmol/L    Chloride 115 (H) 95 - 110 mmol/L    CO2 24 23 - 29 mmol/L    Glucose 127 (H) 70 - 110 mg/dL    BUN 11 6 - 20 mg/dL    Creatinine 0.9 0.5 - 1.4 mg/dL    Calcium 9.7 8.7 - 10.5 mg/dL    Total Protein 7.0 6.0 - 8.4 g/dL    Albumin 3.1 (L) 3.5 - 5.2 g/dL    Total Bilirubin 2.9 (H) 0.1 - 1.0 mg/dL    Alkaline Phosphatase 228 (H) 55 - 135 U/L    AST 36 10 - 40 U/L    ALT 41 10 - 44 U/L    Anion Gap 3 (L) 8 - 16 mmol/L    eGFR >60.0 >60 mL/min/1.73 m^2   Lipase    Collection Time: 04/18/23  1:28 PM   Result Value Ref Range    Lipase Result 54 23 - 300 U/L   Ethanol    Collection Time: 04/18/23  1:28 PM   Result Value Ref Range    Alcohol, Serum <3 <10 mg/dL   Ammonia    Collection Time: 04/18/23  1:28 PM   Result Value Ref Range    Ammonia 114 (H) 10 - 50 umol/L   Lactic acid, plasma    Collection Time: 04/18/23  1:28 PM   Result Value Ref Range    Lactate (Lactic Acid) 1.9 0.5 - 2.2 mmol/L   Blood culture #2 **CANNOT BE ORDERED STAT**    Collection Time: 04/18/23  1:28 PM    Specimen: Blood   Result Value Ref Range    Blood Culture, Routine No growth after 5 days.    CBC auto differential    Collection Time: 04/19/23  5:20 AM   Result Value Ref Range    WBC 8.75 3.90 - 12.70 K/uL    RBC 4.80 4.00 - 5.40 M/uL    Hemoglobin 14.9 12.0 - 16.0 g/dL    Hematocrit 44.5 37.0 - 48.5 %    MCV 93 82 - 98 fL    MCH 31.0 27.0 - 31.0 pg    MCHC 33.5 32.0 - 36.0 g/dL    RDW 16.3 (H)  11.5 - 14.5 %    Platelets 142 (L) 150 - 450 K/uL    MPV 10.4 9.2 - 12.9 fL    Immature Granulocytes 0.2 0.0 - 0.5 %    Gran # (ANC) 4.8 1.8 - 7.7 K/uL    Immature Grans (Abs) 0.02 0.00 - 0.04 K/uL    Lymph # 2.9 1.0 - 4.8 K/uL    Mono # 0.8 0.3 - 1.0 K/uL    Eos # 0.2 0.0 - 0.5 K/uL    Baso # 0.06 0.00 - 0.20 K/uL    nRBC 0 0 /100 WBC    Gran % 54.9 38.0 - 73.0 %    Lymph % 32.9 18.0 - 48.0 %    Mono % 9.5 4.0 - 15.0 %    Eosinophil % 1.8 0.0 - 8.0 %    Basophil % 0.7 0.0 - 1.9 %    Differential Method Automated    Comprehensive metabolic panel    Collection Time: 04/19/23  5:20 AM   Result Value Ref Range    Sodium 145 136 - 145 mmol/L    Potassium 3.4 (L) 3.5 - 5.1 mmol/L    Chloride 115 (H) 95 - 110 mmol/L    CO2 25 23 - 29 mmol/L    Glucose 108 70 - 110 mg/dL    BUN 11 6 - 20 mg/dL    Creatinine 0.8 0.5 - 1.4 mg/dL    Calcium 9.0 8.7 - 10.5 mg/dL    Total Protein 6.1 6.0 - 8.4 g/dL    Albumin 2.8 (L) 3.5 - 5.2 g/dL    Total Bilirubin 3.5 (H) 0.1 - 1.0 mg/dL    Alkaline Phosphatase 172 (H) 55 - 135 U/L    AST 27 10 - 40 U/L    ALT 33 10 - 44 U/L    Anion Gap 5 (L) 8 - 16 mmol/L    eGFR >60.0 >60 mL/min/1.73 m^2   Ammonia    Collection Time: 04/19/23  5:20 AM   Result Value Ref Range    Ammonia 29 10 - 50 umol/L   Protime-INR    Collection Time: 04/19/23  5:20 AM   Result Value Ref Range    Prothrombin Time 14.1 (H) 9.0 - 12.5 sec    INR 1.3 (H) 0.8 - 1.2   Comprehensive Metabolic Panel    Collection Time: 04/20/23  6:48 AM   Result Value Ref Range    Sodium 141 136 - 145 mmol/L    Potassium 3.8 3.5 - 5.1 mmol/L    Chloride 113 (H) 95 - 110 mmol/L    CO2 26 23 - 29 mmol/L    Glucose 131 (H) 70 - 110 mg/dL    BUN 10 6 - 20 mg/dL    Creatinine 0.9 0.5 - 1.4 mg/dL    Calcium 8.8 8.7 - 10.5 mg/dL    Total Protein 5.9 (L) 6.0 - 8.4 g/dL    Albumin 2.6 (L) 3.5 - 5.2 g/dL    Total Bilirubin 3.7 (H) 0.1 - 1.0 mg/dL    Alkaline Phosphatase 168 (H) 55 - 135 U/L    AST 26 10 - 40 U/L    ALT 35 10 - 44 U/L    Anion Gap 2 (L)  8 - 16 mmol/L    eGFR >60.0 >60 mL/min/1.73 m^2   CBC Auto Differential    Collection Time: 04/20/23  6:48 AM   Result Value Ref Range    WBC 7.33 3.90 - 12.70 K/uL    RBC 4.47 4.00 - 5.40 M/uL    Hemoglobin 14.2 12.0 - 16.0 g/dL    Hematocrit 42.0 37.0 - 48.5 %    MCV 94 82 - 98 fL    MCH 31.8 (H) 27.0 - 31.0 pg    MCHC 33.8 32.0 - 36.0 g/dL    RDW 16.3 (H) 11.5 - 14.5 %    Platelets 117 (L) 150 - 450 K/uL    MPV 10.3 9.2 - 12.9 fL    Immature Granulocytes 0.4 0.0 - 0.5 %    Gran # (ANC) 4.1 1.8 - 7.7 K/uL    Immature Grans (Abs) 0.03 0.00 - 0.04 K/uL    Lymph # 2.2 1.0 - 4.8 K/uL    Mono # 0.8 0.3 - 1.0 K/uL    Eos # 0.2 0.0 - 0.5 K/uL    Baso # 0.06 0.00 - 0.20 K/uL    nRBC 0 0 /100 WBC    Gran % 55.4 38.0 - 73.0 %    Lymph % 29.7 18.0 - 48.0 %    Mono % 11.5 4.0 - 15.0 %    Eosinophil % 2.2 0.0 - 8.0 %    Basophil % 0.8 0.0 - 1.9 %    Differential Method Automated    Liquid-Based Pap Smear, Screening    Collection Time: 04/26/23 10:04 AM   Result Value Ref Range    Liquid Based Pap Smear, Screening See Final Cytology Report    HPV High Risk Genotypes, PCR    Collection Time: 04/26/23 10:04 AM   Result Value Ref Range    HPV DNA High Risk See result image under hyperlink    Drug screen panel, in-house    Collection Time: 05/08/23 10:38 AM   Result Value Ref Range    Benzodiazepines Presumptive Positive (A) Negative    Methadone metabolites Negative Negative    Cocaine (Metab.) Negative Negative    Opiate Scrn, Ur Negative Negative    Barbiturate Screen, Ur Negative Negative    Amphetamine Screen, Ur Negative Negative    THC Presumptive Positive (A) Negative    Phencyclidine Negative Negative    Creatinine, Urine 539.0 (H) 15.0 - 325.0 mg/dL    Toxicology Information SEE COMMENT          Kazumi G Yoshinaga, DO [facility]

## 2023-05-17 NOTE — ASSESSMENT & PLAN NOTE
Has an upcoming MRI scheduled in June 2023. Patient to schedule colonoscopy with Dr. Juarez's office today.   - f/u liver transplant team

## 2023-05-18 ENCOUNTER — CLINICAL SUPPORT (OUTPATIENT)
Dept: SMOKING CESSATION | Facility: CLINIC | Age: 58
End: 2023-05-18
Payer: COMMERCIAL

## 2023-05-18 DIAGNOSIS — F17.200 NICOTINE DEPENDENCE: Primary | ICD-10-CM

## 2023-05-18 PROCEDURE — 99999 PR PBB SHADOW E&M-EST. PATIENT-LVL I: ICD-10-PCS | Mod: PBBFAC,TXP,,

## 2023-05-18 PROCEDURE — 99404 PR PREVENT COUNSEL,INDIV,60 MIN: ICD-10-PCS | Mod: S$GLB,TXP,,

## 2023-05-18 PROCEDURE — 99999 PR PBB SHADOW E&M-EST. PATIENT-LVL I: CPT | Mod: PBBFAC,TXP,,

## 2023-05-18 PROCEDURE — 99404 PREV MED CNSL INDIV APPRX 60: CPT | Mod: S$GLB,TXP,,

## 2023-05-18 RX ORDER — NICOTINE 7MG/24HR
1 PATCH, TRANSDERMAL 24 HOURS TRANSDERMAL DAILY
Qty: 14 PATCH | Refills: 0 | Status: SHIPPED | OUTPATIENT
Start: 2023-05-18 | End: 2023-06-08 | Stop reason: SDUPTHER

## 2023-05-18 NOTE — PROGRESS NOTES
Individual Follow-Up Form    5/18/2023    Quit Date: TBD    Clinical Status of Patient: Outpatient    Length of Service: 60 minutes    Continuing Medication: yes  Patches and nicotine gum    Other Medications: none     Target Symptoms: Withdrawal and medication side effects. The following were  rated moderate (3) to severe (4) on TCRS:  Moderate (3): none  Severe (4): none    Comments: Patient seen in clinic regarding smoking cessation follow up. She is here with her significant other, Mr. Hightower, who is also participating in the program. Pt reports that she is currently smoking 1-2 cpd and her new goal is to attempt a quit today. Discussed preparations for quit attempt. Reviewed patient's personal reasons for wanting to quit tobacco and commended pt for progress made thus far. Pt remains on NRT 7 mg nicotine patch QD and 4 mg nicotine gum PRN (1-2 in place of cigarette.) No adverse reactions noted at this time. Discussed the importance of planning and management of urges and cravings. Pt's exhaled carbon monoxide level was?6?ppm as per Smokerlyzer. (non- smoker = 0-5 ppm.)? Pt to continue with counseling in?1?week.    Diagnosis: F17.200    Next Visit: 1 week

## 2023-05-24 ENCOUNTER — TELEPHONE (OUTPATIENT)
Dept: TRANSPLANT | Facility: CLINIC | Age: 58
End: 2023-05-24
Payer: MEDICAID

## 2023-05-24 NOTE — TELEPHONE ENCOUNTER
"Noted    ----- Message from DONAVAN Rice, MPH sent at 5/24/2023 10:42 AM CDT -----  Regarding: RE: positive urine compliance    From psychosocial perspective it makes sense given the history.     ----- Message -----  From: Racquel Ruiz LCSW  Sent: 5/17/2023  11:02 AM CDT  To: Shari Arreola MD, Jessie Schulz, #  Subject: RE: positive urine compliance                    Can our visit with her be after this date, we should review their note to ensure consistency. Thanks!    ----- Message -----  From: DONAVAN Rice, MPH  Sent: 5/16/2023   3:32 PM CDT  To: Shari Arreola MD, Jessie Schulz, #  Subject: RE: positive urine compliance                    Fyi - I see Dr. Gilliland is scheduled to see this patient on 6/8/23 at 3pm.    ----- Message -----  From: Racquel Ruiz LCSW  Sent: 5/12/2023  12:24 PM CDT  To: Shari Arreola MD, Jessie Schulz, #  Subject: RE: positive urine compliance                    Jessie and Dr. Arreola,     I had a lot of concerns for this patient initially. I had asked for Addiction Psychiatry consult, not sure if that happened yet. She was a very poor historian so it's very hard to get history of what is going on. When I talked to her about AA/IOP in past she denied interest noting "I don't plan to relapse". There is a Psychiatry NP note (April 2023) when she presented to ED that states pt had methamphetamine use over a week ago. So with that being very recent, post liver eval, I am not sure how committee would feel about this pt's candidacy overall. She will need to demonstrate a good length of abstinence from substance use, finish a program, and several negative tox screens.     I would really like for Dr. Gilliland to see her first if possible, for us to review that, and then follow up. We can do this via phone if needed or she can be given a Fast Pass spot on June 2nd if there is availability. Also it would be helpful to see mental health records. She stated " seeing Psychiatrist every month. (IAIN GARZA [phone]; [facility] [place] [address])        ----- Message -----  From: Jessie Schulz  Sent: 5/12/2023  11:03 AM CDT  To: Shari Arreola MD, [facility], #  Subject: positive urine compliance                        Hi,     Patient has a diagnosis of Alcoholic Cirrhosis and HCC and started evaluation Jan 2023. Her urine compliance screens have been persistently positive for THC and more recently amphetamines x2 and benzos x1. She admits to recent amphetamine use and expressed shame/remorse, and reports recently being prescribed klonopin (benzo). However, she's unsure why the screens remain positive for THC because she reports discontinuing use when initially discussed Feb 2023.     Dr. Arreola would like a  follow-up to discuss options to assists with eliminating THC and amphetamine use. We were trying to schedule a  appointment with her June 2nd follow-up but there's no availability. She lives in [place] and coordinating travel to [place] is sometimes difficult.     Is it possible to have someone call to address these concerns with her?

## 2023-05-25 ENCOUNTER — CLINICAL SUPPORT (OUTPATIENT)
Dept: SMOKING CESSATION | Facility: CLINIC | Age: 58
End: 2023-05-25
Payer: COMMERCIAL

## 2023-05-25 DIAGNOSIS — F17.200 NICOTINE DEPENDENCE: Primary | ICD-10-CM

## 2023-05-25 PROCEDURE — 99404 PREV MED CNSL INDIV APPRX 60: CPT | Mod: S$GLB,TXP,,

## 2023-05-25 PROCEDURE — 99404 PR PREVENT COUNSEL,INDIV,60 MIN: ICD-10-PCS | Mod: S$GLB,TXP,,

## 2023-05-25 NOTE — PROGRESS NOTES
Individual Follow-Up Form    5/25/2023    Quit Date: 5/22/23    Clinical Status of Patient: Outpatient    Length of Service: 60 minutes    Continuing Medication: yes  Patches or Nicotine gum    Other Medications: none     Target Symptoms: Withdrawal and medication side effects. The following were  rated moderate (3) to severe (4) on TCRS:  Moderate (3): none  Severe (4): none    Comments: Patient seen in clinic regarding smoking progress update. She is here with her significant other, Mr. Hightower, who is also participating in the program. Pt reports that she quit smoking on 5/22/23. Congratulated pt for meeting her goal. Pt remains on NRT 7 mg nicotine patch QD and 4 mg nicotine gum PRN (1-2 in place of cigarette.) No adverse reactions noted at this time. Completion of TCRS (Tobacco Cessation Rating Scale) reviewed strategies, cues, and triggers. Introduced the negative impact of tobacco on health, the health advantages of discontinuing the use of tobacco, time line improved health changes after a quit, withdrawal issues to expect from nicotine and habit, and ways to achieve the goal of a quit. Pt's exhaled carbon monoxide level was?5?ppm as per Smokerlyzer. (non- smoker = 0-5 ppm.)? Pt to continue with counseling in?1?week.    Diagnosis: F17.200    Next Visit: 1 week

## 2023-05-26 ENCOUNTER — OFFICE VISIT (OUTPATIENT)
Dept: PRIMARY CARE CLINIC | Facility: CLINIC | Age: 58
End: 2023-05-26
Payer: MEDICAID

## 2023-05-26 VITALS
TEMPERATURE: 98 F | HEIGHT: 68 IN | SYSTOLIC BLOOD PRESSURE: 139 MMHG | HEART RATE: 64 BPM | WEIGHT: 207 LBS | OXYGEN SATURATION: 98 % | DIASTOLIC BLOOD PRESSURE: 69 MMHG | RESPIRATION RATE: 16 BRPM | BODY MASS INDEX: 31.37 KG/M2

## 2023-05-26 DIAGNOSIS — E66.9 CLASS 1 OBESITY WITH SERIOUS COMORBIDITY AND BODY MASS INDEX (BMI) OF 31.0 TO 31.9 IN ADULT, UNSPECIFIED OBESITY TYPE: ICD-10-CM

## 2023-05-26 DIAGNOSIS — G47.00 INSOMNIA, UNSPECIFIED TYPE: Primary | ICD-10-CM

## 2023-05-26 DIAGNOSIS — F39 MOOD DISORDER: ICD-10-CM

## 2023-05-26 DIAGNOSIS — Z23 NEED FOR HEPATITIS B VACCINATION: ICD-10-CM

## 2023-05-26 DIAGNOSIS — F41.9 ANXIETY DUE TO INVASIVE PROCEDURE: ICD-10-CM

## 2023-05-26 DIAGNOSIS — F41.9 ANXIETY: ICD-10-CM

## 2023-05-26 PROCEDURE — 99213 OFFICE O/P EST LOW 20 MIN: CPT | Mod: S$PBB,,, | Performed by: STUDENT IN AN ORGANIZED HEALTH CARE EDUCATION/TRAINING PROGRAM

## 2023-05-26 PROCEDURE — 90746 HEPB VACCINE 3 DOSE ADULT IM: CPT | Mod: PBBFAC,PN

## 2023-05-26 PROCEDURE — 99214 OFFICE O/P EST MOD 30 MIN: CPT | Mod: PBBFAC,PN | Performed by: STUDENT IN AN ORGANIZED HEALTH CARE EDUCATION/TRAINING PROGRAM

## 2023-05-26 PROCEDURE — 99999 PR PBB SHADOW E&M-EST. PATIENT-LVL IV: ICD-10-PCS | Mod: PBBFAC,,, | Performed by: STUDENT IN AN ORGANIZED HEALTH CARE EDUCATION/TRAINING PROGRAM

## 2023-05-26 PROCEDURE — 99999 PR PBB SHADOW E&M-EST. PATIENT-LVL IV: CPT | Mod: PBBFAC,,, | Performed by: STUDENT IN AN ORGANIZED HEALTH CARE EDUCATION/TRAINING PROGRAM

## 2023-05-26 PROCEDURE — 90471 IMMUNIZATION ADMIN: CPT | Mod: PBBFAC,PN

## 2023-05-26 PROCEDURE — 99213 PR OFFICE/OUTPT VISIT, EST, LEVL III, 20-29 MIN: ICD-10-PCS | Mod: S$PBB,,, | Performed by: STUDENT IN AN ORGANIZED HEALTH CARE EDUCATION/TRAINING PROGRAM

## 2023-05-26 RX ORDER — PANTOPRAZOLE SODIUM 20 MG/1
20 TABLET, DELAYED RELEASE ORAL
COMMUNITY
Start: 2023-05-26 | End: 2023-09-20

## 2023-05-26 RX ORDER — MIRTAZAPINE 7.5 MG/1
7.5 TABLET, FILM COATED ORAL NIGHTLY
Qty: 30 TABLET | Refills: 2 | Status: SHIPPED | OUTPATIENT
Start: 2023-05-26 | End: 2023-11-09

## 2023-05-26 RX ORDER — ALPRAZOLAM 0.25 MG/1
0.25 TABLET ORAL 2 TIMES DAILY PRN
Qty: 2 TABLET | Refills: 2 | Status: SHIPPED | OUTPATIENT
Start: 2023-05-26 | End: 2023-05-29 | Stop reason: SDUPTHER

## 2023-05-28 NOTE — PROGRESS NOTES
[facility] Note    HPI:  Shonda Borrego is a 57 y.o. female who presents today for Follow-up (1 month follow up, 2nd Hep B vaccine)  Denies any new symptoms. Following up with weekly behavioral health visit.     ROS   A review of systems was performed and was negative except as noted above.    I personally reviewed allergies, past medical, surgical, social and family history and updated as appropriate.    Medications:    Current Outpatient Medications:     albuterol (PROVENTIL/VENTOLIN HFA) 90 mcg/actuation inhaler, Inhale 2 puffs into the lungs every 6 (six) hours as needed for Wheezing or Shortness of Breath., Disp: 18 g, Rfl: 3    CONSTULOSE 10 gram/15 mL solution, Take 30 mLs by mouth 3 (three) times daily., Disp: , Rfl:     gabapentin (NEURONTIN) 300 MG capsule, Take 1 capsule (300 mg total) by mouth every evening., Disp: 30 capsule, Rfl: 2    nicotine (NICODERM CQ) 7 mg/24 hr, Place 1 patch onto the skin once daily., Disp: 14 patch, Rfl: 0    nicotine, polacrilex, (NICORETTE) 4 MG Gum, Take 1 each (4 mg total) by mouth as needed (Take 1 each (4 mg total) by mouth as needed for tobacco cessation. 1-2 per hour in the place of cigarettes. (5-16 daily max) - Oral)., Disp: 300 each, Rfl: 0    oxybutynin (DITROPAN-XL) 5 MG TR24, Take 1 tablet (5 mg total) by mouth once daily., Disp: 30 tablet, Rfl: 11    pantoprazole (PROTONIX) 20 MG tablet, Take 20 mg by mouth., Disp: , Rfl:     rOPINIRole (REQUIP) 1 MG tablet, Take 1 tablet (1 mg total) by mouth every evening., Disp: 90 tablet, Rfl: 1    spironolactone (ALDACTONE) 50 MG tablet, Take 1 tablet by mouth once daily, Disp: 90 tablet, Rfl: 1    VRAYLAR 3 mg Cap, Take 3 mg by mouth., Disp: , Rfl:     mirtazapine (REMERON) 7.5 MG Tab, Take 1 tablet (7.5 mg total) by mouth every evening., Disp: 30 tablet, Rfl: 2    ondansetron (ZOFRAN) 4 MG tablet, Take 1 tablet (4 mg total) by mouth 2 (two) times daily. (Patient not taking: Reported on 5/26/2023), Disp:  "30 tablet, Rfl: 0    ursodioL (ACTIGALL) 300 mg capsule, Take 1 capsule (300 mg total) by mouth 3 (three) times daily. for 14 days, Disp: 90 capsule, Rfl: 3     Health Maintenance:  Immunization History   Administered Date(s) Administered    COVID-19 MRNA, LN-S PF (MODERNA HALF 0.25 ML DOSE) 12/09/2021    COVID-19, MRNA, LN-S, PF (MODERNA FULL 0.5 ML DOSE) 05/12/2021    COVID-19, MRNA, LN-S, PF (Pfizer) (Gray Cap) 06/03/2022    COVID-19, MRNA, LN-S, PF (Pfizer) (Purple Cap) 06/09/2021, 09/25/2022    COVID-19, mRNA, LNP-S, bivalent booster, PF (Moderna Omicron) 09/25/2022    Hepatitis A, Adult 03/23/2023    Hepatitis B, Adult 04/24/2023, 05/26/2023    Influenza - Quadrivalent - PF *Preferred* (6 months and older) 09/25/2022    Influenza - Trivalent (ADULT) 10/20/2021    Pneumococcal Conjugate - 20 Valent 10/05/2022, 02/07/2023    Tdap 03/03/2022    Zoster Recombinant 03/03/2022, 09/25/2022      Health Maintenance   Topic Date Due    Lipid Panel  Never done    Mammogram  10/24/2023    LDCT Lung Screen  01/20/2024    TETANUS VACCINE  03/03/2032    Hepatitis C Screening  Completed     Health Maintenance Topics with due status: Not Due       Topic Last Completion Date    TETANUS VACCINE 03/03/2022    Mammogram 10/24/2022    LDCT Lung Screen 01/20/2023    Colorectal Cancer Screening 04/14/2023    Cervical Cancer Screening 04/26/2023     Health Maintenance Due   Topic Date Due    Lipid Panel  Never done    Hemoglobin A1c (Diabetic Prevention Screening)  Never done       PHYSICAL EXAM:  Vitals:    05/26/23 0903   BP: 139/69   BP Location: Left arm   Patient Position: Sitting   BP Method: Large (Automatic)   Pulse: 64   Resp: 16   Temp: 98.3 °F (36.8 °C)   TempSrc: Oral   SpO2: 98%   Weight: 93.9 kg (207 lb)   Height: 5' 8" (1.727 m)     Body mass index is 31.47 kg/m².  Physical Exam  Constitutional:       General: She is not in acute distress.     Appearance: Normal appearance. She is not ill-appearing or toxic-appearing. "   HENT:      Head: Normocephalic.      Right Ear: External ear normal.      Left Ear: External ear normal.      Mouth/Throat:      Pharynx: Oropharynx is clear. No oropharyngeal exudate or posterior oropharyngeal erythema.   Eyes:      Extraocular Movements: Extraocular movements intact.      Conjunctiva/sclera: Conjunctivae normal.   Neck:      Vascular: No carotid bruit.   Cardiovascular:      Rate and Rhythm: Normal rate and regular rhythm.      Pulses: Normal pulses.      Heart sounds: No murmur heard.  Pulmonary:      Effort: Pulmonary effort is normal. No respiratory distress.      Breath sounds: No wheezing, rhonchi or rales.   Abdominal:      General: Abdomen is flat. There is no distension.      Palpations: Abdomen is soft.      Tenderness: There is no abdominal tenderness. There is no right CVA tenderness, left CVA tenderness or guarding.   Musculoskeletal:         General: No swelling, tenderness or deformity. Normal range of motion.      Cervical back: Normal range of motion and neck supple. No rigidity or tenderness.      Right lower leg: No edema.      Left lower leg: No edema.   Lymphadenopathy:      Cervical: No cervical adenopathy.   Skin:     General: Skin is warm and dry.      Capillary Refill: Capillary refill takes less than 2 seconds.      Findings: No erythema or rash.      Comments: Bronzed throughout   Neurological:      General: No focal deficit present.      Mental Status: She is alert and oriented to person, place, and time. Mental status is at baseline.      Motor: No weakness.      Gait: Gait normal.   Psychiatric:         Mood and Affect: Mood normal.         Behavior: Behavior normal.         Thought Content: Thought content normal.         Judgment: Judgment normal.      ASSESSMENT/PLAN:  1. Insomnia, unspecified type  -     mirtazapine (REMERON) 7.5 MG Tab; Take 1 tablet (7.5 mg total) by mouth every evening.  Dispense: 30 tablet; Refill: 2    2. Need for hepatitis B vaccination  -      (In Office Administered) Hepatitis B Vaccine (Adult) (IM)    3. Anxiety due to invasive procedure  -     Discontinue: ALPRAZolam (XANAX) 0.25 MG tablet; Take 1 tablet (0.25 mg total) by mouth 2 (two) times daily as needed for Anxiety.  Dispense: 2 tablet; Refill: 2    4. Anxiety  -     mirtazapine (REMERON) 7.5 MG Tab; Take 1 tablet (7.5 mg total) by mouth every evening.  Dispense: 30 tablet; Refill: 2  -     Discontinue: ALPRAZolam (XANAX) 0.25 MG tablet; Take 1 tablet (0.25 mg total) by mouth 2 (two) times daily as needed for Anxiety.  Dispense: 2 tablet; Refill: 2    5. Mood disorder  -     mirtazapine (REMERON) 7.5 MG Tab; Take 1 tablet (7.5 mg total) by mouth every evening.  Dispense: 30 tablet; Refill: 2    6. Class 1 obesity with serious comorbidity and body mass index (BMI) of 31.0 to 31.9 in adult, unspecified obesity type  Overview:  Wt Readings from Last 8 Encounters:   05/26/23 93.9 kg (207 lb)   05/17/23 93.4 kg (206 lb)   04/26/23 93 kg (205 lb)   04/24/23 94.3 kg (208 lb)   04/18/23 94.8 kg (209 lb)   03/23/23 94.8 kg (209 lb)   02/23/23 93.9 kg (207 lb)   02/07/23 95.7 kg (211 lb)       Assessment & Plan:  Body mass index is 31.47 kg/m². Morbid obesity complicates all aspects of disease management from diagnostic modalities to treatment. Weight loss encouraged and health benefits explained to patient.               Other than changes above, continue current medications and maintain follow up with specialists.      No follow-ups on file.   Recent Results (from the past 2016 hour(s))   Drug screen panel, in-house    Collection Time: 03/07/23 10:13 AM   Result Value Ref Range    Benzodiazepines Negative Negative    Methadone metabolites Negative Negative    Cocaine (Metab.) Negative Negative    Opiate Scrn, Ur Negative Negative    Barbiturate Screen, Ur Negative Negative    Amphetamine Screen, Ur Negative Negative    THC Presumptive Positive (A) Negative    Phencyclidine Negative Negative     Creatinine, Urine 151.0 15.0 - 325.0 mg/dL    Toxicology Information SEE COMMENT    AFP Tumor Marker    Collection Time: 03/17/23  9:51 AM   Result Value Ref Range    AFP 4.2 0.0 - 8.4 ng/mL   Drug screen panel, in-house    Collection Time: 03/17/23  9:54 AM   Result Value Ref Range    Benzodiazepines Negative Negative    Methadone metabolites Negative Negative    Cocaine (Metab.) Negative Negative    Opiate Scrn, Ur Negative Negative    Barbiturate Screen, Ur Negative Negative    Amphetamine Screen, Ur Negative Negative    THC Presumptive Positive (A) Negative    Phencyclidine Negative Negative    Creatinine, Urine 88.6 15.0 - 325.0 mg/dL    Toxicology Information SEE COMMENT    [facility]    Collection Time: 04/12/23  7:54 AM   Result Value Ref Range    [facility] No result necessary    Drug screen panel, in-house    Collection Time: 04/12/23  7:56 AM   Result Value Ref Range    Benzodiazepines Negative Negative    Methadone metabolites Negative Negative    Cocaine (Metab.) Negative Negative    Opiate Scrn, Ur Negative Negative    Barbiturate Screen, Ur Negative Negative    Amphetamine Screen, Ur Presumptive Positive (A) Negative    THC Presumptive Positive (A) Negative    Phencyclidine Negative Negative    Creatinine, Urine 92.0 15.0 - 325.0 mg/dL    Toxicology Information SEE COMMENT    Phosphatidylethanol (PETH)    Collection Time: 04/12/23  8:00 AM   Result Value Ref Range    PEth 16:0/18.1 (POPEth) <10 Cutoff: 10 ng/mL    PEth 16:0/18.2 (PLPEth) <10 Cutoff: 10 ng/mL    PETH INTERPRETATION Negative.    Comprehensive Metabolic Panel    Collection Time: 04/12/23  8:00 AM   Result Value Ref Range    Sodium 138 136 - 145 mmol/L    Potassium 3.8 3.5 - 5.1 mmol/L    Chloride 103 95 - 110 mmol/L    CO2 24 23 - 29 mmol/L    Glucose 125 (H) 70 - 110 mg/dL    BUN 7 6 - 20 mg/dL    Creatinine 0.9 0.5 - 1.4 mg/dL    Calcium 10.1 8.7 - 10.5 mg/dL    Total Protein 7.3 6.0 - 8.4 g/dL    Albumin 3.6 3.5 - 5.2 g/dL     Total Bilirubin 5.3 (H) 0.1 - 1.0 mg/dL    Alkaline Phosphatase 195 (H) 55 - 135 U/L    AST 46 (H) 10 - 40 U/L    ALT 35 10 - 44 U/L    Anion Gap 11 8 - 16 mmol/L    eGFR >60.0 >60 mL/min/1.73 m^2   CBC Auto Differential    Collection Time: 04/12/23  8:00 AM   Result Value Ref Range    WBC 8.72 3.90 - 12.70 K/uL    RBC 5.54 (H) 4.00 - 5.40 M/uL    Hemoglobin 16.8 (H) 12.0 - 16.0 g/dL    Hematocrit 50.3 (H) 37.0 - 48.5 %    MCV 91 82 - 98 fL    MCH 30.3 27.0 - 31.0 pg    MCHC 33.4 32.0 - 36.0 g/dL    RDW 17.1 (H) 11.5 - 14.5 %    Platelets 168 150 - 450 K/uL    MPV 10.0 9.2 - 12.9 fL    Immature Granulocytes 0.2 0.0 - 0.5 %    Gran # (ANC) 4.6 1.8 - 7.7 K/uL    Immature Grans (Abs) 0.02 0.00 - 0.04 K/uL    Lymph # 3.0 1.0 - 4.8 K/uL    Mono # 0.9 0.3 - 1.0 K/uL    Eos # 0.2 0.0 - 0.5 K/uL    Baso # 0.10 0.00 - 0.20 K/uL    nRBC 0 0 /100 WBC    Gran % 53.2 38.0 - 73.0 %    Lymph % 33.9 18.0 - 48.0 %    Mono % 9.9 4.0 - 15.0 %    Eosinophil % 1.7 0.0 - 8.0 %    Basophil % 1.1 0.0 - 1.9 %    Differential Method Automated    Protime-INR    Collection Time: 04/12/23  8:00 AM   Result Value Ref Range    Prothrombin Time 12.0 9.0 - 12.5 sec    INR 1.2 0.8 - 1.2   Blood culture #1 **CANNOT BE ORDERED STAT**    Collection Time: 04/18/23  1:07 PM    Specimen: Blood   Result Value Ref Range    Blood Culture, Routine No growth after 5 days.    Drug screen panel, emergency    Collection Time: 04/18/23  1:09 PM   Result Value Ref Range    Benzodiazepines Negative Negative    Methadone metabolites Negative Negative    Cocaine (Metab.) Negative Negative    Opiate Scrn, Ur Negative Negative    Barbiturate Screen, Ur Negative Negative    Amphetamine Screen, Ur Presumptive Positive (A) Negative    THC Presumptive Positive (A) Negative    Phencyclidine Negative Negative    Creatinine, Urine 78.0 15.0 - 325.0 mg/dL    Toxicology Information SEE COMMENT    Urinalysis, Reflex to Urine Culture Urine, Clean Catch    Collection Time: 04/18/23   1:09 PM    Specimen: Urine, Catheterized   Result Value Ref Range    Specimen UA Urine, Catheterized     Color, UA Yellow Yellow, Straw, Joycelyn    Appearance, UA Cloudy (A) Clear    pH, UA 8.0 5.0 - 8.0    Specific Gravity, UA 1.010 1.005 - 1.030    Protein, UA Negative Negative    Glucose, UA Negative Negative    Ketones, UA Negative Negative    Bilirubin (UA) Negative Negative    Occult Blood UA Negative Negative    Nitrite, UA Negative Negative    Urobilinogen, UA 1.0 <2.0 EU/dL    Leukocytes, UA Negative Negative   Urinalysis Microscopic    Collection Time: 04/18/23  1:09 PM   Result Value Ref Range    RBC, UA 2 0 - 4 /hpf    WBC, UA 2 0 - 5 /hpf    Bacteria Moderate (A) None-Occ /hpf    Squam Epithel, UA 3 /hpf    Hyaline Casts, UA 0.0 (A) 0-1/lpf /lpf    Microscopic Comment SEE COMMENT    POCT ARTERIAL BLOOD GAS    Collection Time: 04/18/23  1:27 PM   Result Value Ref Range    POC PH 7.440 7.345 - 7.450    POC PCO2 32.7 (L) 35.0 - 45.0 mmHg    POC PO2 78.4 (L) 80.0 - 100 mmHg    POC SATURATED O2 96.1 95.0 - 100.0 %    POC HCO3 23.5 (L) 24.0 - 28.0 mmol/l    Base Deficit -1.9 -2.0 - 2.0 mmol/l    POC Temp 37.0 C    Specimen source Arterial     Performed By: Vargas     MODIFIED BRAYAN'S TEST NA     FiO2 21.0 %    O2DEVICE Room Air     Comments DRAWN PER GINI SMALLS RRT    CBC auto differential    Collection Time: 04/18/23  1:28 PM   Result Value Ref Range    WBC 11.78 3.90 - 12.70 K/uL    RBC 5.28 4.00 - 5.40 M/uL    Hemoglobin 16.3 (H) 12.0 - 16.0 g/dL    Hematocrit 48.5 37.0 - 48.5 %    MCV 92 82 - 98 fL    MCH 30.9 27.0 - 31.0 pg    MCHC 33.6 32.0 - 36.0 g/dL    RDW 16.3 (H) 11.5 - 14.5 %    Platelets 149 (L) 150 - 450 K/uL    MPV 9.9 9.2 - 12.9 fL    Immature Granulocytes 0.3 0.0 - 0.5 %    Gran # (ANC) 8.6 (H) 1.8 - 7.7 K/uL    Immature Grans (Abs) 0.04 0.00 - 0.04 K/uL    Lymph # 2.3 1.0 - 4.8 K/uL    Mono # 0.8 0.3 - 1.0 K/uL    Eos # 0.1 0.0 - 0.5 K/uL    Baso # 0.03 0.00 - 0.20 K/uL    nRBC 0 0 /100 WBC     Gran % 73.1 (H) 38.0 - 73.0 %    Lymph % 19.3 18.0 - 48.0 %    Mono % 6.6 4.0 - 15.0 %    Eosinophil % 0.4 0.0 - 8.0 %    Basophil % 0.3 0.0 - 1.9 %    Differential Method Automated    Comprehensive metabolic panel    Collection Time: 04/18/23  1:28 PM   Result Value Ref Range    Sodium 142 136 - 145 mmol/L    Potassium 4.4 3.5 - 5.1 mmol/L    Chloride 115 (H) 95 - 110 mmol/L    CO2 24 23 - 29 mmol/L    Glucose 127 (H) 70 - 110 mg/dL    BUN 11 6 - 20 mg/dL    Creatinine 0.9 0.5 - 1.4 mg/dL    Calcium 9.7 8.7 - 10.5 mg/dL    Total Protein 7.0 6.0 - 8.4 g/dL    Albumin 3.1 (L) 3.5 - 5.2 g/dL    Total Bilirubin 2.9 (H) 0.1 - 1.0 mg/dL    Alkaline Phosphatase 228 (H) 55 - 135 U/L    AST 36 10 - 40 U/L    ALT 41 10 - 44 U/L    Anion Gap 3 (L) 8 - 16 mmol/L    eGFR >60.0 >60 mL/min/1.73 m^2   Lipase    Collection Time: 04/18/23  1:28 PM   Result Value Ref Range    Lipase Result 54 23 - 300 U/L   Ethanol    Collection Time: 04/18/23  1:28 PM   Result Value Ref Range    Alcohol, Serum <3 <10 mg/dL   Ammonia    Collection Time: 04/18/23  1:28 PM   Result Value Ref Range    Ammonia 114 (H) 10 - 50 umol/L   Lactic acid, plasma    Collection Time: 04/18/23  1:28 PM   Result Value Ref Range    Lactate (Lactic Acid) 1.9 0.5 - 2.2 mmol/L   Blood culture #2 **CANNOT BE ORDERED STAT**    Collection Time: 04/18/23  1:28 PM    Specimen: Blood   Result Value Ref Range    Blood Culture, Routine No growth after 5 days.    CBC auto differential    Collection Time: 04/19/23  5:20 AM   Result Value Ref Range    WBC 8.75 3.90 - 12.70 K/uL    RBC 4.80 4.00 - 5.40 M/uL    Hemoglobin 14.9 12.0 - 16.0 g/dL    Hematocrit 44.5 37.0 - 48.5 %    MCV 93 82 - 98 fL    MCH 31.0 27.0 - 31.0 pg    MCHC 33.5 32.0 - 36.0 g/dL    RDW 16.3 (H) 11.5 - 14.5 %    Platelets 142 (L) 150 - 450 K/uL    MPV 10.4 9.2 - 12.9 fL    Immature Granulocytes 0.2 0.0 - 0.5 %    Gran # (ANC) 4.8 1.8 - 7.7 K/uL    Immature Grans (Abs) 0.02 0.00 - 0.04 K/uL    Lymph # 2.9  1.0 - 4.8 K/uL    Mono # 0.8 0.3 - 1.0 K/uL    Eos # 0.2 0.0 - 0.5 K/uL    Baso # 0.06 0.00 - 0.20 K/uL    nRBC 0 0 /100 WBC    Gran % 54.9 38.0 - 73.0 %    Lymph % 32.9 18.0 - 48.0 %    Mono % 9.5 4.0 - 15.0 %    Eosinophil % 1.8 0.0 - 8.0 %    Basophil % 0.7 0.0 - 1.9 %    Differential Method Automated    Comprehensive metabolic panel    Collection Time: 04/19/23  5:20 AM   Result Value Ref Range    Sodium 145 136 - 145 mmol/L    Potassium 3.4 (L) 3.5 - 5.1 mmol/L    Chloride 115 (H) 95 - 110 mmol/L    CO2 25 23 - 29 mmol/L    Glucose 108 70 - 110 mg/dL    BUN 11 6 - 20 mg/dL    Creatinine 0.8 0.5 - 1.4 mg/dL    Calcium 9.0 8.7 - 10.5 mg/dL    Total Protein 6.1 6.0 - 8.4 g/dL    Albumin 2.8 (L) 3.5 - 5.2 g/dL    Total Bilirubin 3.5 (H) 0.1 - 1.0 mg/dL    Alkaline Phosphatase 172 (H) 55 - 135 U/L    AST 27 10 - 40 U/L    ALT 33 10 - 44 U/L    Anion Gap 5 (L) 8 - 16 mmol/L    eGFR >60.0 >60 mL/min/1.73 m^2   Ammonia    Collection Time: 04/19/23  5:20 AM   Result Value Ref Range    Ammonia 29 10 - 50 umol/L   Protime-INR    Collection Time: 04/19/23  5:20 AM   Result Value Ref Range    Prothrombin Time 14.1 (H) 9.0 - 12.5 sec    INR 1.3 (H) 0.8 - 1.2   Comprehensive Metabolic Panel    Collection Time: 04/20/23  6:48 AM   Result Value Ref Range    Sodium 141 136 - 145 mmol/L    Potassium 3.8 3.5 - 5.1 mmol/L    Chloride 113 (H) 95 - 110 mmol/L    CO2 26 23 - 29 mmol/L    Glucose 131 (H) 70 - 110 mg/dL    BUN 10 6 - 20 mg/dL    Creatinine 0.9 0.5 - 1.4 mg/dL    Calcium 8.8 8.7 - 10.5 mg/dL    Total Protein 5.9 (L) 6.0 - 8.4 g/dL    Albumin 2.6 (L) 3.5 - 5.2 g/dL    Total Bilirubin 3.7 (H) 0.1 - 1.0 mg/dL    Alkaline Phosphatase 168 (H) 55 - 135 U/L    AST 26 10 - 40 U/L    ALT 35 10 - 44 U/L    Anion Gap 2 (L) 8 - 16 mmol/L    eGFR >60.0 >60 mL/min/1.73 m^2   CBC Auto Differential    Collection Time: 04/20/23  6:48 AM   Result Value Ref Range    WBC 7.33 3.90 - 12.70 K/uL    RBC 4.47 4.00 - 5.40 M/uL    Hemoglobin 14.2  12.0 - 16.0 g/dL    Hematocrit 42.0 37.0 - 48.5 %    MCV 94 82 - 98 fL    MCH 31.8 (H) 27.0 - 31.0 pg    MCHC 33.8 32.0 - 36.0 g/dL    RDW 16.3 (H) 11.5 - 14.5 %    Platelets 117 (L) 150 - 450 K/uL    MPV 10.3 9.2 - 12.9 fL    Immature Granulocytes 0.4 0.0 - 0.5 %    Gran # (ANC) 4.1 1.8 - 7.7 K/uL    Immature Grans (Abs) 0.03 0.00 - 0.04 K/uL    Lymph # 2.2 1.0 - 4.8 K/uL    Mono # 0.8 0.3 - 1.0 K/uL    Eos # 0.2 0.0 - 0.5 K/uL    Baso # 0.06 0.00 - 0.20 K/uL    nRBC 0 0 /100 WBC    Gran % 55.4 38.0 - 73.0 %    Lymph % 29.7 18.0 - 48.0 %    Mono % 11.5 4.0 - 15.0 %    Eosinophil % 2.2 0.0 - 8.0 %    Basophil % 0.8 0.0 - 1.9 %    Differential Method Automated    Liquid-Based Pap Smear, Screening    Collection Time: 04/26/23 10:04 AM   Result Value Ref Range    Liquid Based Pap Smear, Screening See Final Cytology Report    HPV High Risk Genotypes, PCR    Collection Time: 04/26/23 10:04 AM   Result Value Ref Range    HPV DNA High Risk See result image under hyperlink    Drug screen panel, in-house    Collection Time: 05/08/23 10:38 AM   Result Value Ref Range    Benzodiazepines Presumptive Positive (A) Negative    Methadone metabolites Negative Negative    Cocaine (Metab.) Negative Negative    Opiate Scrn, Ur Negative Negative    Barbiturate Screen, Ur Negative Negative    Amphetamine Screen, Ur Negative Negative    THC Presumptive Positive (A) Negative    Phencyclidine Negative Negative    Creatinine, Urine 539.0 (H) 15.0 - 325.0 mg/dL    Toxicology Information SEE COMMENT    Comprehensive Metabolic Panel    Collection Time: 05/29/23  8:40 AM   Result Value Ref Range    Sodium 141 136 - 145 mmol/L    Potassium 4.3 3.5 - 5.1 mmol/L    Chloride 112 (H) 95 - 110 mmol/L    CO2 27 23 - 29 mmol/L    Glucose 206 (H) 70 - 110 mg/dL    BUN 11 6 - 20 mg/dL    Creatinine 1.0 0.5 - 1.4 mg/dL    Calcium 9.3 8.7 - 10.5 mg/dL    Total Protein 6.7 6.0 - 8.4 g/dL    Albumin 2.7 (L) 3.5 - 5.2 g/dL    Total Bilirubin 2.1 (H) 0.1 - 1.0  mg/dL    Alkaline Phosphatase 241 (H) 55 - 135 U/L    AST 39 10 - 40 U/L    ALT 44 10 - 44 U/L    Anion Gap 2 (L) 8 - 16 mmol/L    eGFR >60.0 >60 mL/min/1.73 m^2   CBC Auto Differential    Collection Time: 05/29/23  8:40 AM   Result Value Ref Range    WBC 5.39 3.90 - 12.70 K/uL    RBC 4.43 4.00 - 5.40 M/uL    Hemoglobin 14.0 12.0 - 16.0 g/dL    Hematocrit 41.6 37.0 - 48.5 %    MCV 94 82 - 98 fL    MCH 31.6 (H) 27.0 - 31.0 pg    MCHC 33.7 32.0 - 36.0 g/dL    RDW 14.1 11.5 - 14.5 %    Platelets 138 (L) 150 - 450 K/uL    MPV 10.9 9.2 - 12.9 fL    Immature Granulocytes 0.2 0.0 - 0.5 %    Gran # (ANC) 2.6 1.8 - 7.7 K/uL    Immature Grans (Abs) 0.01 0.00 - 0.04 K/uL    Lymph # 2.1 1.0 - 4.8 K/uL    Mono # 0.5 0.3 - 1.0 K/uL    Eos # 0.1 0.0 - 0.5 K/uL    Baso # 0.07 0.00 - 0.20 K/uL    nRBC 0 0 /100 WBC    Gran % 49.0 38.0 - 73.0 %    Lymph % 38.2 18.0 - 48.0 %    Mono % 8.9 4.0 - 15.0 %    Eosinophil % 2.4 0.0 - 8.0 %    Basophil % 1.3 0.0 - 1.9 %    Differential Method Automated    Protime-INR    Collection Time: 05/29/23  8:40 AM   Result Value Ref Range    Prothrombin Time 12.9 (H) 9.0 - 12.5 sec    INR 1.3 (H) 0.8 - 1.2         Geeta Gaines DO  [facility]

## 2023-05-29 ENCOUNTER — LAB VISIT (OUTPATIENT)
Dept: LAB | Facility: HOSPITAL | Age: 58
End: 2023-05-29
Attending: INTERNAL MEDICINE
Payer: MEDICAID

## 2023-05-29 DIAGNOSIS — Z01.818 PRE-TRANSPLANT EVALUATION FOR LIVER TRANSPLANT: ICD-10-CM

## 2023-05-29 DIAGNOSIS — K70.30 ALCOHOLIC CIRRHOSIS OF LIVER WITHOUT ASCITES: ICD-10-CM

## 2023-05-29 DIAGNOSIS — C22.0 HCC (HEPATOCELLULAR CARCINOMA): ICD-10-CM

## 2023-05-29 LAB
AFP SERPL-MCNC: 4.2 NG/ML (ref 0–8.4)
ALBUMIN SERPL BCP-MCNC: 2.7 G/DL (ref 3.5–5.2)
ALP SERPL-CCNC: 241 U/L (ref 55–135)
ALT SERPL W/O P-5'-P-CCNC: 44 U/L (ref 10–44)
ANION GAP SERPL CALC-SCNC: 2 MMOL/L (ref 8–16)
AST SERPL-CCNC: 39 U/L (ref 10–40)
BASOPHILS # BLD AUTO: 0.07 K/UL (ref 0–0.2)
BASOPHILS NFR BLD: 1.3 % (ref 0–1.9)
BILIRUB SERPL-MCNC: 2.1 MG/DL (ref 0.1–1)
BUN SERPL-MCNC: 11 MG/DL (ref 6–20)
CALCIUM SERPL-MCNC: 9.3 MG/DL (ref 8.7–10.5)
CHLORIDE SERPL-SCNC: 112 MMOL/L (ref 95–110)
CO2 SERPL-SCNC: 27 MMOL/L (ref 23–29)
CREAT SERPL-MCNC: 1 MG/DL (ref 0.5–1.4)
DIFFERENTIAL METHOD: ABNORMAL
EOSINOPHIL # BLD AUTO: 0.1 K/UL (ref 0–0.5)
EOSINOPHIL NFR BLD: 2.4 % (ref 0–8)
ERYTHROCYTE [DISTWIDTH] IN BLOOD BY AUTOMATED COUNT: 14.1 % (ref 11.5–14.5)
EST. GFR  (NO RACE VARIABLE): >60 ML/MIN/1.73 M^2
GLUCOSE SERPL-MCNC: 206 MG/DL (ref 70–110)
HCT VFR BLD AUTO: 41.6 % (ref 37–48.5)
HGB BLD-MCNC: 14 G/DL (ref 12–16)
IMM GRANULOCYTES # BLD AUTO: 0.01 K/UL (ref 0–0.04)
IMM GRANULOCYTES NFR BLD AUTO: 0.2 % (ref 0–0.5)
INR PPP: 1.3 (ref 0.8–1.2)
LYMPHOCYTES # BLD AUTO: 2.1 K/UL (ref 1–4.8)
LYMPHOCYTES NFR BLD: 38.2 % (ref 18–48)
MCH RBC QN AUTO: 31.6 PG (ref 27–31)
MCHC RBC AUTO-ENTMCNC: 33.7 G/DL (ref 32–36)
MCV RBC AUTO: 94 FL (ref 82–98)
MONOCYTES # BLD AUTO: 0.5 K/UL (ref 0.3–1)
MONOCYTES NFR BLD: 8.9 % (ref 4–15)
NEUTROPHILS # BLD AUTO: 2.6 K/UL (ref 1.8–7.7)
NEUTROPHILS NFR BLD: 49 % (ref 38–73)
NRBC BLD-RTO: 0 /100 WBC
PLATELET # BLD AUTO: 138 K/UL (ref 150–450)
PMV BLD AUTO: 10.9 FL (ref 9.2–12.9)
POTASSIUM SERPL-SCNC: 4.3 MMOL/L (ref 3.5–5.1)
PROT SERPL-MCNC: 6.7 G/DL (ref 6–8.4)
PROTHROMBIN TIME: 12.9 SEC (ref 9–12.5)
RBC # BLD AUTO: 4.43 M/UL (ref 4–5.4)
SODIUM SERPL-SCNC: 141 MMOL/L (ref 136–145)
WBC # BLD AUTO: 5.39 K/UL (ref 3.9–12.7)

## 2023-05-29 PROCEDURE — 80321 ALCOHOLS BIOMARKERS 1OR 2: CPT | Mod: TXP | Performed by: INTERNAL MEDICINE

## 2023-05-29 PROCEDURE — 82105 ALPHA-FETOPROTEIN SERUM: CPT | Mod: TXP | Performed by: INTERNAL MEDICINE

## 2023-05-29 PROCEDURE — 36415 COLL VENOUS BLD VENIPUNCTURE: CPT | Mod: TXP | Performed by: INTERNAL MEDICINE

## 2023-05-29 PROCEDURE — 80053 COMPREHEN METABOLIC PANEL: CPT | Mod: TXP | Performed by: INTERNAL MEDICINE

## 2023-05-29 PROCEDURE — 85610 PROTHROMBIN TIME: CPT | Mod: TXP | Performed by: INTERNAL MEDICINE

## 2023-05-29 PROCEDURE — 85025 COMPLETE CBC W/AUTO DIFF WBC: CPT | Mod: TXP | Performed by: INTERNAL MEDICINE

## 2023-05-29 NOTE — ASSESSMENT & PLAN NOTE
Body mass index is 31.47 kg/m². Morbid obesity complicates all aspects of disease management from diagnostic modalities to treatment. Weight loss encouraged and health benefits explained to patient.

## 2023-06-01 ENCOUNTER — TELEPHONE (OUTPATIENT)
Dept: TRANSPLANT | Facility: CLINIC | Age: 58
End: 2023-06-01
Payer: MEDICAID

## 2023-06-01 LAB
CLINICAL BIOCHEMIST REVIEW: NORMAL
PLPETH BLD-MCNC: <10 NG/ML
POPETH BLD-MCNC: <10 NG/ML

## 2023-06-01 NOTE — TELEPHONE ENCOUNTER
Transplant SW received call from patient's therapist, Jose Luis Godfrey ([phone]), with [facility] where patient has had an intake for substance use treatment (not IOP but individual/group structured treatment 1x per week). Patient is scheduled to start 12 sessions of group on June 6th. Patient's therapist notes that they have lab results they are concerned about due to patient's alcohol urine being positive. Patient also tested positive for meth, THC and alcohol on April 24th. Patient's therapist to forward test results Transplant SW.     At this time patient is high risk for transplant from a psychosocial perspective due to meth, alcohol and THC use while undergoing liver transplant evaluation. SW recommends patient complete substance treatment and maintain complete abstinence from all substances and alcohol use. SW recommends several random tox and PETH screens with negative results prior to considering for transplant. SW forwarding to patient's hepatologist and coordinator and will scan test results in patient's chart when received.

## 2023-06-02 ENCOUNTER — OFFICE VISIT (OUTPATIENT)
Dept: TRANSPLANT | Facility: CLINIC | Age: 58
End: 2023-06-02
Payer: MEDICAID

## 2023-06-02 VITALS
HEART RATE: 71 BPM | BODY MASS INDEX: 31.94 KG/M2 | RESPIRATION RATE: 16 BRPM | SYSTOLIC BLOOD PRESSURE: 149 MMHG | OXYGEN SATURATION: 98 % | HEIGHT: 68 IN | TEMPERATURE: 97 F | WEIGHT: 210.75 LBS | DIASTOLIC BLOOD PRESSURE: 76 MMHG

## 2023-06-02 DIAGNOSIS — C22.0 HCC (HEPATOCELLULAR CARCINOMA): ICD-10-CM

## 2023-06-02 DIAGNOSIS — K70.30 ALCOHOLIC CIRRHOSIS OF LIVER WITHOUT ASCITES: ICD-10-CM

## 2023-06-02 DIAGNOSIS — Z01.818 PRE-TRANSPLANT EVALUATION FOR LIVER TRANSPLANT: ICD-10-CM

## 2023-06-02 DIAGNOSIS — K76.82 HEPATIC ENCEPHALOPATHY: Primary | ICD-10-CM

## 2023-06-02 LAB
AMPHET+METHAMPHET UR QL: NEGATIVE
BARBITURATES UR QL SCN>200 NG/ML: NEGATIVE
BENZODIAZ UR QL SCN>200 NG/ML: ABNORMAL
BZE UR QL SCN: NEGATIVE
CANNABINOIDS UR QL SCN: NEGATIVE
CREAT UR-MCNC: 121 MG/DL (ref 15–325)
ETHANOL UR-MCNC: 12 MG/DL
METHADONE UR QL SCN>300 NG/ML: NEGATIVE
OPIATES UR QL SCN: NEGATIVE
PCP UR QL SCN>25 NG/ML: NEGATIVE
TOXICOLOGY INFORMATION: ABNORMAL

## 2023-06-02 PROCEDURE — 99999 PR PBB SHADOW E&M-EST. PATIENT-LVL III: ICD-10-PCS | Mod: PBBFAC,TXP,, | Performed by: INTERNAL MEDICINE

## 2023-06-02 PROCEDURE — 99999 PR PBB SHADOW E&M-EST. PATIENT-LVL III: CPT | Mod: PBBFAC,TXP,, | Performed by: INTERNAL MEDICINE

## 2023-06-02 PROCEDURE — 99214 PR OFFICE/OUTPT VISIT, EST, LEVL IV, 30-39 MIN: ICD-10-PCS | Mod: S$PBB,TXP,, | Performed by: INTERNAL MEDICINE

## 2023-06-02 PROCEDURE — 99214 OFFICE O/P EST MOD 30 MIN: CPT | Mod: S$PBB,TXP,, | Performed by: INTERNAL MEDICINE

## 2023-06-02 PROCEDURE — 80307 DRUG TEST PRSMV CHEM ANLYZR: CPT | Mod: TXP | Performed by: INTERNAL MEDICINE

## 2023-06-02 PROCEDURE — 99213 OFFICE O/P EST LOW 20 MIN: CPT | Mod: PBBFAC,TXP | Performed by: INTERNAL MEDICINE

## 2023-06-02 NOTE — PROGRESS NOTES
[facility] New Patient (Self-Referral) Note    Reason for Visit:  The encounter diagnosis is Alcoholic Cirrhosis         HPI:  This is a 56 year lady with history of Restless leg syndrome, bipolar disorder, here for evaluation of her alcoholic cirrhosis diagnosed in 2019 when she had a variceal requiring emergent TIPS in [facility].  She had been following with PCP but has not had hepatology evaluation.  Patient says that she quit drinking 6 months ago after slowly cutting down since her diagnosis in 2019.  Her only complaints are lack of energy fatigue and constipation. The constipation is gradually improving since she became compliant with Lactulose.  She has also been taking Spironolactone and Lasix which has helped considerably with ascites and she has noted some weight loss after fluid removal.    Had episode of HE, could not remember boyfriend's name, was obnoxious, and had to be taken to ER in [facility] in [place]. Admitted for two days. Improved with lactulose.     Recommend minimizing doses of remeron, and gabapentin.      Positive for THC repeatedly (patient visits sisters who smoke).  Patient states she has not used marijuana in a year.  She is willing to avoid visiting her sisters. She can see them on video.      Wants a liver transplant.    Councelor was going to send us results of weekly urine tests.      Xifaxan 550 mg twice daily, sent to [facility]      Elevated liver enzymes: Yes  Abnormal imaging: Yes  Cirrhosis: Yes  Hepatitis C: No  Hepatitis B: No  Fatty liver: No  Encephalopathy: No  Post-hospital discharge: No  Symptoms: Malaise Fatigue     Primary hepatic manifestations:  Fatigue: Yes  Edema:No  Ascites:Yes  Encephalopathy:According to patient few days after TIPS none since   Abdominal pain:No  GI bleeds: None since bleeding episode in 2019   Pruritus:No  Weight Changes:No  Changes in Bowel habits: No  Muscle cramps:No    Risk factors for  liver disease:  No jaundice  No transfusions  No IVDU  Did not snort cocaine or similar agents  Did not live with anyone with hepatitis B or C  Sexual partner not tested  No hepatotoxic medications  No exposure to industrial toxins  Alcohol: Quit 6 months ago after using hard liquor > 10-15 years        Review of Systems   Constitutional:  Positive for malaise/fatigue. Negative for chills, fever and weight loss.   Respiratory:  Negative for cough.    Cardiovascular:  Negative for chest pain, palpitations and orthopnea.   Gastrointestinal:  Negative for abdominal pain, blood in stool, constipation, diarrhea, heartburn, melena, nausea and vomiting.       Surgical History:  None     Family History: No Family history of Liver disorders      Current Outpatient Medications   Medication Sig    albuterol (PROVENTIL/VENTOLIN HFA) 90 mcg/actuation inhaler Inhale 2 puffs into the lungs every 6 (six) hours as needed for Wheezing or Shortness of Breath.    clonazepam (KLONOPIN ORAL) Take by mouth.    CONSTULOSE 10 gram/15 mL solution Take 30 mLs by mouth 3 (three) times daily.    gabapentin (NEURONTIN) 300 MG capsule Take 1 capsule (300 mg total) by mouth every evening.    mirtazapine (REMERON) 7.5 MG Tab Take 1 tablet (7.5 mg total) by mouth every evening.    nicotine (NICODERM CQ) 7 mg/24 hr Place 1 patch onto the skin once daily.    nicotine, polacrilex, (NICORETTE) 4 MG Gum Take 1 each (4 mg total) by mouth as needed (Take 1 each (4 mg total) by mouth as needed for tobacco cessation. 1-2 per hour in the place of cigarettes. (5-16 daily max) - Oral).    oxybutynin (DITROPAN-XL) 5 MG TR24 Take 1 tablet (5 mg total) by mouth once daily.    pantoprazole (PROTONIX) 20 MG tablet Take 20 mg by mouth.    rOPINIRole (REQUIP) 1 MG tablet Take 1 tablet (1 mg total) by mouth every evening.    spironolactone (ALDACTONE) 50 MG tablet Take 1 tablet by mouth once daily    VRAYLAR 3 mg Cap Take 3 mg by mouth.    ondansetron (ZOFRAN) 4 MG  tablet Take 1 tablet (4 mg total) by mouth 2 (two) times daily. (Patient not taking: Reported on 6/2/2023)    ursodioL (ACTIGALL) 300 mg capsule Take 1 capsule (300 mg total) by mouth 3 (three) times daily. for 14 days     No current facility-administered medications for this visit.         Objective Findings:    Vital Signs:  Vitals:    06/02/23 0940   BP: (!) 149/76   Pulse: 71   Resp: 16   Temp: 97.3 °F (36.3 °C)           Physical Exam  Vitals reviewed.   Constitutional:       Appearance: Normal appearance. She is not ill-appearing.   Cardiovascular:      Rate and Rhythm: Normal rate.      Pulses: Normal pulses.   Pulmonary:      Effort: Pulmonary effort is normal. No respiratory distress.   Abdominal:      General: Abdomen is flat. Bowel sounds are normal. There is no distension.      Palpations: Abdomen is soft. There is no mass.      Tenderness: There is no abdominal tenderness. There is no guarding.   Musculoskeletal:      Right lower leg: No edema.      Left lower leg: No edema.   Skin:     Capillary Refill: Capillary refill takes less than 2 seconds.      Coloration: Skin is pale. Skin is not jaundiced.      Findings: No erythema.   Neurological:      General: No focal deficit present.      Mental Status: She is alert and oriented to person, place, and time.       MELD-Na: 12 at 5/29/2023  8:40 AM  MELD: 12 at 5/29/2023  8:40 AM  Calculated from:  Serum Creatinine: 1.0 mg/dL at 5/29/2023  8:40 AM  Serum Sodium: 141 mmol/L (Using max of 137 mmol/L) at 5/29/2023  8:40 AM  Total Bilirubin: 2.1 mg/dL at 5/29/2023  8:40 AM  INR(ratio): 1.3 at 5/29/2023  8:40 AM       Imaging:   Ultrasound liver    Nodular contour of the liver indicating cirrhotic change.  There is associated diffuse heterogeneity of the hepatic parenchymal echotexture.  Hepatopetal flow in the portal vein.  Previously demonstrated tips shunt is not demonstrated on this exam.  Common duct normal in size measuring 5 mm  2.  Cholelithiasis    Endoscopy:  Will evaluate after TIPS stent  evaluation is compleetd    Assessment:   Alcoholic Liver Cirrhosis   Decompensated with ascites and variceal bleeding  S/p TIPS but it is no longer mentioned on the ultrasound or MRI - Will Repeat ultrasound today to confirm recent scan findings  MELD-Na score of 12 Child Class B  HCC screen- No focal lesion on ultrasound in May and AFP negative- will repeat in 6 months   HE- no history, for primary prophylaxis continue Lactulose to target 2-3 soft BM/day   - strict abstinence of alcohol use  - No history of SBP- today no ascites noted continue furosemide 20 mga nd Spironolactone 50 mg.    Recommendations:  -  Labs in 2 months:  CBC, CMP, PT INR, AFP.     -   Transplant option discussed, will evaluate when MELD >15 or HCC found in the liver. Transplant evaluation completed 2023.     -  Y-90 was attempted to treat the HCC lesion, but it was met with difficulty. Hence, patient had microwave ablation of segment 6 hypervascular tumor on Jan 25, 2023. Now MRI scheduled for tomorrow 6/3/23, to look for response.      -  HE: Recommend minimizing doses of remeron, and gabapentin.      -  Positive for THC repeatedly (patient visits sisters who smoke).  Patient states she has not used marijuana in a year.  She is willing to avoid visiting her sisters. She can see them on video.  Wants a liver transplant.  Councelor was going to send us results of weekly urine tests.      Return in 2 months.     - avoid non-steroidal anti-inflammatory drugs (NSAIDs) such as ibuprofen, Motrin, naprosyn, Alleve due to the risk of kidney damage  - can take acetaminophen (Tylenol), no more than 2000 mg per day  - low sodium (salt) 2 gram per day diet  - nutrition: 25-30kcal (calorie per body body weight in kilogram) per day  - no need to restrict protein in diet  - high protein diet: 1.2-1.5 gram/kg (protein per body weight in kilogram) per day to prevent muscle mass loss  - avoid  no fever/no loss of consciousness/no chills, no chest pain, no SOB fasting  - Late night snack with 50 gram of complex carbohydrates and protein  - resistance exercises for muscle strength  - avoid raw seafoods due to the risk of fatal Vibrio vulnificus infection  - Upper endoscopy every 1-2 years to screen for varices in the stomach and foodpipe which can burst and cause fatal bleeding

## 2023-06-02 NOTE — Clinical Note
Labs in 2 months:  CBC, CMP, PT INR, AFP.   -   Transplant option discussed, will evaluate when MELD >15 or HCC found in the liver. Transplant evaluation scheduled on Jan 19-20, 2023.   -  Y-90 was attempted to treat the HCC lesion, but it was met with difficulty. Hence, patient had microwave ablation of segment 6 hypervascular tumor on Jan 25, 2023. Now MRI scheduled for tomorrow 6/3/23, to look for response.    -  HE: Recommend minimizing doses of remeron, and gabapentin.    -  Positive for THC repeatedly (patient visits sisters who smoke).  Patient states she has not used marijuana in a year.  She is willing to avoid visiting her sisters. She can see them on video.  Wants a liver transplant.  Councelor was going to send us results of weekly urine tests.    Return in 2 months.   - avoid non-steroidal anti-inflammatory drugs (NSAIDs) such as ibuprofen, Motrin, naprosyn, Alleve due to  - low sodium (salt) 2 gram /d Xifaxan 550 mg twice daily, to [facility] Please See wrap up.  
<<-----Click here for Discharge Medication Review

## 2023-06-02 NOTE — PATIENT INSTRUCTIONS
Labs in 2 months:  CBC, CMP, PT INR, AFP.     -   Transplant option discussed, will evaluate when MELD >15 or HCC found in the liver. Transplant evaluation scheduled on Jan 19-20, 2023.     -  Y-90 was attempted to treat the HCC lesion, but it was met with difficulty. Hence, patient had microwave ablation of segment 6 hypervascular tumor on Jan 25, 2023. Now MRI scheduled for tomorrow 6/3/23, to look for response.      -  HE: Recommend minimizing doses of remeron, and gabapentin.      -  Positive for THC repeatedly (patient visits sisters who smoke).  Patient states she has not used marijuana in a year.  She is willing to avoid visiting her sisters. She can see them on video.  Wants a liver transplant.  Councelor was going to send us results of weekly urine tests.      Xifaxan 550 mg twice daily, sent to [facility]    Return in 2 months.     - avoid non-steroidal anti-inflammatory drugs (NSAIDs) such as ibuprofen, Motrin, naprosyn, Alleve due to the risk of kidney damage  - can take acetaminophen (Tylenol), no more than 2000 mg per day  - low sodium (salt) 2 gram per day diet  - nutrition: 25-30kcal (calorie per body body weight in kilogram) per day  - no need to restrict protein in diet  - high protein diet: 1.2-1.5 gram/kg (protein per body weight in kilogram) per day to prevent muscle mass loss  - avoid fasting  - Late night snack with 50 gram of complex carbohydrates and protein  - resistance exercises for muscle strength  - avoid raw seafoods due to the risk of fatal Vibrio vulnificus infection  - Upper endoscopy every 1-2 years to screen for varices in the stomach and foodpipe which can burst and cause fatal bleeding

## 2023-06-02 NOTE — LETTER
June 2, 2023        Geeta Gaines  [address]  [address]  [facility] [address]  Phone: [phone]  Fax: [phone]             [facility]  [address]  [facility] [address]  Phone: [phone]   Patient: Shonda oBrrego   MR Number: [id number]   YOB: 1965   Date of Visit: 6/2/2023       Dear Dr. Geeta Gaines    Thank you for referring Shonda Borrego to me for evaluation. Attached you will find relevant portions of my assessment and plan of care.    If you have questions, please do not hesitate to call me. I look forward to following Shonda Borrego along with you.    Sincerely,    Shari Arreola MD    Enclosure    If you would like to receive this communication electronically, please contact [email or web address] or [phone] to request [employer] Link access.    [employer] Link is a tool which provides read-only access to select patient information with whom you have a relationship. Its easy to use and provides real time access to review your patients record including encounter summaries, notes, results, and demographic information.    If you feel you have received this communication in error or would no longer like to receive these types of communications, please e-mail [email or web address]

## 2023-06-03 ENCOUNTER — HOSPITAL ENCOUNTER (OUTPATIENT)
Dept: RADIOLOGY | Facility: HOSPITAL | Age: 58
Discharge: HOME | End: 2023-06-03
Attending: INTERNAL MEDICINE
Payer: MEDICAID

## 2023-06-03 DIAGNOSIS — K76.9 LIVER DISEASE, UNSPECIFIED: ICD-10-CM

## 2023-06-03 PROCEDURE — A9585 GADOBUTROL INJECTION: HCPCS | Mod: TXP | Performed by: INTERNAL MEDICINE

## 2023-06-03 PROCEDURE — 25500020 PHARM REV CODE 255: Mod: TXP | Performed by: INTERNAL MEDICINE

## 2023-06-03 PROCEDURE — 74183 MRI ABDOMEN W WO CONTRAST: ICD-10-PCS | Mod: 26,TXP,, | Performed by: RADIOLOGY

## 2023-06-03 PROCEDURE — 74183 MRI ABD W/O CNTR FLWD CNTR: CPT | Mod: 26,TXP,, | Performed by: RADIOLOGY

## 2023-06-03 PROCEDURE — 74183 MRI ABD W/O CNTR FLWD CNTR: CPT | Mod: TC,TXP

## 2023-06-03 RX ORDER — GADOBUTROL 604.72 MG/ML
10 INJECTION INTRAVENOUS
Status: DISCONTINUED | OUTPATIENT
Start: 2023-06-03 | End: 2023-06-03

## 2023-06-03 RX ORDER — GADOBUTROL 604.72 MG/ML
10 INJECTION INTRAVENOUS
Status: COMPLETED | OUTPATIENT
Start: 2023-06-03 | End: 2023-06-03

## 2023-06-03 RX ADMIN — GADOBUTROL 10 ML: 604.72 INJECTION INTRAVENOUS at 02:06

## 2023-06-05 ENCOUNTER — TELEPHONE (OUTPATIENT)
Dept: TRANSPLANT | Facility: CLINIC | Age: 58
End: 2023-06-05
Payer: MEDICAID

## 2023-06-05 NOTE — TELEPHONE ENCOUNTER
Transplant SW received following toxicology results from patient's Addiction Counselor at [facility].

## 2023-06-06 ENCOUNTER — TELEPHONE (OUTPATIENT)
Dept: PHARMACY | Facility: CLINIC | Age: 58
End: 2023-06-06
Payer: MEDICAID

## 2023-06-07 ENCOUNTER — TELEPHONE (OUTPATIENT)
Dept: TRANSPLANT | Facility: CLINIC | Age: 58
End: 2023-06-07
Payer: MEDICAID

## 2023-06-07 ENCOUNTER — COMMITTEE REVIEW (OUTPATIENT)
Dept: TRANSPLANT | Facility: CLINIC | Age: 58
End: 2023-06-07

## 2023-06-07 DIAGNOSIS — K76.9 LIVER DISEASE, UNSPECIFIED: Primary | ICD-10-CM

## 2023-06-07 NOTE — TELEPHONE ENCOUNTER
Patient presented to selection committee today 6/7/23, and she was declined for liver transplant due to positive drug screens after being instructed to not use drugs or alcohol.  Some resutls were from our facilities and some more recent ones were from [facility].    [facility] [place]     4/24/2023  Methamphetamine   160 ng/mg  Amphetamine           97 ng/mg  Alcohol, Ethyl           0.051 g/dL  Carboxy-THC           18 ng/mg     5/10/2023  Alcohol, Ethyl                      0.152 g/dL  Carboxy-THC                      5 ng/mg     5/24/2023  Alcohol, Ethyl     0.031 g/dL      I called patient, no answer, and no way to leave voicemail.   Will call again.     Shari Arreola MD    Repeated call to patient, explained to her that she was declined, ans she needs to make effort not to drink alcohol (she repeated she is not drinking), or use THC or amphetamines.     Will continue drug-test monthly x 6 and when remain negative, will try again to present to selection committee.    Shari Arreola MD

## 2023-06-07 NOTE — COMMITTEE REVIEW
Shonda Borrego's case presented to selection committee.  Patient has been declined for liver transplant due to recent positive toxicology (METH and alcohol) pt needs to complete outpatient program and continue local regional therapy on tumor.  I was present at the committee meeting and attest to the decision of the committee.    Jasen Grimm  06/08/2023

## 2023-06-08 ENCOUNTER — LAB VISIT (OUTPATIENT)
Dept: LAB | Facility: HOSPITAL | Age: 58
End: 2023-06-08
Attending: INTERNAL MEDICINE
Payer: MEDICAID

## 2023-06-08 ENCOUNTER — TELEPHONE (OUTPATIENT)
Dept: TRANSPLANT | Facility: CLINIC | Age: 58
End: 2023-06-08
Payer: MEDICAID

## 2023-06-08 DIAGNOSIS — Z01.818 PRE-TRANSPLANT EVALUATION FOR LIVER TRANSPLANT: ICD-10-CM

## 2023-06-08 DIAGNOSIS — F17.200 NICOTINE DEPENDENCE: ICD-10-CM

## 2023-06-08 DIAGNOSIS — C22.0 HEPATOCELLULAR CARCINOMA: ICD-10-CM

## 2023-06-08 DIAGNOSIS — R82.5 POSITIVE URINE DRUG SCREEN: ICD-10-CM

## 2023-06-08 DIAGNOSIS — K70.30 ALCOHOLIC CIRRHOSIS OF LIVER WITHOUT ASCITES: ICD-10-CM

## 2023-06-08 LAB
AMPHET+METHAMPHET UR QL: NEGATIVE
BARBITURATES UR QL SCN>200 NG/ML: NEGATIVE
BENZODIAZ UR QL SCN>200 NG/ML: NEGATIVE
BZE UR QL SCN: NEGATIVE
CANNABINOIDS UR QL SCN: NEGATIVE
CREAT UR-MCNC: 286 MG/DL (ref 15–325)
METHADONE UR QL SCN>300 NG/ML: NEGATIVE
OPIATES UR QL SCN: NEGATIVE
PCP UR QL SCN>25 NG/ML: NEGATIVE
TOXICOLOGY INFORMATION: NORMAL

## 2023-06-08 PROCEDURE — 80307 DRUG TEST PRSMV CHEM ANLYZR: CPT | Performed by: INTERNAL MEDICINE

## 2023-06-08 RX ORDER — NICOTINE 7MG/24HR
1 PATCH, TRANSDERMAL 24 HOURS TRANSDERMAL DAILY
Qty: 14 PATCH | Refills: 0 | Status: SHIPPED | OUTPATIENT
Start: 2023-06-08 | End: 2023-07-06 | Stop reason: DRUGHIGH

## 2023-06-08 NOTE — TELEPHONE ENCOUNTER
Call returned. Patient advised labs and mri are stable. 3-month tumor surveillance will be scheduled by Hepatology staff. Advised of MD's recommendation to continue to monitor urine tox and PETH monthly, at least for the next 6 months, which will also be coordinated by the Hepatology clinic. Informed of today's negative screen, which is test #1. Provided screens remain negative, MD plans to rediscuss transplant option in ~6 months. Understanding expressed.     ----- Message from Shane Young sent at 6/8/2023  1:12 PM CDT -----  Regarding: Returning Call  Patient called in stating she is returning a missed call. Informed patient it was a nurse reaching out; patient requested a call back to discuss further.                         Contact: [phone]

## 2023-06-08 NOTE — TELEPHONE ENCOUNTER
"Call placed to patient to notify of stable labs and mri with no answer. Unable to leave a voice message due to the "mailbox is full and unable to accept new messages."     ----- Message from Shari Arreola MD sent at 6/5/2023  5:55 AM CDT -----  Please inform patient results are OK.  "

## 2023-06-12 ENCOUNTER — TELEPHONE (OUTPATIENT)
Dept: HEPATOLOGY | Facility: CLINIC | Age: 58
End: 2023-06-12
Payer: MEDICAID

## 2023-06-12 NOTE — TELEPHONE ENCOUNTER
----- Message from Mayelin Slaughter sent at 6/12/2023  1:59 PM CDT -----  Regarding: Consult/Advisory  Consult/Advisory    Name Of Caller: Shonda Borrego      Contact Preference: [phone] (home)        Nature of call: Patient calling to notify that she received a letter from Medicaid stating that her Xifaxin 550mg has been approved but she will need a Rx for it. Requesting a call back.

## 2023-06-14 RX ORDER — LACTULOSE 10 G/15ML
30 SOLUTION ORAL; RECTAL 3 TIMES DAILY
Qty: 4050 ML | Refills: 1 | Status: SHIPPED | OUTPATIENT
Start: 2023-06-14 | End: 2023-08-13

## 2023-06-15 ENCOUNTER — OFFICE VISIT (OUTPATIENT)
Dept: SURGERY | Facility: CLINIC | Age: 58
End: 2023-06-15
Payer: MEDICAID

## 2023-06-15 ENCOUNTER — TELEPHONE (OUTPATIENT)
Dept: SMOKING CESSATION | Facility: CLINIC | Age: 58
End: 2023-06-15
Payer: MEDICAID

## 2023-06-15 VITALS
HEART RATE: 80 BPM | DIASTOLIC BLOOD PRESSURE: 65 MMHG | OXYGEN SATURATION: 95 % | BODY MASS INDEX: 32.05 KG/M2 | HEIGHT: 68 IN | SYSTOLIC BLOOD PRESSURE: 142 MMHG

## 2023-06-15 DIAGNOSIS — Z12.11 SCREEN FOR COLON CANCER: Primary | ICD-10-CM

## 2023-06-15 DIAGNOSIS — Z13.1 SCREENING FOR DIABETES MELLITUS: Primary | ICD-10-CM

## 2023-06-15 DIAGNOSIS — Z13.220 SCREENING FOR LIPID DISORDERS: ICD-10-CM

## 2023-06-15 PROCEDURE — 1159F MED LIST DOCD IN RCRD: CPT | Mod: CPTII,,, | Performed by: STUDENT IN AN ORGANIZED HEALTH CARE EDUCATION/TRAINING PROGRAM

## 2023-06-15 PROCEDURE — 3008F PR BODY MASS INDEX (BMI) DOCUMENTED: ICD-10-PCS | Mod: CPTII,,, | Performed by: STUDENT IN AN ORGANIZED HEALTH CARE EDUCATION/TRAINING PROGRAM

## 2023-06-15 PROCEDURE — 99214 OFFICE O/P EST MOD 30 MIN: CPT | Mod: S$PBB,,, | Performed by: STUDENT IN AN ORGANIZED HEALTH CARE EDUCATION/TRAINING PROGRAM

## 2023-06-15 PROCEDURE — 3044F PR MOST RECENT HEMOGLOBIN A1C LEVEL <7.0%: ICD-10-PCS | Mod: CPTII,,, | Performed by: STUDENT IN AN ORGANIZED HEALTH CARE EDUCATION/TRAINING PROGRAM

## 2023-06-15 PROCEDURE — 3078F PR MOST RECENT DIASTOLIC BLOOD PRESSURE < 80 MM HG: ICD-10-PCS | Mod: CPTII,,, | Performed by: STUDENT IN AN ORGANIZED HEALTH CARE EDUCATION/TRAINING PROGRAM

## 2023-06-15 PROCEDURE — 99999 PR PBB SHADOW E&M-EST. PATIENT-LVL III: CPT | Mod: PBBFAC,,, | Performed by: STUDENT IN AN ORGANIZED HEALTH CARE EDUCATION/TRAINING PROGRAM

## 2023-06-15 PROCEDURE — 3044F HG A1C LEVEL LT 7.0%: CPT | Mod: CPTII,,, | Performed by: STUDENT IN AN ORGANIZED HEALTH CARE EDUCATION/TRAINING PROGRAM

## 2023-06-15 PROCEDURE — 3077F PR MOST RECENT SYSTOLIC BLOOD PRESSURE >= 140 MM HG: ICD-10-PCS | Mod: CPTII,,, | Performed by: STUDENT IN AN ORGANIZED HEALTH CARE EDUCATION/TRAINING PROGRAM

## 2023-06-15 PROCEDURE — 1159F PR MEDICATION LIST DOCUMENTED IN MEDICAL RECORD: ICD-10-PCS | Mod: CPTII,,, | Performed by: STUDENT IN AN ORGANIZED HEALTH CARE EDUCATION/TRAINING PROGRAM

## 2023-06-15 PROCEDURE — 3077F SYST BP >= 140 MM HG: CPT | Mod: CPTII,,, | Performed by: STUDENT IN AN ORGANIZED HEALTH CARE EDUCATION/TRAINING PROGRAM

## 2023-06-15 PROCEDURE — 99213 OFFICE O/P EST LOW 20 MIN: CPT | Mod: PBBFAC | Performed by: STUDENT IN AN ORGANIZED HEALTH CARE EDUCATION/TRAINING PROGRAM

## 2023-06-15 PROCEDURE — 3078F DIAST BP <80 MM HG: CPT | Mod: CPTII,,, | Performed by: STUDENT IN AN ORGANIZED HEALTH CARE EDUCATION/TRAINING PROGRAM

## 2023-06-15 PROCEDURE — 99214 PR OFFICE/OUTPT VISIT, EST, LEVL IV, 30-39 MIN: ICD-10-PCS | Mod: S$PBB,,, | Performed by: STUDENT IN AN ORGANIZED HEALTH CARE EDUCATION/TRAINING PROGRAM

## 2023-06-15 PROCEDURE — 3008F BODY MASS INDEX DOCD: CPT | Mod: CPTII,,, | Performed by: STUDENT IN AN ORGANIZED HEALTH CARE EDUCATION/TRAINING PROGRAM

## 2023-06-15 PROCEDURE — 99999 PR PBB SHADOW E&M-EST. PATIENT-LVL III: ICD-10-PCS | Mod: PBBFAC,,, | Performed by: STUDENT IN AN ORGANIZED HEALTH CARE EDUCATION/TRAINING PROGRAM

## 2023-06-15 NOTE — TELEPHONE ENCOUNTER
Attempted to contact patient regarding smoking cessation follow up. There was no answer at this time. [employer] is currently unavailable.

## 2023-06-16 ENCOUNTER — LAB VISIT (OUTPATIENT)
Dept: LAB | Facility: HOSPITAL | Age: 58
End: 2023-06-16
Attending: STUDENT IN AN ORGANIZED HEALTH CARE EDUCATION/TRAINING PROGRAM
Payer: MEDICAID

## 2023-06-16 DIAGNOSIS — Z13.1 SCREENING FOR DIABETES MELLITUS: ICD-10-CM

## 2023-06-16 DIAGNOSIS — Z13.220 SCREENING FOR LIPID DISORDERS: ICD-10-CM

## 2023-06-16 LAB
CHOLEST SERPL-MCNC: 121 MG/DL (ref 120–199)
CHOLEST/HDLC SERPL: 1.3 {RATIO} (ref 2–5)
ESTIMATED AVG GLUCOSE: 108 MG/DL (ref 68–131)
HBA1C MFR BLD: 5.4 % (ref 4–5.6)
HDLC SERPL-MCNC: 92 MG/DL (ref 40–75)
HDLC SERPL: 76 % (ref 20–50)
LDLC SERPL CALC-MCNC: 12.6 MG/DL (ref 63–159)
NONHDLC SERPL-MCNC: 29 MG/DL
TRIGL SERPL-MCNC: 82 MG/DL (ref 30–150)

## 2023-06-16 PROCEDURE — 36415 COLL VENOUS BLD VENIPUNCTURE: CPT | Performed by: STUDENT IN AN ORGANIZED HEALTH CARE EDUCATION/TRAINING PROGRAM

## 2023-06-16 PROCEDURE — 80061 LIPID PANEL: CPT | Performed by: STUDENT IN AN ORGANIZED HEALTH CARE EDUCATION/TRAINING PROGRAM

## 2023-06-16 PROCEDURE — 83036 HEMOGLOBIN GLYCOSYLATED A1C: CPT | Performed by: STUDENT IN AN ORGANIZED HEALTH CARE EDUCATION/TRAINING PROGRAM

## 2023-06-17 ENCOUNTER — HOSPITAL ENCOUNTER (OUTPATIENT)
Dept: RADIOLOGY | Facility: HOSPITAL | Age: 58
Discharge: HOME | End: 2023-06-17
Attending: INTERNAL MEDICINE
Payer: MEDICAID

## 2023-06-17 DIAGNOSIS — I85.00 ESOPHAGEAL VARICES WITHOUT BLEEDING, UNSPECIFIED ESOPHAGEAL VARICES TYPE: ICD-10-CM

## 2023-06-17 DIAGNOSIS — Z01.818 PRE-TRANSPLANT EVALUATION FOR LIVER TRANSPLANT: ICD-10-CM

## 2023-06-17 DIAGNOSIS — C22.0 HEPATOCELLULAR CARCINOMA: ICD-10-CM

## 2023-06-17 DIAGNOSIS — K70.30 ALCOHOLIC CIRRHOSIS OF LIVER WITHOUT ASCITES: ICD-10-CM

## 2023-06-17 DIAGNOSIS — R82.5 POSITIVE URINE DRUG SCREEN: ICD-10-CM

## 2023-06-17 DIAGNOSIS — Z95.828 S/P TIPS (TRANSJUGULAR INTRAHEPATIC PORTOSYSTEMIC SHUNT): ICD-10-CM

## 2023-06-17 PROCEDURE — 93975 VASCULAR STUDY: CPT | Mod: TC

## 2023-06-17 PROCEDURE — 76700 US EXAM ABDOM COMPLETE: CPT | Mod: 59,TC

## 2023-06-17 PROCEDURE — 76700 US ABDOMEN COMP WITH DOPPLER (XPD): ICD-10-PCS | Mod: 26,XS,, | Performed by: RADIOLOGY

## 2023-06-17 PROCEDURE — 76700 US EXAM ABDOM COMPLETE: CPT | Mod: 26,XS,, | Performed by: RADIOLOGY

## 2023-06-17 PROCEDURE — 93975 VASCULAR STUDY: CPT | Mod: 26,,, | Performed by: RADIOLOGY

## 2023-06-17 PROCEDURE — 93975 US ABDOMEN COMP WITH DOPPLER (XPD): ICD-10-PCS | Mod: 26,,, | Performed by: RADIOLOGY

## 2023-06-27 ENCOUNTER — TELEPHONE (OUTPATIENT)
Dept: HEPATOLOGY | Facility: CLINIC | Age: 58
End: 2023-06-27
Payer: MEDICAID

## 2023-06-27 NOTE — TELEPHONE ENCOUNTER
----- Message from Deyanira Cronin MA sent at 6/27/2023  1:33 PM CDT -----  Regarding: FW: Consult/Advisory  Contact: Shonda Borrego    ----- Message -----  From: Mayelin Slaughter  Sent: 6/27/2023   1:32 PM CDT  To: Liana Connor Staff, Maxwell Mccarthy Staff  Subject: Consult/Advisory                                 Consult/Advisory    Name Of Caller: Shonda Borrego      Contact Preference: [phone] (home)      Nature of call: Patient calling to speak to staff regarding swollen feet that are sore. Patient has an appt on 7/10/2023 at [facility], but wanted advice on how to deal with it until the appt. Please advise.

## 2023-06-27 NOTE — TELEPHONE ENCOUNTER
Spoke with patient.  She will call PCP.  DR Arreola is out of the clinic until July 13.    Patient verbalized understanding.

## 2023-06-28 ENCOUNTER — CLINICAL SUPPORT (OUTPATIENT)
Dept: SMOKING CESSATION | Facility: CLINIC | Age: 58
End: 2023-06-28
Payer: COMMERCIAL

## 2023-06-28 DIAGNOSIS — F17.200 NICOTINE DEPENDENCE: Primary | ICD-10-CM

## 2023-06-28 PROCEDURE — 99407 PR TOBACCO USE CESSATION INTENSIVE >10 MINUTES: ICD-10-PCS | Mod: S$GLB,,,

## 2023-06-28 PROCEDURE — 99407 BEHAV CHNG SMOKING > 10 MIN: CPT | Mod: S$GLB,,,

## 2023-06-28 NOTE — PROGRESS NOTES
Spoke with patient today in regard to smoking cessation progress for 3 month telephone follow up, she states tobacco free. Commended patient on the accomplishment thus far. Patient states the use of nicotine patch and gum to help aid in her quit. Informed patient of benefit period, future follow ups, and contact information if any further help or support is needed. Will complete smart form for 3 month follow up on Quit attempt #2.       Burow's Advancement Flap Text: The defect edges were debeveled with a #15 scalpel blade.  Given the location of the defect and the proximity to free margins a Burow's advancement flap was deemed most appropriate.  Using a sterile surgical marker, the appropriate advancement flap was drawn incorporating the defect and placing the expected incisions within the relaxed skin tension lines where possible.    The area thus outlined was incised deep to adipose tissue with a #15 scalpel blade.  The skin margins were undermined to an appropriate distance in all directions utilizing iris scissors.

## 2023-07-06 ENCOUNTER — CLINICAL SUPPORT (OUTPATIENT)
Dept: SMOKING CESSATION | Facility: CLINIC | Age: 58
End: 2023-07-06
Payer: COMMERCIAL

## 2023-07-06 DIAGNOSIS — F17.200 NICOTINE DEPENDENCE: Primary | ICD-10-CM

## 2023-07-06 PROCEDURE — 99402 PREV MED CNSL INDIV APPRX 30: CPT | Mod: S$GLB,,,

## 2023-07-06 PROCEDURE — 99999 PR PBB SHADOW E&M-EST. PATIENT-LVL I: CPT | Mod: PBBFAC,,,

## 2023-07-06 PROCEDURE — 99999 PR PBB SHADOW E&M-EST. PATIENT-LVL I: ICD-10-PCS | Mod: PBBFAC,,,

## 2023-07-06 PROCEDURE — 99402 PR PREVENT COUNSEL,INDIV,30 MIN: ICD-10-PCS | Mod: S$GLB,,,

## 2023-07-06 RX ORDER — IBUPROFEN 200 MG
1 TABLET ORAL DAILY
Qty: 14 PATCH | Refills: 0 | Status: SHIPPED | OUTPATIENT
Start: 2023-07-06 | End: 2024-02-19 | Stop reason: SDUPTHER

## 2023-07-06 NOTE — PROGRESS NOTES
Individual Follow-Up Form    7/6/2023    Quit Date: TBD    Clinical Status of Patient: Outpatient    Length of Service: 30 minutes    Continuing Medication: yes  Patches or Nicotine gum    Other Medications: none     Target Symptoms: Withdrawal and medication side effects. The following were  rated moderate (3) to severe (4) on TCRS:  Moderate (3): none  Severe (4): none    Comments: Spoke with patient regarding smoking cessation follow up. Pt reports that she has relapsed and is back to smoking 1/2 ppd. Ms. Borrego states that she received the news that she is now off the transplant list, which triggered her to start smoking again. She is upset and teary. Provided pt with listening and emotional support. We discussed the events leading up to her purchasing a pack of cigarettes and then evaluated the effects afterwards. Pt feels ready to get rid of remaining cigarettes and to try to quit again. Dose adjustment made to 14 mg nicotine patch QD. Pt is also using 4 mg nicotine gum with JEOVANY noted at this time. Reviewed strategies, habitual behavior, high risks situations, understanding urges and cravings, stress and relaxation with open discussion and additional interventions, Introduced lapses, relapses, understanding them and analyzing the situation of a lapse, conflict issues that may be linked to a lapse. Pt to continue with smoking cessation counseling in 1 week.    Diagnosis: F17.200    Next Visit: 1 week

## 2023-07-07 ENCOUNTER — LAB VISIT (OUTPATIENT)
Dept: LAB | Facility: HOSPITAL | Age: 58
End: 2023-07-07
Attending: INTERNAL MEDICINE
Payer: MEDICAID

## 2023-07-07 DIAGNOSIS — Z01.818 PRE-TRANSPLANT EVALUATION FOR LIVER TRANSPLANT: ICD-10-CM

## 2023-07-07 DIAGNOSIS — K70.30 ALCOHOLIC CIRRHOSIS OF LIVER WITHOUT ASCITES: ICD-10-CM

## 2023-07-07 DIAGNOSIS — R82.5 POSITIVE URINE DRUG SCREEN: ICD-10-CM

## 2023-07-07 DIAGNOSIS — C22.0 HEPATOCELLULAR CARCINOMA: ICD-10-CM

## 2023-07-07 LAB
AMPHET+METHAMPHET UR QL: NEGATIVE
BARBITURATES UR QL SCN>200 NG/ML: NEGATIVE
BENZODIAZ UR QL SCN>200 NG/ML: NEGATIVE
BZE UR QL SCN: NEGATIVE
CANNABINOIDS UR QL SCN: NEGATIVE
CREAT UR-MCNC: 193 MG/DL (ref 15–325)
METHADONE UR QL SCN>300 NG/ML: NEGATIVE
OPIATES UR QL SCN: NEGATIVE
PCP UR QL SCN>25 NG/ML: NEGATIVE
TOXICOLOGY INFORMATION: NORMAL

## 2023-07-07 PROCEDURE — 80307 DRUG TEST PRSMV CHEM ANLYZR: CPT | Performed by: INTERNAL MEDICINE

## 2023-07-09 NOTE — H&P
[facility] H&P      Consult: Screening colonoscopy   Consulting Service: PCP  Chief Complaint: None        HPI: Pt is a 57 y.o.female with PMH sig for cholelithiais and EtOH cirrhosis s/p TIPS who presents for routine screening colonoscopy.   No personal or family history of colon cancer.  Last colonoscopy in 2020 - poor prep with one polyp - with recommendations for repeat in 1 year.  Has daily regular bowel movements.  Denies melena, rectal bleeding or pain, change in bowel habits or unintended weight loss.  Denies CP, SOB, abdominal pain, nausea, vomiting, fevers, or chills.        PMH:   Past Medical History:   Diagnosis Date    Anxiety     Bipolar disorder     BMI 32.0-32.9,adult 2/7/2023    Wt Readings from Last 8 Encounters: 02/07/23 95.7 kg (211 lb) 01/25/23 99.8 kg (220 lb) 01/20/23 97.1 kg (214 lb 1.1 oz) 01/20/23 97.1 kg (214 lb 1.1 oz) 01/20/23 97.1 kg (214 lb 1.1 oz) 01/19/23 100.2 kg (221 lb) 12/26/22 100.2 kg (221 lb) 12/14/22 99.8 kg (220 lb) Recommendations:  Stay physically active. As tolerated alternate resistance training with stretching and cardio. Goal of 150 minutes     BMI 34.0-34.9,adult 3/18/2022    Cirrhosis     Constipation 7/26/2022    COPD (chronic obstructive pulmonary disease)     GERD (gastroesophageal reflux disease)     Hepatic encephalopathy 4/17/2023    Hyperammonemia 4/21/2022    Restless leg syndrome     Urinary frequency      PSH:   Past Surgical History:   Procedure Laterality Date    TIPS PROCEDURE       Meds: See medication list;  No anticoagulation  ALL: Patient has no known allergies.  FHX: non contributory   SOC:   Social History     Socioeconomic History    Marital status: Single    Number of children: 3   Tobacco Use    Smoking status: Every Day     Packs/day: 1.50     Years: 43.00     Pack years: 64.50     Types: Cigarettes     Passive exposure: Past    Smokeless tobacco: Never    Tobacco comments:     Pt active in [facility] smoking  "cessation program   Substance and Sexual Activity    Alcohol use: Not Currently     Comment: socially    Drug use: Not Currently     Types: Marijuana    Sexual activity: Not Currently   Social History Narrative    Lives with her boyfriend      Social Determinants of Health     Financial Resource Strain: Low Risk     Difficulty of Paying Living Expenses: Not hard at all   Food Insecurity: No Food Insecurity    Worried About Running Out of Food in the Last Year: Never true    Ran Out of Food in the Last Year: Never true   Transportation Needs: Unmet Transportation Needs    Lack of Transportation (Medical): Yes    Lack of Transportation (Non-Medical): Yes   Physical Activity: Sufficiently Active    Days of Exercise per Week: 2 days    Minutes of Exercise per Session: 120 min   Stress: Stress Concern Present    Feeling of Stress : Rather much   Social Connections: Unknown    Frequency of Communication with Friends and Family: Twice a week    Attends [employer]: 1 to 4 times per year    Active Member of Clubs or Organizations: No    Attends Club or Organization Meetings: Never    Marital Status: Living with partner   Housing Stability: Low Risk     Unable to Pay for Housing in the Last Year: No    Number of Places Lived in the Last Year: 1    Unstable Housing in the Last Year: No     ROS: Review of Systems   Constitutional:  Negative for chills, fever, malaise/fatigue and weight loss.   Respiratory:  Negative for cough.    Cardiovascular:  Negative for chest pain.   Gastrointestinal:  Negative for abdominal pain, blood in stool, constipation, diarrhea, heartburn, melena, nausea and vomiting.   All other systems reviewed and are negative.        Physical Exam:  BP (!) 142/65 (BP Location: Right arm, Patient Position: Sitting, BP Method: Medium (Automatic))   Pulse 80   Ht 5' 8" (1.727 m)   SpO2 95%   BMI 32.05 kg/m²   Physical Exam  Constitutional:       General: She is not in acute distress.     Appearance: " She is obese. She is not ill-appearing or toxic-appearing.   Cardiovascular:      Rate and Rhythm: Normal rate and regular rhythm.   Pulmonary:      Effort: Pulmonary effort is normal. No respiratory distress.   Abdominal:      General: There is no distension.      Palpations: Abdomen is soft.      Tenderness: There is no abdominal tenderness. There is no guarding or rebound.   Musculoskeletal:      Right lower leg: No edema.      Left lower leg: No edema.   Skin:     General: Skin is warm and dry.      Capillary Refill: Capillary refill takes less than 2 seconds.   Neurological:      Mental Status: She is alert. Mental status is at baseline.             A/P: Pt is a 57 y.o.female who presents for routine screening colonoscopy  --To [facility] on 8/9 for colonoscopy  --Procedure in detail explained to patient;  including risks including but not limited to bleeding, infection, damage to surrounding structures, and perforation- patient voiced understanding  --CBC, CMP, CXR, EKG   --Bowel prep given - Miralax/Gatorade  --CLD day before procedure        Sweetie Juarez MD  General Surgery   [phone]

## 2023-07-10 ENCOUNTER — OFFICE VISIT (OUTPATIENT)
Dept: PRIMARY CARE CLINIC | Facility: CLINIC | Age: 58
End: 2023-07-10
Payer: MEDICAID

## 2023-07-10 VITALS
WEIGHT: 219 LBS | DIASTOLIC BLOOD PRESSURE: 60 MMHG | RESPIRATION RATE: 16 BRPM | BODY MASS INDEX: 33.19 KG/M2 | HEART RATE: 74 BPM | TEMPERATURE: 98 F | HEIGHT: 68 IN | OXYGEN SATURATION: 96 % | SYSTOLIC BLOOD PRESSURE: 135 MMHG

## 2023-07-10 DIAGNOSIS — M79.89 LEG SWELLING: Primary | ICD-10-CM

## 2023-07-10 DIAGNOSIS — I87.2 STASIS DERMATITIS WITHOUT VARICOSITIES: ICD-10-CM

## 2023-07-10 DIAGNOSIS — K70.30 ALCOHOLIC CIRRHOSIS OF LIVER WITHOUT ASCITES: ICD-10-CM

## 2023-07-10 DIAGNOSIS — E66.9 CLASS 1 OBESITY WITH SERIOUS COMORBIDITY AND BODY MASS INDEX (BMI) OF 31.0 TO 31.9 IN ADULT, UNSPECIFIED OBESITY TYPE: ICD-10-CM

## 2023-07-10 PROCEDURE — 3078F PR MOST RECENT DIASTOLIC BLOOD PRESSURE < 80 MM HG: ICD-10-PCS | Mod: CPTII,,, | Performed by: STUDENT IN AN ORGANIZED HEALTH CARE EDUCATION/TRAINING PROGRAM

## 2023-07-10 PROCEDURE — 3075F PR MOST RECENT SYSTOLIC BLOOD PRESS GE 130-139MM HG: ICD-10-PCS | Mod: CPTII,,, | Performed by: STUDENT IN AN ORGANIZED HEALTH CARE EDUCATION/TRAINING PROGRAM

## 2023-07-10 PROCEDURE — 3078F DIAST BP <80 MM HG: CPT | Mod: CPTII,,, | Performed by: STUDENT IN AN ORGANIZED HEALTH CARE EDUCATION/TRAINING PROGRAM

## 2023-07-10 PROCEDURE — 99215 OFFICE O/P EST HI 40 MIN: CPT | Mod: PBBFAC,PN | Performed by: STUDENT IN AN ORGANIZED HEALTH CARE EDUCATION/TRAINING PROGRAM

## 2023-07-10 PROCEDURE — 99999 PR PBB SHADOW E&M-EST. PATIENT-LVL V: CPT | Mod: PBBFAC,,, | Performed by: STUDENT IN AN ORGANIZED HEALTH CARE EDUCATION/TRAINING PROGRAM

## 2023-07-10 PROCEDURE — 99999 PR PBB SHADOW E&M-EST. PATIENT-LVL V: ICD-10-PCS | Mod: PBBFAC,,, | Performed by: STUDENT IN AN ORGANIZED HEALTH CARE EDUCATION/TRAINING PROGRAM

## 2023-07-10 PROCEDURE — 3044F PR MOST RECENT HEMOGLOBIN A1C LEVEL <7.0%: ICD-10-PCS | Mod: CPTII,,, | Performed by: STUDENT IN AN ORGANIZED HEALTH CARE EDUCATION/TRAINING PROGRAM

## 2023-07-10 PROCEDURE — 1160F PR REVIEW ALL MEDS BY PRESCRIBER/CLIN PHARMACIST DOCUMENTED: ICD-10-PCS | Mod: CPTII,,, | Performed by: STUDENT IN AN ORGANIZED HEALTH CARE EDUCATION/TRAINING PROGRAM

## 2023-07-10 PROCEDURE — 3008F BODY MASS INDEX DOCD: CPT | Mod: CPTII,,, | Performed by: STUDENT IN AN ORGANIZED HEALTH CARE EDUCATION/TRAINING PROGRAM

## 2023-07-10 PROCEDURE — 3044F HG A1C LEVEL LT 7.0%: CPT | Mod: CPTII,,, | Performed by: STUDENT IN AN ORGANIZED HEALTH CARE EDUCATION/TRAINING PROGRAM

## 2023-07-10 PROCEDURE — 3008F PR BODY MASS INDEX (BMI) DOCUMENTED: ICD-10-PCS | Mod: CPTII,,, | Performed by: STUDENT IN AN ORGANIZED HEALTH CARE EDUCATION/TRAINING PROGRAM

## 2023-07-10 PROCEDURE — 1160F RVW MEDS BY RX/DR IN RCRD: CPT | Mod: CPTII,,, | Performed by: STUDENT IN AN ORGANIZED HEALTH CARE EDUCATION/TRAINING PROGRAM

## 2023-07-10 PROCEDURE — 1159F MED LIST DOCD IN RCRD: CPT | Mod: CPTII,,, | Performed by: STUDENT IN AN ORGANIZED HEALTH CARE EDUCATION/TRAINING PROGRAM

## 2023-07-10 PROCEDURE — 3075F SYST BP GE 130 - 139MM HG: CPT | Mod: CPTII,,, | Performed by: STUDENT IN AN ORGANIZED HEALTH CARE EDUCATION/TRAINING PROGRAM

## 2023-07-10 PROCEDURE — 99214 PR OFFICE/OUTPT VISIT, EST, LEVL IV, 30-39 MIN: ICD-10-PCS | Mod: S$PBB,,, | Performed by: STUDENT IN AN ORGANIZED HEALTH CARE EDUCATION/TRAINING PROGRAM

## 2023-07-10 PROCEDURE — 99214 OFFICE O/P EST MOD 30 MIN: CPT | Mod: S$PBB,,, | Performed by: STUDENT IN AN ORGANIZED HEALTH CARE EDUCATION/TRAINING PROGRAM

## 2023-07-10 PROCEDURE — 1159F PR MEDICATION LIST DOCUMENTED IN MEDICAL RECORD: ICD-10-PCS | Mod: CPTII,,, | Performed by: STUDENT IN AN ORGANIZED HEALTH CARE EDUCATION/TRAINING PROGRAM

## 2023-07-10 RX ORDER — SPIRONOLACTONE 25 MG/1
25 TABLET ORAL
COMMUNITY
Start: 2023-06-20 | End: 2023-08-25 | Stop reason: SDUPTHER

## 2023-07-10 RX ORDER — TIOTROPIUM BROMIDE AND OLODATEROL 3.124; 2.736 UG/1; UG/1
SPRAY, METERED RESPIRATORY (INHALATION)
COMMUNITY

## 2023-07-10 RX ORDER — FUROSEMIDE 20 MG/1
20 TABLET ORAL 2 TIMES DAILY
Qty: 60 TABLET | Refills: 11 | Status: SHIPPED | OUTPATIENT
Start: 2023-07-10 | End: 2024-07-09

## 2023-07-10 RX ORDER — CLONAZEPAM 0.5 MG/1
0.5 TABLET ORAL DAILY PRN
COMMUNITY
Start: 2023-06-12 | End: 2023-11-08

## 2023-07-10 NOTE — PROGRESS NOTES
[facility] Note    HPI:  Shonda Borrego is a 57 y.o. female who presents today for Edema (Patient states her feet are swollen for about a month off and on.  She elevates them and the swelling goes down slightly but comes back when she moves around)  Patient has lasix on medication list, but states she is only taking spironolactone 25 mg daily.   Also was started on rifaximin 550 mg BID from her liver specialist and endorses brain fog has been lifted.   She states she has been taken off the liver transplant list, but hopes to have her case reviewed in 6 months after staying sober as instructed by her liver specialist.     Denies fever, chills, sick contacts, dizziness, vision changes, chest pain, palpitations, shortness of breath, abdominal pain, nausea, vomiting.   Stooling 2-3 times a day with lactulose.     ROS   A review of systems was performed and was negative except as noted above.    I personally reviewed allergies, past medical, surgical, social and family history and updated as appropriate.    Medications:    Current Outpatient Medications:     albuterol (PROVENTIL/VENTOLIN HFA) 90 mcg/actuation inhaler, Inhale 2 puffs into the lungs every 6 (six) hours as needed for Wheezing or Shortness of Breath., Disp: 18 g, Rfl: 3    clonazePAM (KLONOPIN) 0.5 MG tablet, Take 0.5 mg by mouth daily as needed., Disp: , Rfl:     gabapentin (NEURONTIN) 300 MG capsule, Take 1 capsule (300 mg total) by mouth every evening., Disp: 30 capsule, Rfl: 2    lactulose (CHRONULAC) 10 gram/15 mL solution, Take 45 mLs (30 g total) by mouth 3 (three) times daily., Disp: 4050 mL, Rfl: 1    mirtazapine (REMERON) 7.5 MG Tab, Take 1 tablet (7.5 mg total) by mouth every evening., Disp: 30 tablet, Rfl: 2    nicotine (NICODERM CQ) 14 mg/24 hr, Place 1 patch onto the skin once daily., Disp: 14 patch, Rfl: 0    nicotine, polacrilex, (NICORETTE) 4 MG Gum, Take 1 each (4 mg total) by mouth as needed (Take 1 each (4 mg total) by  mouth as needed for tobacco cessation. 1-2 per hour in the place of cigarettes. (5-16 daily max) - Oral)., Disp: 300 each, Rfl: 0    ondansetron (ZOFRAN) 4 MG tablet, Take 1 tablet (4 mg total) by mouth 2 (two) times daily., Disp: 30 tablet, Rfl: 0    oxybutynin (DITROPAN-XL) 5 MG TR24, Take 1 tablet (5 mg total) by mouth once daily., Disp: 30 tablet, Rfl: 11    pantoprazole (PROTONIX) 20 MG tablet, Take 20 mg by mouth., Disp: , Rfl:     rifAXIMin (XIFAXAN) 550 mg Tab, Take 1 tablet (550 mg total) by mouth 2 (two) times daily., Disp: 60 tablet, Rfl: 11    rOPINIRole (REQUIP) 1 MG tablet, Take 1 tablet (1 mg total) by mouth every evening., Disp: 90 tablet, Rfl: 1    spironolactone (ALDACTONE) 25 MG tablet, Take 25 mg by mouth., Disp: , Rfl:     tiotropium-olodateroL (STIOLTO RESPIMAT) 2.5-2.5 mcg/actuation Mist, 2 puffs Inhalation Once a day for 30 days, Disp: , Rfl:     VRAYLAR 3 mg Cap, Take 3 mg by mouth., Disp: , Rfl:     clonazepam (KLONOPIN ORAL), Take by mouth., Disp: , Rfl:     furosemide (LASIX) 20 MG tablet, Take 1 tablet (20 mg total) by mouth 2 (two) times daily., Disp: 60 tablet, Rfl: 11    ursodioL (ACTIGALL) 300 mg capsule, Take 1 capsule (300 mg total) by mouth 3 (three) times daily. for 14 days, Disp: 90 capsule, Rfl: 3     Health Maintenance:  Immunization History   Administered Date(s) Administered    COVID-19 MRNA, LN-S PF (MODERNA HALF 0.25 ML DOSE) 12/09/2021    COVID-19, MRNA, LN-S, PF (MODERNA FULL 0.5 ML DOSE) 05/12/2021    COVID-19, MRNA, LN-S, PF (Pfizer) (Gray Cap) 06/03/2022    COVID-19, MRNA, LN-S, PF (Pfizer) (Purple Cap) 06/09/2021, 09/25/2022    COVID-19, mRNA, LNP-S, bivalent booster, PF (Moderna Omicron) 09/25/2022    Hepatitis A, Adult 03/23/2023    Hepatitis B, Adult 04/24/2023, 05/26/2023    Influenza - Quadrivalent - PF *Preferred* (6 months and older) 09/25/2022    Influenza - Trivalent (ADULT) 10/20/2021    Pneumococcal Conjugate - 20 Valent 10/05/2022, 02/07/2023    Tdap  "03/03/2022    Zoster Recombinant 03/03/2022, 09/25/2022      Health Maintenance   Topic Date Due    Mammogram  10/24/2023    LDCT Lung Screen  01/20/2024    Lipid Panel  06/16/2028    TETANUS VACCINE  03/03/2032    Hepatitis C Screening  Completed     Health Maintenance Topics with due status: Not Due       Topic Last Completion Date    TETANUS VACCINE 03/03/2022    Influenza Vaccine 09/25/2022    Mammogram 10/24/2022    LDCT Lung Screen 01/20/2023    Colorectal Cancer Screening 04/14/2023    Cervical Cancer Screening 04/26/2023    Hemoglobin A1c (Diabetic Prevention Screening) 06/16/2023    Lipid Panel 06/16/2023     There are no preventive care reminders to display for this patient.    PHYSICAL EXAM:  Vitals:    07/10/23 1414   BP: 135/60   BP Location: Left arm   Patient Position: Sitting   BP Method: Large (Automatic)   Pulse: 74   Resp: 16   Temp: 98.4 °F (36.9 °C)   TempSrc: Oral   SpO2: 96%   Weight: 99.3 kg (219 lb)   Height: 5' 8" (1.727 m)     Body mass index is 33.3 kg/m².  Physical Exam  Constitutional:       General: She is not in acute distress.     Appearance: Normal appearance. She is not ill-appearing or toxic-appearing.   HENT:      Head: Normocephalic.      Right Ear: External ear normal.      Left Ear: External ear normal.      Mouth/Throat:      Pharynx: Oropharynx is clear. No oropharyngeal exudate or posterior oropharyngeal erythema.   Eyes:      Extraocular Movements: Extraocular movements intact.      Conjunctiva/sclera: Conjunctivae normal.   Neck:      Vascular: No carotid bruit.   Cardiovascular:      Rate and Rhythm: Normal rate and regular rhythm.      Pulses: Normal pulses.      Heart sounds: No murmur heard.  Pulmonary:      Effort: Pulmonary effort is normal. No respiratory distress.      Breath sounds: No wheezing, rhonchi or rales.   Abdominal:      General: Abdomen is flat. There is no distension.      Palpations: Abdomen is soft.      Tenderness: There is no abdominal tenderness. " There is no right CVA tenderness, left CVA tenderness or guarding.   Musculoskeletal:         General: No swelling, tenderness or deformity. Normal range of motion.      Cervical back: Normal range of motion and neck supple. No rigidity or tenderness.      Right lower leg: Edema (+2 pitting bilaterally, no oozing, no erythema) present.      Left lower leg: Edema present.   Lymphadenopathy:      Cervical: No cervical adenopathy.   Skin:     General: Skin is warm and dry.      Capillary Refill: Capillary refill takes less than 2 seconds.      Findings: No erythema or rash.      Comments: Bronzed throughout   Neurological:      General: No focal deficit present.      Mental Status: She is alert and oriented to person, place, and time. Mental status is at baseline.      Motor: No weakness.      Gait: Gait normal.   Psychiatric:         Mood and Affect: Mood normal.         Behavior: Behavior normal.         Thought Content: Thought content normal.         Judgment: Judgment normal.      ASSESSMENT/PLAN:  1. Leg swelling  Assessment & Plan:  Resume lasix as it has fallen off patient's medication list. Currently only taking spironolactone 25 mg.   - resume lasix 20 mg daily  - follow up electrolytes 4 weeks or sooner with worsening symptoms      Orders:  -     furosemide (LASIX) 20 MG tablet; Take 1 tablet (20 mg total) by mouth 2 (two) times daily.  Dispense: 60 tablet; Refill: 11  -     Comprehensive Metabolic Panel; Future; Expected date: 08/10/2023  -     Magnesium; Future; Expected date: 08/10/2023    2. Stasis dermatitis without varicosities  Assessment & Plan:  For leg swelling and leg pain, counseled on daytime wear of compression stockings or CAMPOS hose.   - continue with daily spironolactone and lasix      3. Class 1 obesity with serious comorbidity and body mass index (BMI) of 31.0 to 31.9 in adult, unspecified obesity type  Overview:  Wt Readings from Last 8 Encounters:   07/10/23 99.3 kg (219 lb)   06/02/23  95.6 kg (210 lb 12.2 oz)   05/26/23 93.9 kg (207 lb)   05/17/23 93.4 kg (206 lb)   04/26/23 93 kg (205 lb)   04/24/23 94.3 kg (208 lb)   04/18/23 94.8 kg (209 lb)   03/23/23 94.8 kg (209 lb)       Assessment & Plan:  Weight gain, patient endorse from fluid retention.   Denies any other symptoms.   Body mass index is 33.3 kg/m². Morbid obesity complicates all aspects of disease management from diagnostic modalities to treatment. Weight loss encouraged and health benefits explained to patient.           4. Alcoholic cirrhosis of liver without ascites  Overview:  9/23: MRI 1.8 x 1.6 cm lesion in the right lobe of the liver suspicious for a primary liver neoplasm.    Assessment & Plan:  Has an upcoming MRI scheduled in June 2023.   - patient to schedule colonoscopy with Dr. Juarez's office today  - now off transplant list secondary to ETOH detection through [facility]  - per patient liver transplant team to review patient's case during follow up meeting  - counseled on importance of abstinence to be reinstated as a candidate            Other than changes above, continue current medications and maintain follow up with specialists.      Follow up in about 4 weeks (around 8/7/2023) for BP check, Lab review.   Recent Results (from the past 2016 hour(s))   Blood culture #1 **CANNOT BE ORDERED STAT**    Collection Time: 04/18/23  1:07 PM    Specimen: Blood   Result Value Ref Range    Blood Culture, Routine No growth after 5 days.    Drug screen panel, emergency    Collection Time: 04/18/23  1:09 PM   Result Value Ref Range    Benzodiazepines Negative Negative    Methadone metabolites Negative Negative    Cocaine (Metab.) Negative Negative    Opiate Scrn, Ur Negative Negative    Barbiturate Screen, Ur Negative Negative    Amphetamine Screen, Ur Presumptive Positive (A) Negative    THC Presumptive Positive (A) Negative    Phencyclidine Negative Negative    Creatinine, Urine 78.0 15.0 - 325.0 mg/dL    Toxicology  Information SEE COMMENT    Urinalysis, Reflex to Urine Culture Urine, Clean Catch    Collection Time: 04/18/23  1:09 PM    Specimen: Urine, Catheterized   Result Value Ref Range    Specimen UA Urine, Catheterized     Color, UA Yellow Yellow, Straw, Joycelyn    Appearance, UA Cloudy (A) Clear    pH, UA 8.0 5.0 - 8.0    Specific Gravity, UA 1.010 1.005 - 1.030    Protein, UA Negative Negative    Glucose, UA Negative Negative    Ketones, UA Negative Negative    Bilirubin (UA) Negative Negative    Occult Blood UA Negative Negative    Nitrite, UA Negative Negative    Urobilinogen, UA 1.0 <2.0 EU/dL    Leukocytes, UA Negative Negative   Urinalysis Microscopic    Collection Time: 04/18/23  1:09 PM   Result Value Ref Range    RBC, UA 2 0 - 4 /hpf    WBC, UA 2 0 - 5 /hpf    Bacteria Moderate (A) None-Occ /hpf    Squam Epithel, UA 3 /hpf    Hyaline Casts, UA 0.0 (A) 0-1/lpf /lpf    Microscopic Comment SEE COMMENT    POCT ARTERIAL BLOOD GAS    Collection Time: 04/18/23  1:27 PM   Result Value Ref Range    POC PH 7.440 7.345 - 7.450    POC PCO2 32.7 (L) 35.0 - 45.0 mmHg    POC PO2 78.4 (L) 80.0 - 100 mmHg    POC SATURATED O2 96.1 95.0 - 100.0 %    POC HCO3 23.5 (L) 24.0 - 28.0 mmol/l    Base Deficit -1.9 -2.0 - 2.0 mmol/l    POC Temp 37.0 C    Specimen source Arterial     Performed By: Vargas     MODIFIED BRAYAN'S TEST NA     FiO2 21.0 %    O2DEVICE Room Air     Comments DRAWN PER GINI SMALLS RRT    CBC auto differential    Collection Time: 04/18/23  1:28 PM   Result Value Ref Range    WBC 11.78 3.90 - 12.70 K/uL    RBC 5.28 4.00 - 5.40 M/uL    Hemoglobin 16.3 (H) 12.0 - 16.0 g/dL    Hematocrit 48.5 37.0 - 48.5 %    MCV 92 82 - 98 fL    MCH 30.9 27.0 - 31.0 pg    MCHC 33.6 32.0 - 36.0 g/dL    RDW 16.3 (H) 11.5 - 14.5 %    Platelets 149 (L) 150 - 450 K/uL    MPV 9.9 9.2 - 12.9 fL    Immature Granulocytes 0.3 0.0 - 0.5 %    Gran # (ANC) 8.6 (H) 1.8 - 7.7 K/uL    Immature Grans (Abs) 0.04 0.00 - 0.04 K/uL    Lymph # 2.3 1.0 - 4.8  K/uL    Mono # 0.8 0.3 - 1.0 K/uL    Eos # 0.1 0.0 - 0.5 K/uL    Baso # 0.03 0.00 - 0.20 K/uL    nRBC 0 0 /100 WBC    Gran % 73.1 (H) 38.0 - 73.0 %    Lymph % 19.3 18.0 - 48.0 %    Mono % 6.6 4.0 - 15.0 %    Eosinophil % 0.4 0.0 - 8.0 %    Basophil % 0.3 0.0 - 1.9 %    Differential Method Automated    Comprehensive metabolic panel    Collection Time: 04/18/23  1:28 PM   Result Value Ref Range    Sodium 142 136 - 145 mmol/L    Potassium 4.4 3.5 - 5.1 mmol/L    Chloride 115 (H) 95 - 110 mmol/L    CO2 24 23 - 29 mmol/L    Glucose 127 (H) 70 - 110 mg/dL    BUN 11 6 - 20 mg/dL    Creatinine 0.9 0.5 - 1.4 mg/dL    Calcium 9.7 8.7 - 10.5 mg/dL    Total Protein 7.0 6.0 - 8.4 g/dL    Albumin 3.1 (L) 3.5 - 5.2 g/dL    Total Bilirubin 2.9 (H) 0.1 - 1.0 mg/dL    Alkaline Phosphatase 228 (H) 55 - 135 U/L    AST 36 10 - 40 U/L    ALT 41 10 - 44 U/L    Anion Gap 3 (L) 8 - 16 mmol/L    eGFR >60.0 >60 mL/min/1.73 m^2   Lipase    Collection Time: 04/18/23  1:28 PM   Result Value Ref Range    Lipase Result 54 23 - 300 U/L   Ethanol    Collection Time: 04/18/23  1:28 PM   Result Value Ref Range    Alcohol, Serum <3 <10 mg/dL   Ammonia    Collection Time: 04/18/23  1:28 PM   Result Value Ref Range    Ammonia 114 (H) 10 - 50 umol/L   Lactic acid, plasma    Collection Time: 04/18/23  1:28 PM   Result Value Ref Range    Lactate (Lactic Acid) 1.9 0.5 - 2.2 mmol/L   Blood culture #2 **CANNOT BE ORDERED STAT**    Collection Time: 04/18/23  1:28 PM    Specimen: Blood   Result Value Ref Range    Blood Culture, Routine No growth after 5 days.    CBC auto differential    Collection Time: 04/19/23  5:20 AM   Result Value Ref Range    WBC 8.75 3.90 - 12.70 K/uL    RBC 4.80 4.00 - 5.40 M/uL    Hemoglobin 14.9 12.0 - 16.0 g/dL    Hematocrit 44.5 37.0 - 48.5 %    MCV 93 82 - 98 fL    MCH 31.0 27.0 - 31.0 pg    MCHC 33.5 32.0 - 36.0 g/dL    RDW 16.3 (H) 11.5 - 14.5 %    Platelets 142 (L) 150 - 450 K/uL    MPV 10.4 9.2 - 12.9 fL    Immature Granulocytes  0.2 0.0 - 0.5 %    Gran # (ANC) 4.8 1.8 - 7.7 K/uL    Immature Grans (Abs) 0.02 0.00 - 0.04 K/uL    Lymph # 2.9 1.0 - 4.8 K/uL    Mono # 0.8 0.3 - 1.0 K/uL    Eos # 0.2 0.0 - 0.5 K/uL    Baso # 0.06 0.00 - 0.20 K/uL    nRBC 0 0 /100 WBC    Gran % 54.9 38.0 - 73.0 %    Lymph % 32.9 18.0 - 48.0 %    Mono % 9.5 4.0 - 15.0 %    Eosinophil % 1.8 0.0 - 8.0 %    Basophil % 0.7 0.0 - 1.9 %    Differential Method Automated    Comprehensive metabolic panel    Collection Time: 04/19/23  5:20 AM   Result Value Ref Range    Sodium 145 136 - 145 mmol/L    Potassium 3.4 (L) 3.5 - 5.1 mmol/L    Chloride 115 (H) 95 - 110 mmol/L    CO2 25 23 - 29 mmol/L    Glucose 108 70 - 110 mg/dL    BUN 11 6 - 20 mg/dL    Creatinine 0.8 0.5 - 1.4 mg/dL    Calcium 9.0 8.7 - 10.5 mg/dL    Total Protein 6.1 6.0 - 8.4 g/dL    Albumin 2.8 (L) 3.5 - 5.2 g/dL    Total Bilirubin 3.5 (H) 0.1 - 1.0 mg/dL    Alkaline Phosphatase 172 (H) 55 - 135 U/L    AST 27 10 - 40 U/L    ALT 33 10 - 44 U/L    Anion Gap 5 (L) 8 - 16 mmol/L    eGFR >60.0 >60 mL/min/1.73 m^2   Ammonia    Collection Time: 04/19/23  5:20 AM   Result Value Ref Range    Ammonia 29 10 - 50 umol/L   Protime-INR    Collection Time: 04/19/23  5:20 AM   Result Value Ref Range    Prothrombin Time 14.1 (H) 9.0 - 12.5 sec    INR 1.3 (H) 0.8 - 1.2   Comprehensive Metabolic Panel    Collection Time: 04/20/23  6:48 AM   Result Value Ref Range    Sodium 141 136 - 145 mmol/L    Potassium 3.8 3.5 - 5.1 mmol/L    Chloride 113 (H) 95 - 110 mmol/L    CO2 26 23 - 29 mmol/L    Glucose 131 (H) 70 - 110 mg/dL    BUN 10 6 - 20 mg/dL    Creatinine 0.9 0.5 - 1.4 mg/dL    Calcium 8.8 8.7 - 10.5 mg/dL    Total Protein 5.9 (L) 6.0 - 8.4 g/dL    Albumin 2.6 (L) 3.5 - 5.2 g/dL    Total Bilirubin 3.7 (H) 0.1 - 1.0 mg/dL    Alkaline Phosphatase 168 (H) 55 - 135 U/L    AST 26 10 - 40 U/L    ALT 35 10 - 44 U/L    Anion Gap 2 (L) 8 - 16 mmol/L    eGFR >60.0 >60 mL/min/1.73 m^2   CBC Auto Differential    Collection Time: 04/20/23   6:48 AM   Result Value Ref Range    WBC 7.33 3.90 - 12.70 K/uL    RBC 4.47 4.00 - 5.40 M/uL    Hemoglobin 14.2 12.0 - 16.0 g/dL    Hematocrit 42.0 37.0 - 48.5 %    MCV 94 82 - 98 fL    MCH 31.8 (H) 27.0 - 31.0 pg    MCHC 33.8 32.0 - 36.0 g/dL    RDW 16.3 (H) 11.5 - 14.5 %    Platelets 117 (L) 150 - 450 K/uL    MPV 10.3 9.2 - 12.9 fL    Immature Granulocytes 0.4 0.0 - 0.5 %    Gran # (ANC) 4.1 1.8 - 7.7 K/uL    Immature Grans (Abs) 0.03 0.00 - 0.04 K/uL    Lymph # 2.2 1.0 - 4.8 K/uL    Mono # 0.8 0.3 - 1.0 K/uL    Eos # 0.2 0.0 - 0.5 K/uL    Baso # 0.06 0.00 - 0.20 K/uL    nRBC 0 0 /100 WBC    Gran % 55.4 38.0 - 73.0 %    Lymph % 29.7 18.0 - 48.0 %    Mono % 11.5 4.0 - 15.0 %    Eosinophil % 2.2 0.0 - 8.0 %    Basophil % 0.8 0.0 - 1.9 %    Differential Method Automated    Liquid-Based Pap Smear, Screening    Collection Time: 04/26/23 10:04 AM   Result Value Ref Range    Liquid Based Pap Smear, Screening See Final Cytology Report    HPV High Risk Genotypes, PCR    Collection Time: 04/26/23 10:04 AM   Result Value Ref Range    HPV DNA High Risk See result image under hyperlink    Drug screen panel, in-house    Collection Time: 05/08/23 10:38 AM   Result Value Ref Range    Benzodiazepines Presumptive Positive (A) Negative    Methadone metabolites Negative Negative    Cocaine (Metab.) Negative Negative    Opiate Scrn, Ur Negative Negative    Barbiturate Screen, Ur Negative Negative    Amphetamine Screen, Ur Negative Negative    THC Presumptive Positive (A) Negative    Phencyclidine Negative Negative    Creatinine, Urine 539.0 (H) 15.0 - 325.0 mg/dL    Toxicology Information SEE COMMENT    AFP Tumor Marker    Collection Time: 05/29/23  8:40 AM   Result Value Ref Range    AFP 4.2 0.0 - 8.4 ng/mL   Comprehensive Metabolic Panel    Collection Time: 05/29/23  8:40 AM   Result Value Ref Range    Sodium 141 136 - 145 mmol/L    Potassium 4.3 3.5 - 5.1 mmol/L    Chloride 112 (H) 95 - 110 mmol/L    CO2 27 23 - 29 mmol/L     Glucose 206 (H) 70 - 110 mg/dL    BUN 11 6 - 20 mg/dL    Creatinine 1.0 0.5 - 1.4 mg/dL    Calcium 9.3 8.7 - 10.5 mg/dL    Total Protein 6.7 6.0 - 8.4 g/dL    Albumin 2.7 (L) 3.5 - 5.2 g/dL    Total Bilirubin 2.1 (H) 0.1 - 1.0 mg/dL    Alkaline Phosphatase 241 (H) 55 - 135 U/L    AST 39 10 - 40 U/L    ALT 44 10 - 44 U/L    Anion Gap 2 (L) 8 - 16 mmol/L    eGFR >60.0 >60 mL/min/1.73 m^2   CBC Auto Differential    Collection Time: 05/29/23  8:40 AM   Result Value Ref Range    WBC 5.39 3.90 - 12.70 K/uL    RBC 4.43 4.00 - 5.40 M/uL    Hemoglobin 14.0 12.0 - 16.0 g/dL    Hematocrit 41.6 37.0 - 48.5 %    MCV 94 82 - 98 fL    MCH 31.6 (H) 27.0 - 31.0 pg    MCHC 33.7 32.0 - 36.0 g/dL    RDW 14.1 11.5 - 14.5 %    Platelets 138 (L) 150 - 450 K/uL    MPV 10.9 9.2 - 12.9 fL    Immature Granulocytes 0.2 0.0 - 0.5 %    Gran # (ANC) 2.6 1.8 - 7.7 K/uL    Immature Grans (Abs) 0.01 0.00 - 0.04 K/uL    Lymph # 2.1 1.0 - 4.8 K/uL    Mono # 0.5 0.3 - 1.0 K/uL    Eos # 0.1 0.0 - 0.5 K/uL    Baso # 0.07 0.00 - 0.20 K/uL    nRBC 0 0 /100 WBC    Gran % 49.0 38.0 - 73.0 %    Lymph % 38.2 18.0 - 48.0 %    Mono % 8.9 4.0 - 15.0 %    Eosinophil % 2.4 0.0 - 8.0 %    Basophil % 1.3 0.0 - 1.9 %    Differential Method Automated    Protime-INR    Collection Time: 05/29/23  8:40 AM   Result Value Ref Range    Prothrombin Time 12.9 (H) 9.0 - 12.5 sec    INR 1.3 (H) 0.8 - 1.2   Phosphatidylethanol (PETH)    Collection Time: 05/29/23  8:40 AM   Result Value Ref Range    PEth 16:0/18.1 (POPEth) <10 Cutoff: 10 ng/mL    PEth 16:0/18.2 (PLPEth) <10 Cutoff: 10 ng/mL    PETH INTERPRETATION Negative.    Toxicology Screen, Urine    Collection Time: 06/02/23 11:39 AM   Result Value Ref Range    Alcohol, Urine 12 (A) <10 mg/dL    Benzodiazepines Presumptive Positive (A) Negative    Methadone metabolites Negative Negative    Cocaine (Metab.) Negative Negative    Opiate Scrn, Ur Negative Negative    Barbiturate Screen, Ur Negative Negative    Amphetamine Screen,  Ur Negative Negative    THC Negative Negative    Phencyclidine Negative Negative    Creatinine, Urine 121.0 15.0 - 325.0 mg/dL    Toxicology Information SEE COMMENT    Drug screen panel, in-house    Collection Time: 06/08/23  9:42 AM   Result Value Ref Range    Benzodiazepines Negative Negative    Methadone metabolites Negative Negative    Cocaine (Metab.) Negative Negative    Opiate Scrn, Ur Negative Negative    Barbiturate Screen, Ur Negative Negative    Amphetamine Screen, Ur Negative Negative    THC Negative Negative    Phencyclidine Negative Negative    Creatinine, Urine 286.0 15.0 - 325.0 mg/dL    Toxicology Information SEE COMMENT    Hemoglobin A1C    Collection Time: 06/16/23  7:40 AM   Result Value Ref Range    Hemoglobin A1C 5.4 4.0 - 5.6 %    Estimated Avg Glucose 108 68 - 131 mg/dL   Lipid Panel    Collection Time: 06/16/23  7:40 AM   Result Value Ref Range    Cholesterol 121 120 - 199 mg/dL    Triglycerides 82 30 - 150 mg/dL    HDL 92 (H) 40 - 75 mg/dL    LDL Cholesterol 12.6 (L) 63.0 - 159.0 mg/dL    HDL/Cholesterol Ratio 76.0 (H) 20.0 - 50.0 %    Total Cholesterol/HDL Ratio 1.3 (L) 2.0 - 5.0    Non-HDL Cholesterol 29 mg/dL   Drug screen panel, in-house    Collection Time: 07/07/23  9:42 AM   Result Value Ref Range    Benzodiazepines Negative Negative    Methadone metabolites Negative Negative    Cocaine (Metab.) Negative Negative    Opiate Scrn, Ur Negative Negative    Barbiturate Screen, Ur Negative Negative    Amphetamine Screen, Ur Negative Negative    THC Negative Negative    Phencyclidine Negative Negative    Creatinine, Urine 193.0 15.0 - 325.0 mg/dL    Toxicology Information SEE COMMENT      DO Doug Suarezsshira [facility]

## 2023-07-12 DIAGNOSIS — G25.81 RESTLESS LEG SYNDROME: ICD-10-CM

## 2023-07-12 RX ORDER — GABAPENTIN 300 MG/1
CAPSULE ORAL
Qty: 30 CAPSULE | Refills: 0 | Status: SHIPPED | OUTPATIENT
Start: 2023-07-12 | End: 2023-08-14

## 2023-07-26 ENCOUNTER — TELEPHONE (OUTPATIENT)
Dept: PRIMARY CARE CLINIC | Facility: CLINIC | Age: 58
End: 2023-07-26
Payer: MEDICAID

## 2023-07-26 NOTE — TELEPHONE ENCOUNTER
Patient states she caught pink eye from her grandson and wants to know if you can send medication to the pharmacy.  [facility]

## 2023-07-31 ENCOUNTER — OFFICE VISIT (OUTPATIENT)
Dept: SURGERY | Facility: CLINIC | Age: 58
End: 2023-07-31
Payer: MEDICAID

## 2023-07-31 VITALS
DIASTOLIC BLOOD PRESSURE: 69 MMHG | HEIGHT: 68 IN | WEIGHT: 214.94 LBS | HEART RATE: 79 BPM | SYSTOLIC BLOOD PRESSURE: 141 MMHG | OXYGEN SATURATION: 96 % | BODY MASS INDEX: 32.58 KG/M2

## 2023-07-31 DIAGNOSIS — Z12.11 SCREEN FOR COLON CANCER: Primary | ICD-10-CM

## 2023-07-31 DIAGNOSIS — Z01.818 PRE-OP TESTING: ICD-10-CM

## 2023-07-31 PROCEDURE — 99215 OFFICE O/P EST HI 40 MIN: CPT | Mod: PBBFAC | Performed by: STUDENT IN AN ORGANIZED HEALTH CARE EDUCATION/TRAINING PROGRAM

## 2023-07-31 PROCEDURE — 3077F SYST BP >= 140 MM HG: CPT | Mod: CPTII,,, | Performed by: STUDENT IN AN ORGANIZED HEALTH CARE EDUCATION/TRAINING PROGRAM

## 2023-07-31 PROCEDURE — 3044F HG A1C LEVEL LT 7.0%: CPT | Mod: CPTII,,, | Performed by: STUDENT IN AN ORGANIZED HEALTH CARE EDUCATION/TRAINING PROGRAM

## 2023-07-31 PROCEDURE — 99999 PR PBB SHADOW E&M-EST. PATIENT-LVL V: CPT | Mod: PBBFAC,,, | Performed by: STUDENT IN AN ORGANIZED HEALTH CARE EDUCATION/TRAINING PROGRAM

## 2023-07-31 PROCEDURE — 1159F PR MEDICATION LIST DOCUMENTED IN MEDICAL RECORD: ICD-10-PCS | Mod: CPTII,,, | Performed by: STUDENT IN AN ORGANIZED HEALTH CARE EDUCATION/TRAINING PROGRAM

## 2023-07-31 PROCEDURE — 3078F PR MOST RECENT DIASTOLIC BLOOD PRESSURE < 80 MM HG: ICD-10-PCS | Mod: CPTII,,, | Performed by: STUDENT IN AN ORGANIZED HEALTH CARE EDUCATION/TRAINING PROGRAM

## 2023-07-31 PROCEDURE — 99213 PR OFFICE/OUTPT VISIT, EST, LEVL III, 20-29 MIN: ICD-10-PCS | Mod: S$PBB,,, | Performed by: STUDENT IN AN ORGANIZED HEALTH CARE EDUCATION/TRAINING PROGRAM

## 2023-07-31 PROCEDURE — 3078F DIAST BP <80 MM HG: CPT | Mod: CPTII,,, | Performed by: STUDENT IN AN ORGANIZED HEALTH CARE EDUCATION/TRAINING PROGRAM

## 2023-07-31 PROCEDURE — 3077F PR MOST RECENT SYSTOLIC BLOOD PRESSURE >= 140 MM HG: ICD-10-PCS | Mod: CPTII,,, | Performed by: STUDENT IN AN ORGANIZED HEALTH CARE EDUCATION/TRAINING PROGRAM

## 2023-07-31 PROCEDURE — 99999 PR PBB SHADOW E&M-EST. PATIENT-LVL V: ICD-10-PCS | Mod: PBBFAC,,, | Performed by: STUDENT IN AN ORGANIZED HEALTH CARE EDUCATION/TRAINING PROGRAM

## 2023-07-31 PROCEDURE — 3008F BODY MASS INDEX DOCD: CPT | Mod: CPTII,,, | Performed by: STUDENT IN AN ORGANIZED HEALTH CARE EDUCATION/TRAINING PROGRAM

## 2023-07-31 PROCEDURE — 3044F PR MOST RECENT HEMOGLOBIN A1C LEVEL <7.0%: ICD-10-PCS | Mod: CPTII,,, | Performed by: STUDENT IN AN ORGANIZED HEALTH CARE EDUCATION/TRAINING PROGRAM

## 2023-07-31 PROCEDURE — 99213 OFFICE O/P EST LOW 20 MIN: CPT | Mod: S$PBB,,, | Performed by: STUDENT IN AN ORGANIZED HEALTH CARE EDUCATION/TRAINING PROGRAM

## 2023-07-31 PROCEDURE — 1159F MED LIST DOCD IN RCRD: CPT | Mod: CPTII,,, | Performed by: STUDENT IN AN ORGANIZED HEALTH CARE EDUCATION/TRAINING PROGRAM

## 2023-07-31 PROCEDURE — 3008F PR BODY MASS INDEX (BMI) DOCUMENTED: ICD-10-PCS | Mod: CPTII,,, | Performed by: STUDENT IN AN ORGANIZED HEALTH CARE EDUCATION/TRAINING PROGRAM

## 2023-07-31 RX ORDER — SODIUM CHLORIDE 9 MG/ML
INJECTION, SOLUTION INTRAVENOUS CONTINUOUS
Status: CANCELLED | OUTPATIENT
Start: 2023-07-31

## 2023-07-31 RX ORDER — SOD SULF/POT CHLORIDE/MAG SULF 1.479 G
12 TABLET ORAL 2 TIMES DAILY
Qty: 24 TABLET | Refills: 0 | Status: ON HOLD | OUTPATIENT
Start: 2023-07-31 | End: 2023-08-09 | Stop reason: CLARIF

## 2023-07-31 RX ORDER — SODIUM CHLORIDE 0.9 % (FLUSH) 0.9 %
10 SYRINGE (ML) INJECTION
Status: CANCELLED | OUTPATIENT
Start: 2023-07-31

## 2023-07-31 NOTE — H&P (VIEW-ONLY)
[facility] H&P      Consult: Screening colonoscopy   Consulting Service: PCP  Chief Complaint: None        HPI: Pt is a 57 y.o.female with PMH sig for cholelithiais and EtOH cirrhosis s/p TIPS who presents for routine screening colonoscopy.   No personal or family history of colon cancer.  Last colonoscopy in 2020 - poor prep with one polyp - with recommendations for repeat in 1 year.  Has daily regular bowel movements.  Denies melena, rectal bleeding or pain, change in bowel habits or unintended weight loss.  Denies CP, SOB, abdominal pain, nausea, vomiting, fevers, or chills. - unchanged from original H&P.  Pt presents today for pre-op        PMH:   Past Medical History:   Diagnosis Date    Anxiety     Bipolar disorder     BMI 32.0-32.9,adult 2/7/2023    Wt Readings from Last 8 Encounters: 02/07/23 95.7 kg (211 lb) 01/25/23 99.8 kg (220 lb) 01/20/23 97.1 kg (214 lb 1.1 oz) 01/20/23 97.1 kg (214 lb 1.1 oz) 01/20/23 97.1 kg (214 lb 1.1 oz) 01/19/23 100.2 kg (221 lb) 12/26/22 100.2 kg (221 lb) 12/14/22 99.8 kg (220 lb) Recommendations:  Stay physically active. As tolerated alternate resistance training with stretching and cardio. Goal of 150 minutes     BMI 34.0-34.9,adult 3/18/2022    Cirrhosis     Constipation 7/26/2022    COPD (chronic obstructive pulmonary disease)     GERD (gastroesophageal reflux disease)     Hepatic encephalopathy 4/17/2023    Hyperammonemia 4/21/2022    Restless leg syndrome     Urinary frequency      PSH:   Past Surgical History:   Procedure Laterality Date    TIPS PROCEDURE       Meds: See medication list;  No anticoagulation  ALL: Patient has no known allergies.  FHX: non contributory   SOC:   Social History     Socioeconomic History    Marital status: Single    Number of children: 3   Tobacco Use    Smoking status: Every Day     Current packs/day: 1.50     Average packs/day: 1.5 packs/day for 48.6 years (72.9 ttl pk-yrs)     Types: Cigarettes     Start date:  1975     Passive exposure: Past    Smokeless tobacco: Never    Tobacco comments:     Pt active in [facility] smoking cessation program   Substance and Sexual Activity    Alcohol use: Not Currently     Comment: socially    Drug use: Not Currently     Types: Marijuana    Sexual activity: Not Currently   Social History Narrative    Lives with her boyfriend      Social Determinants of Health     Financial Resource Strain: Low Risk  (4/19/2023)    Overall Financial Resource Strain (CARDIA)     Difficulty of Paying Living Expenses: Not hard at all   Food Insecurity: No Food Insecurity (4/19/2023)    Hunger Vital Sign     Worried About Running Out of Food in the Last Year: Never true     Ran Out of Food in the Last Year: Never true   Transportation Needs: No Transportation Needs (7/10/2023)    PRAPARE - Transportation     Lack of Transportation (Medical): No     Lack of Transportation (Non-Medical): No   Recent Concern: Transportation Needs - Unmet Transportation Needs (5/26/2023)    PRAPARE - Transportation     Lack of Transportation (Medical): Yes     Lack of Transportation (Non-Medical): Yes   Physical Activity: Sufficiently Active (4/19/2023)    Exercise Vital Sign     Days of Exercise per Week: 2 days     Minutes of Exercise per Session: 120 min   Stress: Stress Concern Present (4/19/2023)    [employer] [place] [employer] - Occupational Stress Questionnaire     Feeling of Stress : Rather much   Social Connections: Unknown (4/19/2023)    Social Connection and Isolation Panel [NHANES]     Frequency of Communication with Friends and Family: Twice a week     Attends Congregational Services: 1 to 4 times per year     Active Member of Clubs or Organizations: No     Attends Club or Organization Meetings: Never     Marital Status: Living with partner   Housing Stability: Low Risk  (4/19/2023)    Housing Stability Vital Sign     Unable to Pay for Housing in the Last Year: No     Number of Places Lived in the Last Year: 1      Unstable Housing in the Last Year: No     ROS: Review of Systems   Constitutional:  Negative for chills, fever, malaise/fatigue and weight loss.   Respiratory:  Negative for cough.    Cardiovascular:  Negative for chest pain.   Gastrointestinal:  Negative for abdominal pain, blood in stool, constipation, diarrhea, heartburn, melena, nausea and vomiting.   All other systems reviewed and are negative.          Physical Exam:  There were no vitals taken for this visit.  Physical Exam  Constitutional:       General: She is not in acute distress.     Appearance: She is obese. She is not ill-appearing or toxic-appearing.   Cardiovascular:      Rate and Rhythm: Normal rate and regular rhythm.   Pulmonary:      Effort: Pulmonary effort is normal. No respiratory distress.   Abdominal:      General: There is no distension.      Palpations: Abdomen is soft.      Tenderness: There is no abdominal tenderness. There is no guarding or rebound.   Musculoskeletal:      Right lower leg: No edema.      Left lower leg: No edema.   Skin:     General: Skin is warm and dry.      Capillary Refill: Capillary refill takes less than 2 seconds.   Neurological:      Mental Status: She is alert. Mental status is at baseline.               A/P: Pt is a 57 y.o.female who presents for routine screening colonoscopy  --To Endo on 8/9 for colonoscopy  --Procedure in detail explained to patient;  including risks including but not limited to bleeding, infection, damage to surrounding structures, and perforation- patient voiced understanding  --CBC, CMP, CXR, EKG   --Bowel prep given - Sutab  --CLD day before procedure        Sweetie Juarez MD  General Surgery   [phone]

## 2023-07-31 NOTE — PROGRESS NOTES
[facility] H&P      Consult: Screening colonoscopy   Consulting Service: PCP  Chief Complaint: None        HPI: Pt is a 57 y.o.female with PMH sig for cholelithiais and EtOH cirrhosis s/p TIPS who presents for routine screening colonoscopy.   No personal or family history of colon cancer.  Last colonoscopy in 2020 - poor prep with one polyp - with recommendations for repeat in 1 year.  Has daily regular bowel movements.  Denies melena, rectal bleeding or pain, change in bowel habits or unintended weight loss.  Denies CP, SOB, abdominal pain, nausea, vomiting, fevers, or chills. - unchanged from original H&P.  Pt presents today for pre-op        PMH:   Past Medical History:   Diagnosis Date    Anxiety     Bipolar disorder     BMI 32.0-32.9,adult 2/7/2023    Wt Readings from Last 8 Encounters: 02/07/23 95.7 kg (211 lb) 01/25/23 99.8 kg (220 lb) 01/20/23 97.1 kg (214 lb 1.1 oz) 01/20/23 97.1 kg (214 lb 1.1 oz) 01/20/23 97.1 kg (214 lb 1.1 oz) 01/19/23 100.2 kg (221 lb) 12/26/22 100.2 kg (221 lb) 12/14/22 99.8 kg (220 lb) Recommendations:  Stay physically active. As tolerated alternate resistance training with stretching and cardio. Goal of 150 minutes     BMI 34.0-34.9,adult 3/18/2022    Cirrhosis     Constipation 7/26/2022    COPD (chronic obstructive pulmonary disease)     GERD (gastroesophageal reflux disease)     Hepatic encephalopathy 4/17/2023    Hyperammonemia 4/21/2022    Restless leg syndrome     Urinary frequency      PSH:   Past Surgical History:   Procedure Laterality Date    TIPS PROCEDURE       Meds: See medication list;  No anticoagulation  ALL: Patient has no known allergies.  FHX: non contributory   SOC:   Social History     Socioeconomic History    Marital status: Single    Number of children: 3   Tobacco Use    Smoking status: Every Day     Current packs/day: 1.50     Average packs/day: 1.5 packs/day for 48.6 years (72.9 ttl pk-yrs)     Types: Cigarettes     Start date:  1975     Passive exposure: Past    Smokeless tobacco: Never    Tobacco comments:     Pt active in [facility] smoking cessation program   Substance and Sexual Activity    Alcohol use: Not Currently     Comment: socially    Drug use: Not Currently     Types: Marijuana    Sexual activity: Not Currently   Social History Narrative    Lives with her boyfriend      Social Determinants of Health     Financial Resource Strain: Low Risk  (4/19/2023)    Overall Financial Resource Strain (CARDIA)     Difficulty of Paying Living Expenses: Not hard at all   Food Insecurity: No Food Insecurity (4/19/2023)    Hunger Vital Sign     Worried About Running Out of Food in the Last Year: Never true     Ran Out of Food in the Last Year: Never true   Transportation Needs: No Transportation Needs (7/10/2023)    PRAPARE - Transportation     Lack of Transportation (Medical): No     Lack of Transportation (Non-Medical): No   Recent Concern: Transportation Needs - Unmet Transportation Needs (5/26/2023)    PRAPARE - Transportation     Lack of Transportation (Medical): Yes     Lack of Transportation (Non-Medical): Yes   Physical Activity: Sufficiently Active (4/19/2023)    Exercise Vital Sign     Days of Exercise per Week: 2 days     Minutes of Exercise per Session: 120 min   Stress: Stress Concern Present (4/19/2023)    [employer] [place] [employer] - Occupational Stress Questionnaire     Feeling of Stress : Rather much   Social Connections: Unknown (4/19/2023)    Social Connection and Isolation Panel [NHANES]     Frequency of Communication with Friends and Family: Twice a week     Attends Nondenominational Services: 1 to 4 times per year     Active Member of Clubs or Organizations: No     Attends Club or Organization Meetings: Never     Marital Status: Living with partner   Housing Stability: Low Risk  (4/19/2023)    Housing Stability Vital Sign     Unable to Pay for Housing in the Last Year: No     Number of Places Lived in the Last Year: 1      Unstable Housing in the Last Year: No     ROS: Review of Systems   Constitutional:  Negative for chills, fever, malaise/fatigue and weight loss.   Respiratory:  Negative for cough.    Cardiovascular:  Negative for chest pain.   Gastrointestinal:  Negative for abdominal pain, blood in stool, constipation, diarrhea, heartburn, melena, nausea and vomiting.   All other systems reviewed and are negative.          Physical Exam:  There were no vitals taken for this visit.  Physical Exam  Constitutional:       General: She is not in acute distress.     Appearance: She is obese. She is not ill-appearing or toxic-appearing.   Cardiovascular:      Rate and Rhythm: Normal rate and regular rhythm.   Pulmonary:      Effort: Pulmonary effort is normal. No respiratory distress.   Abdominal:      General: There is no distension.      Palpations: Abdomen is soft.      Tenderness: There is no abdominal tenderness. There is no guarding or rebound.   Musculoskeletal:      Right lower leg: No edema.      Left lower leg: No edema.   Skin:     General: Skin is warm and dry.      Capillary Refill: Capillary refill takes less than 2 seconds.   Neurological:      Mental Status: She is alert. Mental status is at baseline.               A/P: Pt is a 57 y.o.female who presents for routine screening colonoscopy  --To Endo on 8/9 for colonoscopy  --Procedure in detail explained to patient;  including risks including but not limited to bleeding, infection, damage to surrounding structures, and perforation- patient voiced understanding  --CBC, CMP, CXR, EKG   --Bowel prep given - Sutab  --CLD day before procedure        Sweetie Juarez MD  General Surgery   [phone]

## 2023-08-07 ENCOUNTER — HOSPITAL ENCOUNTER (OUTPATIENT)
Dept: PULMONOLOGY | Facility: HOSPITAL | Age: 58
Discharge: HOME | End: 2023-08-07
Attending: STUDENT IN AN ORGANIZED HEALTH CARE EDUCATION/TRAINING PROGRAM
Payer: MEDICAID

## 2023-08-07 ENCOUNTER — HOSPITAL ENCOUNTER (OUTPATIENT)
Dept: PREADMISSION TESTING | Facility: HOSPITAL | Age: 58
Discharge: HOME | End: 2023-08-07
Attending: STUDENT IN AN ORGANIZED HEALTH CARE EDUCATION/TRAINING PROGRAM
Payer: MEDICAID

## 2023-08-07 VITALS — BODY MASS INDEX: 32.43 KG/M2 | WEIGHT: 214 LBS | HEIGHT: 68 IN

## 2023-08-07 DIAGNOSIS — Z12.11 SCREEN FOR COLON CANCER: ICD-10-CM

## 2023-08-07 DIAGNOSIS — Z01.818 PRE-OP TESTING: ICD-10-CM

## 2023-08-07 PROCEDURE — 93010 EKG 12-LEAD: ICD-10-PCS | Mod: ,,, | Performed by: INTERNAL MEDICINE

## 2023-08-07 PROCEDURE — 93010 ELECTROCARDIOGRAM REPORT: CPT | Mod: ,,, | Performed by: INTERNAL MEDICINE

## 2023-08-07 PROCEDURE — 93005 ELECTROCARDIOGRAM TRACING: CPT

## 2023-08-08 ENCOUNTER — ANESTHESIA EVENT (OUTPATIENT)
Dept: ENDOSCOPY | Facility: HOSPITAL | Age: 58
End: 2023-08-08
Payer: MEDICAID

## 2023-08-08 NOTE — ANESTHESIA PREPROCEDURE EVALUATION
08/08/2023  Shonda Borrego is a 57 y.o., female.      Pre-op Assessment    I have reviewed the Patient Summary Reports.    I have reviewed the NPO Status.   I have reviewed the Medications.     Review of Systems  Anesthesia Hx:  No problems with previous Anesthesia  Denies Family Hx of Anesthesia complications.   Denies Personal Hx of Anesthesia complications.   Social:  Smoker    Cardiovascular:  Cardiovascular Normal  ECG has been reviewed.    Pulmonary:   COPD, moderate    Renal/:  Renal/ Normal     Hepatic/GI:   GERD, well controlled Liver Disease, CIRRHOSIS   Neurological:  Neurology Normal    Endocrine:  Endocrine Normal    Psych:   Psychiatric History anxiety BIPOLAR       Lab Results   Component Value Date    WBC 4.73 08/07/2023    HGB 14.2 08/07/2023    HCT 40.8 08/07/2023    MCV 91 08/07/2023     (L) 08/07/2023     CMP  Sodium   Date Value Ref Range Status   08/07/2023 140 136 - 145 mmol/L Final     Potassium   Date Value Ref Range Status   08/07/2023 3.6 3.5 - 5.1 mmol/L Final     Chloride   Date Value Ref Range Status   08/07/2023 110 95 - 110 mmol/L Final     CO2   Date Value Ref Range Status   08/07/2023 25 23 - 29 mmol/L Final     Glucose   Date Value Ref Range Status   08/07/2023 245 (H) 70 - 110 mg/dL Final     BUN   Date Value Ref Range Status   08/07/2023 12 6 - 20 mg/dL Final     Creatinine   Date Value Ref Range Status   08/07/2023 1.0 0.5 - 1.4 mg/dL Final     Calcium   Date Value Ref Range Status   08/07/2023 9.2 8.7 - 10.5 mg/dL Final     Total Protein   Date Value Ref Range Status   08/07/2023 6.5 6.0 - 8.4 g/dL Final     Albumin   Date Value Ref Range Status   08/07/2023 2.8 (L) 3.5 - 5.2 g/dL Final     Total Bilirubin   Date Value Ref Range Status   08/07/2023 2.3 (H) 0.1 - 1.0 mg/dL Final     Comment:     For infants and newborns, interpretation of results should be  based  on gestational age, weight and in agreement with clinical  observations.    Premature Infant recommended reference ranges:  Up to 24 hours.............<8.0 mg/dL  Up to 48 hours............<12.0 mg/dL  3-5 days..................<15.0 mg/dL  6-29 days.................<15.0 mg/dL    For patients on Eltrombopag therapy, use of Dimension [place] TBIL is   not   recommended.       Alkaline Phosphatase   Date Value Ref Range Status   08/07/2023 169 (H) 55 - 135 U/L Final     AST   Date Value Ref Range Status   08/07/2023 55 (H) 10 - 40 U/L Final     ALT   Date Value Ref Range Status   08/07/2023 35 10 - 44 U/L Final     Anion Gap   Date Value Ref Range Status   08/07/2023 5 (L) 8 - 16 mmol/L Final     eGFR   Date Value Ref Range Status   08/07/2023 >60.0 >60 mL/min/1.73 m^2 Final       Physical Exam  General: Well nourished    Airway:  Mallampati: II / I  Mouth Opening: Normal  TM Distance: Normal  Tongue: Normal  Neck ROM: Normal ROM    Dental:  Dentures    Chest/Lungs:  Clear to auscultation    Heart:  Rate: Normal  Rhythm: Regular Rhythm  Sounds: Normal        Anesthesia Plan  Type of Anesthesia, risks & benefits discussed:    Anesthesia Type: MAC  Intra-op Monitoring Plan: Standard ASA Monitors  Post Op Pain Control Plan: multimodal analgesia  Induction:  IV  Airway Plan: Direct  Informed Consent: Informed consent signed with the Patient and all parties understand the risks and agree with anesthesia plan.  All questions answered.   ASA Score: 4  Day of Surgery Review of History & Physical: I have interviewed and examined the patient. I have reviewed the patient's H&P dated: There are no significant changes.     Ready For Surgery From Anesthesia Perspective.     .

## 2023-08-09 ENCOUNTER — ANESTHESIA (OUTPATIENT)
Dept: ENDOSCOPY | Facility: HOSPITAL | Age: 58
End: 2023-08-09
Payer: MEDICAID

## 2023-08-09 ENCOUNTER — HOSPITAL ENCOUNTER (OUTPATIENT)
Facility: HOSPITAL | Age: 58
Discharge: HOME | End: 2023-08-09
Attending: STUDENT IN AN ORGANIZED HEALTH CARE EDUCATION/TRAINING PROGRAM | Admitting: STUDENT IN AN ORGANIZED HEALTH CARE EDUCATION/TRAINING PROGRAM
Payer: MEDICAID

## 2023-08-09 VITALS
TEMPERATURE: 98 F | DIASTOLIC BLOOD PRESSURE: 65 MMHG | HEART RATE: 59 BPM | OXYGEN SATURATION: 95 % | SYSTOLIC BLOOD PRESSURE: 114 MMHG | RESPIRATION RATE: 18 BRPM

## 2023-08-09 DIAGNOSIS — Z12.11 SCREEN FOR COLON CANCER: Primary | ICD-10-CM

## 2023-08-09 PROCEDURE — 25000003 PHARM REV CODE 250: Performed by: STUDENT IN AN ORGANIZED HEALTH CARE EDUCATION/TRAINING PROGRAM

## 2023-08-09 PROCEDURE — 37000009 HC ANESTHESIA EA ADD 15 MINS: Performed by: STUDENT IN AN ORGANIZED HEALTH CARE EDUCATION/TRAINING PROGRAM

## 2023-08-09 PROCEDURE — G0121 COLON CA SCRN NOT HI RSK IND: ICD-10-PCS | Mod: 33,,, | Performed by: STUDENT IN AN ORGANIZED HEALTH CARE EDUCATION/TRAINING PROGRAM

## 2023-08-09 PROCEDURE — 37000008 HC ANESTHESIA 1ST 15 MINUTES: Performed by: STUDENT IN AN ORGANIZED HEALTH CARE EDUCATION/TRAINING PROGRAM

## 2023-08-09 PROCEDURE — G0121 COLON CA SCRN NOT HI RSK IND: HCPCS | Mod: 33,,, | Performed by: STUDENT IN AN ORGANIZED HEALTH CARE EDUCATION/TRAINING PROGRAM

## 2023-08-09 PROCEDURE — G0121 COLON CA SCRN NOT HI RSK IND: HCPCS | Mod: 74 | Performed by: STUDENT IN AN ORGANIZED HEALTH CARE EDUCATION/TRAINING PROGRAM

## 2023-08-09 RX ORDER — SODIUM CHLORIDE 9 MG/ML
INJECTION, SOLUTION INTRAVENOUS CONTINUOUS
Status: DISCONTINUED | OUTPATIENT
Start: 2023-08-09 | End: 2023-08-09 | Stop reason: HOSPADM

## 2023-08-09 RX ORDER — SODIUM CHLORIDE 0.9 % (FLUSH) 0.9 %
10 SYRINGE (ML) INJECTION
Status: DISCONTINUED | OUTPATIENT
Start: 2023-08-09 | End: 2023-08-09 | Stop reason: HOSPADM

## 2023-08-09 RX ORDER — PROPOFOL 10 MG/ML
VIAL (ML) INTRAVENOUS
Status: DISCONTINUED | OUTPATIENT
Start: 2023-08-09 | End: 2023-08-09

## 2023-08-09 RX ORDER — LIDOCAINE HYDROCHLORIDE 10 MG/ML
INJECTION, SOLUTION INTRAVENOUS
Status: DISCONTINUED | OUTPATIENT
Start: 2023-08-09 | End: 2023-08-09

## 2023-08-09 RX ADMIN — SODIUM CHLORIDE: 9 INJECTION, SOLUTION INTRAVENOUS at 09:08

## 2023-08-09 RX ADMIN — Medication 30 MG: at 11:08

## 2023-08-09 RX ADMIN — LIDOCAINE HYDROCHLORIDE 50 MG: 10 INJECTION, SOLUTION INTRAVENOUS at 11:08

## 2023-08-09 RX ADMIN — Medication 120 MG: at 11:08

## 2023-08-09 NOTE — TRANSFER OF CARE
Anesthesia Transfer of Care Note    Patient: Shonda Borrego    Procedure(s) Performed: Procedure(s) (LRB):  COLONOSCOPY (N/A)    Patient location: GI    Anesthesia Type: MAC    Transport from OR: Transported from OR on room air with adequate spontaneous ventilation    Post pain: adequate analgesia    Post assessment: no apparent anesthetic complications    Post vital signs: stable    Level of consciousness: awake    Nausea/Vomiting: no nausea/vomiting    Complications: none    Transfer of care protocol was followed      Last vitals:   HR 63  RR 16  T 36.8  SPO2 94  BP 90/50

## 2023-08-09 NOTE — PLAN OF CARE
Received pt to room 521 accompanied by MinervaRN, pt aa&ox3, no c/o, snack at bedside. Sister at bedside. Call with needs.

## 2023-08-09 NOTE — DISCHARGE SUMMARY
[facility] - Endoscopy  Discharge Note  Short Stay    Procedure(s) (LRB):  COLONOSCOPY (N/A)      OUTCOME: Patient tolerated treatment/procedure well without complication and is now ready for discharge.    DISPOSITION: Home or Self Care    FINAL DIAGNOSIS:  Screen for colon cancer    FOLLOWUP: In clinic    DISCHARGE INSTRUCTIONS:    Discharge Procedure Orders   Diet Adult Regular     No driving until:     Notify your health care provider if you experience any of the following:  temperature >100.4     Notify your health care provider if you experience any of the following:  persistent nausea and vomiting or diarrhea     Notify your health care provider if you experience any of the following:  severe uncontrolled pain     Activity as tolerated        TIME SPENT ON DISCHARGE: 5 minutes

## 2023-08-09 NOTE — OP NOTE
[facility] - OR Peri Services  General Surgery Department  Operative Note    SUMMARY     Date of Procedure: 8/9/2023    Procedure: Procedure(s) (LRB):  COLONOSCOPY:      Surgeon(s) and Role:  Sweetie Juarez MD     Pre-Operative Diagnosis: Pre-Op Diagnosis Codes:     * Screen for colon cancer [Z12.11]    Post-Operative Diagnosis: Same         Anesthesia: Local MAC      Findings:  -Poor prep;  unable to visualize appendiceal orifice due to food burden  -No large masses or polypoid lesions seen - unable to account for small polyps    -There was normal mucosa with normal submucosal venous pattern.    -No vascular lesions were identified.    -No major diverticular disease was encountered.       Indications for the Procedure:  58yo female with no family history of colorectal cancer who presents for --screening colonoscopy for screening for colon cancer.    Operative Conduct in Detail:   The risks, benefits, and alternatives were thoroughly discussed with the patient. Despite the risks, the patient wished to proceed. Informed consent was obtained and it will scanned to the electronic chart.      The patient was placed on left lateral decubitus. After achieving moderate sedation, a digital rectal examination was performed; subsequently, a standard colonoscope was introduced through the anus and advanced without difficulty up to the maximum extent  The scope was withdrawn slowly while the mucosa was carefully examined. The following findings were seen:    Bowel Preparation: poor  Rectal exam was normal with good rectal tone and no masses or blood detected in the rectal vault.  -Poor prep;  unable to visualize appendiceal orifice due to food burden  -No large masses or polypoid lesions seen - unable to account for small polyps    -There was normal mucosa with normal submucosal venous pattern.    -No vascular lesions were identified.    -No major diverticular disease was encountered.      Withdrawal time >6 minutes  (if no biopsies/polypectomies obtained)      Complications: No    Estimated Blood Loss (EBL): None             Specimens:   Specimens (From admission, onward)      None                   Condition: Good    Disposition: PACU - hemodynamically stable.    Recommendation:  Repeat colonoscopy in 1 year per guidelines  - poor prep    Sweetie Juarez MD  General Surgery   [phone]

## 2023-08-09 NOTE — PLAN OF CARE
"Pt aa&ox3, anxious to be discharged "hungry", dc instructions given to pt ,verbalize good understanding and agree. Wheeled to exit. Pts sister driving pt home.  "

## 2023-08-09 NOTE — DISCHARGE INSTRUCTIONS
FOLLOW UP WITH DR SIBLEY AS SCHEDULED.    NO DRIVING OR DRINKING ALCOHOL FOR 24 HOURS.    CALL DR SIBLEY'S OFFICE FOR ANY QUESTIONS OR CONCERNS.  REPORT TO THE ER IF URGENT.    THANK YOU FOR CHOOSING [facility]!

## 2023-08-09 NOTE — INTERVAL H&P NOTE
Patient seen and examined.  No interval change since the below obtained H&P  Informed consent verified    NPO since midnight  Prep Sutab; last BM liquid/clear  All questions and concerns addressed  To Endo for screening colonoscopy     Sweetie Juarez MD  General Surgery   [phone]

## 2023-08-10 DIAGNOSIS — G25.81 RESTLESS LEG SYNDROME: ICD-10-CM

## 2023-08-10 NOTE — ANESTHESIA POSTPROCEDURE EVALUATION
Anesthesia Post Evaluation    Patient: Shonda Borrego    Procedure(s) Performed: Procedure(s) (LRB):  COLONOSCOPY (N/A)    Final Anesthesia Type: MAC      Patient location during evaluation: OPS  Patient participation: Yes- Able to Participate  Level of consciousness: awake  Post-procedure vital signs: reviewed and stable  Pain management: adequate  Airway patency: patent    PONV status at discharge: No PONV  Anesthetic complications: no      Cardiovascular status: blood pressure returned to baseline  Respiratory status: spontaneous ventilation  Hydration status: euvolemic  Follow-up not needed.          Vitals Value Taken Time   /65 08/09/23 1207   Temp 36.4 °C (97.5 °F) 08/09/23 1207   Pulse 59 08/09/23 1207   Resp 18 08/09/23 1207   SpO2 95 % 08/09/23 1207         No case tracking events are documented in the log.      Pain/Trent Score: Trent Score: 10 (8/9/2023 12:07 PM)

## 2023-08-14 RX ORDER — GABAPENTIN 300 MG/1
CAPSULE ORAL
Qty: 30 CAPSULE | Refills: 3 | Status: SHIPPED | OUTPATIENT
Start: 2023-08-14 | End: 2024-01-10

## 2023-08-21 ENCOUNTER — PATIENT MESSAGE (OUTPATIENT)
Dept: HEPATOLOGY | Facility: CLINIC | Age: 58
End: 2023-08-21
Payer: MEDICAID

## 2023-08-25 ENCOUNTER — OFFICE VISIT (OUTPATIENT)
Dept: PRIMARY CARE CLINIC | Facility: CLINIC | Age: 58
End: 2023-08-25
Payer: MEDICAID

## 2023-08-25 VITALS
HEIGHT: 68 IN | OXYGEN SATURATION: 96 % | SYSTOLIC BLOOD PRESSURE: 130 MMHG | WEIGHT: 220 LBS | HEART RATE: 68 BPM | TEMPERATURE: 99 F | RESPIRATION RATE: 16 BRPM | DIASTOLIC BLOOD PRESSURE: 84 MMHG | BODY MASS INDEX: 33.34 KG/M2

## 2023-08-25 DIAGNOSIS — G25.81 RESTLESS LEG SYNDROME: ICD-10-CM

## 2023-08-25 DIAGNOSIS — E66.9 CLASS 1 OBESITY WITH SERIOUS COMORBIDITY AND BODY MASS INDEX (BMI) OF 31.0 TO 31.9 IN ADULT, UNSPECIFIED OBESITY TYPE: ICD-10-CM

## 2023-08-25 DIAGNOSIS — K74.60 HEPATIC CIRRHOSIS, UNSPECIFIED HEPATIC CIRRHOSIS TYPE, UNSPECIFIED WHETHER ASCITES PRESENT: ICD-10-CM

## 2023-08-25 DIAGNOSIS — F19.10 MULTIPLE SUBSTANCE ABUSE: ICD-10-CM

## 2023-08-25 DIAGNOSIS — M79.89 LEG SWELLING: Primary | ICD-10-CM

## 2023-08-25 PROCEDURE — 1159F MED LIST DOCD IN RCRD: CPT | Mod: CPTII,,, | Performed by: STUDENT IN AN ORGANIZED HEALTH CARE EDUCATION/TRAINING PROGRAM

## 2023-08-25 PROCEDURE — 3075F PR MOST RECENT SYSTOLIC BLOOD PRESS GE 130-139MM HG: ICD-10-PCS | Mod: CPTII,,, | Performed by: STUDENT IN AN ORGANIZED HEALTH CARE EDUCATION/TRAINING PROGRAM

## 2023-08-25 PROCEDURE — 3075F SYST BP GE 130 - 139MM HG: CPT | Mod: CPTII,,, | Performed by: STUDENT IN AN ORGANIZED HEALTH CARE EDUCATION/TRAINING PROGRAM

## 2023-08-25 PROCEDURE — 3044F HG A1C LEVEL LT 7.0%: CPT | Mod: CPTII,,, | Performed by: STUDENT IN AN ORGANIZED HEALTH CARE EDUCATION/TRAINING PROGRAM

## 2023-08-25 PROCEDURE — 3079F PR MOST RECENT DIASTOLIC BLOOD PRESSURE 80-89 MM HG: ICD-10-PCS | Mod: CPTII,,, | Performed by: STUDENT IN AN ORGANIZED HEALTH CARE EDUCATION/TRAINING PROGRAM

## 2023-08-25 PROCEDURE — 99999 PR PBB SHADOW E&M-EST. PATIENT-LVL IV: CPT | Mod: PBBFAC,,, | Performed by: STUDENT IN AN ORGANIZED HEALTH CARE EDUCATION/TRAINING PROGRAM

## 2023-08-25 PROCEDURE — 1160F RVW MEDS BY RX/DR IN RCRD: CPT | Mod: CPTII,,, | Performed by: STUDENT IN AN ORGANIZED HEALTH CARE EDUCATION/TRAINING PROGRAM

## 2023-08-25 PROCEDURE — 3008F BODY MASS INDEX DOCD: CPT | Mod: CPTII,,, | Performed by: STUDENT IN AN ORGANIZED HEALTH CARE EDUCATION/TRAINING PROGRAM

## 2023-08-25 PROCEDURE — 3008F PR BODY MASS INDEX (BMI) DOCUMENTED: ICD-10-PCS | Mod: CPTII,,, | Performed by: STUDENT IN AN ORGANIZED HEALTH CARE EDUCATION/TRAINING PROGRAM

## 2023-08-25 PROCEDURE — 99999 PR PBB SHADOW E&M-EST. PATIENT-LVL IV: ICD-10-PCS | Mod: PBBFAC,,, | Performed by: STUDENT IN AN ORGANIZED HEALTH CARE EDUCATION/TRAINING PROGRAM

## 2023-08-25 PROCEDURE — 1159F PR MEDICATION LIST DOCUMENTED IN MEDICAL RECORD: ICD-10-PCS | Mod: CPTII,,, | Performed by: STUDENT IN AN ORGANIZED HEALTH CARE EDUCATION/TRAINING PROGRAM

## 2023-08-25 PROCEDURE — 3044F PR MOST RECENT HEMOGLOBIN A1C LEVEL <7.0%: ICD-10-PCS | Mod: CPTII,,, | Performed by: STUDENT IN AN ORGANIZED HEALTH CARE EDUCATION/TRAINING PROGRAM

## 2023-08-25 PROCEDURE — 1160F PR REVIEW ALL MEDS BY PRESCRIBER/CLIN PHARMACIST DOCUMENTED: ICD-10-PCS | Mod: CPTII,,, | Performed by: STUDENT IN AN ORGANIZED HEALTH CARE EDUCATION/TRAINING PROGRAM

## 2023-08-25 PROCEDURE — 3079F DIAST BP 80-89 MM HG: CPT | Mod: CPTII,,, | Performed by: STUDENT IN AN ORGANIZED HEALTH CARE EDUCATION/TRAINING PROGRAM

## 2023-08-25 PROCEDURE — 99214 OFFICE O/P EST MOD 30 MIN: CPT | Mod: S$PBB,,, | Performed by: STUDENT IN AN ORGANIZED HEALTH CARE EDUCATION/TRAINING PROGRAM

## 2023-08-25 PROCEDURE — 99214 PR OFFICE/OUTPT VISIT, EST, LEVL IV, 30-39 MIN: ICD-10-PCS | Mod: S$PBB,,, | Performed by: STUDENT IN AN ORGANIZED HEALTH CARE EDUCATION/TRAINING PROGRAM

## 2023-08-25 PROCEDURE — 99214 OFFICE O/P EST MOD 30 MIN: CPT | Mod: PBBFAC,PN | Performed by: STUDENT IN AN ORGANIZED HEALTH CARE EDUCATION/TRAINING PROGRAM

## 2023-08-25 RX ORDER — SPIRONOLACTONE 100 MG/1
100 TABLET, FILM COATED ORAL DAILY
Qty: 30 TABLET | Refills: 2 | Status: SHIPPED | OUTPATIENT
Start: 2023-08-25 | End: 2023-11-08 | Stop reason: SDUPTHER

## 2023-08-25 NOTE — PROGRESS NOTES
[facility]    HPI:  Shonda Borrego is a 58 y.o. female who presents today for Follow-up (Medication check for swelling)  For leg swelling during last visit added lasix to daily spironolactone.   Still having leg swelling and intermittent cramping.   Reviewed recent labs with borderline hypokalemia.   Will increase daily spironolactone to 100 mg with current lasix 20 mg BID.   Patient endorses compliant with rifaximin 550 mg BID, maintain daily stool with PRN use of lactulose. Denies any brain fog.   Recently patient has had to move back in with her sister after her fiance relapsed admitting to his old drug habits using up their savings and money for rent.   She intends to follow Dr. Arreola's recommendations, so that she can get back on the transplant list.      Denies fever, chills, sick contacts, dizziness, vision changes, chest pain, palpitations, shortness of breath, abdominal pain, nausea, vomiting.      ROS   A review of systems was performed and was negative except as noted above.    I personally reviewed allergies, past medical, surgical, social and family history and updated as appropriate.    Medications:    Current Outpatient Medications:     albuterol (PROVENTIL/VENTOLIN HFA) 90 mcg/actuation inhaler, Inhale 2 puffs into the lungs every 6 (six) hours as needed for Wheezing or Shortness of Breath., Disp: 18 g, Rfl: 3    clonazePAM (KLONOPIN) 0.5 MG tablet, Take 0.5 mg by mouth daily as needed., Disp: , Rfl:     furosemide (LASIX) 20 MG tablet, Take 1 tablet (20 mg total) by mouth 2 (two) times daily., Disp: 60 tablet, Rfl: 11    gabapentin (NEURONTIN) 300 MG capsule, TAKE 1 CAPSULE BY MOUTH IN THE EVENING, Disp: 30 capsule, Rfl: 3    nicotine (NICODERM CQ) 14 mg/24 hr, Place 1 patch onto the skin once daily., Disp: 14 patch, Rfl: 0    nicotine, polacrilex, (NICORETTE) 4 MG Gum, Take 1 each (4 mg total) by mouth as needed (Take 1 each (4 mg total) by mouth as needed for tobacco  cessation. 1-2 per hour in the place of cigarettes. (5-16 daily max) - Oral)., Disp: 300 each, Rfl: 0    ondansetron (ZOFRAN) 4 MG tablet, Take 1 tablet (4 mg total) by mouth 2 (two) times daily., Disp: 30 tablet, Rfl: 0    oxybutynin (DITROPAN-XL) 5 MG TR24, Take 1 tablet (5 mg total) by mouth once daily., Disp: 30 tablet, Rfl: 11    pantoprazole (PROTONIX) 20 MG tablet, Take 20 mg by mouth., Disp: , Rfl:     rifAXIMin (XIFAXAN) 550 mg Tab, Take 1 tablet (550 mg total) by mouth 2 (two) times daily., Disp: 60 tablet, Rfl: 11    rOPINIRole (REQUIP) 1 MG tablet, Take 1 tablet (1 mg total) by mouth every evening., Disp: 90 tablet, Rfl: 1    tiotropium-olodateroL (STIOLTO RESPIMAT) 2.5-2.5 mcg/actuation Mist, 2 puffs Inhalation Once a day for 30 days, Disp: , Rfl:     VRAYLAR 3 mg Cap, Take 3 mg by mouth., Disp: , Rfl:     mirtazapine (REMERON) 7.5 MG Tab, Take 1 tablet (7.5 mg total) by mouth every evening., Disp: 30 tablet, Rfl: 2    spironolactone (ALDACTONE) 100 MG tablet, Take 1 tablet (100 mg total) by mouth once daily., Disp: 30 tablet, Rfl: 2    ursodioL (ACTIGALL) 300 mg capsule, Take 1 capsule (300 mg total) by mouth 3 (three) times daily. for 14 days, Disp: 90 capsule, Rfl: 3     Health Maintenance:  Immunization History   Administered Date(s) Administered    COVID-19 MRNA, LN-S PF (MODERNA HALF 0.25 ML DOSE) 12/09/2021    COVID-19, MRNA, LN-S, PF (MODERNA FULL 0.5 ML DOSE) 05/12/2021    COVID-19, MRNA, LN-S, PF (Pfizer) (Gray Cap) 06/03/2022    COVID-19, MRNA, LN-S, PF (Pfizer) (Purple Cap) 06/09/2021, 09/25/2022    COVID-19, mRNA, LNP-S, bivalent booster, PF (Moderna Omicron) 09/25/2022    Hepatitis A, Adult 03/23/2023    Hepatitis B, Adult 04/24/2023, 05/26/2023    Influenza - Quadrivalent - PF *Preferred* (6 months and older) 09/25/2022    Influenza - Trivalent (ADULT) 10/20/2021    Pneumococcal Conjugate - 20 Valent 10/05/2022, 02/07/2023    Tdap 03/03/2022    Zoster Recombinant 03/03/2022, 09/25/2022     "  Health Maintenance   Topic Date Due    Mammogram  10/24/2023    LDCT Lung Screen  01/20/2024    Lipid Panel  06/16/2028    TETANUS VACCINE  03/03/2032    Colorectal Cancer Screening  08/09/2033    Hepatitis C Screening  Completed    Shingles Vaccine  Completed     Health Maintenance Topics with due status: Not Due       Topic Last Completion Date    TETANUS VACCINE 03/03/2022    Influenza Vaccine 09/25/2022    LDCT Lung Screen 01/20/2023    Cervical Cancer Screening 04/26/2023    Hemoglobin A1c (Diabetic Prevention Screening) 06/16/2023    Lipid Panel 06/16/2023    Colorectal Cancer Screening 08/09/2023     Health Maintenance Due   Topic Date Due    Mammogram  10/24/2023       PHYSICAL EXAM:  Vitals:    08/25/23 0826   BP: 130/84   BP Location: Right arm   Patient Position: Sitting   BP Method: Large (Automatic)   Pulse: 68   Resp: 16   Temp: 98.5 °F (36.9 °C)   TempSrc: Oral   SpO2: 96%   Weight: 99.8 kg (220 lb)   Height: 5' 8" (1.727 m)     Body mass index is 33.45 kg/m².  Physical Exam  Constitutional:       General: She is not in acute distress.     Appearance: Normal appearance. She is not ill-appearing or toxic-appearing.   HENT:      Head: Normocephalic.      Right Ear: External ear normal.      Left Ear: External ear normal.      Mouth/Throat:      Pharynx: Oropharynx is clear. No oropharyngeal exudate or posterior oropharyngeal erythema.   Eyes:      Extraocular Movements: Extraocular movements intact.      Conjunctiva/sclera: Conjunctivae normal.   Neck:      Vascular: No carotid bruit.   Cardiovascular:      Rate and Rhythm: Normal rate and regular rhythm.      Pulses: Normal pulses.      Heart sounds: No murmur heard.  Pulmonary:      Effort: Pulmonary effort is normal. No respiratory distress.      Breath sounds: No wheezing, rhonchi or rales.   Abdominal:      General: Abdomen is flat. There is no distension.      Palpations: Abdomen is soft.      Tenderness: There is no abdominal tenderness. There " is no right CVA tenderness, left CVA tenderness or guarding.   Musculoskeletal:         General: No swelling, tenderness or deformity. Normal range of motion.      Cervical back: Normal range of motion and neck supple. No rigidity or tenderness.      Right lower leg: Edema (+2 pitting bilaterally, no oozing, no erythema) present.      Left lower leg: Edema present.   Lymphadenopathy:      Cervical: No cervical adenopathy.   Skin:     General: Skin is warm and dry.      Capillary Refill: Capillary refill takes less than 2 seconds.      Findings: No erythema or rash.      Comments: Bronzed throughout   Neurological:      General: No focal deficit present.      Mental Status: She is alert and oriented to person, place, and time. Mental status is at baseline.      Motor: No weakness.      Gait: Gait normal.   Psychiatric:         Mood and Affect: Mood normal.         Behavior: Behavior normal.         Thought Content: Thought content normal.         Judgment: Judgment normal.        ASSESSMENT/PLAN:  1. Leg swelling  Assessment & Plan:  Continue with lasix 20 mg BID and increase daily spironolactone to 100 mg from 25 mg  - follow up electrolytes 4 weeks or sooner with worsening symptoms      Orders:  -     spironolactone (ALDACTONE) 100 MG tablet; Take 1 tablet (100 mg total) by mouth once daily.  Dispense: 30 tablet; Refill: 2    2. Hepatic cirrhosis, unspecified hepatic cirrhosis type, unspecified whether ascites present  Assessment & Plan:  Per patient recent colonoscopy completed with Dr. Juarez, recommendations for f/u in 3 years, no biopsies taken.   - now off transplant list secondary to ETOH detection through [facility]  - per patient liver transplant team to review patient's case during follow up meeting  - counseled on importance of abstinence to be reinstated as a candidate      Orders:  -     spironolactone (ALDACTONE) 100 MG tablet; Take 1 tablet (100 mg total) by mouth once daily.  Dispense: 30  tablet; Refill: 2    3. Restless leg syndrome  Assessment & Plan:  Nightly Requip now patient taking 2 mg.  Continue to monitor symptom resolution.        4. Multiple substance abuse  Assessment & Plan:  Denies use of pain medications. No recent prescriptions noted on . Taking daily clonazepam, per patient for anxiety and sleep through her behavioral health specialist.       5. Class 1 obesity with serious comorbidity and body mass index (BMI) of 31.0 to 31.9 in adult, unspecified obesity type  Overview:  Wt Readings from Last 8 Encounters:   08/25/23 99.8 kg (220 lb)   08/07/23 97.1 kg (214 lb)   07/31/23 97.5 kg (214 lb 15.2 oz)   07/10/23 99.3 kg (219 lb)   06/02/23 95.6 kg (210 lb 12.2 oz)   05/26/23 93.9 kg (207 lb)   05/17/23 93.4 kg (206 lb)   04/26/23 93 kg (205 lb)       Assessment & Plan:  Weight gain, patient endorse from fluid retention.   Denies any other symptoms.   Body mass index is 33.45 kg/m².  Obesity complicates all aspects of disease management from diagnostic modalities to treatment.   8/25 Patient reports she is eating a lot of sweets at this time, likely contributing to weight gain.   Weight loss encouraged and health benefits explained to patient.   Counseled patient on importance of weight management and monitoring sudden changes 2-3 pounds over 2 days or 3-5 pound over week.   - cut back on food items containing highly refined carbohydrates  - bulk up on high energy protein                  Other than changes above, continue current medications and maintain follow up with specialists.      Follow up in about 4 weeks (around 9/22/2023) for BP check, Med recheck.   Recent Results (from the past 2016 hour(s))   Drug screen panel, in-house    Collection Time: 06/08/23  9:42 AM   Result Value Ref Range    Benzodiazepines Negative Negative    Methadone metabolites Negative Negative    Cocaine (Metab.) Negative Negative    Opiate Scrn, Ur Negative Negative    Barbiturate Screen, Ur Negative  Negative    Amphetamine Screen, Ur Negative Negative    THC Negative Negative    Phencyclidine Negative Negative    Creatinine, Urine 286.0 15.0 - 325.0 mg/dL    Toxicology Information SEE COMMENT    Hemoglobin A1C    Collection Time: 06/16/23  7:40 AM   Result Value Ref Range    Hemoglobin A1C 5.4 4.0 - 5.6 %    Estimated Avg Glucose 108 68 - 131 mg/dL   Lipid Panel    Collection Time: 06/16/23  7:40 AM   Result Value Ref Range    Cholesterol 121 120 - 199 mg/dL    Triglycerides 82 30 - 150 mg/dL    HDL 92 (H) 40 - 75 mg/dL    LDL Cholesterol 12.6 (L) 63.0 - 159.0 mg/dL    HDL/Cholesterol Ratio 76.0 (H) 20.0 - 50.0 %    Total Cholesterol/HDL Ratio 1.3 (L) 2.0 - 5.0    Non-HDL Cholesterol 29 mg/dL   Drug screen panel, in-house    Collection Time: 07/07/23  9:42 AM   Result Value Ref Range    Benzodiazepines Negative Negative    Methadone metabolites Negative Negative    Cocaine (Metab.) Negative Negative    Opiate Scrn, Ur Negative Negative    Barbiturate Screen, Ur Negative Negative    Amphetamine Screen, Ur Negative Negative    THC Negative Negative    Phencyclidine Negative Negative    Creatinine, Urine 193.0 15.0 - 325.0 mg/dL    Toxicology Information SEE COMMENT    CBC Without Differential    Collection Time: 08/07/23  8:13 AM   Result Value Ref Range    WBC 4.73 3.90 - 12.70 K/uL    RBC 4.49 4.00 - 5.40 M/uL    Hemoglobin 14.2 12.0 - 16.0 g/dL    Hematocrit 40.8 37.0 - 48.5 %    MCV 91 82 - 98 fL    MCH 31.6 (H) 27.0 - 31.0 pg    MCHC 34.8 32.0 - 36.0 g/dL    RDW 14.2 11.5 - 14.5 %    Platelets 107 (L) 150 - 450 K/uL    MPV 11.2 9.2 - 12.9 fL   Comprehensive Metabolic Panel    Collection Time: 08/07/23  8:13 AM   Result Value Ref Range    Sodium 140 136 - 145 mmol/L    Potassium 3.6 3.5 - 5.1 mmol/L    Chloride 110 95 - 110 mmol/L    CO2 25 23 - 29 mmol/L    Glucose 245 (H) 70 - 110 mg/dL    BUN 12 6 - 20 mg/dL    Creatinine 1.0 0.5 - 1.4 mg/dL    Calcium 9.2 8.7 - 10.5 mg/dL    Total Protein 6.5 6.0 - 8.4  g/dL    Albumin 2.8 (L) 3.5 - 5.2 g/dL    Total Bilirubin 2.3 (H) 0.1 - 1.0 mg/dL    Alkaline Phosphatase 169 (H) 55 - 135 U/L    AST 55 (H) 10 - 40 U/L    ALT 35 10 - 44 U/L    eGFR >60.0 >60 mL/min/1.73 m^2    Anion Gap 5 (L) 8 - 16 mmol/L   Drug screen panel, in-house    Collection Time: 08/07/23  8:31 AM   Result Value Ref Range    Benzodiazepines Presumptive Positive (A) Negative    Methadone metabolites Negative Negative    Cocaine (Metab.) Negative Negative    Opiate Scrn, Ur Presumptive Positive (A) Negative    Barbiturate Screen, Ur Negative Negative    Amphetamine Screen, Ur Negative Negative    THC Negative Negative    Phencyclidine Negative Negative    Creatinine, Urine 162.0 15.0 - 325.0 mg/dL    Toxicology Information SEE COMMENT          DO [facility]

## 2023-08-25 NOTE — ASSESSMENT & PLAN NOTE
Denies use of pain medications. No recent prescriptions noted on . Taking daily clonazepam, per patient for anxiety and sleep through her behavioral health specialist.

## 2023-08-25 NOTE — ASSESSMENT & PLAN NOTE
Continue with lasix 20 mg BID and increase daily spironolactone to 100 mg from 25 mg  - follow up electrolytes 4 weeks or sooner with worsening symptoms

## 2023-08-25 NOTE — ASSESSMENT & PLAN NOTE
Weight gain, patient endorse from fluid retention.   Denies any other symptoms.   Body mass index is 33.45 kg/m².  Obesity complicates all aspects of disease management from diagnostic modalities to treatment.   8/25 Patient reports she is eating a lot of sweets at this time, likely contributing to weight gain.   Weight loss encouraged and health benefits explained to patient.   Counseled patient on importance of weight management and monitoring sudden changes 2-3 pounds over 2 days or 3-5 pound over week.   - cut back on food items containing highly refined carbohydrates  - bulk up on high energy protein

## 2023-08-25 NOTE — ASSESSMENT & PLAN NOTE
Per patient recent colonoscopy completed with Dr. Juarez, recommendations for f/u in 3 years, no biopsies taken.   - now off transplant list secondary to ETOH detection through [facility]  - per patient liver transplant team to review patient's case during follow up meeting  - counseled on importance of abstinence to be reinstated as a candidate

## 2023-09-18 ENCOUNTER — LAB VISIT (OUTPATIENT)
Dept: LAB | Facility: HOSPITAL | Age: 58
End: 2023-09-18
Attending: INTERNAL MEDICINE
Payer: MEDICAID

## 2023-09-18 DIAGNOSIS — R16.0 LIVER MASS: ICD-10-CM

## 2023-09-18 DIAGNOSIS — K70.30 ALCOHOLIC CIRRHOSIS OF LIVER WITHOUT ASCITES: ICD-10-CM

## 2023-09-18 LAB
AFP SERPL-MCNC: 2.2 NG/ML (ref 0–8.4)
ALBUMIN SERPL BCP-MCNC: 3.3 G/DL (ref 3.5–5.2)
ALP SERPL-CCNC: 233 U/L (ref 55–135)
ALT SERPL W/O P-5'-P-CCNC: 39 U/L (ref 10–44)
ANION GAP SERPL CALC-SCNC: 5 MMOL/L (ref 3–11)
AST SERPL-CCNC: 40 U/L (ref 10–40)
BASOPHILS # BLD AUTO: 0.07 K/UL (ref 0–0.2)
BASOPHILS NFR BLD: 1.1 % (ref 0–1.9)
BILIRUB SERPL-MCNC: 1.9 MG/DL (ref 0.1–1)
BUN SERPL-MCNC: 11 MG/DL (ref 6–20)
CALCIUM SERPL-MCNC: 9 MG/DL (ref 8.7–10.5)
CHLORIDE SERPL-SCNC: 104 MMOL/L (ref 95–110)
CO2 SERPL-SCNC: 27 MMOL/L (ref 23–29)
CREAT SERPL-MCNC: 1.4 MG/DL (ref 0.5–1.4)
DIFFERENTIAL METHOD: ABNORMAL
EOSINOPHIL # BLD AUTO: 0.2 K/UL (ref 0–0.5)
EOSINOPHIL NFR BLD: 2.8 % (ref 0–8)
ERYTHROCYTE [DISTWIDTH] IN BLOOD BY AUTOMATED COUNT: 14.7 % (ref 11.5–14.5)
EST. GFR  (NO RACE VARIABLE): 43.3 ML/MIN/1.73 M^2
GLUCOSE SERPL-MCNC: 226 MG/DL (ref 70–110)
HCT VFR BLD AUTO: 42.6 % (ref 37–48.5)
HGB BLD-MCNC: 14.9 G/DL (ref 12–16)
IMM GRANULOCYTES # BLD AUTO: 0.02 K/UL (ref 0–0.04)
IMM GRANULOCYTES NFR BLD AUTO: 0.3 % (ref 0–0.5)
INR PPP: 1.2 (ref 0.8–1.2)
LYMPHOCYTES # BLD AUTO: 2 K/UL (ref 1–4.8)
LYMPHOCYTES NFR BLD: 30.4 % (ref 18–48)
MCH RBC QN AUTO: 32.2 PG (ref 27–31)
MCHC RBC AUTO-ENTMCNC: 35 G/DL (ref 32–36)
MCV RBC AUTO: 92 FL (ref 82–98)
MONOCYTES # BLD AUTO: 0.6 K/UL (ref 0.3–1)
MONOCYTES NFR BLD: 9.1 % (ref 4–15)
NEUTROPHILS # BLD AUTO: 3.6 K/UL (ref 1.8–7.7)
NEUTROPHILS NFR BLD: 56.3 % (ref 38–73)
NRBC BLD-RTO: 0 /100 WBC
PLATELET # BLD AUTO: 152 K/UL (ref 150–450)
PMV BLD AUTO: 10.7 FL (ref 9.2–12.9)
POTASSIUM SERPL-SCNC: 3.6 MMOL/L (ref 3.5–5.1)
PROT SERPL-MCNC: 6.7 G/DL (ref 6–8.4)
PROTHROMBIN TIME: 12.5 SEC (ref 9–12.5)
RBC # BLD AUTO: 4.63 M/UL (ref 4–5.4)
SODIUM SERPL-SCNC: 136 MMOL/L (ref 136–145)
WBC # BLD AUTO: 6.47 K/UL (ref 3.9–12.7)

## 2023-09-18 PROCEDURE — 36415 COLL VENOUS BLD VENIPUNCTURE: CPT | Performed by: INTERNAL MEDICINE

## 2023-09-18 PROCEDURE — 80053 COMPREHEN METABOLIC PANEL: CPT | Performed by: INTERNAL MEDICINE

## 2023-09-18 PROCEDURE — 85025 COMPLETE CBC W/AUTO DIFF WBC: CPT | Performed by: INTERNAL MEDICINE

## 2023-09-18 PROCEDURE — 82105 ALPHA-FETOPROTEIN SERUM: CPT | Performed by: INTERNAL MEDICINE

## 2023-09-20 RX ORDER — PANTOPRAZOLE SODIUM 20 MG/1
20 TABLET, DELAYED RELEASE ORAL
Qty: 90 TABLET | Refills: 0 | Status: SHIPPED | OUTPATIENT
Start: 2023-09-20 | End: 2024-02-29

## 2023-09-25 ENCOUNTER — DOCUMENTATION ONLY (OUTPATIENT)
Dept: TRANSPLANT | Facility: CLINIC | Age: 58
End: 2023-09-25
Payer: MEDICAID

## 2023-09-25 NOTE — PROGRESS NOTES
LATE ENTRY 6/5/2023:    Communication received from [employer]:    [facility] Xifaxan 550mg tablet, and it is approved as follows-  Xifaxan 550 mg tablet, quantity/billable units of 60, approved for 6/5/2023-6/4/2024  Approved J Code (if applicable):      What Type Of Note Output Would You Prefer (Optional)?: Standard Output Is The Patient Presenting As Previously Scheduled?: Yes How Severe Is Your Rash?: moderate Is This A New Presentation, Or A Follow-Up?: Rash

## 2023-09-26 ENCOUNTER — OFFICE VISIT (OUTPATIENT)
Dept: PRIMARY CARE CLINIC | Facility: CLINIC | Age: 58
End: 2023-09-26
Payer: MEDICAID

## 2023-09-26 DIAGNOSIS — Z23 NEED FOR VACCINATION AGAINST HEPATITIS A: Primary | ICD-10-CM

## 2023-09-26 PROCEDURE — 99999 PR PBB SHADOW E&M-EST. PATIENT-LVL I: ICD-10-PCS | Mod: PBBFAC,,, | Performed by: STUDENT IN AN ORGANIZED HEALTH CARE EDUCATION/TRAINING PROGRAM

## 2023-09-26 PROCEDURE — 99999 PR PBB SHADOW E&M-EST. PATIENT-LVL I: CPT | Mod: PBBFAC,,, | Performed by: STUDENT IN AN ORGANIZED HEALTH CARE EDUCATION/TRAINING PROGRAM

## 2023-09-26 PROCEDURE — 90471 IMMUNIZATION ADMIN: CPT | Mod: PBBFAC,PN

## 2023-09-26 PROCEDURE — 3044F PR MOST RECENT HEMOGLOBIN A1C LEVEL <7.0%: ICD-10-PCS | Mod: CPTII,,, | Performed by: STUDENT IN AN ORGANIZED HEALTH CARE EDUCATION/TRAINING PROGRAM

## 2023-09-26 PROCEDURE — 90632 HEPA VACCINE ADULT IM: CPT | Mod: PBBFAC,PN

## 2023-09-26 PROCEDURE — 99999PBSHW HEPATITIS A VACCINE ADULT IM: ICD-10-PCS | Mod: PBBFAC,,,

## 2023-09-26 PROCEDURE — 99212 PR OFFICE/OUTPT VISIT, EST, LEVL II, 10-19 MIN: ICD-10-PCS | Mod: S$PBB,,, | Performed by: STUDENT IN AN ORGANIZED HEALTH CARE EDUCATION/TRAINING PROGRAM

## 2023-09-26 PROCEDURE — 99999PBSHW HEPATITIS A VACCINE ADULT IM: Mod: PBBFAC,,,

## 2023-09-26 PROCEDURE — 99212 OFFICE O/P EST SF 10 MIN: CPT | Mod: S$PBB,,, | Performed by: STUDENT IN AN ORGANIZED HEALTH CARE EDUCATION/TRAINING PROGRAM

## 2023-09-26 PROCEDURE — 3044F HG A1C LEVEL LT 7.0%: CPT | Mod: CPTII,,, | Performed by: STUDENT IN AN ORGANIZED HEALTH CARE EDUCATION/TRAINING PROGRAM

## 2023-09-26 PROCEDURE — 99211 OFF/OP EST MAY X REQ PHY/QHP: CPT | Mod: PBBFAC,PN | Performed by: STUDENT IN AN ORGANIZED HEALTH CARE EDUCATION/TRAINING PROGRAM

## 2023-09-26 NOTE — PROGRESS NOTES
[facility] Note    HPI:  Shonda Borrego is a 58 y.o. female who presents today for Immunizations (Patient here for 2nd Hep A vaccine per liver transplant team. )  Denies new symptoms. No complaints.     ROS   A review of systems was performed and was negative except as noted above.    I personally reviewed allergies, past medical, surgical, social and family history and updated as appropriate.    Medications:    Current Outpatient Medications:     albuterol (PROVENTIL/VENTOLIN HFA) 90 mcg/actuation inhaler, Inhale 2 puffs into the lungs every 6 (six) hours as needed for Wheezing or Shortness of Breath., Disp: 18 g, Rfl: 3    clonazePAM (KLONOPIN) 0.5 MG tablet, Take 0.5 mg by mouth daily as needed., Disp: , Rfl:     furosemide (LASIX) 20 MG tablet, Take 1 tablet (20 mg total) by mouth 2 (two) times daily., Disp: 60 tablet, Rfl: 11    gabapentin (NEURONTIN) 300 MG capsule, TAKE 1 CAPSULE BY MOUTH IN THE EVENING, Disp: 30 capsule, Rfl: 3    mirtazapine (REMERON) 7.5 MG Tab, Take 1 tablet (7.5 mg total) by mouth every evening., Disp: 30 tablet, Rfl: 2    nicotine (NICODERM CQ) 14 mg/24 hr, Place 1 patch onto the skin once daily., Disp: 14 patch, Rfl: 0    nicotine, polacrilex, (NICORETTE) 4 MG Gum, Take 1 each (4 mg total) by mouth as needed (Take 1 each (4 mg total) by mouth as needed for tobacco cessation. 1-2 per hour in the place of cigarettes. (5-16 daily max) - Oral)., Disp: 300 each, Rfl: 0    ondansetron (ZOFRAN) 4 MG tablet, Take 1 tablet (4 mg total) by mouth 2 (two) times daily., Disp: 30 tablet, Rfl: 0    oxybutynin (DITROPAN-XL) 5 MG TR24, Take 1 tablet (5 mg total) by mouth once daily., Disp: 30 tablet, Rfl: 11    pantoprazole (PROTONIX) 20 MG tablet, Take 1 tablet by mouth once daily, Disp: 90 tablet, Rfl: 0    rifAXIMin (XIFAXAN) 550 mg Tab, Take 1 tablet (550 mg total) by mouth 2 (two) times daily., Disp: 60 tablet, Rfl: 11    rOPINIRole (REQUIP) 1 MG tablet, Take 1 tablet (1 mg  total) by mouth every evening., Disp: 90 tablet, Rfl: 1    spironolactone (ALDACTONE) 100 MG tablet, Take 1 tablet (100 mg total) by mouth once daily., Disp: 30 tablet, Rfl: 2    tiotropium-olodateroL (STIOLTO RESPIMAT) 2.5-2.5 mcg/actuation Mist, 2 puffs Inhalation Once a day for 30 days, Disp: , Rfl:     ursodioL (ACTIGALL) 300 mg capsule, Take 1 capsule (300 mg total) by mouth 3 (three) times daily. for 14 days, Disp: 90 capsule, Rfl: 3    VRAYLAR 3 mg Cap, Take 3 mg by mouth., Disp: , Rfl:      Health Maintenance:  Immunization History   Administered Date(s) Administered    COVID-19 MRNA, LN-S PF (MODERNA HALF 0.25 ML DOSE) 12/09/2021    COVID-19, MRNA, LN-S, PF (MODERNA FULL 0.5 ML DOSE) 05/12/2021    COVID-19, MRNA, LN-S, PF (Pfizer) (Gray Cap) 06/03/2022    COVID-19, MRNA, LN-S, PF (Pfizer) (Purple Cap) 06/09/2021, 09/25/2022    COVID-19, mRNA, LNP-S, bivalent booster, PF (Moderna Omicron) 09/25/2022    Hepatitis A, Adult 03/23/2023, 09/26/2023    Hepatitis B, Adult 04/24/2023, 05/26/2023    Influenza - Quadrivalent - PF *Preferred* (6 months and older) 09/25/2022    Influenza - Trivalent (ADULT) 10/20/2021    Pneumococcal Conjugate - 20 Valent 10/05/2022, 02/07/2023    Tdap 03/03/2022    Zoster Recombinant 03/03/2022, 09/25/2022      Health Maintenance   Topic Date Due    Mammogram  10/24/2023    LDCT Lung Screen  01/20/2024    Lipid Panel  06/16/2028    TETANUS VACCINE  03/03/2032    Colorectal Cancer Screening  08/09/2033    Hepatitis C Screening  Completed    Shingles Vaccine  Completed     Health Maintenance Topics with due status: Not Due       Topic Last Completion Date    TETANUS VACCINE 03/03/2022    LDCT Lung Screen 01/20/2023    Cervical Cancer Screening 04/26/2023    Hemoglobin A1c (Diabetic Prevention Screening) 06/16/2023    Lipid Panel 06/16/2023    Colorectal Cancer Screening 08/09/2023     Health Maintenance Due   Topic Date Due    Influenza Vaccine (1) 09/01/2023    Mammogram  10/24/2023        PHYSICAL EXAM:  There were no vitals filed for this visit.  There is no height or weight on file to calculate BMI.  Physical Exam  Nursing note reviewed.        ASSESSMENT/PLAN:  1. Need for vaccination against hepatitis A  -     (In Office Administered) Hepatitis A Vaccine (Adult) (IM)      Other than changes above, continue current medications and maintain follow up with specialists.      No follow-ups on file.   Recent Results (from the past 2016 hour(s))   Drug screen panel, in-house    Collection Time: 07/07/23  9:42 AM   Result Value Ref Range    Benzodiazepines Negative Negative    Methadone metabolites Negative Negative    Cocaine (Metab.) Negative Negative    Opiate Scrn, Ur Negative Negative    Barbiturate Screen, Ur Negative Negative    Amphetamine Screen, Ur Negative Negative    THC Negative Negative    Phencyclidine Negative Negative    Creatinine, Urine 193.0 15.0 - 325.0 mg/dL    Toxicology Information SEE COMMENT    CBC Without Differential    Collection Time: 08/07/23  8:13 AM   Result Value Ref Range    WBC 4.73 3.90 - 12.70 K/uL    RBC 4.49 4.00 - 5.40 M/uL    Hemoglobin 14.2 12.0 - 16.0 g/dL    Hematocrit 40.8 37.0 - 48.5 %    MCV 91 82 - 98 fL    MCH 31.6 (H) 27.0 - 31.0 pg    MCHC 34.8 32.0 - 36.0 g/dL    RDW 14.2 11.5 - 14.5 %    Platelets 107 (L) 150 - 450 K/uL    MPV 11.2 9.2 - 12.9 fL   Comprehensive Metabolic Panel    Collection Time: 08/07/23  8:13 AM   Result Value Ref Range    Sodium 140 136 - 145 mmol/L    Potassium 3.6 3.5 - 5.1 mmol/L    Chloride 110 95 - 110 mmol/L    CO2 25 23 - 29 mmol/L    Glucose 245 (H) 70 - 110 mg/dL    BUN 12 6 - 20 mg/dL    Creatinine 1.0 0.5 - 1.4 mg/dL    Calcium 9.2 8.7 - 10.5 mg/dL    Total Protein 6.5 6.0 - 8.4 g/dL    Albumin 2.8 (L) 3.5 - 5.2 g/dL    Total Bilirubin 2.3 (H) 0.1 - 1.0 mg/dL    Alkaline Phosphatase 169 (H) 55 - 135 U/L    AST 55 (H) 10 - 40 U/L    ALT 35 10 - 44 U/L    eGFR >60.0 >60 mL/min/1.73 m^2    Anion Gap 5 (L) 8 - 16  mmol/L   Drug screen panel, in-house    Collection Time: 08/07/23  8:31 AM   Result Value Ref Range    Benzodiazepines Presumptive Positive (A) Negative    Methadone metabolites Negative Negative    Cocaine (Metab.) Negative Negative    Opiate Scrn, Ur Presumptive Positive (A) Negative    Barbiturate Screen, Ur Negative Negative    Amphetamine Screen, Ur Negative Negative    THC Negative Negative    Phencyclidine Negative Negative    Creatinine, Urine 162.0 15.0 - 325.0 mg/dL    Toxicology Information SEE COMMENT    CBC Auto Differential    Collection Time: 09/18/23 12:40 PM   Result Value Ref Range    WBC 6.47 3.90 - 12.70 K/uL    RBC 4.63 4.00 - 5.40 M/uL    Hemoglobin 14.9 12.0 - 16.0 g/dL    Hematocrit 42.6 37.0 - 48.5 %    MCV 92 82 - 98 fL    MCH 32.2 (H) 27.0 - 31.0 pg    MCHC 35.0 32.0 - 36.0 g/dL    RDW 14.7 (H) 11.5 - 14.5 %    Platelets 152 150 - 450 K/uL    MPV 10.7 9.2 - 12.9 fL    Immature Granulocytes 0.3 0.0 - 0.5 %    Gran # (ANC) 3.6 1.8 - 7.7 K/uL    Immature Grans (Abs) 0.02 0.00 - 0.04 K/uL    Lymph # 2.0 1.0 - 4.8 K/uL    Mono # 0.6 0.3 - 1.0 K/uL    Eos # 0.2 0.0 - 0.5 K/uL    Baso # 0.07 0.00 - 0.20 K/uL    nRBC 0 0 /100 WBC    Gran % 56.3 38.0 - 73.0 %    Lymph % 30.4 18.0 - 48.0 %    Mono % 9.1 4.0 - 15.0 %    Eosinophil % 2.8 0.0 - 8.0 %    Basophil % 1.1 0.0 - 1.9 %    Differential Method Automated    Comprehensive Metabolic Panel    Collection Time: 09/18/23 12:40 PM   Result Value Ref Range    Sodium 136 136 - 145 mmol/L    Potassium 3.6 3.5 - 5.1 mmol/L    Chloride 104 95 - 110 mmol/L    CO2 27 23 - 29 mmol/L    Glucose 226 (H) 70 - 110 mg/dL    BUN 11 6 - 20 mg/dL    Creatinine 1.4 0.5 - 1.4 mg/dL    Calcium 9.0 8.7 - 10.5 mg/dL    Total Protein 6.7 6.0 - 8.4 g/dL    Albumin 3.3 (L) 3.5 - 5.2 g/dL    Total Bilirubin 1.9 (H) 0.1 - 1.0 mg/dL    Alkaline Phosphatase 233 (H) 55 - 135 U/L    AST 40 10 - 40 U/L    ALT 39 10 - 44 U/L    eGFR 43.3 (A) >60 mL/min/1.73 m^2    Anion Gap 5 3 - 11  mmol/L   Protime-INR    Collection Time: 09/18/23 12:40 PM   Result Value Ref Range    Prothrombin Time 12.5 9.0 - 12.5 sec    INR 1.2 0.8 - 1.2   AFP Tumor Marker    Collection Time: 09/18/23 12:40 PM   Result Value Ref Range    AFP 2.2 0.0 - 8.4 ng/mL         Geeta Gaines DO  [facility]

## 2023-09-27 ENCOUNTER — OFFICE VISIT (OUTPATIENT)
Dept: HEPATOLOGY | Facility: CLINIC | Age: 58
End: 2023-09-27
Payer: MEDICAID

## 2023-09-27 VITALS
HEIGHT: 68 IN | BODY MASS INDEX: 32.68 KG/M2 | TEMPERATURE: 98 F | WEIGHT: 215.63 LBS | HEART RATE: 97 BPM | DIASTOLIC BLOOD PRESSURE: 71 MMHG | OXYGEN SATURATION: 97 % | SYSTOLIC BLOOD PRESSURE: 128 MMHG

## 2023-09-27 DIAGNOSIS — C22.0 HCC (HEPATOCELLULAR CARCINOMA): Primary | ICD-10-CM

## 2023-09-27 PROCEDURE — 1159F MED LIST DOCD IN RCRD: CPT | Mod: CPTII,,, | Performed by: INTERNAL MEDICINE

## 2023-09-27 PROCEDURE — 99214 PR OFFICE/OUTPT VISIT, EST, LEVL IV, 30-39 MIN: ICD-10-PCS | Mod: S$PBB,,, | Performed by: INTERNAL MEDICINE

## 2023-09-27 PROCEDURE — 99214 OFFICE O/P EST MOD 30 MIN: CPT | Mod: PBBFAC | Performed by: INTERNAL MEDICINE

## 2023-09-27 PROCEDURE — 3078F PR MOST RECENT DIASTOLIC BLOOD PRESSURE < 80 MM HG: ICD-10-PCS | Mod: CPTII,,, | Performed by: INTERNAL MEDICINE

## 2023-09-27 PROCEDURE — 99214 OFFICE O/P EST MOD 30 MIN: CPT | Mod: S$PBB,,, | Performed by: INTERNAL MEDICINE

## 2023-09-27 PROCEDURE — 3044F PR MOST RECENT HEMOGLOBIN A1C LEVEL <7.0%: ICD-10-PCS | Mod: CPTII,,, | Performed by: INTERNAL MEDICINE

## 2023-09-27 PROCEDURE — 99999 PR PBB SHADOW E&M-EST. PATIENT-LVL IV: ICD-10-PCS | Mod: PBBFAC,,, | Performed by: INTERNAL MEDICINE

## 2023-09-27 PROCEDURE — 99999 PR PBB SHADOW E&M-EST. PATIENT-LVL IV: CPT | Mod: PBBFAC,,, | Performed by: INTERNAL MEDICINE

## 2023-09-27 PROCEDURE — 3078F DIAST BP <80 MM HG: CPT | Mod: CPTII,,, | Performed by: INTERNAL MEDICINE

## 2023-09-27 PROCEDURE — 3008F PR BODY MASS INDEX (BMI) DOCUMENTED: ICD-10-PCS | Mod: CPTII,,, | Performed by: INTERNAL MEDICINE

## 2023-09-27 PROCEDURE — 3074F PR MOST RECENT SYSTOLIC BLOOD PRESSURE < 130 MM HG: ICD-10-PCS | Mod: CPTII,,, | Performed by: INTERNAL MEDICINE

## 2023-09-27 PROCEDURE — 3044F HG A1C LEVEL LT 7.0%: CPT | Mod: CPTII,,, | Performed by: INTERNAL MEDICINE

## 2023-09-27 PROCEDURE — 3074F SYST BP LT 130 MM HG: CPT | Mod: CPTII,,, | Performed by: INTERNAL MEDICINE

## 2023-09-27 PROCEDURE — 3008F BODY MASS INDEX DOCD: CPT | Mod: CPTII,,, | Performed by: INTERNAL MEDICINE

## 2023-09-27 PROCEDURE — 1159F PR MEDICATION LIST DOCUMENTED IN MEDICAL RECORD: ICD-10-PCS | Mod: CPTII,,, | Performed by: INTERNAL MEDICINE

## 2023-09-27 NOTE — PATIENT INSTRUCTIONS
Return in 2 months.   -  MRI abd w wo contrast and AFP in early December 2023.    - Routine labs CBC, CMP, PT INR also same day as MRI.   - avoid non-steroidal anti-inflammatory drugs (NSAIDs) such as ibuprofen, Motrin, naprosyn, Alleve due to the risk of kidney damage  - can take acetaminophen (Tylenol), no more than 2000 mg per day  - low sodium (salt) 2 gram per day diet  - nutrition: 25-30kcal (calorie per body body weight in kilogram) per day  - no need to restrict protein in diet  - high protein diet: 1.2-1.5 gram/kg (protein per body weight in kilogram) per day to prevent muscle mass loss  - avoid fasting  - Late night snack with 50 gram of complex carbohydrates and protein  - resistance exercises for muscle strength  - avoid raw seafoods due to the risk of fatal Vibrio vulnificus infection  - Upper endoscopy every 1-2 years to screen for varices in the stomach and foodpipe which can burst and cause fatal bleeding

## 2023-09-27 NOTE — PROGRESS NOTES
[facility] New Patient (Self-Referral) Note    Reason for Visit:  The encounter diagnosis is Alcoholic Cirrhosis and HCC      HPI:  This is a 56 year lady with history of Restless leg syndrome, bipolar disorder, here for evaluation of her alcoholic cirrhosis diagnosed in 2019 when she had a variceal requiring emergent TIPS in [facility].  She had been following with PCP but has not had hepatology evaluation.  Patient says that she quit drinking >12 months ago after slowly cutting down since her diagnosis in 2019. Stopped smoking pot 8 months ago.         Her only complaints are lack of energy fatigue and constipation. The constipation is gradually improving since she became compliant with Lactulose.  She has also been taking Spironolactone and Lasix which has helped considerably with ascites and she has noted some weight loss after fluid removal.    Had episode of HE, could not remember boyfriend's name, was obnoxious, and had to be taken to ER in [facility] in [place]. Admitted for two days. Improved with lactulose.     Recommend minimizing doses of remeron, and gabapentin.      Positive for THC repeatedly (patient visits sisters who smoke).  Patient states she has not used marijuana in a year.  She is willing to avoid visiting her sisters. She can see them on video.    Was positive for opiate on 8/7/23, and today she tells me she took an aspirin pill from her sister's medicine cabinet, and on the urine test, her opiate screen was positive.  The clock starts now to see how long she can negative for opiates, marijuana.     She was declined for liver transplant in 6/2023:   declined due to recent positive toxicology (METH and alcohol) pt needs to complete outpatient program and continue local regional therapy on tumor.      9/18/23:   As above, drug screen positive for opiates.  No evidence of HCC recurrence as of 6/3/2023, based on MRI and AFP.        Wants a liver  transplant.    [facility] was going to send us results of weekly urine tests.      Xifaxan 550 mg twice daily, sent to [facility]      Elevated liver enzymes: Yes  Abnormal imaging: Yes  Cirrhosis: Yes  Hepatitis C: No  Hepatitis B: No  Fatty liver: No  Encephalopathy: No  Post-hospital discharge: No  Symptoms: Malaise Fatigue     Primary hepatic manifestations:  Fatigue: Yes  Edema:No  Ascites:Yes  Encephalopathy:According to patient few days after TIPS none since   Abdominal pain:No  GI bleeds: None since bleeding episode in 2019   Pruritus:No  Weight Changes:No  Changes in Bowel habits: No  Muscle cramps:No    Risk factors for liver disease:  No jaundice  No transfusions  No IVDU  Did not snort cocaine or similar agents  Did not live with anyone with hepatitis B or C  Sexual partner not tested  No hepatotoxic medications  No exposure to industrial toxins  Alcohol: Quit 6 months ago after using hard liquor > 10-15 years    Review of Systems   Constitutional:  Positive for malaise/fatigue. Negative for chills, fever and weight loss.   Respiratory:  Negative for cough.    Cardiovascular:  Negative for chest pain, palpitations and orthopnea.   Gastrointestinal:  Negative for abdominal pain, blood in stool, constipation, diarrhea, heartburn, melena, nausea and vomiting.     Surgical History:  None     Family History: No Family history of Liver disorders      Current Outpatient Medications   Medication Sig    clonazePAM (KLONOPIN) 0.5 MG tablet Take 0.5 mg by mouth daily as needed.    furosemide (LASIX) 20 MG tablet Take 1 tablet (20 mg total) by mouth 2 (two) times daily.    gabapentin (NEURONTIN) 300 MG capsule TAKE 1 CAPSULE BY MOUTH IN THE EVENING    nicotine (NICODERM CQ) 14 mg/24 hr Place 1 patch onto the skin once daily.    nicotine, polacrilex, (NICORETTE) 4 MG Gum Take 1 each (4 mg total) by mouth as needed (Take 1 each (4 mg total) by mouth as needed for tobacco cessation. 1-2 per hour in the place  of cigarettes. (5-16 daily max) - Oral).    ondansetron (ZOFRAN) 4 MG tablet Take 1 tablet (4 mg total) by mouth 2 (two) times daily.    oxybutynin (DITROPAN-XL) 5 MG TR24 Take 1 tablet (5 mg total) by mouth once daily.    pantoprazole (PROTONIX) 20 MG tablet Take 1 tablet by mouth once daily    rifAXIMin (XIFAXAN) 550 mg Tab Take 1 tablet (550 mg total) by mouth 2 (two) times daily.    rOPINIRole (REQUIP) 1 MG tablet Take 1 tablet (1 mg total) by mouth every evening.    spironolactone (ALDACTONE) 100 MG tablet Take 1 tablet (100 mg total) by mouth once daily.    tiotropium-olodateroL (STIOLTO RESPIMAT) 2.5-2.5 mcg/actuation Mist 2 puffs Inhalation Once a day for 30 days    VRAYLAR 3 mg Cap Take 3 mg by mouth.    albuterol (PROVENTIL/VENTOLIN HFA) 90 mcg/actuation inhaler Inhale 2 puffs into the lungs every 6 (six) hours as needed for Wheezing or Shortness of Breath.    mirtazapine (REMERON) 7.5 MG Tab Take 1 tablet (7.5 mg total) by mouth every evening.    ursodioL (ACTIGALL) 300 mg capsule Take 1 capsule (300 mg total) by mouth 3 (three) times daily. for 14 days     No current facility-administered medications for this visit.     Objective Findings:    Vital Signs:  Vitals:    09/27/23 1334   BP: 128/71   Pulse: 97   Temp: 98.1 °F (36.7 °C)           Physical Exam  Vitals reviewed.   Constitutional:       Appearance: Normal appearance. She is not ill-appearing.   Cardiovascular:      Rate and Rhythm: Normal rate.      Pulses: Normal pulses.   Pulmonary:      Effort: Pulmonary effort is normal. No respiratory distress.   Abdominal:      General: Abdomen is flat. Bowel sounds are normal. There is no distension.      Palpations: Abdomen is soft. There is no mass.      Tenderness: There is no abdominal tenderness. There is no guarding.   Musculoskeletal:      Right lower leg: No edema.      Left lower leg: No edema.   Skin:     Capillary Refill: Capillary refill takes less than 2 seconds.      Coloration: Skin is  pale. Skin is not jaundiced.      Findings: No erythema.   Neurological:      General: No focal deficit present.      Mental Status: She is alert and oriented to person, place, and time.       MELD 3.0: 17 at 9/18/2023 12:40 PM  MELD-Na: 15 at 9/18/2023 12:40 PM  Calculated from:  Serum Creatinine: 1.4 mg/dL at 9/18/2023 12:40 PM  Serum Sodium: 136 mmol/L at 9/18/2023 12:40 PM  Total Bilirubin: 1.9 mg/dL at 9/18/2023 12:40 PM  Serum Albumin: 3.3 g/dL at 9/18/2023 12:40 PM  INR(ratio): 1.2 at 9/18/2023 12:40 PM  Age at listing (hypothetical): 58 years  Sex: Female at 9/18/2023 12:40 PM       Imaging:     Ultrasound liver    Nodular contour of the liver indicating cirrhotic change.  There is associated diffuse heterogeneity of the hepatic parenchymal echotexture.  Hepatopetal flow in the portal vein.  Previously demonstrated tips shunt is not demonstrated on this exam.  Common duct normal in size measuring 5 mm  2. Cholelithiasis    Endoscopy:  Will evaluate after TIPS stent  evaluation is compleetd    Assessment:   Alcoholic Liver Cirrhosis   Decompensated with ascites and variceal bleeding  S/p TIPS in [facility] (2019), but it is no longer mentioned on the ultrasound or MRI - Will Repeat MRI in December 2023, to confirm recent scan findings  MELD 17 on 9/18/23.       HCC screen- No focal lesion on MRI in June 2023 and AFP normal.    will repeat in 6 months, in early December 2023     HE- no history, for primary prophylaxis continue Lactulose to target 2-3 soft BM/day   - strict abstinence of alcohol use  - No history of SBP- today no ascites noted continue furosemide 20 mga nd Spironolactone 50 mg.    Recommendations:  -  Labs in 3 months after MRI done:  CBC, CMP, PT INR, AFP.     -   Transplant option discussed, will evaluate when MELD >15 or HCC found in the liver. Transplant evaluation completed 2023.     -  Y-90 was attempted to treat the HCC lesion, but it was met with difficulty. Hence, patient  had microwave ablation of segment 6 hypervascular tumor on Jan 25, 2023. Now MRI scheduled for tomorrow 6/3/23, to look for response.      -  HE: Recommend minimizing doses of remeron, and gabapentin.      -  Positive for THC repeatedly (patient visits sisters who smoke).  Patient states she has not used marijuana in a year.  She is willing to avoid visiting her sisters. She can see them on video.  Wants a liver transplant.  [facility] was going to send us results of weekly urine tests.      -  Patient was declined 6/2023 for liver transplant due to recent positive toxicology (METH and alcohol) pt needs to complete outpatient program and continue local regional therapy on tumor.    Return in 2 months.   -  MRI abd w wo contrast and AFP in early December 2023.    - Routine labs CBC, CMP, PT INR also same day as MRI.   - avoid non-steroidal anti-inflammatory drugs (NSAIDs) such as ibuprofen, Motrin, naprosyn, Alleve due to the risk of kidney damage  - can take acetaminophen (Tylenol), no more than 2000 mg per day  - low sodium (salt) 2 gram per day diet  - nutrition: 25-30kcal (calorie per body body weight in kilogram) per day  - no need to restrict protein in diet  - high protein diet: 1.2-1.5 gram/kg (protein per body weight in kilogram) per day to prevent muscle mass loss  - avoid fasting  - Late night snack with 50 gram of complex carbohydrates and protein  - resistance exercises for muscle strength  - avoid raw seafoods due to the risk of fatal Vibrio vulnificus infection  - Upper endoscopy every 1-2 years to screen for varices in the stomach and foodpipe which can burst and cause fatal bleeding

## 2023-10-02 ENCOUNTER — TELEPHONE (OUTPATIENT)
Dept: SMOKING CESSATION | Facility: CLINIC | Age: 58
End: 2023-10-02
Payer: MEDICAID

## 2023-10-02 NOTE — TELEPHONE ENCOUNTER
Spoke with Ms. Villaseñorh regarding smoking cessation program. She states that she would like to return to the program, but cannot schedule at this time. Pt gives me permission to call her back in approximately 3-5 weeks.

## 2023-11-05 DIAGNOSIS — K74.60 HEPATIC CIRRHOSIS, UNSPECIFIED HEPATIC CIRRHOSIS TYPE, UNSPECIFIED WHETHER ASCITES PRESENT: ICD-10-CM

## 2023-11-05 DIAGNOSIS — M79.89 LEG SWELLING: ICD-10-CM

## 2023-11-08 DIAGNOSIS — J44.9 COPD WITHOUT EXACERBATION: ICD-10-CM

## 2023-11-08 DIAGNOSIS — M79.89 LEG SWELLING: ICD-10-CM

## 2023-11-08 DIAGNOSIS — K74.60 HEPATIC CIRRHOSIS, UNSPECIFIED HEPATIC CIRRHOSIS TYPE, UNSPECIFIED WHETHER ASCITES PRESENT: ICD-10-CM

## 2023-11-08 RX ORDER — SPIRONOLACTONE 100 MG/1
100 TABLET, FILM COATED ORAL DAILY
Qty: 30 TABLET | Refills: 5 | Status: SHIPPED | OUTPATIENT
Start: 2023-11-08

## 2023-11-08 RX ORDER — SPIRONOLACTONE 100 MG/1
100 TABLET, FILM COATED ORAL
Qty: 30 TABLET | Refills: 0 | OUTPATIENT
Start: 2023-11-08

## 2023-11-09 ENCOUNTER — OFFICE VISIT (OUTPATIENT)
Dept: PRIMARY CARE CLINIC | Facility: CLINIC | Age: 58
End: 2023-11-09
Payer: MEDICAID

## 2023-11-09 VITALS
TEMPERATURE: 98 F | OXYGEN SATURATION: 98 % | BODY MASS INDEX: 32.89 KG/M2 | DIASTOLIC BLOOD PRESSURE: 65 MMHG | HEART RATE: 87 BPM | WEIGHT: 217 LBS | RESPIRATION RATE: 16 BRPM | HEIGHT: 68 IN | SYSTOLIC BLOOD PRESSURE: 138 MMHG

## 2023-11-09 DIAGNOSIS — Z12.31 BREAST CANCER SCREENING BY MAMMOGRAM: ICD-10-CM

## 2023-11-09 DIAGNOSIS — F41.9 ANXIETY DUE TO INVASIVE PROCEDURE: ICD-10-CM

## 2023-11-09 DIAGNOSIS — F41.9 ANXIETY: Primary | ICD-10-CM

## 2023-11-09 DIAGNOSIS — F39 MOOD DISORDER: ICD-10-CM

## 2023-11-09 DIAGNOSIS — Z92.29 HAS RECEIVED THIRD DOSE OF HEPATITIS B VACCINE: ICD-10-CM

## 2023-11-09 PROCEDURE — 90471 IMMUNIZATION ADMIN: CPT | Mod: PBBFAC,PO

## 2023-11-09 PROCEDURE — 99999PBSHW HEPATITIS B VACCINE ADULT IM: Mod: PBBFAC,,,

## 2023-11-09 PROCEDURE — 3078F PR MOST RECENT DIASTOLIC BLOOD PRESSURE < 80 MM HG: ICD-10-PCS | Mod: CPTII,,, | Performed by: STUDENT IN AN ORGANIZED HEALTH CARE EDUCATION/TRAINING PROGRAM

## 2023-11-09 PROCEDURE — 3008F PR BODY MASS INDEX (BMI) DOCUMENTED: ICD-10-PCS | Mod: CPTII,,, | Performed by: STUDENT IN AN ORGANIZED HEALTH CARE EDUCATION/TRAINING PROGRAM

## 2023-11-09 PROCEDURE — 3044F PR MOST RECENT HEMOGLOBIN A1C LEVEL <7.0%: ICD-10-PCS | Mod: CPTII,,, | Performed by: STUDENT IN AN ORGANIZED HEALTH CARE EDUCATION/TRAINING PROGRAM

## 2023-11-09 PROCEDURE — 1159F PR MEDICATION LIST DOCUMENTED IN MEDICAL RECORD: ICD-10-PCS | Mod: CPTII,,, | Performed by: STUDENT IN AN ORGANIZED HEALTH CARE EDUCATION/TRAINING PROGRAM

## 2023-11-09 PROCEDURE — 3008F BODY MASS INDEX DOCD: CPT | Mod: CPTII,,, | Performed by: STUDENT IN AN ORGANIZED HEALTH CARE EDUCATION/TRAINING PROGRAM

## 2023-11-09 PROCEDURE — 99999 PR PBB SHADOW E&M-EST. PATIENT-LVL V: CPT | Mod: PBBFAC,,, | Performed by: STUDENT IN AN ORGANIZED HEALTH CARE EDUCATION/TRAINING PROGRAM

## 2023-11-09 PROCEDURE — 1159F MED LIST DOCD IN RCRD: CPT | Mod: CPTII,,, | Performed by: STUDENT IN AN ORGANIZED HEALTH CARE EDUCATION/TRAINING PROGRAM

## 2023-11-09 PROCEDURE — 3078F DIAST BP <80 MM HG: CPT | Mod: CPTII,,, | Performed by: STUDENT IN AN ORGANIZED HEALTH CARE EDUCATION/TRAINING PROGRAM

## 2023-11-09 PROCEDURE — 99213 PR OFFICE/OUTPT VISIT, EST, LEVL III, 20-29 MIN: ICD-10-PCS | Mod: S$PBB,,, | Performed by: STUDENT IN AN ORGANIZED HEALTH CARE EDUCATION/TRAINING PROGRAM

## 2023-11-09 PROCEDURE — 99213 OFFICE O/P EST LOW 20 MIN: CPT | Mod: S$PBB,,, | Performed by: STUDENT IN AN ORGANIZED HEALTH CARE EDUCATION/TRAINING PROGRAM

## 2023-11-09 PROCEDURE — 99999PBSHW HEPATITIS B VACCINE ADULT IM: ICD-10-PCS | Mod: PBBFAC,,,

## 2023-11-09 PROCEDURE — 1160F PR REVIEW ALL MEDS BY PRESCRIBER/CLIN PHARMACIST DOCUMENTED: ICD-10-PCS | Mod: CPTII,,, | Performed by: STUDENT IN AN ORGANIZED HEALTH CARE EDUCATION/TRAINING PROGRAM

## 2023-11-09 PROCEDURE — 3075F PR MOST RECENT SYSTOLIC BLOOD PRESS GE 130-139MM HG: ICD-10-PCS | Mod: CPTII,,, | Performed by: STUDENT IN AN ORGANIZED HEALTH CARE EDUCATION/TRAINING PROGRAM

## 2023-11-09 PROCEDURE — 1160F RVW MEDS BY RX/DR IN RCRD: CPT | Mod: CPTII,,, | Performed by: STUDENT IN AN ORGANIZED HEALTH CARE EDUCATION/TRAINING PROGRAM

## 2023-11-09 PROCEDURE — 90746 HEPB VACCINE 3 DOSE ADULT IM: CPT | Mod: PBBFAC,PO

## 2023-11-09 PROCEDURE — 3075F SYST BP GE 130 - 139MM HG: CPT | Mod: CPTII,,, | Performed by: STUDENT IN AN ORGANIZED HEALTH CARE EDUCATION/TRAINING PROGRAM

## 2023-11-09 PROCEDURE — 3044F HG A1C LEVEL LT 7.0%: CPT | Mod: CPTII,,, | Performed by: STUDENT IN AN ORGANIZED HEALTH CARE EDUCATION/TRAINING PROGRAM

## 2023-11-09 PROCEDURE — 99999 PR PBB SHADOW E&M-EST. PATIENT-LVL V: ICD-10-PCS | Mod: PBBFAC,,, | Performed by: STUDENT IN AN ORGANIZED HEALTH CARE EDUCATION/TRAINING PROGRAM

## 2023-11-09 PROCEDURE — 99215 OFFICE O/P EST HI 40 MIN: CPT | Mod: PBBFAC,PO | Performed by: STUDENT IN AN ORGANIZED HEALTH CARE EDUCATION/TRAINING PROGRAM

## 2023-11-09 RX ORDER — MIRTAZAPINE 15 MG/1
15 TABLET, ORALLY DISINTEGRATING ORAL NIGHTLY
COMMUNITY
Start: 2023-10-31

## 2023-11-09 NOTE — PROGRESS NOTES
[facility] Note    HPI:  Shonda Borrego is a 58 y.o. female who presents today for Immunizations (3rd Hepatitis B vaccine)      ROS   A review of systems was performed and was negative except as noted above.    I personally reviewed allergies, past medical, surgical, social and family history and updated as appropriate.    Medications:  No current facility-administered medications for this visit.  No current outpatient medications on file.    Facility-Administered Medications Ordered in Other Visits:     lactated ringers infusion, , Intravenous, Continuous, Geeta Gaines, DO, Last Rate: 75 mL/hr at 12/06/23 0633, Rate Verify at 12/06/23 0633    lactulose 20 gram/30 mL solution Soln 30 g, 30 g, Oral, Q6H, Geeta Gaines, DO, 30 g at 12/06/23 1312    LORazepam (ATIVAN) injection 0.5 mg, 0.5 mg, Intravenous, Daily PRN, Geeta Gaines, DO, 0.5 mg at 12/06/23 1031    melatonin tablet 6 mg, 6 mg, Oral, Nightly PRN, Radames Brown MD, 6 mg at 12/05/23 2105    mupirocin 2 % ointment, , Nasal, BID, Geeta Gaines, DO, Given at 12/06/23 1100    nicotine 14 mg/24 hr 1 patch, 1 patch, Transdermal, Daily, Geeta Gaines, DO, 1 patch at 12/06/23 1016    rifAXIMin tablet 550 mg, 550 mg, Oral, BID, Geeta Gaines, DO, 550 mg at 12/06/23 1016    sodium chloride 0.9% flush 10 mL, 10 mL, Intravenous, PRN, Radames Brown MD     Health Maintenance:  Immunization History   Administered Date(s) Administered    COVID-19 MRNA, LN-S PF (MODERNA HALF 0.25 ML DOSE) 12/09/2021    COVID-19 Vaccine 05/12/2021, 06/09/2021, 12/09/2021    COVID-19, MRNA, LN-S, PF (MODERNA FULL 0.5 ML DOSE) 05/12/2021    COVID-19, MRNA, LN-S, PF (Pfizer) (Gray Cap) 06/03/2022    COVID-19, MRNA, LN-S, PF (Pfizer) (Purple Cap) 06/09/2021, 09/25/2022    COVID-19, mRNA, LNP-S, PF (Moderna 2023)Ages 12+ 11/02/2023    COVID-19, mRNA, LNP-S, bivalent booster, PF (Moderna Omicron)12 + YEARS 09/25/2022    COVID-19, mRNA,  "LNP-S, bivalent booster, PF (PFIZER OMICRON YEARS 5-11) 09/25/2022    Hepatitis A, Adult 03/23/2023, 09/26/2023    Hepatitis B, Adult 04/24/2023, 05/26/2023, 11/09/2023    Influenza (FLUBLOK) - Quadrivalent - Recombinant - PF *Preferred* (egg allergy) 10/09/2023    Influenza - Quadrivalent - PF *Preferred* (6 months and older) 09/25/2022    Influenza - Trivalent (ADULT) 10/20/2021    Pneumococcal Conjugate - 20 Valent 10/05/2022, 02/07/2023    Tdap 03/03/2022    Zoster Recombinant 03/03/2022, 09/25/2022      Health Maintenance   Topic Date Due    LDCT Lung Screen  01/20/2024    Mammogram  11/22/2024    Lipid Panel  06/16/2028    TETANUS VACCINE  03/03/2032    Colorectal Cancer Screening  08/09/2033    Hepatitis C Screening  Completed    Shingles Vaccine  Completed     Health Maintenance Topics with due status: Not Due       Topic Last Completion Date    TETANUS VACCINE 03/03/2022    LDCT Lung Screen 01/20/2023    Cervical Cancer Screening 04/26/2023    Hemoglobin A1c (Diabetic Prevention Screening) 06/16/2023    Lipid Panel 06/16/2023    Colorectal Cancer Screening 08/09/2023    Mammogram 11/22/2023     There are no preventive care reminders to display for this patient.      PHYSICAL EXAM:  Vitals:    11/09/23 1441   BP: 138/65   BP Location: Right arm   Patient Position: Sitting   BP Method: Large (Automatic)   Pulse: 87   Resp: 16   Temp: 98.3 °F (36.8 °C)   SpO2: 98%   Weight: 98.4 kg (217 lb)   Height: 5' 8" (1.727 m)     Body mass index is 32.99 kg/m².  Physical Exam  Nursing note reviewed.   Constitutional:       General: She is not in acute distress.     Appearance: She is not ill-appearing, toxic-appearing or diaphoretic.   HENT:      Right Ear: External ear normal.      Left Ear: External ear normal.      Mouth/Throat:      Mouth: Mucous membranes are dry.      Pharynx: Oropharynx is clear.   Eyes:      Extraocular Movements: Extraocular movements intact.   Cardiovascular:      Rate and Rhythm: Normal rate. "      Pulses: Normal pulses.   Pulmonary:      Effort: Pulmonary effort is normal. No respiratory distress.      Breath sounds: No wheezing or rales.   Abdominal:      General: Abdomen is flat.      Palpations: Abdomen is soft.      Tenderness: There is no right CVA tenderness or left CVA tenderness.   Musculoskeletal:      Cervical back: Normal range of motion and neck supple.      Right lower leg: No edema.      Left lower leg: No edema.   Lymphadenopathy:      Cervical: No cervical adenopathy.   Skin:     General: Skin is warm and dry.      Capillary Refill: Capillary refill takes less than 2 seconds.      Findings: No lesion or rash.   Neurological:      General: No focal deficit present.      Mental Status: She is alert and oriented to person, place, and time.   Psychiatric:         Mood and Affect: Mood normal.         Behavior: Behavior normal.          ASSESSMENT/PLAN:  1. Anxiety  Assessment & Plan:  Resume home medications. Currently following  specialist for severe anxiety and panic attacks. Denies SI/HI, thoughts of harming self or others.   Endorses free of street drug use.   - PRN ativan 0.5 mg Q12H        2. 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   -  reviewed      4. Has received third dose of hepatitis B vaccine  -     (In Office Administered) Hepatitis B Vaccine (Adult) (IM)    5. Breast cancer screening by mammogram  Assessment & Plan:  11/24/23   BI-RADS Category 1: Negative  Recommendation:  Routine screening mammogram in 1 year is recommended.    Orders:  -     Mammo Digital Screening Bilat w/ Jayme; Future; Expected date: 11/09/2023        Other than changes above, continue current medications and maintain follow up with specialists.      No follow-ups on file.   Recent Results  (from the past 2016 hour(s))   CBC Auto Differential    Collection Time: 09/18/23 12:40 PM   Result Value Ref Range    WBC 6.47 3.90 - 12.70 K/uL    RBC 4.63 4.00 - 5.40 M/uL    Hemoglobin 14.9 12.0 - 16.0 g/dL    Hematocrit 42.6 37.0 - 48.5 %    MCV 92 82 - 98 fL    MCH 32.2 (H) 27.0 - 31.0 pg    MCHC 35.0 32.0 - 36.0 g/dL    RDW 14.7 (H) 11.5 - 14.5 %    Platelets 152 150 - 450 K/uL    MPV 10.7 9.2 - 12.9 fL    Immature Granulocytes 0.3 0.0 - 0.5 %    Gran # (ANC) 3.6 1.8 - 7.7 K/uL    Immature Grans (Abs) 0.02 0.00 - 0.04 K/uL    Lymph # 2.0 1.0 - 4.8 K/uL    Mono # 0.6 0.3 - 1.0 K/uL    Eos # 0.2 0.0 - 0.5 K/uL    Baso # 0.07 0.00 - 0.20 K/uL    nRBC 0 0 /100 WBC    Gran % 56.3 38.0 - 73.0 %    Lymph % 30.4 18.0 - 48.0 %    Mono % 9.1 4.0 - 15.0 %    Eosinophil % 2.8 0.0 - 8.0 %    Basophil % 1.1 0.0 - 1.9 %    Differential Method Automated    Comprehensive Metabolic Panel    Collection Time: 09/18/23 12:40 PM   Result Value Ref Range    Sodium 136 136 - 145 mmol/L    Potassium 3.6 3.5 - 5.1 mmol/L    Chloride 104 95 - 110 mmol/L    CO2 27 23 - 29 mmol/L    Glucose 226 (H) 70 - 110 mg/dL    BUN 11 6 - 20 mg/dL    Creatinine 1.4 0.5 - 1.4 mg/dL    Calcium 9.0 8.7 - 10.5 mg/dL    Total Protein 6.7 6.0 - 8.4 g/dL    Albumin 3.3 (L) 3.5 - 5.2 g/dL    Total Bilirubin 1.9 (H) 0.1 - 1.0 mg/dL    Alkaline Phosphatase 233 (H) 55 - 135 U/L    AST 40 10 - 40 U/L    ALT 39 10 - 44 U/L    eGFR 43.3 (A) >60 mL/min/1.73 m^2    Anion Gap 5 3 - 11 mmol/L   Protime-INR    Collection Time: 09/18/23 12:40 PM   Result Value Ref Range    Prothrombin Time 12.5 9.0 - 12.5 sec    INR 1.2 0.8 - 1.2   AFP Tumor Marker    Collection Time: 09/18/23 12:40 PM   Result Value Ref Range    AFP 2.2 0.0 - 8.4 ng/mL   Urinalysis, Reflex to Urine Culture Urine, Clean Catch    Collection Time: 12/05/23 11:00 AM    Specimen: Urine, Catheterized   Result Value Ref Range    Specimen UA Urine, Catheterized     Color, UA Yellow Yellow, Straw, Joycelyn     Appearance, UA Clear Clear    pH, UA 8.0 5.0 - 8.0    Specific Gravity, UA 1.010 1.005 - 1.030    Protein, UA Negative Negative    Glucose, UA Negative Negative    Ketones, UA Negative Negative    Bilirubin (UA) Negative Negative    Occult Blood UA Negative Negative    Nitrite, UA Positive (A) Negative    Urobilinogen, UA 1.0 <2.0 EU/dL    Leukocytes, UA Negative Negative   Urinalysis Microscopic    Collection Time: 12/05/23 11:00 AM   Result Value Ref Range    RBC, UA 1 0 - 4 /hpf    WBC, UA 6 (H) 0 - 5 /hpf    Bacteria Few (A) None-Occ /hpf    Squam Epithel, UA 1 /hpf    Hyaline Casts, UA 0.0 (A) 0-1/lpf /lpf    Microscopic Comment SEE COMMENT    CBC auto differential    Collection Time: 12/05/23 11:17 AM   Result Value Ref Range    WBC 6.56 3.90 - 12.70 K/uL    RBC 4.74 4.00 - 5.40 M/uL    Hemoglobin 14.9 12.0 - 16.0 g/dL    Hematocrit 43.2 37.0 - 48.5 %    MCV 91 82 - 98 fL    MCH 31.4 (H) 27.0 - 31.0 pg    MCHC 34.5 32.0 - 36.0 g/dL    RDW 13.9 11.5 - 14.5 %    Platelets 132 (L) 150 - 450 K/uL    MPV 10.8 9.2 - 12.9 fL    Immature Granulocytes 0.3 0.0 - 0.5 %    Gran # (ANC) 3.5 1.8 - 7.7 K/uL    Immature Grans (Abs) 0.02 0.00 - 0.04 K/uL    Lymph # 2.2 1.0 - 4.8 K/uL    Mono # 0.6 0.3 - 1.0 K/uL    Eos # 0.2 0.0 - 0.5 K/uL    Baso # 0.06 0.00 - 0.20 K/uL    nRBC 0 0 /100 WBC    Gran % 53.9 38.0 - 73.0 %    Lymph % 34.1 18.0 - 48.0 %    Mono % 8.5 4.0 - 15.0 %    Eosinophil % 2.3 0.0 - 8.0 %    Basophil % 0.9 0.0 - 1.9 %    Differential Method Automated    Comprehensive metabolic panel    Collection Time: 12/05/23 11:17 AM   Result Value Ref Range    Sodium 144 136 - 145 mmol/L    Potassium 4.2 3.5 - 5.1 mmol/L    Chloride 114 (H) 95 - 110 mmol/L    CO2 26 23 - 29 mmol/L    Glucose 124 (H) 70 - 110 mg/dL    BUN 9 6 - 20 mg/dL    Creatinine 0.8 0.5 - 1.4 mg/dL    Calcium 9.4 8.7 - 10.5 mg/dL    Total Protein 6.6 6.0 - 8.4 g/dL    Albumin 3.0 (L) 3.5 - 5.2 g/dL    Total Bilirubin 2.2 (H) 0.1 - 1.0 mg/dL     Alkaline Phosphatase 169 (H) 55 - 135 U/L    AST 38 10 - 40 U/L    ALT 30 10 - 44 U/L    eGFR >60.0 >60 mL/min/1.73 m^2    Anion Gap 4 3 - 11 mmol/L   Magnesium    Collection Time: 12/05/23 11:17 AM   Result Value Ref Range    Magnesium 2.1 1.6 - 2.6 mg/dL   Lipase    Collection Time: 12/05/23 11:17 AM   Result Value Ref Range    Lipase 24 13 - 75 U/L   Ammonia    Collection Time: 12/05/23 11:17 AM   Result Value Ref Range    Ammonia 134 (H) 10 - 50 umol/L   POCT Venous Blood Gas    Collection Time: 12/05/23 11:30 AM   Result Value Ref Range    POC PH 7.546 (H) 7.345 - 7.450    POC PCO2 25.2 (LL) 35.0 - 45.0 mmHg    POC PO2 220 (HH) 40.0 - 60.0 mmHg    Base Deficit -0.6 -2.0 - 2.0 mmol/l    POC Temp 37.0 C    Specimen source Venous     Performed By: ANTONIO Jones     MODIFIED BRAYAN'S TEST NA     FiO2 21.0 %    O2DEVICE Room Air     Comments       VBG DRAWN BY ARLEN PHLEBOTOMIST  -- sO2 VALUE ABOVE REPORTABLE RANGE >100.0    Provider Notified: ROBERTO LALISON     Notified Time 12/05/2023 11:34     Notified By ANTONIO Jones     Notified Note Called and read back by ROBERTO ALLISON    Drug screen panel, emergency    Collection Time: 12/05/23  4:22 PM   Result Value Ref Range    Benzodiazepines Presumptive Positive (A) Negative    Methadone metabolites Negative Negative    Cocaine (Metab.) Negative Negative    Opiate Scrn, Ur Negative Negative    Barbiturate Screen, Ur Negative Negative    Amphetamine Screen, Ur Negative Negative    THC Negative Negative    Phencyclidine Negative Negative    Creatinine, Urine 88.7 15.0 - 325.0 mg/dL    Toxicology Information SEE COMMENT    CBC Auto Differential    Collection Time: 12/06/23  6:27 AM   Result Value Ref Range    WBC 7.41 3.90 - 12.70 K/uL    RBC 4.79 4.00 - 5.40 M/uL    Hemoglobin 14.9 12.0 - 16.0 g/dL    Hematocrit 44.0 37.0 - 48.5 %    MCV 92 82 - 98 fL    MCH 31.1 (H) 27.0 - 31.0 pg    MCHC 33.9 32.0 - 36.0 g/dL    RDW 14.1 11.5 - 14.5 %    Platelets 162 150 - 450 K/uL    MPV  10.4 9.2 - 12.9 fL    Immature Granulocytes 0.1 0.0 - 0.5 %    Gran # (ANC) 3.2 1.8 - 7.7 K/uL    Immature Grans (Abs) 0.01 0.00 - 0.04 K/uL    Lymph # 3.0 1.0 - 4.8 K/uL    Mono # 0.8 0.3 - 1.0 K/uL    Eos # 0.3 0.0 - 0.5 K/uL    Baso # 0.10 0.00 - 0.20 K/uL    nRBC 0 0 /100 WBC    Gran % 43.8 38.0 - 73.0 %    Lymph % 40.4 18.0 - 48.0 %    Mono % 10.4 4.0 - 15.0 %    Eosinophil % 4.0 0.0 - 8.0 %    Basophil % 1.3 0.0 - 1.9 %    Differential Method Automated    Comprehensive Metabolic Panel    Collection Time: 12/06/23  6:27 AM   Result Value Ref Range    Sodium 144 136 - 145 mmol/L    Potassium 3.8 3.5 - 5.1 mmol/L    Chloride 114 (H) 95 - 110 mmol/L    CO2 25 23 - 29 mmol/L    Glucose 118 (H) 70 - 110 mg/dL    BUN 11 6 - 20 mg/dL    Creatinine 0.9 0.5 - 1.4 mg/dL    Calcium 9.2 8.7 - 10.5 mg/dL    Total Protein 6.4 6.0 - 8.4 g/dL    Albumin 2.9 (L) 3.5 - 5.2 g/dL    Total Bilirubin 3.3 (H) 0.1 - 1.0 mg/dL    Alkaline Phosphatase 128 55 - 135 U/L    AST 34 10 - 40 U/L    ALT 27 10 - 44 U/L    eGFR >60.0 >60 mL/min/1.73 m^2    Anion Gap 5 3 - 11 mmol/L   Magnesium    Collection Time: 12/06/23  6:27 AM   Result Value Ref Range    Magnesium 1.8 1.6 - 2.6 mg/dL         Kazumi G Yoshinaga, DO [facility]

## 2023-11-14 NOTE — ASSESSMENT & PLAN NOTE
For leg swelling and leg pain, counseled on daytime wear of compression stockings or CAMPOS hose.   - continue with daily spironolactone and lasix  
Has an upcoming MRI scheduled in June 2023.   - patient to schedule colonoscopy with Dr. Juarez's office today  - now off transplant list secondary to ETOH detection through [facility]  - per patient liver transplant team to review patient's case during follow up meeting  - counseled on importance of abstinence to be reinstated as a candidate    
Resume lasix as it has fallen off patient's medication list. Currently only taking spironolactone 25 mg.   - resume lasix 20 mg daily  - follow up electrolytes 4 weeks or sooner with worsening symptoms    
Weight gain, patient endorse from fluid retention.   Denies any other symptoms.   Body mass index is 33.3 kg/m². Morbid obesity complicates all aspects of disease management from diagnostic modalities to treatment. Weight loss encouraged and health benefits explained to patient.       
Sidney Long

## 2023-11-22 ENCOUNTER — HOSPITAL ENCOUNTER (OUTPATIENT)
Dept: RADIOLOGY | Facility: HOSPITAL | Age: 58
Discharge: HOME | End: 2023-11-22
Attending: STUDENT IN AN ORGANIZED HEALTH CARE EDUCATION/TRAINING PROGRAM
Payer: MEDICAID

## 2023-11-22 VITALS — BODY MASS INDEX: 32.89 KG/M2 | HEIGHT: 68 IN | WEIGHT: 217 LBS

## 2023-11-22 DIAGNOSIS — Z12.31 BREAST CANCER SCREENING BY MAMMOGRAM: ICD-10-CM

## 2023-11-22 PROCEDURE — 77067 SCR MAMMO BI INCL CAD: CPT | Mod: TC

## 2023-12-04 ENCOUNTER — TELEPHONE (OUTPATIENT)
Dept: TRANSPLANT | Facility: CLINIC | Age: 58
End: 2023-12-04
Payer: MEDICAID

## 2023-12-04 ENCOUNTER — HOSPITAL ENCOUNTER (OUTPATIENT)
Dept: RADIOLOGY | Facility: HOSPITAL | Age: 58
Discharge: HOME | End: 2023-12-04
Attending: INTERNAL MEDICINE
Payer: MEDICAID

## 2023-12-04 DIAGNOSIS — C22.0 HCC (HEPATOCELLULAR CARCINOMA): ICD-10-CM

## 2023-12-04 DIAGNOSIS — C22.0 HEPATOCELLULAR CARCINOMA: Primary | ICD-10-CM

## 2023-12-04 PROCEDURE — 74183 MRI ABD W/O CNTR FLWD CNTR: CPT | Mod: 26,,, | Performed by: RADIOLOGY

## 2023-12-04 PROCEDURE — 74183 MRI ABDOMEN W WO CONTRAST: ICD-10-PCS | Mod: 26,,, | Performed by: RADIOLOGY

## 2023-12-04 PROCEDURE — 25500020 PHARM REV CODE 255: Performed by: INTERNAL MEDICINE

## 2023-12-04 PROCEDURE — A9585 GADOBUTROL INJECTION: HCPCS | Performed by: INTERNAL MEDICINE

## 2023-12-04 PROCEDURE — 74183 MRI ABD W/O CNTR FLWD CNTR: CPT | Mod: TC

## 2023-12-04 RX ORDER — GADOBUTROL 604.72 MG/ML
10 INJECTION INTRAVENOUS
Status: COMPLETED | OUTPATIENT
Start: 2023-12-04 | End: 2023-12-04

## 2023-12-04 RX ADMIN — GADOBUTROL 10 ML: 604.72 INJECTION INTRAVENOUS at 02:12

## 2023-12-05 ENCOUNTER — PATIENT MESSAGE (OUTPATIENT)
Dept: HEPATOLOGY | Facility: CLINIC | Age: 58
End: 2023-12-05
Payer: MEDICAID

## 2023-12-05 ENCOUNTER — TELEPHONE (OUTPATIENT)
Dept: HEPATOLOGY | Facility: CLINIC | Age: 58
End: 2023-12-05
Payer: MEDICAID

## 2023-12-05 ENCOUNTER — HOSPITAL ENCOUNTER (INPATIENT)
Facility: HOSPITAL | Age: 58
LOS: 2 days | Discharge: HOME HEALTH | DRG: 442 | End: 2023-12-07
Attending: EMERGENCY MEDICINE | Admitting: STUDENT IN AN ORGANIZED HEALTH CARE EDUCATION/TRAINING PROGRAM
Payer: MEDICAID

## 2023-12-05 DIAGNOSIS — E80.6 HYPERBILIRUBINEMIA: ICD-10-CM

## 2023-12-05 DIAGNOSIS — R41.82 AMS (ALTERED MENTAL STATUS): ICD-10-CM

## 2023-12-05 DIAGNOSIS — E72.20 HYPERAMMONEMIA: ICD-10-CM
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
## 2023-12-05 LAB
ALBUMIN SERPL BCP-MCNC: 3 G/DL (ref 3.5–5.2)
ALP SERPL-CCNC: 169 U/L (ref 55–135)
ALT SERPL W/O P-5'-P-CCNC: 30 U/L (ref 10–44)
AMMONIA PLAS-SCNC: 134 UMOL/L (ref 10–50)
AMPHET+METHAMPHET UR QL: NEGATIVE
ANION GAP SERPL CALC-SCNC: 4 MMOL/L (ref 3–11)
AST SERPL-CCNC: 38 U/L (ref 10–40)
BACTERIA #/AREA URNS HPF: ABNORMAL /HPF
BARBITURATES UR QL SCN>200 NG/ML: NEGATIVE
BASOPHILS # BLD AUTO: 0.06 K/UL (ref 0–0.2)
BASOPHILS NFR BLD: 0.9 % (ref 0–1.9)
BENZODIAZ UR QL SCN>200 NG/ML: ABNORMAL
BILIRUB SERPL-MCNC: 2.2 MG/DL (ref 0.1–1)
BILIRUB UR QL STRIP: NEGATIVE
BUN SERPL-MCNC: 9 MG/DL (ref 6–20)
BZE UR QL SCN: NEGATIVE
CALCIUM SERPL-MCNC: 9.4 MG/DL (ref 8.7–10.5)
CANNABINOIDS UR QL SCN: NEGATIVE
CHLORIDE SERPL-SCNC: 114 MMOL/L (ref 95–110)
CLARITY UR: CLEAR
CO2 SERPL-SCNC: 26 MMOL/L (ref 23–29)
COLOR UR: YELLOW
COMMENTS: ABNORMAL
CREAT SERPL-MCNC: 0.8 MG/DL (ref 0.5–1.4)
CREAT UR-MCNC: 88.7 MG/DL (ref 15–325)
DIFFERENTIAL METHOD: ABNORMAL
EOSINOPHIL # BLD AUTO: 0.2 K/UL (ref 0–0.5)
EOSINOPHIL NFR BLD: 2.3 % (ref 0–8)
ERYTHROCYTE [DISTWIDTH] IN BLOOD BY AUTOMATED COUNT: 13.9 % (ref 11.5–14.5)
EST. GFR  (NO RACE VARIABLE): >60 ML/MIN/1.73 M^2
FIO2: 21 %
GLUCOSE SERPL-MCNC: 124 MG/DL (ref 70–110)
GLUCOSE UR QL STRIP: NEGATIVE
HCT VFR BLD AUTO: 43.2 % (ref 37–48.5)
HGB BLD-MCNC: 14.9 G/DL (ref 12–16)
HGB UR QL STRIP: NEGATIVE
HYALINE CASTS #/AREA URNS LPF: 0 /LPF
IMM GRANULOCYTES # BLD AUTO: 0.02 K/UL (ref 0–0.04)
IMM GRANULOCYTES NFR BLD AUTO: 0.3 % (ref 0–0.5)
KETONES UR QL STRIP: NEGATIVE
LEUKOCYTE ESTERASE UR QL STRIP: NEGATIVE
LIPASE SERPL-CCNC: 24 U/L (ref 13–75)
LYMPHOCYTES # BLD AUTO: 2.2 K/UL (ref 1–4.8)
LYMPHOCYTES NFR BLD: 34.1 % (ref 18–48)
Lab: ABNORMAL
MAGNESIUM SERPL-MCNC: 2.1 MG/DL (ref 1.6–2.6)
MCH RBC QN AUTO: 31.4 PG (ref 27–31)
MCHC RBC AUTO-ENTMCNC: 34.5 G/DL (ref 32–36)
MCV RBC AUTO: 91 FL (ref 82–98)
METHADONE UR QL SCN>300 NG/ML: NEGATIVE
MICROSCOPIC COMMENT: ABNORMAL
MODIFIED ALLEN'S TEST: ABNORMAL
MONOCYTES # BLD AUTO: 0.6 K/UL (ref 0.3–1)
MONOCYTES NFR BLD: 8.5 % (ref 4–15)
NEUTROPHILS # BLD AUTO: 3.5 K/UL (ref 1.8–7.7)
NEUTROPHILS NFR BLD: 53.9 % (ref 38–73)
NITRITE UR QL STRIP: POSITIVE
NOTIFIED BY: ABNORMAL
NRBC BLD-RTO: 0 /100 WBC
O2DEVICE: ABNORMAL
OPIATES UR QL SCN: NEGATIVE
PCO2 BLDA: 25.2 MMHG (ref 35–45)
PCP UR QL SCN>25 NG/ML: NEGATIVE
PH SMN: 7.55 [PH] (ref 7.34–7.45)
PH UR STRIP: 8 [PH] (ref 5–8)
PLATELET # BLD AUTO: 132 K/UL (ref 150–450)
PMV BLD AUTO: 10.8 FL (ref 9.2–12.9)
PO2 BLDA: 220 MMHG (ref 40–60)
POC BASE DEFICIT: -0.6 MMOL/L (ref -2–2)
POC NOTIFIED NOTE: ABNORMAL
POC PERFORMED BY: ABNORMAL
POC TEMPERATURE: 37 C
POTASSIUM SERPL-SCNC: 4.2 MMOL/L (ref 3.5–5.1)
PROT SERPL-MCNC: 6.6 G/DL (ref 6–8.4)
PROT UR QL STRIP: NEGATIVE
PROVIDER NOTIFIED: ABNORMAL
RBC # BLD AUTO: 4.74 M/UL (ref 4–5.4)
RBC #/AREA URNS HPF: 1 /HPF (ref 0–4)
SODIUM SERPL-SCNC: 144 MMOL/L (ref 136–145)
SP GR UR STRIP: 1.01 (ref 1–1.03)
SPECIMEN SOURCE: ABNORMAL
SQUAMOUS #/AREA URNS HPF: 1 /HPF
TOXICOLOGY INFORMATION: ABNORMAL
URN SPEC COLLECT METH UR: ABNORMAL
UROBILINOGEN UR STRIP-ACNC: 1 EU/DL
WBC # BLD AUTO: 6.56 K/UL (ref 3.9–12.7)
WBC #/AREA URNS HPF: 6 /HPF (ref 0–5)

## 2023-12-05 PROCEDURE — 80053 COMPREHEN METABOLIC PANEL: CPT | Performed by: EMERGENCY MEDICINE

## 2023-12-05 PROCEDURE — 63600175 PHARM REV CODE 636 W HCPCS: Performed by: EMERGENCY MEDICINE

## 2023-12-05 PROCEDURE — 21400001 HC TELEMETRY ROOM

## 2023-12-05 PROCEDURE — 25000003 PHARM REV CODE 250: Performed by: EMERGENCY MEDICINE

## 2023-12-05 PROCEDURE — 63600175 PHARM REV CODE 636 W HCPCS: Performed by: STUDENT IN AN ORGANIZED HEALTH CARE EDUCATION/TRAINING PROGRAM

## 2023-12-05 PROCEDURE — 85025 COMPLETE CBC W/AUTO DIFF WBC: CPT | Performed by: EMERGENCY MEDICINE

## 2023-12-05 PROCEDURE — 36415 COLL VENOUS BLD VENIPUNCTURE: CPT | Performed by: EMERGENCY MEDICINE

## 2023-12-05 PROCEDURE — 82803 BLOOD GASES ANY COMBINATION: CPT

## 2023-12-05 PROCEDURE — 99223 1ST HOSP IP/OBS HIGH 75: CPT | Mod: ,,, | Performed by: STUDENT IN AN ORGANIZED HEALTH CARE EDUCATION/TRAINING PROGRAM

## 2023-12-05 PROCEDURE — 81000 URINALYSIS NONAUTO W/SCOPE: CPT | Performed by: EMERGENCY MEDICINE

## 2023-12-05 PROCEDURE — 11000001 HC ACUTE MED/SURG PRIVATE ROOM

## 2023-12-05 PROCEDURE — 80307 DRUG TEST PRSMV CHEM ANLYZR: CPT | Performed by: EMERGENCY MEDICINE

## 2023-12-05 PROCEDURE — 99223 PR INITIAL HOSPITAL CARE,LEVL III: ICD-10-PCS | Mod: ,,, | Performed by: STUDENT IN AN ORGANIZED HEALTH CARE EDUCATION/TRAINING PROGRAM

## 2023-12-05 PROCEDURE — 99900035 HC TECH TIME PER 15 MIN (STAT)

## 2023-12-05 PROCEDURE — 83735 ASSAY OF MAGNESIUM: CPT | Performed by: EMERGENCY MEDICINE

## 2023-12-05 PROCEDURE — 83690 ASSAY OF LIPASE: CPT | Performed by: EMERGENCY MEDICINE

## 2023-12-05 PROCEDURE — 82140 ASSAY OF AMMONIA: CPT | Performed by: EMERGENCY MEDICINE

## 2023-12-05 PROCEDURE — 25000003 PHARM REV CODE 250: Performed by: STUDENT IN AN ORGANIZED HEALTH CARE EDUCATION/TRAINING PROGRAM

## 2023-12-05 PROCEDURE — 99285 EMERGENCY DEPT VISIT HI MDM: CPT | Mod: 25

## 2023-12-05 PROCEDURE — 96360 HYDRATION IV INFUSION INIT: CPT

## 2023-12-05 PROCEDURE — 51702 INSERT TEMP BLADDER CATH: CPT

## 2023-12-05 RX ORDER — SODIUM CHLORIDE 0.9 % (FLUSH) 0.9 %
10 SYRINGE (ML) INJECTION
Status: DISCONTINUED | OUTPATIENT
Start: 2023-12-05 | End: 2023-12-07 | Stop reason: HOSPADM

## 2023-12-05 RX ORDER — TALC
6 POWDER (GRAM) TOPICAL NIGHTLY PRN
Status: DISCONTINUED | OUTPATIENT
Start: 2023-12-05 | End: 2023-12-07 | Stop reason: HOSPADM

## 2023-12-05 RX ORDER — LACTULOSE 10 G/15ML
10 SOLUTION ORAL
Status: COMPLETED | OUTPATIENT
Start: 2023-12-05 | End: 2023-12-05

## 2023-12-05 RX ORDER — SODIUM CHLORIDE, SODIUM LACTATE, POTASSIUM CHLORIDE, CALCIUM CHLORIDE 600; 310; 30; 20 MG/100ML; MG/100ML; MG/100ML; MG/100ML
INJECTION, SOLUTION INTRAVENOUS CONTINUOUS
Status: DISCONTINUED | OUTPATIENT
Start: 2023-12-05 | End: 2023-12-07 | Stop reason: HOSPADM

## 2023-12-05 RX ORDER — LACTULOSE 10 G/15ML
30 SOLUTION ORAL EVERY 6 HOURS
Status: DISCONTINUED | OUTPATIENT
Start: 2023-12-05 | End: 2023-12-07 | Stop reason: HOSPADM

## 2023-12-05 RX ORDER — IBUPROFEN 200 MG
1 TABLET ORAL DAILY
Status: DISCONTINUED | OUTPATIENT
Start: 2023-12-06 | End: 2023-12-07 | Stop reason: HOSPADM

## 2023-12-05 RX ADMIN — LACTULOSE 30 G: 20 SOLUTION ORAL at 11:12

## 2023-12-05 RX ADMIN — LORAZEPAM 0.5 MG: 2 INJECTION INTRAMUSCULAR; INTRAVENOUS at 05:12

## 2023-12-05 RX ADMIN — SODIUM CHLORIDE, POTASSIUM CHLORIDE, SODIUM LACTATE AND CALCIUM CHLORIDE: 600; 310; 30; 20 INJECTION, SOLUTION INTRAVENOUS at 05:12

## 2023-12-05 RX ADMIN — Medication 6 MG: at 09:12

## 2023-12-05 RX ADMIN — RIFAXIMIN 550 MG: 550 TABLET ORAL at 09:12

## 2023-12-05 RX ADMIN — SODIUM CHLORIDE, POTASSIUM CHLORIDE, SODIUM LACTATE AND CALCIUM CHLORIDE 500 ML: 600; 310; 30; 20 INJECTION, SOLUTION INTRAVENOUS at 10:12

## 2023-12-05 RX ADMIN — LACTULOSE 30 G: 20 SOLUTION ORAL at 05:12

## 2023-12-05 RX ADMIN — LACTULOSE 10 G: 20 SOLUTION ORAL at 01:12

## 2023-12-05 NOTE — ED NOTES
Bedding changed d/t stool getting on sheets.  Diaper placed and marshall bag emptied of 1000 mls urine.

## 2023-12-05 NOTE — ED NOTES
Called to give report but moving her to a closer room and needs to clean the room. Will call back with ready.

## 2023-12-05 NOTE — Clinical Note
Diagnosis: AMS (altered mental status) [[id number]]   Future Attending Provider: YAMILE SAPP [[id number]]   Admitting Provider:: YAMILE SAPP [[id number]]   Reason for IP Medical Treatment  (Clinical interventions that can only be accomplished in the IP setting? ) :: Hyperammonemia   I certify that Inpatient services for greater than or equal to 2 midnights are medically necessary:: Yes   Plans for Post-Acute care--if anticipated (pick the single best option):: A. No post acute care anticipated at this time

## 2023-12-05 NOTE — TELEPHONE ENCOUNTER
----- Message from Shari Arreola MD sent at 12/4/2023  7:05 PM CST -----  mRI showed stable finding compared to previous mRI, without new or recurrent tumor activity.  Get next abd MRI w wo contrast  and AFP in 3 months.

## 2023-12-05 NOTE — ED PROVIDER NOTES
Encounter Date: 12/5/2023       History     Chief Complaint   Patient presents with    Altered Mental Status     Pt c/o low back pain and has been altered since Sunday     Patient presents emergency department from home via EMS for concerns of altered mental status for last several days.  Limited information provided by EMS due to patient's mental status.  She is alert oriented to person place unsure of year.  She states she has some lower back pain but denies any pain elsewhere.  Patient moves all extremities and follows commands.  Patient has a history of cirrhosis due to alcohol abuse      Review of patient's allergies indicates:  No Known Allergies  Past Medical History:   Diagnosis Date    Anxiety     Bipolar disorder     BMI 32.0-32.9,adult 2/7/2023    Wt Readings from Last 8 Encounters: 02/07/23 95.7 kg (211 lb) 01/25/23 99.8 kg (220 lb) 01/20/23 97.1 kg (214 lb 1.1 oz) 01/20/23 97.1 kg (214 lb 1.1 oz) 01/20/23 97.1 kg (214 lb 1.1 oz) 01/19/23 100.2 kg (221 lb) 12/26/22 100.2 kg (221 lb) 12/14/22 99.8 kg (220 lb) Recommendations:  Stay physically active. As tolerated alternate resistance training with stretching and cardio. Goal of 150 minutes     BMI 34.0-34.9,adult 3/18/2022    Cirrhosis     Constipation 7/26/2022    COPD (chronic obstructive pulmonary disease)     GERD (gastroesophageal reflux disease)     Hepatic encephalopathy 4/17/2023    Hyperammonemia 4/21/2022    Restless leg syndrome     Urinary frequency      Past Surgical History:   Procedure Laterality Date    COLONOSCOPY N/A 8/9/2023    Procedure: COLONOSCOPY;  Surgeon: Sweetie Juarez MD;  Location: [facility];  Service: General;  Laterality: N/A;  6th 1000    TIPS PROCEDURE       Family History   Problem Relation Age of Onset    Lung cancer Mother     No Known Problems Father     Cancer Sister     Lung cancer Sister     No Known Problems Maternal Grandmother     No Known Problems Maternal Grandfather     No Known Problems Paternal  Grandmother     No Known Problems Paternal Grandfather     No Known Problems Daughter     No Known Problems Maternal Aunt     No Known Problems Maternal Uncle     No Known Problems Paternal Aunt     No Known Problems Paternal Uncle     Breast cancer Neg Hx     Ovarian cancer Neg Hx     BRCA 1/2 Neg Hx      Social History     Tobacco Use    Smoking status: Every Day     Current packs/day: 0.50     Average packs/day: 1.5 packs/day for 48.9 years (73.0 ttl pk-yrs)     Types: Cigarettes     Start date: 1975     Passive exposure: Past    Smokeless tobacco: Never    Tobacco comments:     Pt active in [facility] smoking cessation program   Substance Use Topics    Alcohol use: Not Currently     Comment: socially    Drug use: Not Currently     Types: Marijuana     Review of Systems   Unable to perform ROS: Mental status change       Physical Exam     Initial Vitals   BP Pulse Resp Temp SpO2   12/05/23 0858 12/05/23 0858 12/05/23 0858 12/05/23 0902 12/05/23 0858   (!) 121/58 95 14 98.2 °F (36.8 °C) 99 %      MAP       --                Physical Exam    Constitutional: She appears well-developed and well-nourished.   HENT:   Head: Normocephalic and atraumatic.   Dry oral mucous membranes   Eyes: EOM are normal. Pupils are equal, round, and reactive to light. No scleral icterus.   Neck:   Normal range of motion.  Cardiovascular:  Normal rate and regular rhythm.           Pulmonary/Chest: Breath sounds normal. No stridor. No respiratory distress. She has no wheezes.   Abdominal: Abdomen is soft. Bowel sounds are normal. She exhibits no distension. There is no abdominal tenderness. There is no rebound.   Musculoskeletal:         General: Normal range of motion.      Cervical back: Normal range of motion.      Comments: Mild tenderness to upper lumbar spine with no crepitus step-off induration or contusions     Skin:   No jaundice   Psychiatric: She has a normal mood and affect.         ED Course   Procedures  Labs Reviewed   CBC W/  AUTO DIFFERENTIAL - Abnormal; Notable for the following components:       Result Value    MCH 31.4 (*)     Platelets 132 (*)     All other components within normal limits   COMPREHENSIVE METABOLIC PANEL - Abnormal; Notable for the following components:    Chloride 114 (*)     Glucose 124 (*)     Albumin 3.0 (*)     Total Bilirubin 2.2 (*)     Alkaline Phosphatase 169 (*)     All other components within normal limits   AMMONIA - Abnormal; Notable for the following components:    Ammonia 134 (*)     All other components within normal limits   URINALYSIS, REFLEX TO URINE CULTURE - Abnormal; Notable for the following components:    Nitrite, UA Positive (*)     All other components within normal limits    Narrative:     Preferred Collection Type->Urine, Clean Catch  Specimen Source->Urine   URINALYSIS MICROSCOPIC - Abnormal; Notable for the following components:    WBC, UA 6 (*)     Bacteria Few (*)     Hyaline Casts, UA 0.0 (*)     All other components within normal limits    Narrative:     Preferred Collection Type->Urine, Clean Catch  Specimen Source->Urine   MAGNESIUM   LIPASE   DRUG SCREEN PANEL, URINE EMERGENCY          Imaging Results              CT Cervical Spine Without Contrast (Final result)  Result time 12/05/23 10:32:43      Final result by Josseline Barber MD (12/05/23 10:32:43)                   Impression:      No evidence of an acute fracture or dislocation    Multilevel spondylosis    Motion artifact on some images      Electronically signed by: Josseline Barber MD  Date:    12/05/2023  Time:    10:32               Narrative:    EXAMINATION:  CT CERVICAL SPINE WITHOUT CONTRAST    CLINICAL HISTORY:  fall;    CT/Cardiac Nuclear exams in prior 12 months: 1    TECHNIQUE:  CT cervical spine without IV contrast.  Iterative reconstruction was utilized.  Sagittal and coronal reformats prepared.    COMPARISON:  None available    FINDINGS:  Some of the images are degraded secondary to motion artifact.    No  acute fracture or dislocation detected.  Multilevel facet osteoarthritis and degenerative disc disease with small disc bulge-osteophytes with multifocal effacement of anterior thecal sac and foraminal stenosis                                       CT Thoracic Spine Without Contrast (Final result)  Result time 12/05/23 10:38:51      Final result by Josseline Barber MD (12/05/23 10:38:51)                   Impression:      No evidence of a thoracic spine fracture status post fall      Electronically signed by: Josseline Barber MD  Date:    12/05/2023  Time:    10:38               Narrative:    EXAMINATION:  CT THORACIC SPINE WITHOUT CONTRAST    CLINICAL HISTORY:  upper L, lower T spine pain;    TECHNIQUE:  CT thoracic spine without IV contrast.  Multiplanar reformats prepared.  Iterative reconstruction was utilized for dose reduction.    CT past 12 months: 1    COMPARISON:  None    FINDINGS:  Thoracic vertebral body heights are maintained.  No evidence of a fracture or malalignment.  No suspicious bone lesion.                                       CT Pelvis Without Contrast (Final result)  Result time 12/05/23 10:36:30      Final result by Josseline Barber MD (12/05/23 10:36:30)                   Impression:      No evidence of posttraumatic osseous injury status post fall      Electronically signed by: Josseline Barber MD  Date:    12/05/2023  Time:    10:36               Narrative:    EXAMINATION:  CT PELVIS WITHOUT CONTRAST    CLINICAL HISTORY:  Unspecified fall, initial encounterPelvic trauma;    CT/Cardiac Nuclear exams in prior 12 months: 1    TECHNIQUE:  CT pelvis without IV contrast.  Iterative reconstruction utilized.  Coronal and sagittal reformats as well as 3D   reconstructions prepared.    FINDINGS:  No acute fracture or dislocation detected.  Mild degenerative changes within the hips, SI joints and visualized lumbar spine.                                       CT Lumbar Spine Without Contrast (Final  result)  Result time 12/05/23 10:47:00      Final result by Josseline Barber MD (12/05/23 10:47:00)                   Impression:      No evidence of a lumbar spine fracture status post fall      Electronically signed by: Josseline Barber MD  Date:    12/05/2023  Time:    10:47               Narrative:    EXAMINATION:  CT LUMBAR SPINE WITHOUT CONTRAST    CLINICAL HISTORY:  Upper L, lower T spine pain;    CT/Cardiac Nuclear exams in prior 12 months: 0    TECHNIQUE:  CT lumbar spine without IV contrast.  Sagittal and coronal reformats prepared.  Iterative reconstruction utilized.    COMPARISON:  None available    FINDINGS:  Lumbar vertebral body heights are maintained.  There is no evidence of an acute fracture or dislocation.  No suspicious bone lesion detected.  No central spinal canal compromise demonstrated.  Mild multilevel lumbar spondylosis with facet arthropathy and mild disc bulges.                                       CT Head Without Contrast (Final result)  Result time 12/05/23 10:29:30      Final result by Josseline Barber MD (12/05/23 10:29:30)                   Impression:      No acute intracranial abnormalities      Electronically signed by: Josseline Barber MD  Date:    12/05/2023  Time:    10:29               Narrative:    EXAMINATION:  CT HEAD WITHOUT CONTRAST    CLINICAL HISTORY:  Mental status change, unknown cause;    CT/Cardiac Nuclear exams in prior 12 months: 1    TECHNIQUE:  Axial head CT performed without IV contrast.  Iterative reconstruction utilized.    COMPARISON:  None    FINDINGS:  No evidence of intracranial hemorrhage, acute infarction or mass effect.  No skull fracture detected.  Imaged paranasal sinuses, mastoids and middle ear cavities are clear.                                       X-Ray Chest 1 View (Final result)  Result time 12/05/23 12:09:46      Final result by Josseline Barber MD (12/05/23 12:09:46)                   Impression:      No acute findings in the  chest      Electronically signed by: Josseline Barber MD  Date:    12/05/2023  Time:    12:09               Narrative:    EXAMINATION:  XR CHEST 1 VIEW    CLINICAL HISTORY:  Altered mental status, unspecified    COMPARISON:  Chest CT 01/20/2023    FINDINGS:  No acute infiltrates.  No signs of CHF.  No pleural fluid or pneumothorax detected                                       Medications   sodium chloride 0.9% flush 10 mL (has no administration in time range)   melatonin tablet 6 mg (has no administration in time range)   lactated ringers bolus 500 mL (0 mLs Intravenous Stopped 12/5/23 1047)   lactulose 20 gram/30 mL solution Soln 10 g (10 g Oral Given 12/5/23 1340)     Medical Decision Making  Amount and/or Complexity of Data Reviewed  Labs: ordered.  Radiology: ordered.    Risk  OTC drugs.  Prescription drug management.  Decision regarding hospitalization.                          Medical Decision Making:   Initial Assessment:   Overall patient well-appearing can not tell me her name she can tell me that she has upper lumbar back pain but no other complaints.  Limited information provided by patient due to apparent confusion but she does follow commands and overall pleasant.  Workup currently in progress  Differential Diagnosis:   Differentials include hyperammonemia, intracranial bleeding, UTI, pneumonia, electrolyte derangement, hypothyroidism  ED Management:  Time 9:22 a.m.   Patient's brother now at bedside stating last 4 5 days patient has been altered just generalized confusion.  States that she is had several falls since last night as she is been unsteady on her feet.  Due to these new findings will CT axial skeleton showed no fractures.  No bruising on abdomen or back her chest    Time 10:29 a.m.  Lab just now in room collecting lab work    Time 1:41 p.m.   Patient has hyperammonemia patient will be admitted to Dr. Gaines's service. Lactulose provided in ED.             Clinical Impression:  Final  diagnoses:  [R41.82] AMS (altered mental status)          ED Disposition Condition    Admit                 Radames Brown MD  12/05/23 2291

## 2023-12-06 PROBLEM — Z92.29 HAS RECEIVED THIRD DOSE OF HEPATITIS B VACCINE: Status: ACTIVE | Noted: 2023-12-06

## 2023-12-06 PROBLEM — Z12.31 BREAST CANCER SCREENING BY MAMMOGRAM: Status: ACTIVE | Noted: 2023-12-06

## 2023-12-06 PROBLEM — I73.9 CLAUDICATION OF BOTH LOWER EXTREMITIES: Status: RESOLVED | Noted: 2022-03-18 | Resolved: 2023-12-06

## 2023-12-06 LAB
ALBUMIN SERPL BCP-MCNC: 2.9 G/DL (ref 3.5–5.2)
ALP SERPL-CCNC: 128 U/L (ref 55–135)
ALT SERPL W/O P-5'-P-CCNC: 27 U/L (ref 10–44)
ANION GAP SERPL CALC-SCNC: 5 MMOL/L (ref 3–11)
AST SERPL-CCNC: 34 U/L (ref 10–40)
BASOPHILS # BLD AUTO: 0.1 K/UL (ref 0–0.2)
BASOPHILS NFR BLD: 1.3 % (ref 0–1.9)
BILIRUB SERPL-MCNC: 3.3 MG/DL (ref 0.1–1)
BUN SERPL-MCNC: 11 MG/DL (ref 6–20)
CALCIUM SERPL-MCNC: 9.2 MG/DL (ref 8.7–10.5)
CHLORIDE SERPL-SCNC: 114 MMOL/L (ref 95–110)
CO2 SERPL-SCNC: 25 MMOL/L (ref 23–29)
CREAT SERPL-MCNC: 0.9 MG/DL (ref 0.5–1.4)
DIFFERENTIAL METHOD: ABNORMAL
EOSINOPHIL # BLD AUTO: 0.3 K/UL (ref 0–0.5)
EOSINOPHIL NFR BLD: 4 % (ref 0–8)
ERYTHROCYTE [DISTWIDTH] IN BLOOD BY AUTOMATED COUNT: 14.1 % (ref 11.5–14.5)
EST. GFR  (NO RACE VARIABLE): >60 ML/MIN/1.73 M^2
GLUCOSE SERPL-MCNC: 118 MG/DL (ref 70–110)
HCT VFR BLD AUTO: 44 % (ref 37–48.5)
HGB BLD-MCNC: 14.9 G/DL (ref 12–16)
IMM GRANULOCYTES # BLD AUTO: 0.01 K/UL (ref 0–0.04)
IMM GRANULOCYTES NFR BLD AUTO: 0.1 % (ref 0–0.5)
LYMPHOCYTES # BLD AUTO: 3 K/UL (ref 1–4.8)
LYMPHOCYTES NFR BLD: 40.4 % (ref 18–48)
MAGNESIUM SERPL-MCNC: 1.8 MG/DL (ref 1.6–2.6)
MCH RBC QN AUTO: 31.1 PG (ref 27–31)
MCHC RBC AUTO-ENTMCNC: 33.9 G/DL (ref 32–36)
MCV RBC AUTO: 92 FL (ref 82–98)
MONOCYTES # BLD AUTO: 0.8 K/UL (ref 0.3–1)
MONOCYTES NFR BLD: 10.4 % (ref 4–15)
NEUTROPHILS # BLD AUTO: 3.2 K/UL (ref 1.8–7.7)
NEUTROPHILS NFR BLD: 43.8 % (ref 38–73)
NRBC BLD-RTO: 0 /100 WBC
PLATELET # BLD AUTO: 162 K/UL (ref 150–450)
PMV BLD AUTO: 10.4 FL (ref 9.2–12.9)
POTASSIUM SERPL-SCNC: 3.8 MMOL/L (ref 3.5–5.1)
PROT SERPL-MCNC: 6.4 G/DL (ref 6–8.4)
RBC # BLD AUTO: 4.79 M/UL (ref 4–5.4)
SODIUM SERPL-SCNC: 144 MMOL/L (ref 136–145)
WBC # BLD AUTO: 7.41 K/UL (ref 3.9–12.7)

## 2023-12-06 PROCEDURE — 97165 OT EVAL LOW COMPLEX 30 MIN: CPT

## 2023-12-06 PROCEDURE — 85025 COMPLETE CBC W/AUTO DIFF WBC: CPT | Performed by: STUDENT IN AN ORGANIZED HEALTH CARE EDUCATION/TRAINING PROGRAM

## 2023-12-06 PROCEDURE — 63600175 PHARM REV CODE 636 W HCPCS: Performed by: STUDENT IN AN ORGANIZED HEALTH CARE EDUCATION/TRAINING PROGRAM

## 2023-12-06 PROCEDURE — S4991 NICOTINE PATCH NONLEGEND: HCPCS | Performed by: STUDENT IN AN ORGANIZED HEALTH CARE EDUCATION/TRAINING PROGRAM

## 2023-12-06 PROCEDURE — 99233 PR SUBSEQUENT HOSPITAL CARE,LEVL III: ICD-10-PCS | Mod: ,,, | Performed by: STUDENT IN AN ORGANIZED HEALTH CARE EDUCATION/TRAINING PROGRAM

## 2023-12-06 PROCEDURE — 83735 ASSAY OF MAGNESIUM: CPT | Performed by: STUDENT IN AN ORGANIZED HEALTH CARE EDUCATION/TRAINING PROGRAM

## 2023-12-06 PROCEDURE — 99223 1ST HOSP IP/OBS HIGH 75: CPT | Mod: ,,, | Performed by: PSYCHIATRY & NEUROLOGY

## 2023-12-06 PROCEDURE — 99233 SBSQ HOSP IP/OBS HIGH 50: CPT | Mod: ,,, | Performed by: STUDENT IN AN ORGANIZED HEALTH CARE EDUCATION/TRAINING PROGRAM

## 2023-12-06 PROCEDURE — 90833 PR PSYCHOTHERAPY W/PATIENT W/E&M, 30 MIN (ADD ON): ICD-10-PCS | Mod: ,,, | Performed by: PSYCHIATRY & NEUROLOGY

## 2023-12-06 PROCEDURE — 25000003 PHARM REV CODE 250: Performed by: STUDENT IN AN ORGANIZED HEALTH CARE EDUCATION/TRAINING PROGRAM

## 2023-12-06 PROCEDURE — 25000003 PHARM REV CODE 250: Performed by: EMERGENCY MEDICINE

## 2023-12-06 PROCEDURE — 36415 COLL VENOUS BLD VENIPUNCTURE: CPT | Performed by: STUDENT IN AN ORGANIZED HEALTH CARE EDUCATION/TRAINING PROGRAM

## 2023-12-06 PROCEDURE — 21400001 HC TELEMETRY ROOM

## 2023-12-06 PROCEDURE — 97162 PT EVAL MOD COMPLEX 30 MIN: CPT

## 2023-12-06 PROCEDURE — 90833 PSYTX W PT W E/M 30 MIN: CPT | Mod: ,,, | Performed by: PSYCHIATRY & NEUROLOGY

## 2023-12-06 PROCEDURE — 80053 COMPREHEN METABOLIC PANEL: CPT | Performed by: STUDENT IN AN ORGANIZED HEALTH CARE EDUCATION/TRAINING PROGRAM

## 2023-12-06 PROCEDURE — 11000001 HC ACUTE MED/SURG PRIVATE ROOM

## 2023-12-06 PROCEDURE — 99223 PR INITIAL HOSPITAL CARE,LEVL III: ICD-10-PCS | Mod: ,,, | Performed by: PSYCHIATRY & NEUROLOGY

## 2023-12-06 RX ORDER — ACETAMINOPHEN 325 MG/1
650 TABLET ORAL EVERY 8 HOURS PRN
Status: DISCONTINUED | OUTPATIENT
Start: 2023-12-06 | End: 2023-12-07 | Stop reason: HOSPADM

## 2023-12-06 RX ORDER — MUPIROCIN 20 MG/G
OINTMENT TOPICAL 2 TIMES DAILY
Status: DISCONTINUED | OUTPATIENT
Start: 2023-12-06 | End: 2023-12-07 | Stop reason: HOSPADM

## 2023-12-06 RX ADMIN — MUPIROCIN: 20 OINTMENT TOPICAL at 09:12

## 2023-12-06 RX ADMIN — RIFAXIMIN 550 MG: 550 TABLET ORAL at 10:12

## 2023-12-06 RX ADMIN — RIFAXIMIN 550 MG: 550 TABLET ORAL at 09:12

## 2023-12-06 RX ADMIN — SODIUM CHLORIDE, POTASSIUM CHLORIDE, SODIUM LACTATE AND CALCIUM CHLORIDE: 600; 310; 30; 20 INJECTION, SOLUTION INTRAVENOUS at 10:12

## 2023-12-06 RX ADMIN — NICOTINE 1 PATCH: 14 PATCH, EXTENDED RELEASE TRANSDERMAL at 10:12

## 2023-12-06 RX ADMIN — Medication 6 MG: at 09:12

## 2023-12-06 RX ADMIN — LORAZEPAM 0.5 MG: 2 INJECTION INTRAMUSCULAR; INTRAVENOUS at 10:12

## 2023-12-06 RX ADMIN — MUPIROCIN: 20 OINTMENT TOPICAL at 11:12

## 2023-12-06 RX ADMIN — LACTULOSE 30 G: 20 SOLUTION ORAL at 01:12

## 2023-12-06 RX ADMIN — ACETAMINOPHEN 650 MG: 325 TABLET ORAL at 06:12

## 2023-12-06 RX ADMIN — LORAZEPAM 0.5 MG: 2 INJECTION INTRAMUSCULAR; INTRAVENOUS at 08:12

## 2023-12-06 RX ADMIN — LACTULOSE 30 G: 20 SOLUTION ORAL at 06:12

## 2023-12-06 NOTE — ASSESSMENT & PLAN NOTE
11/24/23   BI-RADS Category 1: Negative  Recommendation:  Routine screening mammogram in 1 year is recommended.

## 2023-12-06 NOTE — PLAN OF CARE
Unable to review plan of care with patient due to confusion. Patient admitted from Er this evening. She is oriented to person, place only. She will need reinforcement to remain in bed. She is crying and continuously attempting to get out of bed. New orders for ativan now, bed alarm remains on, Telesitter in place. Will continue to monitor.

## 2023-12-06 NOTE — PLAN OF CARE
Problem: Physical Therapy  Goal: Physical Therapy Goal  Description: Patient will increase functional independence with mobility by performin. Supine to sit with Independent  2. Sit to supine with Independent  3. Bed to chair transfer with Supervision or Set-up Assistancewith or without rolling walker using Stand Pivot TECHNIQUE  4. Gait  x 300  feet with Supervision or Set-up Assistance with or without rolling walker  5. Lower extremity exercise program x10 reps per handout, with assistance as needed   Outcome: Ongoing, Progressing

## 2023-12-06 NOTE — ASSESSMENT & PLAN NOTE
Patient with known Cirrhosis with Child's class Calculator for Child's score link- https://www.[employer].com/calc/340/child-gonzalez-score-cirrhosis-mortality#creator-insights. Co-morbidities are present and inclusive of portal hypertension, esophageal varices, hepatic encephalopathy, and anemia/pancytopenia.  MELD-Na score calculated; MELD 3.0: 17 at 9/18/2023 12:40 PM  MELD-Na: 15 at 9/18/2023 12:40 PM  Calculated from:  Serum Creatinine: 1.4 mg/dL at 9/18/2023 12:40 PM  Serum Sodium: 136 mmol/L at 9/18/2023 12:40 PM  Total Bilirubin: 1.9 mg/dL at 9/18/2023 12:40 PM  Serum Albumin: 3.3 g/dL at 9/18/2023 12:40 PM  INR(ratio): 1.2 at 9/18/2023 12:40 PM  Age at listing (hypothetical): 58 years  Sex: Female at 9/18/2023 12:40 PM      Continue chronic meds. Etiology likely  ETOH, multiple substance abuse history . Will avoid any hepatotoxic meds, and monitor CBC/CMP/INR for synthetic function.

## 2023-12-06 NOTE — ASSESSMENT & PLAN NOTE
Resume home medications. Currently following  specialist for severe anxiety and panic attacks. Denies SI/HI, thoughts of harming self or others.   Endorses free of street drug use.   - PRN ativan 0.5 mg Q12H

## 2023-12-06 NOTE — ASSESSMENT & PLAN NOTE
Patient with known Cirrhosis with Child's class Calculator for Child's score link- https://www.[employer].com/calc/340/child-gonzalez-score-cirrhosis-mortality#creator-insights. Co-morbidities are present and inclusive of portal hypertension, esophageal varices, hepatic encephalopathy, and anemia/pancytopenia.  MELD-Na score calculated; MELD 3.0: 17 at 9/18/2023 12:40 PM  MELD-Na: 15 at 9/18/2023 12:40 PM  Calculated from:  Serum Creatinine: 1.4 mg/dL at 9/18/2023 12:40 PM  Serum Sodium: 136 mmol/L at 9/18/2023 12:40 PM  Total Bilirubin: 1.9 mg/dL at 9/18/2023 12:40 PM  Serum Albumin: 3.3 g/dL at 9/18/2023 12:40 PM  INR(ratio): 1.2 at 9/18/2023 12:40 PM  Age at listing (hypothetical): 58 years  Sex: Female at 9/18/2023 12:40 PM      Continue chronic meds. Etiology likely  ETOH, multiple substance abuse history . Will avoid any hepatotoxic meds, and monitor CBC/CMP/INR for synthetic function.

## 2023-12-06 NOTE — PLAN OF CARE
Problem: Occupational Therapy  Goal: Occupational Therapy Goal  Description: Goals to be met by: 12/13/23     Patient will increase functional independence with ADLs by performing:    Feeding with Modified [place].  UE Dressing with Modified [place].  LE Dressing with Modified [place].  Grooming while standing at sink with Modified [place].  Toileting from toilet with Modified [place] for hygiene and clothing management.   Bathing from  shower chair/bench with Modified [place].  Toilet transfer to toilet with Modified [place].    Outcome: Established OT POC

## 2023-12-06 NOTE — PLAN OF CARE
[facility]  Initial [facility]       Primary Care Provider: Geeta Gaines DO    Admission Diagnosis: Hyperammonemia [E72.20]  Fall [W19.XXXA]  AMS (altered mental status) [R41.82]    Admission Date: 12/5/2023  Expected Discharge Date:          Payor: MEDICAID / Plan: [facility] ([place] MEDICAID) / Product Type: Managed Medicaid /     Extended Emergency Contact Information  Primary Emergency Contact: Raúl Borrego  Mobile Phone: [phone]  Relation: Sister   needed? No  Secondary Emergency Contact: FREDARAZA  Mobile Phone: [phone]  Relation: Sister  Preferred language: English   needed? No    Discharge Plan A: Home with family, Home Health  Discharge Plan B: Home with family, Home Health      [facility] [address] [place] [address] [address] [facility] [address] [facility]  [address] [facility] [address]  Phone: [phone] Fax: [phone]      Initial Assessment (most recent)       Adult Discharge Assessment - 12/06/23 0841          Discharge Assessment    Assessment Type Discharge Planning Assessment     Source of Information patient     When was your last doctors appointment? 11/09/23     Reason For Admission Acute hepatic encephalopathy     People in Home sibling(s)   The patient stated that she lives with her sister and her brother.    Do you expect to return to your current living situation? Yes     Do you have help at home or someone to help you manage your care at home? --   The patient stated that she is independent    Prior to hospitilization cognitive status: Alert/Oriented     Current cognitive status: Alert/Oriented     Walking or Climbing Stairs --   The patient stated that she was independent prior to being admitted to the hosptial    Dressing/Bathing --   The patient stated that she was independent prior to being admitted to the hosptial    Do you have any problems with: --   "I don't need help."    Home Accessibility other (see " comments)   The patient stated that she has a ramp attached to her home.    Home Layout Able to live on 1st floor     Equipment Currently Used at Home medication pump     Readmission within 30 days? No     Patient currently being followed by outpatient case management? No     Do you currently have service(s) that help you manage your care at home? No     Do you take prescription medications? Yes     Do you have prescription coverage? Yes     Coverage MEDICAID - [facility] ([place] MEDICAID) -     Who is going to help you get home at discharge? TBD     How do you get to doctors appointments? car, drives self     Are you on dialysis? No     DME Needed Upon Discharge  other (see comments)   TBD    Discharge Plan discussed with: Patient   Attempted to contact the patient's sister, Raúl, no answer    Discharge Plan A Home with family;Home Health     Discharge Plan B Home with family;Home Health                 Initial discharge assessment is completed. Spoke to the patient in her room. She was awake, alert, and oriented to the questions being asked. The patient expressed that she lives with her siblings. The patient expressed that she is independent. She currently does not utilize any DME to ambulate with. The patient expressed that she would like to return home upon discharge. I spoke to the patient about home health care, and she was open. CM/SW will remain available. Contact information was left in the patient's room.

## 2023-12-06 NOTE — SUBJECTIVE & OBJECTIVE
Interval History: patient seen and examined.     Review of Systems   Constitutional:  Negative for activity change, appetite change, diaphoresis, fatigue and fever.   Respiratory:  Negative for cough, chest tightness, shortness of breath and wheezing.    Cardiovascular:  Negative for chest pain and leg swelling.   Genitourinary:  Negative for difficulty urinating.   Musculoskeletal:  Negative for neck pain and neck stiffness.   Skin:  Negative for pallor, rash and wound.   Neurological:  Positive for weakness. Negative for dizziness, tremors, seizures, speech difficulty, numbness and headaches.   Psychiatric/Behavioral:  Positive for confusion and dysphoric mood. Negative for agitation, behavioral problems and suicidal ideas. The patient is nervous/anxious.      Objective:     Vital Signs (Most Recent):  Temp: 98.2 °F (36.8 °C) (12/06/23 0737)  Pulse: 95 (12/06/23 0737)  Resp: 18 (12/06/23 0737)  BP: 117/61 (12/06/23 0737)  SpO2: (!) 92 % (12/06/23 0737) Vital Signs (24h Range):  Temp:  [97 °F (36.1 °C)-98.6 °F (37 °C)] 98.2 °F (36.8 °C)  Pulse:  [] 95  Resp:  [18-20] 18  SpO2:  [92 %-99 %] 92 %  BP: (115-144)/(61-75) 117/61     Weight: 97.2 kg (214 lb 3.2 oz)  Body mass index is 32.57 kg/m².    Intake/Output Summary (Last 24 hours) at 12/6/2023 0984  Last data filed at 12/6/2023 0633  Gross per 24 hour   Intake 1793.09 ml   Output 2250 ml   Net -456.91 ml         Physical Exam  Vitals reviewed.   Constitutional:       General: She is not in acute distress.     Appearance: Normal appearance. She is not ill-appearing, toxic-appearing or diaphoretic.   HENT:      Head: Normocephalic and atraumatic.      Right Ear: External ear normal.      Left Ear: External ear normal.      Nose: Nose normal.      Mouth/Throat:      Mouth: Mucous membranes are moist.      Pharynx: Oropharynx is clear.   Eyes:      Extraocular Movements: Extraocular movements intact.      Conjunctiva/sclera: Conjunctivae normal.    Cardiovascular:      Rate and Rhythm: Normal rate and regular rhythm.      Pulses: Normal pulses.      Heart sounds: Normal heart sounds.   Pulmonary:      Effort: Pulmonary effort is normal. No respiratory distress.      Breath sounds: No stridor. No wheezing, rhonchi or rales.   Abdominal:      General: Abdomen is flat. There is no distension.      Palpations: Abdomen is soft. There is no mass.      Tenderness: There is no abdominal tenderness. There is no right CVA tenderness, left CVA tenderness or guarding.   Musculoskeletal:         General: No swelling, tenderness or deformity. Normal range of motion.      Cervical back: Normal range of motion and neck supple. No rigidity or tenderness.      Right lower leg: No edema.      Left lower leg: No edema.   Skin:     General: Skin is warm and dry.      Capillary Refill: Capillary refill takes less than 2 seconds.      Findings: No lesion or rash.   Neurological:      General: No focal deficit present.      Mental Status: She is alert and oriented to person, place, and time. Mental status is at baseline.      Motor: Weakness present.   Psychiatric:         Mood and Affect: Mood normal.         Behavior: Behavior normal.         Thought Content: Thought content normal.         Judgment: Judgment normal.             Significant Labs: All pertinent labs within the past 24 hours have been reviewed.  A1C:   Recent Labs   Lab 06/16/23  0740   HGBA1C 5.4     ABGs:   Recent Labs   Lab 12/05/23  1130   PH 7.546*   PCO2 25.2*   PO2 220*     Bilirubin:   Recent Labs   Lab 12/05/23  1117 12/06/23 0627   BILITOT 2.2* 3.3*     BMP:   Recent Labs   Lab 12/06/23 0627   *      K 3.8   *   CO2 25   BUN 11   CREATININE 0.9   CALCIUM 9.2   MG 1.8     CBC:   Recent Labs   Lab 12/05/23 1117 12/06/23 0627   WBC 6.56 7.41   HGB 14.9 14.9   HCT 43.2 44.0   * 162     CMP:   Recent Labs   Lab 12/05/23  1117 12/06/23 0627    144   K 4.2 3.8   * 114*    CO2 26 25   * 118*   BUN 9 11   CREATININE 0.8 0.9   CALCIUM 9.4 9.2   PROT 6.6 6.4   ALBUMIN 3.0* 2.9*   BILITOT 2.2* 3.3*   ALKPHOS 169* 128   AST 38 34   ALT 30 27   ANIONGAP 4 5     Lipase:   Recent Labs   Lab 12/05/23  1117   LIPASE 24     Magnesium:   Recent Labs   Lab 12/05/23  1117 12/06/23  0627   MG 2.1 1.8     Urine Studies:   Recent Labs   Lab 12/05/23  1100   COLORU Yellow   APPEARANCEUA Clear   PHUR 8.0   SPECGRAV 1.010   PROTEINUA Negative   GLUCUA Negative   KETONESU Negative   BILIRUBINUA Negative   OCCULTUA Negative   NITRITE Positive*   UROBILINOGEN 1.0   LEUKOCYTESUR Negative   RBCUA 1   WBCUA 6*   BACTERIA Few*   SQUAMEPITHEL 1   HYALINECASTS 0.0*     Significant Imaging: I have reviewed all pertinent imaging results/findings within the past 24 hours.

## 2023-12-06 NOTE — CONSULTS
PSYCHIATRY CONSULT      12/6/2023 11:47 AM   Shonda Borrego   1965   [id number]         DATE OF ADMISSION: 12/5/2023  8:42 AM    SITE: [facility]    CURRENT LEGAL STATUS: Voluntary Medical Admission       HISTORY    CHIEF COMPLAINT   Shonda Borrego is a 58 y.o. female with a past psychiatric history of Anxiety Disorder NOS currently admitted to the inpatient unit with the following chief complaint: altered mental status    HPI   The patient was seen and examined. The chart was reviewed.    The patient presented to the ER on 12/5/2023 .    Per ED/Hospital medicine:  Chief Complaint   Patient presents with    Altered Mental Status       Pt c/o low back pain and has been altered since Sunday      Patient presents emergency department from home via EMS for concerns of altered mental status for last several days.  Limited information provided by EMS due to patient's mental status. She is alert oriented to person place unsure of year.  She states she has some lower back pain but denies any pain elsewhere.  Patient moves all extremities and follows commands.  Patient has a history of cirrhosis due to alcohol abuse.  58 year old with hepatocellular carcinoma, alcoholic cirrhosis, anxiety, history of substance abuse history presents to emergency department by her sister and brother for altered mental status and frequent falls recently.      ED course:   On arrival vital signs /58 mmHg, HR 95, RR 14, SpO2 99% on room air, temp 98.2F. Labs notable for ammonia 134, lipase 24, AST/ALT 38/30, Tbili 2.2, Na 144, K 4.2, Cl 114, CO2 26, Glucose 124. Hg 14.9, HCT 43.2, WBC 6.56, . Urine positive for presumptive benzodiazepine. CT head reports no intracranial processes, CXR with no acute cardiopulmonary processes, CT cervical, thoracic, lumbar and pelvis with no evidence o acute fraction or dislocations. Recent oupatient MRI of abdomen shows stable right hepatic lobe signaling, sequela of portal hypertenion,  "splenomegaly, cholelithasis.    Patient received lactulose x1 and started on IVF LR bolus 500 x1.  Patient keegan admitted to medicine services for treament of hepatic encephalopathy, hyperammonemia with lactulose, rifaximin, and continue IVF resuscitation.       Overview/Hospital Course:  12/6 No acute events overnight. Lactulose x2 with stool and endorses couple episodes of stooling. She becomes teary and wanting to go home to her sister and brother. Does not recall the last time she has fallen. Endorses compliance with her medications. Patient outpatient sees psychiatry for her anxiety. Concerning sedating effects of medications affecting her ability to stay awake and increase risks for falls, will consult psychiatry for further recommendations.   Continue IVF, lactulose, rifaximin for hyperammonemia.      Interval History: patient seen and examined.       Per initial psychiatric assessment:    Patient seen today for psychiatric consultation due to "severe anxiety, depression." Current hospital problems include acute hepatic encephalopathy. Pt reports reason for admission is "because I've been falling a lot over the last week."    Nursing staff reports patient confused, using incorrect vocabulary (Example, pt asked for staff to call her "washer and dryer" rather than "brother and sister.") On assessment, patient observed lying in bed, awake, alert, in no acute distress.     Patient is currently oriented to person, place, time, situation, and current events. Endorses history of "cirrhosis" 2/2 "drinking for a long time." Reports last drink was over one year ago. Initially pt denies psychiatric history. On review of UDS and , pt noted to be prescribed clonazepam which she states is for "anxiety."Home med list indicates pt prescribed Vraylar in March of 2023, however pt is unaware why this was prescribed and states she does not think she is currently taking this medication. Denies hx of manic or hypomanic symptoms, " denies hx of auditory/visual hallucinations. Reports hx of intermittent depressive episodes which she states were likely associated with her previous alcohol consumption. Denies current or historical SI/HI.    Patient grossly appropriate during examination. She demonstrates some delayed response to select assessment questions but is inevitably able to provide appropriate responses.     Additional assessment data/recommendations below    Symptoms of Depression: diminished mood - No, loss of interest/anhedonia - No;  recurrent - No, >14 days - No, diminished energy - No, change in sleep - No, change in appetite - No, diminished concentration or cognition or indecisiveness - No, PMA/R -  No, excessive guilt or hopelessness or worthlessness - No, suicidal ideations - No    Changes in Sleep: trouble with initiation- No, maintenance, - No early morning awakening with inability to return to sleep - No, hypersomnolence - No    Suicidal- active/passive ideations - No, organized plans, future intentions - No    Homicidal ideations: active/passive ideations - No, organized plans, future intentions - No    Symptoms of psychosis: hallucinations - No, delusions - No, disorganized speech - No, disorganized behavior or abnormal motor behavior - No, or negative symptoms (diminshed emotional expression, avolition, anhedonia, alogia, asociality) - No, active phase symptoms >1 month - No, continuous signs of illness > 6 months - No, since onset of illness decreased level of functioning present - No    Symptoms of anamaria or hypomania: elevated, expansive, or irritable mood with increased energy or activity - No; > 4 days - No,  >7 days - No; with inflated self-esteem or grandiosity - No, decreased need for sleep - No, increased rate of speech - No, FOI or racing thoughts - No, distractibility - No, increased goal directed activity or PMA - No, risky/disinhibited behavior - No    Symptoms of AKILAH: excessive anxiety/worry/fear, more days  "than not, about numerous issues - Yes, ongoing for >6 months - Yes, difficult to control - No, with restlessness - No, fatigue - No, poor concentration - Yes, irritability - Yes, muscle tension - No, sleep disturbance - No; causes functionally impairing distress - No    Symptoms of Panic Disorder: recurrent panic attacks (palpitations/heart racing, sweating, shakiness, dyspnea, choking, chest pain/discomfort, Gi symptoms, dizzy/lightheadedness, hot/col flashes, paresthesias, derealization, fear of losing control or fear of dying or fear of "going crazy") - No, precipitated - No, un-precipitated - No, source of worry and/or behavioral changes secondary for 1 month or longer- No, agoraphobia - No    Symptoms of PTSD: h/o trauma exposure - No; re-experiencing/intrusive symptoms - No, avoidant behavior - No, 2 or more negative alterations in cognition or mood - No, 2 or more hyperarousal symptoms - No; with dissociative symptoms - No, ongoing for 1 or more  months - No    Symptoms of OCD: obsessions (recurrent thoughts/urges/images; intrusive and/or unwanted; uses other thoughts/actions to suppress) - No; compulsions (repetitive behaviors used to lower distress/anxiety/obsessions) - No, time-consuming (over 1 hour per day) or cause significant distress/impairment - - No    Symptoms of Anorexia: restriction of caloric intake leading to significantly low body weight - No, intense fear of gaining weight or persistent behavior that interferes with weight gain even thought at a significantly low weight - No, disturbance in the way in which one's body weight or shape is experienced, undue influence of body weight or shape on self evaluation, or persistent lack of recognition of the seriousness of the current low body weight - No    Symptoms of Bulimia: recurrent episodes of binge eating (definitely larger amount  than what others would eat and lack of a sense of control over eating during episode) - No, recurrent " inappropriate compensatory behaviors in order to prevent weight gain (fasting, medications, exercise, vomiting) - No, binges and compensatory behaviors both occur on average at least once a week for 3 months - No, self evaluations is unduly influenced by body shape/weight- - No    Symptoms of Binge eating: recurrent episodes of binge eating (definitely larger amount than what others would eat and lack of a sense of control over eating during episode) - No, 3 or more of following (eating much more rapidly, eating until uncomfortably full, large amounts when not hungry, eating alone because of embarrassed by how much,  feeling disgusted with oneself, depressed or very guilty afterward) - No, distress regarding binges - No, binges occur on average at least once a week for 3 months - No      Substance/s:  Taken in larger amounts or over longer periods than intended: Yes,  Persistent desire or unsuccessful attempts to cut down or stop: Yes,  Great deal of time spent seeking, using or recovering from: Yes,  Craving or strong desire to use: Yes,  Recurrent use despite failure to meet major role obligation: No,  Continued use despite persistent or recurrent social/interparsonal issues due to use: No,  Important social/work/recreational activities given up due to use: No,  Recurrent use in physically hazardous situations: No,  Continued use despite knowledge of persistent physical or psychological problem: No,  Tolerance (either increased need or diminished effect): Yes,      Psychotherapy:  Target symptoms: alcohol abuse, anxiety   Why chosen therapy is appropriate versus another modality: relevant to diagnosis, evidence based practice  Outcome monitoring methods: self-report, observation  Therapeutic intervention type: insight oriented psychotherapy, supportive psychotherapy  Topics discussed/themes: substance abuse  The patient's response to the intervention is accepting. The patient's progress toward treatment goals is  fair.   Duration of intervention: 16 minutes.      PAST PSYCHIATRIC HISTORY  Previous Psychiatric Hospitalizations: No  Previous SI/HI: No,  Previous Suicide Attempts: No,   Previous Medication Trials: Vrayar, clonazepam,  Psychiatric Care (current & past): Yes,  History of Psychotherapy: No,  History of Violence: No,  History of sexual/physical abuse: No,    PAST MEDICAL & SURGICAL HISTORY   Past Medical History:   Diagnosis Date    Anxiety     Bipolar disorder     BMI 32.0-32.9,adult 2/7/2023    Wt Readings from Last 8 Encounters: 02/07/23 95.7 kg (211 lb) 01/25/23 99.8 kg (220 lb) 01/20/23 97.1 kg (214 lb 1.1 oz) 01/20/23 97.1 kg (214 lb 1.1 oz) 01/20/23 97.1 kg (214 lb 1.1 oz) 01/19/23 100.2 kg (221 lb) 12/26/22 100.2 kg (221 lb) 12/14/22 99.8 kg (220 lb) Recommendations:  Stay physically active. As tolerated alternate resistance training with stretching and cardio. Goal of 150 minutes     BMI 34.0-34.9,adult 3/18/2022    Cirrhosis     Constipation 7/26/2022    COPD (chronic obstructive pulmonary disease)     GERD (gastroesophageal reflux disease)     Hepatic encephalopathy 4/17/2023    Hyperammonemia 4/21/2022    Restless leg syndrome     Urinary frequency      Past Surgical History:   Procedure Laterality Date    COLONOSCOPY N/A 8/9/2023    Procedure: COLONOSCOPY;  Surgeon: Sweetie Juarez MD;  Location: [facility];  Service: General;  Laterality: N/A;  6th 1000    TIPS PROCEDURE            CURRENT PSYCH MEDICATION REGIMEN   Clonazepam 0.5 mg daily  Current Medication side effects:  Denies  Current Medication compliance:  Compliant    Previous psych meds trials  Vraylar per chart     Home Meds:   Prior to Admission medications    Medication Sig Start Date End Date Taking? Authorizing Provider   albuterol (PROVENTIL/VENTOLIN HFA) 90 mcg/actuation inhaler Inhale 2 puffs into the lungs every 6 (six) hours as needed for Wheezing or Shortness of Breath. 3/23/23 9/19/23  Geeta Gaines, DO   furosemide  (LASIX) 20 MG tablet Take 1 tablet (20 mg total) by mouth 2 (two) times daily. 7/10/23 7/9/24  Geeta Gaines,    gabapentin (NEURONTIN) 300 MG capsule TAKE 1 CAPSULE BY MOUTH IN THE EVENING 8/14/23   Geeta Gaines,    mirtazapine (REMERON SOL-TAB) 15 MG disintegrating tablet Take 15 mg by mouth every evening. 10/31/23   Provider, Historical   nicotine (NICODERM CQ) 14 mg/24 hr Place 1 patch onto the skin once daily. 7/6/23   Marianne Chu MD   nicotine, polacrilex, (NICORETTE) 4 MG Gum Take 1 each (4 mg total) by mouth as needed (Take 1 each (4 mg total) by mouth as needed for tobacco cessation. 1-2 per hour in the place of cigarettes. (5-16 daily max) - Oral). 3/16/23   Marianne Chu MD   ondansetron (ZOFRAN) 4 MG tablet Take 1 tablet (4 mg total) by mouth 2 (two) times daily. 1/25/23   Michael Moreland,    oxybutynin (DITROPAN-XL) 5 MG TR24 Take 1 tablet (5 mg total) by mouth once daily. 5/17/23 5/16/24  Geeta Gaines DO   pantoprazole (PROTONIX) 20 MG tablet Take 1 tablet by mouth once daily 9/20/23   Geeta Gaines DO   rifAXIMin (XIFAXAN) 550 mg Tab Take 1 tablet (550 mg total) by mouth 2 (two) times daily. 6/2/23   Shari Arreola MD   rOPINIRole (REQUIP) 1 MG tablet Take 1 tablet (1 mg total) by mouth every evening. 4/25/23 10/22/23  Geeta Gaines DO   spironolactone (ALDACTONE) 100 MG tablet Take 1 tablet (100 mg total) by mouth once daily. 11/8/23   Geeta Gaines,    tiotropium-olodateroL (STIOLTO RESPIMAT) 2.5-2.5 mcg/actuation Mist 2 puffs Inhalation Once a day for 30 days    Provider, Historical   ursodioL (ACTIGALL) 300 mg capsule Take 1 capsule (300 mg total) by mouth 3 (three) times daily. for 14 days 4/25/23 8/9/23  Geeta Gaines,    VRAYLAR 3 mg Cap Take 3 mg by mouth. 3/13/23   Provider, Historical          OTC Meds:      Scheduled Meds:    lactulose  30 g Oral Q6H    mupirocin   Nasal BID    nicotine  1 patch Transdermal Daily     rifAXIMin  550 mg Oral BID      PRN Meds: lorazepam, melatonin, sodium chloride 0.9%   Psychotherapeutics (From admission, onward)      Start     Stop Route Frequency Ordered    12/06/23 1045  LORazepam (ATIVAN) injection 0.5 mg         -- IV Daily PRN 12/06/23 0936            ALLERGIES   Review of patient's allergies indicates:  No Known Allergies    NEUROLOGIC HISTORY  Seizures: No  Head trauma: No    SOCIAL HISTORY:  Developmental/Childhood:Achieved all developmental milestones timely  Education:11th  Employment Status/Finances:   Relationship Status/Sexual Orientation:   Children: 3  Housing Status: Home    history:  NO   Access to Firearms: NO ;  Locked up? NO  Caodaism:Non-practicing  Recreational activities:4    SUBSTANCE ABUSE HISTORY   Recreational Drugs: Denies   Use of Alcohol:  History of ETOH dependence  Rehab History:no   Tobacco Use:yes    LEGAL HISTORY:   Past charges/incarcerations: NO  Pending charges:NO    FAMILY PSYCHIATRIC HISTORY   Family History   Problem Relation Age of Onset    Lung cancer Mother     No Known Problems Father     Cancer Sister     Lung cancer Sister     No Known Problems Maternal Grandmother     No Known Problems Maternal Grandfather     No Known Problems Paternal Grandmother     No Known Problems Paternal Grandfather     No Known Problems Daughter     No Known Problems Maternal Aunt     No Known Problems Maternal Uncle     No Known Problems Paternal Aunt     No Known Problems Paternal Uncle     Breast cancer Neg Hx     Ovarian cancer Neg Hx     BRCA 1/2 Neg Hx               ROS  Review of Systems   Constitutional: Negative.    HENT: Negative.     Eyes: Negative.    Respiratory: Negative.     Cardiovascular: Negative.    Gastrointestinal: Negative.    Genitourinary: Negative.    Musculoskeletal: Negative.    Skin: Negative.    Neurological: Negative.    Endo/Heme/Allergies: Negative.          EXAMINATION    PHYSICAL EXAM  Reviewed note/exam by Dr. Gaines from 12/5      VITALS   Vitals:    12/06/23 1111   BP:    Pulse: 92   Resp:    Temp:         Body mass index is 32.57 kg/m².        PAIN  4/10  Subjective report of pain matches objective signs and symptoms: Yes    LABORATORY DATA   Recent Results (from the past 72 hour(s))   Urinalysis, Reflex to Urine Culture Urine, Clean Catch    Collection Time: 12/05/23 11:00 AM    Specimen: Urine, Catheterized   Result Value Ref Range    Specimen UA Urine, Catheterized     Color, UA Yellow Yellow, Straw, Joycelyn    Appearance, UA Clear Clear    pH, UA 8.0 5.0 - 8.0    Specific Gravity, UA 1.010 1.005 - 1.030    Protein, UA Negative Negative    Glucose, UA Negative Negative    Ketones, UA Negative Negative    Bilirubin (UA) Negative Negative    Occult Blood UA Negative Negative    Nitrite, UA Positive (A) Negative    Urobilinogen, UA 1.0 <2.0 EU/dL    Leukocytes, UA Negative Negative   Urinalysis Microscopic    Collection Time: 12/05/23 11:00 AM   Result Value Ref Range    RBC, UA 1 0 - 4 /hpf    WBC, UA 6 (H) 0 - 5 /hpf    Bacteria Few (A) None-Occ /hpf    Squam Epithel, UA 1 /hpf    Hyaline Casts, UA 0.0 (A) 0-1/lpf /lpf    Microscopic Comment SEE COMMENT    CBC auto differential    Collection Time: 12/05/23 11:17 AM   Result Value Ref Range    WBC 6.56 3.90 - 12.70 K/uL    RBC 4.74 4.00 - 5.40 M/uL    Hemoglobin 14.9 12.0 - 16.0 g/dL    Hematocrit 43.2 37.0 - 48.5 %    MCV 91 82 - 98 fL    MCH 31.4 (H) 27.0 - 31.0 pg    MCHC 34.5 32.0 - 36.0 g/dL    RDW 13.9 11.5 - 14.5 %    Platelets 132 (L) 150 - 450 K/uL    MPV 10.8 9.2 - 12.9 fL    Immature Granulocytes 0.3 0.0 - 0.5 %    Gran # (ANC) 3.5 1.8 - 7.7 K/uL    Immature Grans (Abs) 0.02 0.00 - 0.04 K/uL    Lymph # 2.2 1.0 - 4.8 K/uL    Mono # 0.6 0.3 - 1.0 K/uL    Eos # 0.2 0.0 - 0.5 K/uL    Baso # 0.06 0.00 - 0.20 K/uL    nRBC 0 0 /100 WBC    Gran % 53.9 38.0 - 73.0 %    Lymph % 34.1 18.0 - 48.0 %    Mono % 8.5 4.0 - 15.0 %    Eosinophil % 2.3 0.0 - 8.0 %    Basophil % 0.9 0.0 - 1.9 %     Differential Method Automated    Comprehensive metabolic panel    Collection Time: 12/05/23 11:17 AM   Result Value Ref Range    Sodium 144 136 - 145 mmol/L    Potassium 4.2 3.5 - 5.1 mmol/L    Chloride 114 (H) 95 - 110 mmol/L    CO2 26 23 - 29 mmol/L    Glucose 124 (H) 70 - 110 mg/dL    BUN 9 6 - 20 mg/dL    Creatinine 0.8 0.5 - 1.4 mg/dL    Calcium 9.4 8.7 - 10.5 mg/dL    Total Protein 6.6 6.0 - 8.4 g/dL    Albumin 3.0 (L) 3.5 - 5.2 g/dL    Total Bilirubin 2.2 (H) 0.1 - 1.0 mg/dL    Alkaline Phosphatase 169 (H) 55 - 135 U/L    AST 38 10 - 40 U/L    ALT 30 10 - 44 U/L    eGFR >60.0 >60 mL/min/1.73 m^2    Anion Gap 4 3 - 11 mmol/L   Magnesium    Collection Time: 12/05/23 11:17 AM   Result Value Ref Range    Magnesium 2.1 1.6 - 2.6 mg/dL   Lipase    Collection Time: 12/05/23 11:17 AM   Result Value Ref Range    Lipase 24 13 - 75 U/L   Ammonia    Collection Time: 12/05/23 11:17 AM   Result Value Ref Range    Ammonia 134 (H) 10 - 50 umol/L   POCT Venous Blood Gas    Collection Time: 12/05/23 11:30 AM   Result Value Ref Range    POC PH 7.546 (H) 7.345 - 7.450    POC PCO2 25.2 (LL) 35.0 - 45.0 mmHg    POC PO2 220 (HH) 40.0 - 60.0 mmHg    Base Deficit -0.6 -2.0 - 2.0 mmol/l    POC Temp 37.0 C    Specimen source Venous     Performed By: ANTONIO Jones     MODIFIED BRAYAN'S TEST NA     FiO2 21.0 %    O2DEVICE Room Air     Comments       VBG DRAWN BY ARLEN PHLEBOTOMIST  -- sO2 VALUE ABOVE REPORTABLE RANGE >100.0    Provider Notified: ROBERTO ALLISON     Notified Time 12/05/2023 11:34     Notified By ANTONIO Jones     Notified Note Called and read back by ROBERTO ALLISON    Drug screen panel, emergency    Collection Time: 12/05/23  4:22 PM   Result Value Ref Range    Benzodiazepines Presumptive Positive (A) Negative    Methadone metabolites Negative Negative    Cocaine (Metab.) Negative Negative    Opiate Scrn, Ur Negative Negative    Barbiturate Screen, Ur Negative Negative    Amphetamine Screen, Ur Negative Negative    THC Negative  "Negative    Phencyclidine Negative Negative    Creatinine, Urine 88.7 15.0 - 325.0 mg/dL    Toxicology Information SEE COMMENT    CBC Auto Differential    Collection Time: 12/06/23  6:27 AM   Result Value Ref Range    WBC 7.41 3.90 - 12.70 K/uL    RBC 4.79 4.00 - 5.40 M/uL    Hemoglobin 14.9 12.0 - 16.0 g/dL    Hematocrit 44.0 37.0 - 48.5 %    MCV 92 82 - 98 fL    MCH 31.1 (H) 27.0 - 31.0 pg    MCHC 33.9 32.0 - 36.0 g/dL    RDW 14.1 11.5 - 14.5 %    Platelets 162 150 - 450 K/uL    MPV 10.4 9.2 - 12.9 fL    Immature Granulocytes 0.1 0.0 - 0.5 %    Gran # (ANC) 3.2 1.8 - 7.7 K/uL    Immature Grans (Abs) 0.01 0.00 - 0.04 K/uL    Lymph # 3.0 1.0 - 4.8 K/uL    Mono # 0.8 0.3 - 1.0 K/uL    Eos # 0.3 0.0 - 0.5 K/uL    Baso # 0.10 0.00 - 0.20 K/uL    nRBC 0 0 /100 WBC    Gran % 43.8 38.0 - 73.0 %    Lymph % 40.4 18.0 - 48.0 %    Mono % 10.4 4.0 - 15.0 %    Eosinophil % 4.0 0.0 - 8.0 %    Basophil % 1.3 0.0 - 1.9 %    Differential Method Automated    Comprehensive Metabolic Panel    Collection Time: 12/06/23  6:27 AM   Result Value Ref Range    Sodium 144 136 - 145 mmol/L    Potassium 3.8 3.5 - 5.1 mmol/L    Chloride 114 (H) 95 - 110 mmol/L    CO2 25 23 - 29 mmol/L    Glucose 118 (H) 70 - 110 mg/dL    BUN 11 6 - 20 mg/dL    Creatinine 0.9 0.5 - 1.4 mg/dL    Calcium 9.2 8.7 - 10.5 mg/dL    Total Protein 6.4 6.0 - 8.4 g/dL    Albumin 2.9 (L) 3.5 - 5.2 g/dL    Total Bilirubin 3.3 (H) 0.1 - 1.0 mg/dL    Alkaline Phosphatase 128 55 - 135 U/L    AST 34 10 - 40 U/L    ALT 27 10 - 44 U/L    eGFR >60.0 >60 mL/min/1.73 m^2    Anion Gap 5 3 - 11 mmol/L   Magnesium    Collection Time: 12/06/23  6:27 AM   Result Value Ref Range    Magnesium 1.8 1.6 - 2.6 mg/dL      No results found for: "PHENYTOIN", "PHENOBARB", "VALPROATE", "CBMZ"        CONSTITUTIONAL  General Appearance: lying in bed, overweight    MUSCULOSKELETAL  Muscle Strength and Tone:no tremor, no tic  Abnormal Involuntary Movements: No  Gait and Station:  not " observed    PSYCHIATRIC   Level of Consciousness: awake, alert , and oriented  Orientation: person, place, time, and situation  Grooming: Hospital garb  Psychomotor Behavior: normal, cooperative  Speech: normal tone, normal rate, normal pitch, normal volume  Language: grossly intact  Mood: fine  Affect: Consistent with mood and Congruent with thought  Thought Process: linear, logical  Associations: intact   Thought Content: denies SI, denies HI, and no delusions  Perceptions: denies AH and denies  VH  Memory: Able to recall past events, Remote intact, and Recent intact  Attention:Impaired to some degree  Fund of Knowledge: Aware of current events and Vocabulary appropriate   Estimate if Intelligence:  Average based on work/education history, vocabulary and mental status exam  Insight: has awareness of illness  Judgment: behavior is adequate to circumstances      PSYCHOSOCIAL    PSYCHOSOCIAL STRESSORS   health    FUNCTIONING RELATIONSHIPS   good support system    STRENGTHS AND LIABILITIES   Strength: Patient accepts guidance/feedback, Strength: Patient is motivated for change., Liability: Patient has poor health.    Is the patient aware of the biomedical complications associated with substance abuse and mental illness? yes    Does the patient have an Advance Directive for Mental Health treatment? no  (If yes, inform patient to bring copy.)        MEDICAL DECISION MAKING        ASSESSMENT       Altered mental status  History of alcohol dependence  Long-term benzodiazepine use  Nicotine dependence      PROBLEM LIST AND MANAGEMENT PLANS    Altered mental status:  -Patient counseled  -Likely due to encephalopathy, no major cognitive deficits observed during observation.  -Recommend delirium precautions  DELIRIUM BEHAVIOR MANAGEMENT  PLEASE utilize CHEMICAL restraints with PRN meds first for agitation. Minimize use of PHYSICAL restraints  Keep window shades open and room lit during day and room dim at night in order to  promote normal sleep-wake cycles  Encourage family at bedside. [place] patient often to situation, location, date  Continue to Limit or Discontinue use of Narcotics, Benzos and Anti-cholinergic medications as they may worsen delirium  Continue medical workup for causative etiology of Delirium    History of alcohol dependence  -Pt counseled  -Reports cessation > 1 year    Benzodiazepine use:  -Continue alprazolam 0.5 mg PRN daily for anxiety/symptoms of benzodiazepine withdrawal. Patient likely a low risk considering home dose.  Recommend switching to PO when able  -Will attempt to reach patient's outpatient provider, Chas Khan is patient is agreeable to discuss discontinuing benzo due to hx substance abuse and hepatic concerns.     Tobacco dependence:  -Patient counseled, she expresses high motivation to quit smoking.   -Consider initiating chantix    PRESCRIPTION DRUG MANAGEMENT    Discussed diagnosis, risks and benefits of proposed treatment vs alternative treatments vs no treatment, potential side effects of these treatments and the inherent unpredictability of treatment. The patient expresses understanding of the above and displays the capacity to agree with this treatment given said understanding. Patient also agrees that, currently, the benefits outweigh the risks and would like to pursue/continue treatment at this time.    Any medications being used off-label were discussed with the patient inclusive of the evidence base for the use of the medications and consent was obtained for the off-label use of the medication.         DIAGNOSTIC TESTING  Labs reviewed with patient; follow up pending labs    Disposition:    Inpatient psychiatric hospitalization not indicated at this time.         Jaime Heatno, KEVIN-BC

## 2023-12-06 NOTE — PT/OT/SLP EVAL
Occupational Therapy   Evaluation    Name: Shonda Borrego  MRN: [id number]  Admitting Diagnosis: Acute hepatic encephalopathy  Recent Surgery: * No surgery found *      Recommendations:     Discharge Recommendations: Low Intensity Therapy (To be further determined based on progress prior to discharge.)  Discharge Equipment Recommendations:  other (see comments) (To be further determined based on progress prior to discharge.)  Barriers to discharge:  Other (Comment) (Medical and functional status)    Assessment:     Shonda Borrego is a 58 y.o. female with a medical diagnosis of Acute hepatic encephalopathy.  She presents with functional deficits impacting independence with ADL's including functional mobility. Performance deficits affecting function: weakness, impaired endurance, impaired self care skills, impaired functional mobility, impaired balance, visual deficits, impaired cognition, decreased upper extremity function, decreased coordination, decreased lower extremity function, decreased safety awareness, decreased ROM, impaired cardiopulmonary response to activity, impaired joint extensibility, impaired muscle length.      Rehab Prognosis: Good; patient would benefit from acute skilled OT services to address these deficits and reach maximum level of function.       Plan:     Patient to be seen 5 x/week to address the above listed problems via self-care/home management, therapeutic activities, therapeutic exercises, cognitive retraining  Plan of Care Expires: 12/13/23  Plan of Care Reviewed with: patient, family    Subjective     Chief Complaint: Pt would like the marshall catheter removed as soon as possible.   Patient/Family Comments/goals: Pt would like to return home with sister.     Occupational Profile:  Living Environment: Pt lives with her sister in a mobile home without steps.  Previous level of function: Independent  Roles and Routines: ADL's and light IADL's  Equipment Used at Home: none  Assistance upon  Discharge: Pt's significant other can assist when needed.     Pain/Comfort:  Pain Rating 1: 0/10  Pain Rating Post-Intervention 1: 0/10    Patients cultural, spiritual, Yazidism conflicts given the current situation: no    Objective:     Communicated with: nurse prior to session.  Patient found HOB elevated with telemetry, peripheral IV, marshall catheter upon OT entry to room.    General Precautions: Standard, fall  Orthopedic Precautions: N/A  Braces: N/A  Respiratory Status: Room air    Occupational Performance:    Bed Mobility:    Patient completed Rolling/Turning to Left with  stand by assistance  Patient completed Rolling/Turning to Right with stand by assistance  Patient completed Scooting/Bridging with contact guard assistance  Patient completed Supine to Sit with moderate assistance    Functional Mobility/Transfers:  Patient completed Sit <> Stand Transfer with minimum assistance  with  rolling walker   Patient completed Bed <> Chair Transfer using Step Transfer technique with minimum assistance with rolling walker  Patient completed Toilet Transfer Step Transfer technique with minimum assistance with  bedside commode  Functional Mobility: Pt ambulated 148' requiring steadying assist utilizing RW.    Activities of Daily Living:  Feeding:  setup assistance    Grooming: setup assistance     Bathing: minimum assistance    Upper Body Dressing: supervision    Lower Body Dressing: moderate assistance    Toileting: minimum assistance      Cognitive/Visual Perceptual:  Cognitive/Psychosocial Skills:  -       Oriented to: Person and Place   -       Follows Commands/attention:Follows two-step commands  -       Communication: anomia  -       Memory: Impaired STM  -       Safety awareness/insight to disability: impaired   -       Mood/Affect/Coping skills/emotional control: Cooperative    Physical Exam:  Postural examination/scapula alignment: -       Rounded shoulders  Sensation: -       Intact  light/touch bilateral  UE's  Dominant hand: -       Right  Upper Extremity Range of Motion:  -       Right Upper Extremity: WFL  -       Left Upper Extremity: WFL  Upper Extremity Strength: -       Right Upper Extremity: grossly 4- to 4/5  -       Left Upper Extremity: grossly 4- to 4/5  Fine Motor Coordination: -       Intact  Left hand, manipulation of objects and Right hand, manipulation of objects  Gross motor coordination: WFL    AMPAC 6 Click ADL:  AMPAC Total Score: 20    Treatment & Education:  Pt was provided education / instruction regarding role of OT and established OT POC.    Patient left sitting edge of bed with all lines intact, call button in reach, nurse notified, and PT present    GOALS:   Goals to be met by: 12/13/23     Patient will increase functional independence with ADLs by performing:    Feeding with Modified [place].  UE Dressing with Modified [place].  LE Dressing with Modified [place].  Grooming while standing at sink with Modified [place].  Toileting from toilet with Modified [place] for hygiene and clothing management.   Bathing from  shower chair/bench with Modified [place].  Toilet transfer to toilet with Modified [place].      History:     Past Medical History:   Diagnosis Date    Anxiety     Bipolar disorder     BMI 32.0-32.9,adult 2/7/2023    Wt Readings from Last 8 Encounters: 02/07/23 95.7 kg (211 lb) 01/25/23 99.8 kg (220 lb) 01/20/23 97.1 kg (214 lb 1.1 oz) 01/20/23 97.1 kg (214 lb 1.1 oz) 01/20/23 97.1 kg (214 lb 1.1 oz) 01/19/23 100.2 kg (221 lb) 12/26/22 100.2 kg (221 lb) 12/14/22 99.8 kg (220 lb) Recommendations:  Stay physically active. As tolerated alternate resistance training with stretching and cardio. Goal of 150 minutes     BMI 34.0-34.9,adult 3/18/2022    Cirrhosis     Constipation 7/26/2022    COPD (chronic obstructive pulmonary disease)     GERD (gastroesophageal reflux disease)     Hepatic encephalopathy 4/17/2023    Hyperammonemia 4/21/2022     Restless leg syndrome     Urinary frequency          Past Surgical History:   Procedure Laterality Date    COLONOSCOPY N/A 8/9/2023    Procedure: COLONOSCOPY;  Surgeon: Sweetie Juarez MD;  Location: [facility];  Service: General;  Laterality: N/A;  6th 1000    TIPS PROCEDURE         Time Tracking:     OT Date of Treatment: 12/06/23  OT Start Time: 1320  OT Stop Time: 1352  OT Total Time (min): 32 min    Billable Minutes:Evaluation 32    12/6/2023

## 2023-12-06 NOTE — SUBJECTIVE & OBJECTIVE
Past Medical History:   Diagnosis Date    Anxiety     Bipolar disorder     BMI 32.0-32.9,adult 2/7/2023    Wt Readings from Last 8 Encounters: 02/07/23 95.7 kg (211 lb) 01/25/23 99.8 kg (220 lb) 01/20/23 97.1 kg (214 lb 1.1 oz) 01/20/23 97.1 kg (214 lb 1.1 oz) 01/20/23 97.1 kg (214 lb 1.1 oz) 01/19/23 100.2 kg (221 lb) 12/26/22 100.2 kg (221 lb) 12/14/22 99.8 kg (220 lb) Recommendations:  Stay physically active. As tolerated alternate resistance training with stretching and cardio. Goal of 150 minutes     BMI 34.0-34.9,adult 3/18/2022    Cirrhosis     Constipation 7/26/2022    COPD (chronic obstructive pulmonary disease)     GERD (gastroesophageal reflux disease)     Hepatic encephalopathy 4/17/2023    Hyperammonemia 4/21/2022    Restless leg syndrome     Urinary frequency        Past Surgical History:   Procedure Laterality Date    COLONOSCOPY N/A 8/9/2023    Procedure: COLONOSCOPY;  Surgeon: Sweetie Juarez MD;  Location: [facility];  Service: General;  Laterality: N/A;  6th 1000    TIPS PROCEDURE         Review of patient's allergies indicates:  No Known Allergies    No current facility-administered medications on file prior to encounter.     Current Outpatient Medications on File Prior to Encounter   Medication Sig    albuterol (PROVENTIL/VENTOLIN HFA) 90 mcg/actuation inhaler Inhale 2 puffs into the lungs every 6 (six) hours as needed for Wheezing or Shortness of Breath.    furosemide (LASIX) 20 MG tablet Take 1 tablet (20 mg total) by mouth 2 (two) times daily.    gabapentin (NEURONTIN) 300 MG capsule TAKE 1 CAPSULE BY MOUTH IN THE EVENING    mirtazapine (REMERON SOL-TAB) 15 MG disintegrating tablet Take 15 mg by mouth every evening.    nicotine (NICODERM CQ) 14 mg/24 hr Place 1 patch onto the skin once daily.    nicotine, polacrilex, (NICORETTE) 4 MG Gum Take 1 each (4 mg total) by mouth as needed (Take 1 each (4 mg total) by mouth as needed for tobacco cessation. 1-2 per hour in the place of cigarettes.  (5-16 daily max) - Oral).    ondansetron (ZOFRAN) 4 MG tablet Take 1 tablet (4 mg total) by mouth 2 (two) times daily.    oxybutynin (DITROPAN-XL) 5 MG TR24 Take 1 tablet (5 mg total) by mouth once daily.    pantoprazole (PROTONIX) 20 MG tablet Take 1 tablet by mouth once daily    rifAXIMin (XIFAXAN) 550 mg Tab Take 1 tablet (550 mg total) by mouth 2 (two) times daily.    rOPINIRole (REQUIP) 1 MG tablet Take 1 tablet (1 mg total) by mouth every evening.    spironolactone (ALDACTONE) 100 MG tablet Take 1 tablet (100 mg total) by mouth once daily.    tiotropium-olodateroL (STIOLTO RESPIMAT) 2.5-2.5 mcg/actuation Mist 2 puffs Inhalation Once a day for 30 days    ursodioL (ACTIGALL) 300 mg capsule Take 1 capsule (300 mg total) by mouth 3 (three) times daily. for 14 days    VRAYLAR 3 mg Cap Take 3 mg by mouth.     Family History       Problem Relation (Age of Onset)    Cancer Sister    Lung cancer Mother, Sister    No Known Problems Father, Maternal Grandmother, Maternal Grandfather, Paternal Grandmother, Paternal Grandfather, Daughter, Maternal Aunt, Maternal Uncle, Paternal Aunt, Paternal Uncle          Tobacco Use    Smoking status: Every Day     Current packs/day: 0.50     Average packs/day: 1.5 packs/day for 48.9 years (73.0 ttl pk-yrs)     Types: Cigarettes     Start date: 1975     Passive exposure: Past    Smokeless tobacco: Never    Tobacco comments:     Pt active in [facility] smoking cessation program   Substance and Sexual Activity    Alcohol use: Not Currently     Comment: socially    Drug use: Not Currently     Types: Marijuana    Sexual activity: Not Currently     Review of Systems   Unable to perform ROS: Mental status change   Constitutional:  Negative for activity change, appetite change, diaphoresis, fatigue and fever.     Objective:     Vital Signs (Most Recent):  Temp: 97 °F (36.1 °C) (12/05/23 2040)  Pulse: 91 (12/05/23 2330)  Resp: 20 (12/05/23 2040)  BP: (!) 144/70 (12/05/23 2040)  SpO2: (!) 94 %  (12/05/23 2040) Vital Signs (24h Range):  Temp:  [97 °F (36.1 °C)-98.6 °F (37 °C)] 97 °F (36.1 °C)  Pulse:  [] 91  Resp:  [14-20] 20  SpO2:  [94 %-99 %] 94 %  BP: (115-144)/(58-75) 144/70     Weight: 97.2 kg (214 lb 3.2 oz)  Body mass index is 32.57 kg/m².     Physical Exam  Vitals reviewed.   Constitutional:       General: She is not in acute distress.     Appearance: Normal appearance. She is not ill-appearing, toxic-appearing or diaphoretic.   HENT:      Head: Normocephalic and atraumatic.      Right Ear: External ear normal.      Left Ear: External ear normal.      Nose: Nose normal.      Mouth/Throat:      Mouth: Mucous membranes are moist.      Pharynx: Oropharynx is clear.   Eyes:      Extraocular Movements: Extraocular movements intact.      Conjunctiva/sclera: Conjunctivae normal.   Cardiovascular:      Rate and Rhythm: Normal rate and regular rhythm.      Pulses: Normal pulses.      Heart sounds: Normal heart sounds.   Pulmonary:      Effort: Pulmonary effort is normal. No respiratory distress.      Breath sounds: No stridor. No wheezing, rhonchi or rales.   Abdominal:      General: Abdomen is flat. There is no distension.      Palpations: Abdomen is soft. There is no mass.      Tenderness: There is no abdominal tenderness. There is no right CVA tenderness, left CVA tenderness or guarding.   Musculoskeletal:         General: No tenderness. Normal range of motion.      Cervical back: Normal range of motion and neck supple. No rigidity or tenderness.      Right lower leg: No edema.      Left lower leg: No edema.   Skin:     General: Skin is warm and dry.      Capillary Refill: Capillary refill takes less than 2 seconds.   Neurological:      General: No focal deficit present.      Mental Status: She is alert and oriented to person, place, and time. Mental status is at baseline.      Motor: No weakness.      Gait: Gait normal.   Psychiatric:         Mood and Affect: Mood normal.         Behavior:  Behavior normal.         Thought Content: Thought content normal.         Judgment: Judgment normal.               Significant Labs: All pertinent labs within the past 24 hours have been reviewed.  A1C:   Recent Labs   Lab 06/16/23  0740   HGBA1C 5.4     ABGs:   Recent Labs   Lab 12/05/23  1130   PH 7.546*   PCO2 25.2*   PO2 220*     Bilirubin:   Recent Labs   Lab 12/05/23  1117   BILITOT 2.2*     BMP:   Recent Labs   Lab 12/05/23  1117   *      K 4.2   *   CO2 26   BUN 9   CREATININE 0.8   CALCIUM 9.4   MG 2.1     CBC:   Recent Labs   Lab 12/05/23  1117   WBC 6.56   HGB 14.9   HCT 43.2   *     CMP:   Recent Labs   Lab 12/05/23  1117      K 4.2   *   CO2 26   *   BUN 9   CREATININE 0.8   CALCIUM 9.4   PROT 6.6   ALBUMIN 3.0*   BILITOT 2.2*   ALKPHOS 169*   AST 38   ALT 30   ANIONGAP 4     Lipase:   Recent Labs   Lab 12/05/23  1117   LIPASE 24     Magnesium:   Recent Labs   Lab 12/05/23  1117   MG 2.1     Urine Studies:   Recent Labs   Lab 12/05/23  1100   COLORU Yellow   APPEARANCEUA Clear   PHUR 8.0   SPECGRAV 1.010   PROTEINUA Negative   GLUCUA Negative   KETONESU Negative   BILIRUBINUA Negative   OCCULTUA Negative   NITRITE Positive*   UROBILINOGEN 1.0   LEUKOCYTESUR Negative   RBCUA 1   WBCUA 6*   BACTERIA Few*   SQUAMEPITHEL 1   HYALINECASTS 0.0*   X-Ray Chest 1 View  Narrative: EXAMINATION:  XR CHEST 1 VIEW    CLINICAL HISTORY:  Altered mental status, unspecified    COMPARISON:  Chest CT 01/20/2023    FINDINGS:  No acute infiltrates.  No signs of CHF.  No pleural fluid or pneumothorax detected  Impression: No acute findings in the chest    Electronically signed by: Josseline Barber MD  Date:    12/05/2023  Time:    12:09  CT Lumbar Spine Without Contrast  Narrative: EXAMINATION:  CT LUMBAR SPINE WITHOUT CONTRAST    CLINICAL HISTORY:  Upper L, lower T spine pain;    CT/Cardiac Nuclear exams in prior 12 months: 0    TECHNIQUE:  CT lumbar spine without IV contrast.   Sagittal and coronal reformats prepared.  Iterative reconstruction utilized.    COMPARISON:  None available    FINDINGS:  Lumbar vertebral body heights are maintained.  There is no evidence of an acute fracture or dislocation.  No suspicious bone lesion detected.  No central spinal canal compromise demonstrated.  Mild multilevel lumbar spondylosis with facet arthropathy and mild disc bulges.  Impression: No evidence of a lumbar spine fracture status post fall    Electronically signed by: Josseline Barber MD  Date:    12/05/2023  Time:    10:47  CT Thoracic Spine Without Contrast  Narrative: EXAMINATION:  CT THORACIC SPINE WITHOUT CONTRAST    CLINICAL HISTORY:  upper L, lower T spine pain;    TECHNIQUE:  CT thoracic spine without IV contrast.  Multiplanar reformats prepared.  Iterative reconstruction was utilized for dose reduction.    CT past 12 months: 1    COMPARISON:  None    FINDINGS:  Thoracic vertebral body heights are maintained.  No evidence of a fracture or malalignment.  No suspicious bone lesion.  Impression: No evidence of a thoracic spine fracture status post fall    Electronically signed by: Josseline Barber MD  Date:    12/05/2023  Time:    10:38  CT Pelvis Without Contrast  Narrative: EXAMINATION:  CT PELVIS WITHOUT CONTRAST    CLINICAL HISTORY:  Unspecified fall, initial encounterPelvic trauma;    CT/Cardiac Nuclear exams in prior 12 months: 1    TECHNIQUE:  CT pelvis without IV contrast.  Iterative reconstruction utilized.  Coronal and sagittal reformats as well as 3D   reconstructions prepared.    FINDINGS:  No acute fracture or dislocation detected.  Mild degenerative changes within the hips, SI joints and visualized lumbar spine.  Impression: No evidence of posttraumatic osseous injury status post fall    Electronically signed by: Josseline Barber MD  Date:    12/05/2023  Time:    10:36  CT Cervical Spine Without Contrast  Narrative: EXAMINATION:  CT CERVICAL SPINE WITHOUT CONTRAST    CLINICAL  HISTORY:  fall;    CT/Cardiac Nuclear exams in prior 12 months: 1    TECHNIQUE:  CT cervical spine without IV contrast.  Iterative reconstruction was utilized.  Sagittal and coronal reformats prepared.    COMPARISON:  None available    FINDINGS:  Some of the images are degraded secondary to motion artifact.    No acute fracture or dislocation detected.  Multilevel facet osteoarthritis and degenerative disc disease with small disc bulge-osteophytes with multifocal effacement of anterior thecal sac and foraminal stenosis  Impression: No evidence of an acute fracture or dislocation    Multilevel spondylosis    Motion artifact on some images    Electronically signed by: Josseline Barber MD  Date:    12/05/2023  Time:    10:32  CT Head Without Contrast  Narrative: EXAMINATION:  CT HEAD WITHOUT CONTRAST    CLINICAL HISTORY:  Mental status change, unknown cause;    CT/Cardiac Nuclear exams in prior 12 months: 1    TECHNIQUE:  Axial head CT performed without IV contrast.  Iterative reconstruction utilized.    COMPARISON:  None    FINDINGS:  No evidence of intracranial hemorrhage, acute infarction or mass effect.  No skull fracture detected.  Imaged paranasal sinuses, mastoids and middle ear cavities are clear.  Impression: No acute intracranial abnormalities    Electronically signed by: Josseline Barber MD  Date:    12/05/2023  Time:    10:29     Significant Imaging: I have reviewed all pertinent imaging results/findings within the past 24 hours.

## 2023-12-06 NOTE — ASSESSMENT & PLAN NOTE
Patient was found to have thrombocytopenia, the likely etiology is secondary to cirrhosis/portal hypertension, will monitor the platelets Daily. Will transfuse if platelet count is <50k (if undergoing surgical procedure or have active bleeding). Hold DVT prophylaxis if platelets are <50k. The patient's platelet results have been reviewed and are listed below.  Recent Labs   Lab 12/05/23  1117   *

## 2023-12-06 NOTE — ASSESSMENT & PLAN NOTE
Known condition, still present on recent findings on MRI of abdomen. Patient asymptomatic at this time. Continue to monitor.

## 2023-12-06 NOTE — ASSESSMENT & PLAN NOTE
Patient's COPD is controlled currently.  Patient is currently on COPD Pathway. Continue scheduled inhalers Supplemental oxygen per O2 protocol and monitor respiratory status closely.

## 2023-12-06 NOTE — ASSESSMENT & PLAN NOTE
Body mass index is 32.57 kg/m². Morbid obesity complicates all aspects of disease management from diagnostic modalities to treatment. Weight loss encouraged and health benefits explained to patient.

## 2023-12-06 NOTE — HPI
History           Chief Complaint   Patient presents with    Altered Mental Status       Pt c/o low back pain and has been altered since Sunday      Patient presents emergency department from home via EMS for concerns of altered mental status for last several days.  Limited information provided by EMS due to patient's mental status. She is alert oriented to person place unsure of year.  She states she has some lower back pain but denies any pain elsewhere.  Patient moves all extremities and follows commands.  Patient has a history of cirrhosis due to alcohol abuse.  58 year old with hepatocellular carcinoma, alcoholic cirrhosis, anxiety, history of substance abuse history presents to emergency department by her sister and brother for altered mental status and frequent falls recently.     ED course:   On arrival vital signs /58 mmHg, HR 95, RR 14, SpO2 99% on room air, temp 98.2F. Labs notable for ammonia 134, lipase 24, AST/ALT 38/30, Tbili 2.2, Na 144, K 4.2, Cl 114, CO2 26, Glucose 124. Hg 14.9, HCT 43.2, WBC 6.56, . Urine positive for presumptive benzodiazepine. CT head reports no intracranial processes, CXR with no acute cardiopulmonary processes, CT cervical, thoracic, lumbar and pelvis with no evidence o acute fraction or dislocations. Recent oupatient MRI of abdomen shows stable right hepatic lobe signaling, sequela of portal hypertenion, splenomegaly, cholelithasis.    Patient received lactulose x1 and started on IVF LR bolus 500 x1.  Patient tibe admitted to medicine services for treament of hepatic encephalopathy, hyperammonemia with lactulose, rifaximin, and continue IVF resuscitation.

## 2023-12-06 NOTE — H&P
[facility] Medicine  History & Physical    Patient Name: Shonda Borrego  MRN: [id number]  Patient Class: IP- Inpatient  Admission Date: 12/5/2023  Attending Physician: Geeta Gaines DO   Primary Care Provider: Geeta Gaines DO         Patient information was obtained from patient and ER records.     Subjective:     Principal Problem:<principal problem not specified>    Chief Complaint:   Chief Complaint   Patient presents with    Altered Mental Status     Pt c/o low back pain and has been altered since Sunday        HPI:   History           Chief Complaint   Patient presents with    Altered Mental Status       Pt c/o low back pain and has been altered since Sunday      Patient presents emergency department from home via EMS for concerns of altered mental status for last several days.  Limited information provided by EMS due to patient's mental status. She is alert oriented to person place unsure of year.  She states she has some lower back pain but denies any pain elsewhere.  Patient moves all extremities and follows commands.  Patient has a history of cirrhosis due to alcohol abuse.  58 year old with hepatocellular carcinoma, alcoholic cirrhosis, anxiety, history of substance abuse history presents to emergency department by her sister and brother for altered mental status and frequent falls recently.     ED course:   On arrival vital signs /58 mmHg, HR 95, RR 14, SpO2 99% on room air, temp 98.2F. Labs notable for ammonia 134, lipase 24, AST/ALT 38/30, Tbili 2.2, Na 144, K 4.2, Cl 114, CO2 26, Glucose 124. Hg 14.9, HCT 43.2, WBC 6.56, . Urine positive for presumptive benzodiazepine. CT head reports no intracranial processes, CXR with no acute cardiopulmonary processes, CT cervical, thoracic, lumbar and pelvis with no evidence o acute fraction or dislocations. Recent oupatient MRI of abdomen shows stable right hepatic lobe signaling, sequela of portal hypertenion,  splenomegaly, cholelithasis.    Patient received lactulose x1 and started on IVF LR bolus 500 x1.  Patient tibe admitted to medicine services for treament of hepatic encephalopathy, hyperammonemia with lactulose, rifaximin, and continue IVF resuscitation.      No new subjective & objective note has been filed under this hospital service since the last note was generated.    Assessment/Plan:     Class 1 obesity with serious comorbidity and body mass index (BMI) of 32.0 to 32.9 in adult  Body mass index is 32.57 kg/m². Morbid obesity complicates all aspects of disease management from diagnostic modalities to treatment. Weight loss encouraged and health benefits explained to patient.         COPD without exacerbation  Patient's COPD is controlled currently.  Patient is currently on COPD Pathway. Continue scheduled inhalers Supplemental oxygen per O2 protocol and monitor respiratory status closely.     Hyperbilirubinemia  Secondary to liver cirrhosis. Continue to trend.       Anxiety  Resume home medications.   - PRN ativan 0.5 mg Q12H      Dependence on nicotine from cigarettes  Dangers of cigarette smoking were reviewed with patient in detail. Patient was Counseled for 3-10 minutes. Nicotine replacement options were discussed. Nicotine replacement was discussed- prescribed    Calculus of gallbladder without cholecystitis without obstruction  Known condition, still present on recent findings on MRI of abdomen. Patient asymptomatic at this time. Continue to monitor.       Thrombocytopenia  Patient was found to have thrombocytopenia, the likely etiology is secondary to cirrhosis/portal hypertension, will monitor the platelets Daily. Will transfuse if platelet count is <50k (if undergoing surgical procedure or have active bleeding). Hold DVT prophylaxis if platelets are <50k. The patient's platelet results have been reviewed and are listed below.  Recent Labs   Lab 12/05/23  1117   *         Alcoholic cirrhosis of  liver without ascites  Patient with known Cirrhosis with Child's class Calculator for Child's score link- https://www.mdcalc.com/calc/340/child-gonzalez-score-cirrhosis-mortality#creator-insights. Co-morbidities are present and inclusive of portal hypertension, esophageal varices, hepatic encephalopathy, and anemia/pancytopenia.  MELD-Na score calculated; MELD 3.0: 17 at 9/18/2023 12:40 PM  MELD-Na: 15 at 9/18/2023 12:40 PM  Calculated from:  Serum Creatinine: 1.4 mg/dL at 9/18/2023 12:40 PM  Serum Sodium: 136 mmol/L at 9/18/2023 12:40 PM  Total Bilirubin: 1.9 mg/dL at 9/18/2023 12:40 PM  Serum Albumin: 3.3 g/dL at 9/18/2023 12:40 PM  INR(ratio): 1.2 at 9/18/2023 12:40 PM  Age at listing (hypothetical): 58 years  Sex: Female at 9/18/2023 12:40 PM      Continue chronic meds. Etiology likely  ETOH, multiple substance abuse history . Will avoid any hepatotoxic meds, and monitor CBC/CMP/INR for synthetic function.       VTE Risk Mitigation (From admission, onward)           Ordered     IP VTE HIGH RISK PATIENT  Once         12/05/23 1341     Place sequential compression device  Until discontinued         12/05/23 1341                                    Geeta Gaines DO  Department of Hospital Medicine  [facility]

## 2023-12-06 NOTE — ASSESSMENT & PLAN NOTE
Patient with known Cirrhosis with Child's class Calculator for Child's score link- https://www.[employer].com/calc/340/child-gonzalez-score-cirrhosis-mortality#creator-insights. Co-morbidities are present and inclusive of portal hypertension, esophageal varices, hepatic encephalopathy, and anemia/pancytopenia.  MELD-Na score calculated; MELD 3.0: 17 at 9/18/2023 12:40 PM  MELD-Na: 15 at 9/18/2023 12:40 PM  Calculated from:  Serum Creatinine: 1.4 mg/dL at 9/18/2023 12:40 PM  Serum Sodium: 136 mmol/L at 9/18/2023 12:40 PM  Total Bilirubin: 1.9 mg/dL at 9/18/2023 12:40 PM  Serum Albumin: 3.3 g/dL at 9/18/2023 12:40 PM  INR(ratio): 1.2 at 9/18/2023 12:40 PM  Age at listing (hypothetical): 58 years  Sex: Female at 9/18/2023 12:40 PM      Continue chronic meds. Etiology likely  ETOH, multiple substance abuse history . Will avoid any hepatotoxic meds, and monitor CBC/CMP/INR for synthetic function.

## 2023-12-06 NOTE — PT/OT/SLP EVAL
Physical Therapy Evaluation    Patient Name:  Shonda Borrego   MRN:  [id number]    Recommendations:     Discharge Recommendations: Low Intensity Therapy   Discharge Equipment Recommendations:  (TBD)   Barriers to discharge: None    Assessment:     Shonda Borrego is a 58 y.o. female admitted with a medical diagnosis of Acute hepatic encephalopathy.  She presents with the following impairments/functional limitations: weakness, impaired endurance, impaired self care skills, impaired functional mobility, decreased lower extremity function, gait instability, decreased safety awareness. Patient is able to perform physical therapy evaluation following occupational therapy evaluation. She is able to ambulate well with decreased step length noted and forward flexed trunk. Patient is demonstrating decreased strength in B LE, but overall, she is demonstrating strength WFL.     Rehab Prognosis: Good; patient would benefit from acute skilled PT services to address these deficits and reach maximum level of function.    Recent Surgery: * No surgery found *      Plan:     During this hospitalization, patient to be seen   to address the identified rehab impairments via gait training, therapeutic activities, therapeutic exercises, neuromuscular re-education and progress toward the following goals:    Plan of Care Expires:  12/15/23    Subjective     Chief Complaint: weakness in B LE   Patient/Family Comments/goals: return to PLOF   Pain/Comfort:  Pain Rating 1: 0/10    Patients cultural, spiritual, Orthodox conflicts given the current situation: no    Living Environment:  Lives in mobile home with sister without steps  Prior to admission, patients level of function was independent.  Equipment used at home: none.  DME owned (not currently used): none.  Upon discharge, patient will have assistance from significant other.    Objective:     Communicated with patient and family and nursing prior to session.  Patient found sitting edge  of bed with telemetry, peripheral IV, marshall catheter  upon PT entry to room.    General Precautions: Standard, fall  Orthopedic Precautions:N/A   Braces: N/A  Respiratory Status: Room air    Exams:  Cognitive Exam:  Patient is oriented to Person, Place, Time, and Situation  Gross Motor Coordination:  WFL  RLE ROM: WFL  RLE Strength: WFL  LLE ROM: WFL  LLE Strength: WFL    Functional Mobility:  Bed Mobility:     Scooting: modified independence  Supine to Sit: stand by assistance  Transfers:     Sit to Stand:  stand by assistance with rolling walker  Gait: 150 feet with CGA with RW   Balance: Minimal LOB noted posteriorly when transferring from stand to sit at bedside chair       AM-PAC 6 CLICK MOBILITY  Total Score:21       Treatment & Education:  Patient educated on safety awareness and gait pattern      Patient left up in chair with all lines intact, call button in reach, nurse  notified, and family  present.    GOALS:   Multidisciplinary Problems       Physical Therapy Goals          Problem: Physical Therapy    Goal Priority Disciplines Outcome Goal Variances Interventions   Physical Therapy Goal     PT, PT/OT Ongoing, Progressing     Description: Patient will increase functional independence with mobility by performin. Supine to sit with Independent  2. Sit to supine with Independent  3. Bed to chair transfer with Supervision or Set-up Assistancewith or without rolling walker using Stand Pivot TECHNIQUE  4. Gait  x 300  feet with Supervision or Set-up Assistance with or without rolling walker  5. Lower extremity exercise program x10 reps per handout, with assistance as needed                        History:     Past Medical History:   Diagnosis Date    Anxiety     Bipolar disorder     BMI 32.0-32.9,adult 2023    Wt Readings from Last 8 Encounters: 23 95.7 kg (211 lb) 23 99.8 kg (220 lb) 23 97.1 kg (214 lb 1.1 oz) 23 97.1 kg (214 lb 1.1 oz) 23 97.1 kg (214 lb 1.1 oz)  01/19/23 100.2 kg (221 lb) 12/26/22 100.2 kg (221 lb) 12/14/22 99.8 kg (220 lb) Recommendations:  Stay physically active. As tolerated alternate resistance training with stretching and cardio. Goal of 150 minutes     BMI 34.0-34.9,adult 3/18/2022    Cirrhosis     Constipation 7/26/2022    COPD (chronic obstructive pulmonary disease)     GERD (gastroesophageal reflux disease)     Hepatic encephalopathy 4/17/2023    Hyperammonemia 4/21/2022    Restless leg syndrome     Urinary frequency        Past Surgical History:   Procedure Laterality Date    COLONOSCOPY N/A 8/9/2023    Procedure: COLONOSCOPY;  Surgeon: Sweetie Juarez MD;  Location: [facility];  Service: General;  Laterality: N/A;  6th 1000    TIPS PROCEDURE         Time Tracking:     PT Received On: 12/06/23  PT Start Time: 1345     PT Stop Time: 1355  PT Total Time (min): 10 min     Billable Minutes: Evaluation 10      12/06/2023

## 2023-12-06 NOTE — PROGRESS NOTES
[facility] Medicine  Progress Note    Patient Name: Shonda Borrego  MRN: [id number]  Patient Class: IP- Inpatient   Admission Date: 12/5/2023  Length of Stay: 1 days  Attending Physician: Geeta Gaines DO  Primary Care Provider: Geeta Gaines DO        Subjective:     Principal Problem:Acute hepatic encephalopathy        HPI:    History           Chief Complaint   Patient presents with    Altered Mental Status       Pt c/o low back pain and has been altered since Sunday      Patient presents emergency department from home via EMS for concerns of altered mental status for last several days.  Limited information provided by EMS due to patient's mental status. She is alert oriented to person place unsure of year.  She states she has some lower back pain but denies any pain elsewhere.  Patient moves all extremities and follows commands.  Patient has a history of cirrhosis due to alcohol abuse.  58 year old with hepatocellular carcinoma, alcoholic cirrhosis, anxiety, history of substance abuse history presents to emergency department by her sister and brother for altered mental status and frequent falls recently.     ED course:   On arrival vital signs /58 mmHg, HR 95, RR 14, SpO2 99% on room air, temp 98.2F. Labs notable for ammonia 134, lipase 24, AST/ALT 38/30, Tbili 2.2, Na 144, K 4.2, Cl 114, CO2 26, Glucose 124. Hg 14.9, HCT 43.2, WBC 6.56, . Urine positive for presumptive benzodiazepine. CT head reports no intracranial processes, CXR with no acute cardiopulmonary processes, CT cervical, thoracic, lumbar and pelvis with no evidence o acute fraction or dislocations. Recent oupatient MRI of abdomen shows stable right hepatic lobe signaling, sequela of portal hypertenion, splenomegaly, cholelithasis.    Patient received lactulose x1 and started on IVF LR bolus 500 x1.  Patient tibe admitted to medicine services for treament of hepatic encephalopathy, hyperammonemia with  lactulose, rifaximin, and continue IVF resuscitation.      Overview/Hospital Course:  12/6 No acute events overnight. Lactulose x2 with stool and endorses couple episodes of stooling. She becomes teary and wanting to go home to her sister and brother. Does not recall the last time she has fallen. Endorses compliance with her medications. Patient outpatient sees psychiatry for her anxiety. Concerning sedating effects of medications affecting her ability to stay awake and increase risks for falls, will consult psychiatry for further recommendations.   Continue IVF, lactulose, rifaximin for hyperammonemia.     Interval History: patient seen and examined.     Review of Systems   Constitutional:  Negative for activity change, appetite change, diaphoresis, fatigue and fever.   Respiratory:  Negative for cough, chest tightness, shortness of breath and wheezing.    Cardiovascular:  Negative for chest pain and leg swelling.   Genitourinary:  Negative for difficulty urinating.   Musculoskeletal:  Negative for neck pain and neck stiffness.   Skin:  Negative for pallor, rash and wound.   Neurological:  Positive for weakness. Negative for dizziness, tremors, seizures, speech difficulty, numbness and headaches.   Psychiatric/Behavioral:  Positive for confusion and dysphoric mood. Negative for agitation, behavioral problems and suicidal ideas. The patient is nervous/anxious.      Objective:     Vital Signs (Most Recent):  Temp: 98.2 °F (36.8 °C) (12/06/23 0737)  Pulse: 95 (12/06/23 0737)  Resp: 18 (12/06/23 0737)  BP: 117/61 (12/06/23 0737)  SpO2: (!) 92 % (12/06/23 0737) Vital Signs (24h Range):  Temp:  [97 °F (36.1 °C)-98.6 °F (37 °C)] 98.2 °F (36.8 °C)  Pulse:  [] 95  Resp:  [18-20] 18  SpO2:  [92 %-99 %] 92 %  BP: (115-144)/(61-75) 117/61     Weight: 97.2 kg (214 lb 3.2 oz)  Body mass index is 32.57 kg/m².    Intake/Output Summary (Last 24 hours) at 12/6/2023 0995  Last data filed at 12/6/2023 0639  Gross per 24 hour    Intake 1793.09 ml   Output 2250 ml   Net -456.91 ml         Physical Exam  Vitals reviewed.   Constitutional:       General: She is not in acute distress.     Appearance: Normal appearance. She is not ill-appearing, toxic-appearing or diaphoretic.   HENT:      Head: Normocephalic and atraumatic.      Right Ear: External ear normal.      Left Ear: External ear normal.      Nose: Nose normal.      Mouth/Throat:      Mouth: Mucous membranes are moist.      Pharynx: Oropharynx is clear.   Eyes:      Extraocular Movements: Extraocular movements intact.      Conjunctiva/sclera: Conjunctivae normal.   Cardiovascular:      Rate and Rhythm: Normal rate and regular rhythm.      Pulses: Normal pulses.      Heart sounds: Normal heart sounds.   Pulmonary:      Effort: Pulmonary effort is normal. No respiratory distress.      Breath sounds: No stridor. No wheezing, rhonchi or rales.   Abdominal:      General: Abdomen is flat. There is no distension.      Palpations: Abdomen is soft. There is no mass.      Tenderness: There is no abdominal tenderness. There is no right CVA tenderness, left CVA tenderness or guarding.   Musculoskeletal:         General: No swelling, tenderness or deformity. Normal range of motion.      Cervical back: Normal range of motion and neck supple. No rigidity or tenderness.      Right lower leg: No edema.      Left lower leg: No edema.   Skin:     General: Skin is warm and dry.      Capillary Refill: Capillary refill takes less than 2 seconds.      Findings: No lesion or rash.   Neurological:      General: No focal deficit present.      Mental Status: She is alert and oriented to person, place, and time. Mental status is at baseline.      Motor: Weakness present.   Psychiatric:         Mood and Affect: Mood normal.         Behavior: Behavior normal.         Thought Content: Thought content normal.         Judgment: Judgment normal.             Significant Labs: All pertinent labs within the past 24 hours  have been reviewed.  A1C:   Recent Labs   Lab 06/16/23  0740   HGBA1C 5.4     ABGs:   Recent Labs   Lab 12/05/23  1130   PH 7.546*   PCO2 25.2*   PO2 220*     Bilirubin:   Recent Labs   Lab 12/05/23  1117 12/06/23  0627   BILITOT 2.2* 3.3*     BMP:   Recent Labs   Lab 12/06/23  0627   *      K 3.8   *   CO2 25   BUN 11   CREATININE 0.9   CALCIUM 9.2   MG 1.8     CBC:   Recent Labs   Lab 12/05/23  1117 12/06/23  0627   WBC 6.56 7.41   HGB 14.9 14.9   HCT 43.2 44.0   * 162     CMP:   Recent Labs   Lab 12/05/23  1117 12/06/23  0627    144   K 4.2 3.8   * 114*   CO2 26 25   * 118*   BUN 9 11   CREATININE 0.8 0.9   CALCIUM 9.4 9.2   PROT 6.6 6.4   ALBUMIN 3.0* 2.9*   BILITOT 2.2* 3.3*   ALKPHOS 169* 128   AST 38 34   ALT 30 27   ANIONGAP 4 5     Lipase:   Recent Labs   Lab 12/05/23  1117   LIPASE 24     Magnesium:   Recent Labs   Lab 12/05/23  1117 12/06/23  0627   MG 2.1 1.8     Urine Studies:   Recent Labs   Lab 12/05/23  1100   COLORU Yellow   APPEARANCEUA Clear   PHUR 8.0   SPECGRAV 1.010   PROTEINUA Negative   GLUCUA Negative   KETONESU Negative   BILIRUBINUA Negative   OCCULTUA Negative   NITRITE Positive*   UROBILINOGEN 1.0   LEUKOCYTESUR Negative   RBCUA 1   WBCUA 6*   BACTERIA Few*   SQUAMEPITHEL 1   HYALINECASTS 0.0*     Significant Imaging: I have reviewed all pertinent imaging results/findings within the past 24 hours.    Assessment/Plan:      * Acute hepatic encephalopathy  Ammonia level 134, Tbili 2.2  Continue lactulose 30 mg QID  Start rifaximin  - monitor CMP      Class 1 obesity with serious comorbidity and body mass index (BMI) of 32.0 to 32.9 in adult  Body mass index is 32.57 kg/m². Morbid obesity complicates all aspects of disease management from diagnostic modalities to treatment. Weight loss encouraged and health benefits explained to patient.         Forgetfulness  Multifactorial with hepatic encephalopathy, anti-anxiety medication induced.   - f/u  psychiatry      Mood disorder  Home regimen clonazepam 0.5 mg PRN  - f/u psychiatry consult      COPD without exacerbation  Patient's COPD is controlled currently.  Patient is currently on COPD Pathway. Continue scheduled inhalers Supplemental oxygen per O2 protocol and monitor respiratory status closely.     Hyperbilirubinemia  Secondary to liver cirrhosis. Continue to trend.   Tbili increase 3.3 from 2.2      Anxiety  Resume home medications.   - PRN ativan 0.5 mg Q12H      Dependence on nicotine from cigarettes  Dangers of cigarette smoking were reviewed with patient in detail. Patient was Counseled for 3-10 minutes. Nicotine replacement options were discussed. Nicotine replacement was discussed- prescribed    Calculus of gallbladder without cholecystitis without obstruction  Known condition, still present on recent findings on MRI of abdomen. Patient asymptomatic at this time. Continue to monitor.       Thrombocytopenia  Patient was found to have thrombocytopenia, the likely etiology is secondary to cirrhosis/portal hypertension, will monitor the platelets Daily. Will transfuse if platelet count is <50k (if undergoing surgical procedure or have active bleeding). Hold DVT prophylaxis if platelets are <50k. The patient's platelet results have been reviewed and are listed below.  Recent Labs   Lab 12/05/23  1117   *         Alcoholic cirrhosis of liver without ascites  Patient with known Cirrhosis with Child's class Calculator for Child's score link- https://www.mdcalc.com/calc/340/child-gonzalez-score-cirrhosis-mortality#creator-insights. Co-morbidities are present and inclusive of portal hypertension, esophageal varices, hepatic encephalopathy, and anemia/pancytopenia.  MELD-Na score calculated; MELD 3.0: 17 at 9/18/2023 12:40 PM  MELD-Na: 15 at 9/18/2023 12:40 PM  Calculated from:  Serum Creatinine: 1.4 mg/dL at 9/18/2023 12:40 PM  Serum Sodium: 136 mmol/L at 9/18/2023 12:40 PM  Total Bilirubin: 1.9 mg/dL at  9/18/2023 12:40 PM  Serum Albumin: 3.3 g/dL at 9/18/2023 12:40 PM  INR(ratio): 1.2 at 9/18/2023 12:40 PM  Age at listing (hypothetical): 58 years  Sex: Female at 9/18/2023 12:40 PM      Continue chronic meds. Etiology likely  ETOH, multiple substance abuse history . Will avoid any hepatotoxic meds, and monitor CBC/CMP/INR for synthetic function.       VTE Risk Mitigation (From admission, onward)           Ordered     IP VTE HIGH RISK PATIENT  Once         12/05/23 1341     Place sequential compression device  Until discontinued         12/05/23 1341                    Discharge Planning   ALEJANDRO:      Code Status: Full Code   Is the patient medically ready for discharge?:     Reason for patient still in hospital (select all that apply): Patient trending condition, Treatment, Consult recommendations, and PT / OT recommendations  Discharge Plan A: Home with family, Home Health                  Geeta Gaines DO  Department of Hospital Medicine   [facility]

## 2023-12-06 NOTE — HOSPITAL COURSE
12/6 No acute events overnight. Lactulose x2 with stool and endorses couple episodes of stooling. She becomes teary and wanting to go home to her sister and brother. Does not recall the last time she has fallen. Endorses compliance with her medications. Patient outpatient sees psychiatry for her anxiety. Concerning sedating effects of medications affecting her ability to stay awake and increase risks for falls, will consult psychiatry for further recommendations.   Continue IVF, lactulose, rifaximin for hyperammonemia.   12/7 Back to baseline strength, no acute abdomen on physical exam. US abdomen with no acute findings. Patient ready to discharge with close follow up with primary hepatologist.

## 2023-12-06 NOTE — H&P
[facility] Medicine  History & Physical    Patient Name: Shonda Borrego  MRN: [id number]  Patient Class: IP- Inpatient  Admission Date: 12/5/2023  Attending Physician: Geeta Gaines DO   Primary Care Provider: Geeta Gaines DO    Patient information was obtained from ER records.     Subjective:     Principal Problem:Acute hepatic encephalopathy    Chief Complaint:   Chief Complaint   Patient presents with    Altered Mental Status     Pt c/o low back pain and has been altered since Sunday        HPI:   History           Chief Complaint   Patient presents with    Altered Mental Status       Pt c/o low back pain and has been altered since Sunday      Patient presents emergency department from home via EMS for concerns of altered mental status for last several days.  Limited information provided by EMS due to patient's mental status. She is alert oriented to person place unsure of year.  She states she has some lower back pain but denies any pain elsewhere.  Patient moves all extremities and follows commands.  Patient has a history of cirrhosis due to alcohol abuse.  58 year old with hepatocellular carcinoma, alcoholic cirrhosis, anxiety, history of substance abuse history presents to emergency department by her sister and brother for altered mental status and frequent falls recently.     ED course:   On arrival vital signs /58 mmHg, HR 95, RR 14, SpO2 99% on room air, temp 98.2F. Labs notable for ammonia 134, lipase 24, AST/ALT 38/30, Tbili 2.2, Na 144, K 4.2, Cl 114, CO2 26, Glucose 124. Hg 14.9, HCT 43.2, WBC 6.56, . Urine positive for presumptive benzodiazepine. CT head reports no intracranial processes, CXR with no acute cardiopulmonary processes, CT cervical, thoracic, lumbar and pelvis with no evidence o acute fraction or dislocations. Recent oupatient MRI of abdomen shows stable right hepatic lobe signaling, sequela of portal hypertenion, splenomegaly,  cholelithasis.    Patient received lactulose x1 and started on IVF LR bolus 500 x1.  Patient keegan admitted to medicine services for treament of hepatic encephalopathy, hyperammonemia with lactulose, rifaximin, and continue IVF resuscitation.      Past Medical History:   Diagnosis Date    Anxiety     Bipolar disorder     BMI 32.0-32.9,adult 2/7/2023    Wt Readings from Last 8 Encounters: 02/07/23 95.7 kg (211 lb) 01/25/23 99.8 kg (220 lb) 01/20/23 97.1 kg (214 lb 1.1 oz) 01/20/23 97.1 kg (214 lb 1.1 oz) 01/20/23 97.1 kg (214 lb 1.1 oz) 01/19/23 100.2 kg (221 lb) 12/26/22 100.2 kg (221 lb) 12/14/22 99.8 kg (220 lb) Recommendations:  Stay physically active. As tolerated alternate resistance training with stretching and cardio. Goal of 150 minutes     BMI 34.0-34.9,adult 3/18/2022    Cirrhosis     Constipation 7/26/2022    COPD (chronic obstructive pulmonary disease)     GERD (gastroesophageal reflux disease)     Hepatic encephalopathy 4/17/2023    Hyperammonemia 4/21/2022    Restless leg syndrome     Urinary frequency        Past Surgical History:   Procedure Laterality Date    COLONOSCOPY N/A 8/9/2023    Procedure: COLONOSCOPY;  Surgeon: Sweetie Juarez MD;  Location: [facility];  Service: General;  Laterality: N/A;  6th 1000    TIPS PROCEDURE         Review of patient's allergies indicates:  No Known Allergies    No current facility-administered medications on file prior to encounter.     Current Outpatient Medications on File Prior to Encounter   Medication Sig    albuterol (PROVENTIL/VENTOLIN HFA) 90 mcg/actuation inhaler Inhale 2 puffs into the lungs every 6 (six) hours as needed for Wheezing or Shortness of Breath.    furosemide (LASIX) 20 MG tablet Take 1 tablet (20 mg total) by mouth 2 (two) times daily.    gabapentin (NEURONTIN) 300 MG capsule TAKE 1 CAPSULE BY MOUTH IN THE EVENING    mirtazapine (REMERON SOL-TAB) 15 MG disintegrating tablet Take 15 mg by mouth every evening.    nicotine (NICODERM CQ) 14  mg/24 hr Place 1 patch onto the skin once daily.    nicotine, polacrilex, (NICORETTE) 4 MG Gum Take 1 each (4 mg total) by mouth as needed (Take 1 each (4 mg total) by mouth as needed for tobacco cessation. 1-2 per hour in the place of cigarettes. (5-16 daily max) - Oral).    ondansetron (ZOFRAN) 4 MG tablet Take 1 tablet (4 mg total) by mouth 2 (two) times daily.    oxybutynin (DITROPAN-XL) 5 MG TR24 Take 1 tablet (5 mg total) by mouth once daily.    pantoprazole (PROTONIX) 20 MG tablet Take 1 tablet by mouth once daily    rifAXIMin (XIFAXAN) 550 mg Tab Take 1 tablet (550 mg total) by mouth 2 (two) times daily.    rOPINIRole (REQUIP) 1 MG tablet Take 1 tablet (1 mg total) by mouth every evening.    spironolactone (ALDACTONE) 100 MG tablet Take 1 tablet (100 mg total) by mouth once daily.    tiotropium-olodateroL (STIOLTO RESPIMAT) 2.5-2.5 mcg/actuation Mist 2 puffs Inhalation Once a day for 30 days    ursodioL (ACTIGALL) 300 mg capsule Take 1 capsule (300 mg total) by mouth 3 (three) times daily. for 14 days    VRAYLAR 3 mg Cap Take 3 mg by mouth.     Family History       Problem Relation (Age of Onset)    Cancer Sister    Lung cancer Mother, Sister    No Known Problems Father, Maternal Grandmother, Maternal Grandfather, Paternal Grandmother, Paternal Grandfather, Daughter, Maternal Aunt, Maternal Uncle, Paternal Aunt, Paternal Uncle          Tobacco Use    Smoking status: Every Day     Current packs/day: 0.50     Average packs/day: 1.5 packs/day for 48.9 years (73.0 ttl pk-yrs)     Types: Cigarettes     Start date: 1975     Passive exposure: Past    Smokeless tobacco: Never    Tobacco comments:     Pt active in [facility] smoking cessation program   Substance and Sexual Activity    Alcohol use: Not Currently     Comment: socially    Drug use: Not Currently     Types: Marijuana    Sexual activity: Not Currently     Review of Systems   Unable to perform ROS: Mental status change   Constitutional:  Negative for activity  change, appetite change, diaphoresis, fatigue and fever.     Objective:     Vital Signs (Most Recent):  Temp: 97 °F (36.1 °C) (12/05/23 2040)  Pulse: 91 (12/05/23 2330)  Resp: 20 (12/05/23 2040)  BP: (!) 144/70 (12/05/23 2040)  SpO2: (!) 94 % (12/05/23 2040) Vital Signs (24h Range):  Temp:  [97 °F (36.1 °C)-98.6 °F (37 °C)] 97 °F (36.1 °C)  Pulse:  [] 91  Resp:  [14-20] 20  SpO2:  [94 %-99 %] 94 %  BP: (115-144)/(58-75) 144/70     Weight: 97.2 kg (214 lb 3.2 oz)  Body mass index is 32.57 kg/m².     Physical Exam  Vitals reviewed.   Constitutional:       General: She is not in acute distress.     Appearance: Normal appearance. She is not ill-appearing, toxic-appearing or diaphoretic.   HENT:      Head: Normocephalic and atraumatic.      Right Ear: External ear normal.      Left Ear: External ear normal.      Nose: Nose normal.      Mouth/Throat:      Mouth: Mucous membranes are moist.      Pharynx: Oropharynx is clear.   Eyes:      Extraocular Movements: Extraocular movements intact.      Conjunctiva/sclera: Conjunctivae normal.   Cardiovascular:      Rate and Rhythm: Normal rate and regular rhythm.      Pulses: Normal pulses.      Heart sounds: Normal heart sounds.   Pulmonary:      Effort: Pulmonary effort is normal. No respiratory distress.      Breath sounds: No stridor. No wheezing, rhonchi or rales.   Abdominal:      General: Abdomen is flat. There is no distension.      Palpations: Abdomen is soft. There is no mass.      Tenderness: There is no abdominal tenderness. There is no right CVA tenderness, left CVA tenderness or guarding.   Musculoskeletal:         General: No tenderness. Normal range of motion.      Cervical back: Normal range of motion and neck supple. No rigidity or tenderness.      Right lower leg: No edema.      Left lower leg: No edema.   Skin:     General: Skin is warm and dry.      Capillary Refill: Capillary refill takes less than 2 seconds.   Neurological:      General: No focal  deficit present.      Mental Status: She is alert and oriented to person, place, and time. Mental status is at baseline.      Motor: No weakness.      Gait: Gait normal.   Psychiatric:         Mood and Affect: Mood normal.         Behavior: Behavior normal.         Thought Content: Thought content normal.         Judgment: Judgment normal.               Significant Labs: All pertinent labs within the past 24 hours have been reviewed.  A1C:   Recent Labs   Lab 06/16/23  0740   HGBA1C 5.4     ABGs:   Recent Labs   Lab 12/05/23  1130   PH 7.546*   PCO2 25.2*   PO2 220*     Bilirubin:   Recent Labs   Lab 12/05/23  1117   BILITOT 2.2*     BMP:   Recent Labs   Lab 12/05/23  1117   *      K 4.2   *   CO2 26   BUN 9   CREATININE 0.8   CALCIUM 9.4   MG 2.1     CBC:   Recent Labs   Lab 12/05/23  1117   WBC 6.56   HGB 14.9   HCT 43.2   *     CMP:   Recent Labs   Lab 12/05/23  1117      K 4.2   *   CO2 26   *   BUN 9   CREATININE 0.8   CALCIUM 9.4   PROT 6.6   ALBUMIN 3.0*   BILITOT 2.2*   ALKPHOS 169*   AST 38   ALT 30   ANIONGAP 4     Lipase:   Recent Labs   Lab 12/05/23  1117   LIPASE 24     Magnesium:   Recent Labs   Lab 12/05/23  1117   MG 2.1     Urine Studies:   Recent Labs   Lab 12/05/23  1100   COLORU Yellow   APPEARANCEUA Clear   PHUR 8.0   SPECGRAV 1.010   PROTEINUA Negative   GLUCUA Negative   KETONESU Negative   BILIRUBINUA Negative   OCCULTUA Negative   NITRITE Positive*   UROBILINOGEN 1.0   LEUKOCYTESUR Negative   RBCUA 1   WBCUA 6*   BACTERIA Few*   SQUAMEPITHEL 1   HYALINECASTS 0.0*   X-Ray Chest 1 View  Narrative: EXAMINATION:  XR CHEST 1 VIEW    CLINICAL HISTORY:  Altered mental status, unspecified    COMPARISON:  Chest CT 01/20/2023    FINDINGS:  No acute infiltrates.  No signs of CHF.  No pleural fluid or pneumothorax detected  Impression: No acute findings in the chest    Electronically signed by: Josseline Barber MD  Date:    12/05/2023  Time:    12:09  CT  Lumbar Spine Without Contrast  Narrative: EXAMINATION:  CT LUMBAR SPINE WITHOUT CONTRAST    CLINICAL HISTORY:  Upper L, lower T spine pain;    CT/Cardiac Nuclear exams in prior 12 months: 0    TECHNIQUE:  CT lumbar spine without IV contrast.  Sagittal and coronal reformats prepared.  Iterative reconstruction utilized.    COMPARISON:  None available    FINDINGS:  Lumbar vertebral body heights are maintained.  There is no evidence of an acute fracture or dislocation.  No suspicious bone lesion detected.  No central spinal canal compromise demonstrated.  Mild multilevel lumbar spondylosis with facet arthropathy and mild disc bulges.  Impression: No evidence of a lumbar spine fracture status post fall    Electronically signed by: Josseline Barber MD  Date:    12/05/2023  Time:    10:47  CT Thoracic Spine Without Contrast  Narrative: EXAMINATION:  CT THORACIC SPINE WITHOUT CONTRAST    CLINICAL HISTORY:  upper L, lower T spine pain;    TECHNIQUE:  CT thoracic spine without IV contrast.  Multiplanar reformats prepared.  Iterative reconstruction was utilized for dose reduction.    CT past 12 months: 1    COMPARISON:  None    FINDINGS:  Thoracic vertebral body heights are maintained.  No evidence of a fracture or malalignment.  No suspicious bone lesion.  Impression: No evidence of a thoracic spine fracture status post fall    Electronically signed by: Josseline Barber MD  Date:    12/05/2023  Time:    10:38  CT Pelvis Without Contrast  Narrative: EXAMINATION:  CT PELVIS WITHOUT CONTRAST    CLINICAL HISTORY:  Unspecified fall, initial encounterPelvic trauma;    CT/Cardiac Nuclear exams in prior 12 months: 1    TECHNIQUE:  CT pelvis without IV contrast.  Iterative reconstruction utilized.  Coronal and sagittal reformats as well as 3D   reconstructions prepared.    FINDINGS:  No acute fracture or dislocation detected.  Mild degenerative changes within the hips, SI joints and visualized lumbar spine.  Impression: No evidence of  posttraumatic osseous injury status post fall    Electronically signed by: Josseline Barber MD  Date:    12/05/2023  Time:    10:36  CT Cervical Spine Without Contrast  Narrative: EXAMINATION:  CT CERVICAL SPINE WITHOUT CONTRAST    CLINICAL HISTORY:  fall;    CT/Cardiac Nuclear exams in prior 12 months: 1    TECHNIQUE:  CT cervical spine without IV contrast.  Iterative reconstruction was utilized.  Sagittal and coronal reformats prepared.    COMPARISON:  None available    FINDINGS:  Some of the images are degraded secondary to motion artifact.    No acute fracture or dislocation detected.  Multilevel facet osteoarthritis and degenerative disc disease with small disc bulge-osteophytes with multifocal effacement of anterior thecal sac and foraminal stenosis  Impression: No evidence of an acute fracture or dislocation    Multilevel spondylosis    Motion artifact on some images    Electronically signed by: Josseline Barber MD  Date:    12/05/2023  Time:    10:32  CT Head Without Contrast  Narrative: EXAMINATION:  CT HEAD WITHOUT CONTRAST    CLINICAL HISTORY:  Mental status change, unknown cause;    CT/Cardiac Nuclear exams in prior 12 months: 1    TECHNIQUE:  Axial head CT performed without IV contrast.  Iterative reconstruction utilized.    COMPARISON:  None    FINDINGS:  No evidence of intracranial hemorrhage, acute infarction or mass effect.  No skull fracture detected.  Imaged paranasal sinuses, mastoids and middle ear cavities are clear.  Impression: No acute intracranial abnormalities    Electronically signed by: Josseline Barber MD  Date:    12/05/2023  Time:    10:29     Significant Imaging: I have reviewed all pertinent imaging results/findings within the past 24 hours.  Assessment/Plan:     * Acute hepatic encephalopathy  Ammonia level 134, Tbili 2.2  Continue lactulose 30 mg QID  Start rifaximin  - monitor CMP      Class 1 obesity with serious comorbidity and body mass index (BMI) of 32.0 to 32.9 in adult  Body  mass index is 32.57 kg/m². Morbid obesity complicates all aspects of disease management from diagnostic modalities to treatment. Weight loss encouraged and health benefits explained to patient.         COPD without exacerbation  Patient's COPD is controlled currently.  Patient is currently on COPD Pathway. Continue scheduled inhalers Supplemental oxygen per O2 protocol and monitor respiratory status closely.     Hyperbilirubinemia  Secondary to liver cirrhosis. Continue to trend.       Anxiety  Resume home medications.   - PRN ativan 0.5 mg Q12H      Dependence on nicotine from cigarettes  Dangers of cigarette smoking were reviewed with patient in detail. Patient was Counseled for 3-10 minutes. Nicotine replacement options were discussed. Nicotine replacement was discussed- prescribed    Calculus of gallbladder without cholecystitis without obstruction  Known condition, still present on recent findings on MRI of abdomen. Patient asymptomatic at this time. Continue to monitor.       Thrombocytopenia  Patient was found to have thrombocytopenia, the likely etiology is secondary to cirrhosis/portal hypertension, will monitor the platelets Daily. Will transfuse if platelet count is <50k (if undergoing surgical procedure or have active bleeding). Hold DVT prophylaxis if platelets are <50k. The patient's platelet results have been reviewed and are listed below.  Recent Labs   Lab 12/05/23  1117   *         Alcoholic cirrhosis of liver without ascites  Patient with known Cirrhosis with Child's class Calculator for Child's score link- https://www.mdcalc.com/calc/340/child-gonzalez-score-cirrhosis-mortality#creator-insights. Co-morbidities are present and inclusive of portal hypertension, esophageal varices, hepatic encephalopathy, and anemia/pancytopenia.  MELD-Na score calculated; MELD 3.0: 17 at 9/18/2023 12:40 PM  MELD-Na: 15 at 9/18/2023 12:40 PM  Calculated from:  Serum Creatinine: 1.4 mg/dL at 9/18/2023 12:40  PM  Serum Sodium: 136 mmol/L at 9/18/2023 12:40 PM  Total Bilirubin: 1.9 mg/dL at 9/18/2023 12:40 PM  Serum Albumin: 3.3 g/dL at 9/18/2023 12:40 PM  INR(ratio): 1.2 at 9/18/2023 12:40 PM  Age at listing (hypothetical): 58 years  Sex: Female at 9/18/2023 12:40 PM      Continue chronic meds. Etiology likely  ETOH, multiple substance abuse history . Will avoid any hepatotoxic meds, and monitor CBC/CMP/INR for synthetic function.       VTE Risk Mitigation (From admission, onward)           Ordered     IP VTE HIGH RISK PATIENT  Once         12/05/23 1341     Place sequential compression device  Until discontinued         12/05/23 1341                                    Geeta Gaines DO  Department of Hospital Medicine  [facility]

## 2023-12-06 NOTE — ASSESSMENT & PLAN NOTE
No additional benzodiazepines as patient filling clonazepam via behavioral health specialist.   -  reviewed

## 2023-12-07 VITALS
SYSTOLIC BLOOD PRESSURE: 141 MMHG | RESPIRATION RATE: 18 BRPM | WEIGHT: 214.19 LBS | HEART RATE: 75 BPM | HEIGHT: 68 IN | TEMPERATURE: 98 F | BODY MASS INDEX: 32.46 KG/M2 | DIASTOLIC BLOOD PRESSURE: 66 MMHG | OXYGEN SATURATION: 98 %

## 2023-12-07 LAB
ALBUMIN SERPL BCP-MCNC: 2.7 G/DL (ref 3.5–5.2)
ALP SERPL-CCNC: 111 U/L (ref 55–135)
ALT SERPL W/O P-5'-P-CCNC: 25 U/L (ref 10–44)
ANION GAP SERPL CALC-SCNC: 5 MMOL/L (ref 3–11)
AST SERPL-CCNC: 33 U/L (ref 10–40)
BASOPHILS # BLD AUTO: 0.07 K/UL (ref 0–0.2)
BASOPHILS NFR BLD: 1.3 % (ref 0–1.9)
BILIRUB DIRECT SERPL-MCNC: 0.6 MG/DL (ref 0.1–0.3)
BILIRUB SERPL-MCNC: 3.3 MG/DL (ref 0.1–1)
BUN SERPL-MCNC: 10 MG/DL (ref 6–20)
CALCIUM SERPL-MCNC: 8.7 MG/DL (ref 8.7–10.5)
CHLORIDE SERPL-SCNC: 113 MMOL/L (ref 95–110)
CO2 SERPL-SCNC: 25 MMOL/L (ref 23–29)
CREAT SERPL-MCNC: 0.8 MG/DL (ref 0.5–1.4)
DIFFERENTIAL METHOD: ABNORMAL
EOSINOPHIL # BLD AUTO: 0.4 K/UL (ref 0–0.5)
EOSINOPHIL NFR BLD: 7.4 % (ref 0–8)
ERYTHROCYTE [DISTWIDTH] IN BLOOD BY AUTOMATED COUNT: 13.8 % (ref 11.5–14.5)
EST. GFR  (NO RACE VARIABLE): >60 ML/MIN/1.73 M^2
GLUCOSE SERPL-MCNC: 118 MG/DL (ref 70–110)
HCT VFR BLD AUTO: 39.8 % (ref 37–48.5)
HGB BLD-MCNC: 13.4 G/DL (ref 12–16)
IMM GRANULOCYTES # BLD AUTO: 0.01 K/UL (ref 0–0.04)
IMM GRANULOCYTES NFR BLD AUTO: 0.2 % (ref 0–0.5)
LYMPHOCYTES # BLD AUTO: 2.7 K/UL (ref 1–4.8)
LYMPHOCYTES NFR BLD: 50.1 % (ref 18–48)
MAGNESIUM SERPL-MCNC: 1.8 MG/DL (ref 1.6–2.6)
MCH RBC QN AUTO: 31.2 PG (ref 27–31)
MCHC RBC AUTO-ENTMCNC: 33.7 G/DL (ref 32–36)
MCV RBC AUTO: 93 FL (ref 82–98)
MONOCYTES # BLD AUTO: 0.6 K/UL (ref 0.3–1)
MONOCYTES NFR BLD: 11.5 % (ref 4–15)
NEUTROPHILS # BLD AUTO: 1.6 K/UL (ref 1.8–7.7)
NEUTROPHILS NFR BLD: 29.5 % (ref 38–73)
NRBC BLD-RTO: 0 /100 WBC
PLATELET # BLD AUTO: 140 K/UL (ref 150–450)
PMV BLD AUTO: 10.4 FL (ref 9.2–12.9)
POTASSIUM SERPL-SCNC: 3.6 MMOL/L (ref 3.5–5.1)
PROT SERPL-MCNC: 5.7 G/DL (ref 6–8.4)
RBC # BLD AUTO: 4.3 M/UL (ref 4–5.4)
SODIUM SERPL-SCNC: 143 MMOL/L (ref 136–145)
WBC # BLD AUTO: 5.41 K/UL (ref 3.9–12.7)

## 2023-12-07 PROCEDURE — 97110 THERAPEUTIC EXERCISES: CPT | Mod: CO

## 2023-12-07 PROCEDURE — 97530 THERAPEUTIC ACTIVITIES: CPT | Mod: CQ

## 2023-12-07 PROCEDURE — S4991 NICOTINE PATCH NONLEGEND: HCPCS | Performed by: STUDENT IN AN ORGANIZED HEALTH CARE EDUCATION/TRAINING PROGRAM

## 2023-12-07 PROCEDURE — 80076 HEPATIC FUNCTION PANEL: CPT | Performed by: STUDENT IN AN ORGANIZED HEALTH CARE EDUCATION/TRAINING PROGRAM

## 2023-12-07 PROCEDURE — 36415 COLL VENOUS BLD VENIPUNCTURE: CPT | Performed by: STUDENT IN AN ORGANIZED HEALTH CARE EDUCATION/TRAINING PROGRAM

## 2023-12-07 PROCEDURE — 25000003 PHARM REV CODE 250: Performed by: STUDENT IN AN ORGANIZED HEALTH CARE EDUCATION/TRAINING PROGRAM

## 2023-12-07 PROCEDURE — 80048 BASIC METABOLIC PNL TOTAL CA: CPT | Performed by: STUDENT IN AN ORGANIZED HEALTH CARE EDUCATION/TRAINING PROGRAM

## 2023-12-07 PROCEDURE — 99238 HOSP IP/OBS DSCHRG MGMT 30/<: CPT | Mod: ,,, | Performed by: STUDENT IN AN ORGANIZED HEALTH CARE EDUCATION/TRAINING PROGRAM

## 2023-12-07 PROCEDURE — 83735 ASSAY OF MAGNESIUM: CPT | Performed by: STUDENT IN AN ORGANIZED HEALTH CARE EDUCATION/TRAINING PROGRAM

## 2023-12-07 PROCEDURE — 99238 PR HOSPITAL DISCHARGE DAY,<30 MIN: ICD-10-PCS | Mod: ,,, | Performed by: STUDENT IN AN ORGANIZED HEALTH CARE EDUCATION/TRAINING PROGRAM

## 2023-12-07 PROCEDURE — 85025 COMPLETE CBC W/AUTO DIFF WBC: CPT | Performed by: STUDENT IN AN ORGANIZED HEALTH CARE EDUCATION/TRAINING PROGRAM

## 2023-12-07 PROCEDURE — 97116 GAIT TRAINING THERAPY: CPT | Mod: CQ

## 2023-12-07 RX ORDER — LACTULOSE 10 G/15ML
30 SOLUTION ORAL EVERY 6 HOURS
Qty: 2700 ML | Refills: 1 | Status: SHIPPED | OUTPATIENT
Start: 2023-12-07

## 2023-12-07 RX ADMIN — RIFAXIMIN 550 MG: 550 TABLET ORAL at 10:12

## 2023-12-07 RX ADMIN — LACTULOSE 30 G: 20 SOLUTION ORAL at 01:12

## 2023-12-07 RX ADMIN — LACTULOSE 30 G: 20 SOLUTION ORAL at 06:12

## 2023-12-07 RX ADMIN — NICOTINE 1 PATCH: 14 PATCH, EXTENDED RELEASE TRANSDERMAL at 10:12

## 2023-12-07 NOTE — NURSING
Female patient lying in bed no distress noted.Voices no complaints of pain.Ambulating to BSC. Regular tray served appetite good. IV d/c instructed to drink so she can urinate and be discharge. Verbalizes understanding.. Ambulated to bathroom voided clear urine,  IV and telemetry d/c. Discharge instructions given written and verbally. Verbalizes understanding.  Discharge home per w/c in stable condition.

## 2023-12-07 NOTE — PLAN OF CARE
[facility]  Discharge Final Note    Primary Care Provider: Geeta Gaines DO    Expected Discharge Date: 12/7/2023    Final Discharge Note (most recent)       Final Note - 12/07/23 1628          Final Note    Assessment Type Final Discharge Note     Anticipated Discharge Disposition Home-Health Care [place]     Hospital Resources/Appts/Education Provided Appointments scheduled and added to AVS        Post-Acute Status    Discharge Delays None known at this time                     Important Message from Medicare              Follow-up providers       Geeta Gaines DO   Specialty: Family Medicine   Relationship: PCP - General    [address]  [address]  [facility] [address]   Phone: [phone]       Next Steps: Follow up on 12/12/2023    Instructions: 10:20am.              After-discharge care                Home Medical Care       [facility]   Service: Home Nursing    [address] [facility] [address]   Phone: [phone]                           Final note. The patient agreed to home health care with Nursing Care. Nursing Care was willing to accept this patient.

## 2023-12-07 NOTE — PLAN OF CARE
Problem: Infection  Goal: Absence of Infection Signs and Symptoms  Outcome: Ongoing, Progressing     Problem: Adult Inpatient Plan of Care  Goal: Plan of Care Review  Outcome: Ongoing, Progressing  Goal: Patient-Specific Goal (Individualized)  Outcome: Ongoing, Progressing  Goal: Absence of Hospital-Acquired Illness or Injury  Outcome: Ongoing, Progressing  Goal: Optimal Comfort and Wellbeing  Outcome: Ongoing, Progressing  Goal: Readiness for Transition of Care  Outcome: Ongoing, Progressing     Problem: Confusion Acute  Goal: Optimal Cognitive Function  Outcome: Ongoing, Progressing     Problem: Fall Injury Risk  Goal: Absence of Fall and Fall-Related Injury  Outcome: Ongoing, Progressing     Problem: Skin Injury Risk Increased  Goal: Skin Health and Integrity  Outcome: Ongoing, Progressing

## 2023-12-07 NOTE — PLAN OF CARE
Problem: Occupational Therapy  Goal: Occupational Therapy Goal  Description: Goals to be met by: 12/13/23     Patient will increase functional independence with ADLs by performing:    Feeding with Modified [place].  UE Dressing with Modified [place].  LE Dressing with Modified [place].  Grooming while standing at sink with Modified [place].  Toileting from toilet with Modified [place] for hygiene and clothing management.   Bathing from  shower chair/bench with Modified [place].  Toilet transfer to toilet with Modified [place].    Outcome: Ongoing, Progressing

## 2023-12-07 NOTE — PLAN OF CARE
Problem: Physical Therapy  Goal: Physical Therapy Goal  Description: Patient will increase functional independence with mobility by performin. Supine to sit with Independent  2. Sit to supine with Independent  3. Bed to chair transfer with Supervision or Set-up Assistancewith or without rolling walker using Stand Pivot TECHNIQUE  4. Gait  x 300  feet with Supervision or Set-up Assistance with or without rolling walker  5. Lower extremity exercise program x10 reps per handout, with assistance as needed   Outcome: Ongoing, Progressing       Ana Fernandes, PTA  2023

## 2023-12-07 NOTE — PT/OT/SLP PROGRESS
Occupational Therapy   Treatment    Name: Shonda Borrego  MRN: [id number]  Admitting Diagnosis:  Acute hepatic encephalopathy       Recommendations:     Discharge Recommendations: Low Intensity Therapy  Discharge Equipment Recommendations:  other (see comments) (TBD)  Barriers to discharge:  Other (Comment) (current medical status)    Assessment:     Shonda Borrego is a 58 y.o. female with a medical diagnosis of Acute hepatic encephalopathy.  She presents with good participation. Performance deficits affecting function are weakness, impaired endurance, impaired self care skills, impaired functional mobility, gait instability, decreased safety awareness, impaired cognition. Pt completed bed mobility indep without use of bed rails. Pt completed therapeutic exercise sitting edge of bed without any LOB. Pt reported she is probably d/c today but is willing to complete a little exercise.     Rehab Prognosis:  Good; patient would benefit from acute skilled OT services to address these deficits and reach maximum level of function.       Plan:     Patient to be seen 5 x/week to address the above listed problems via self-care/home management, therapeutic activities, therapeutic exercises, cognitive retraining  Plan of Care Expires: 12/08/23  Plan of Care Reviewed with: patient, family    Subjective     Chief Complaint: reported none  Patient/Family Comments/goals: Get better and go home  Pain/Comfort:  Pain Rating 1: 0/10  Pain Rating Post-Intervention 1: 0/10    Objective:     Communicated with: nurse and OT prior to session.  Patient found HOB elevated with telemetry, peripheral IV, marshall catheter upon OT entry to room.    General Precautions: Standard, fall    Orthopedic Precautions:N/A  Braces: N/A  Respiratory Status: Room air     Occupational Performance:     Bed Mobility:    Patient completed Rolling/Turning to Left with  independence  Patient completed Scooting/Bridging with independence  Patient completed Supine to  Sit with independence  Patient completed Sit to Supine with independence     [facility] 6 Click ADL: 20    Treatment & Education:  Patient engaged in active range of motion therapeutic exercise for 2 x 15 sitting EOB in the following planes: shoulder flexion/extension, shoulder abduction/adduction, elbow flexion/extension, scapular retractions/protraction, scapular elevation/depression, shoulder rotations (forward and reverse), wrist flexion/extension, wrist radial/ulnar deviation, forearm supination/pronation, and composite fist. Patient needed no rest breaks between during therapeutic exercise showing good endurance and activity tolerance.      Patient left HOB elevated with all lines intact, call button in reach, and  present    GOALS:   Multidisciplinary Problems       Occupational Therapy Goals          Problem: Occupational Therapy    Goal Priority Disciplines Outcome Interventions   Occupational Therapy Goal     OT, PT/OT Ongoing, Progressing    Description: Goals to be met by: 12/13/23     Patient will increase functional independence with ADLs by performing:    Feeding with Modified [place].  UE Dressing with Modified [place].  LE Dressing with Modified [place].  Grooming while standing at sink with Modified [place].  Toileting from toilet with Modified [place] for hygiene and clothing management.   Bathing from  shower chair/bench with Modified [place].  Toilet transfer to toilet with Modified [place].                         Time Tracking:     OT Date of Treatment: 12/07/23  OT Start Time: 1133  OT Stop Time: 1142  OT Total Time (min): 9 min    Billable Minutes:Therapeutic Exercise 9 min    OT/ZAHEER: ZAHEER     Number of ZAHEER visits since last OT visit: 1    12/7/2023  Angelita WEN

## 2023-12-07 NOTE — PLAN OF CARE
Problem: Physical Therapy  Goal: Physical Therapy Goal  Description: Patient will increase functional independence with mobility by performin. Supine to sit with Independent  2. Sit to supine with Independent  3. Bed to chair transfer with Supervision or Set-up Assistancewith or without rolling walker using Stand Pivot TECHNIQUE  4. Gait  x 300  feet with Supervision or Set-up Assistance with or without rolling walker  5. Lower extremity exercise program x10 reps per handout, with assistance as needed   Outcome: Adequate for Care Transition

## 2023-12-07 NOTE — PT/OT/SLP DISCHARGE
Physical Therapy Discharge Summary    Name: Shonda Borrego  MRN: [id number]   Principal Problem: Acute hepatic encephalopathy     Patient Discharged from acute Physical Therapy on 2023 .  Please refer to prior PT note dated  2023  for functional status.     Assessment:     Patient appropriate for care in another setting.    Objective:     GOALS:   Multidisciplinary Problems       Physical Therapy Goals          Problem: Physical Therapy    Goal Priority Disciplines Outcome Goal Variances Interventions   Physical Therapy Goal     PT, PT/OT Adequate for Care Transition     Description: Patient will increase functional independence with mobility by performin. Supine to sit with Independent  2. Sit to supine with Independent  3. Bed to chair transfer with Supervision or Set-up Assistancewith or without rolling walker using Stand Pivot TECHNIQUE  4. Gait  x 300  feet with Supervision or Set-up Assistance with or without rolling walker  5. Lower extremity exercise program x10 reps per handout, with assistance as needed                        Reasons for Discontinuation of Therapy Services  Transfer to alternate level of care.      Plan:     Patient Discharged to: Home with Home Health Service.      2023

## 2023-12-07 NOTE — PT/OT/SLP PROGRESS
"Physical Therapy Treatment    Patient Name:  Shonda Borrego   MRN:  [id number]    Recommendations:     Discharge Recommendations: Low Intensity Therapy  Discharge Equipment Recommendations:  (TBD)  Barriers to discharge: None    Assessment:     Shonda Borrego is a 58 y.o. female admitted with a medical diagnosis of Acute hepatic encephalopathy.  She presents with the following impairments/functional limitations: weakness, impaired endurance, impaired self care skills, gait instability, impaired functional mobility, decreased safety awareness. Patient able to ambulate ~400ft with no AD SBA. Patient able to perform standing therex mid ambulation with little difficulty.     Rehab Prognosis: Good; patient would benefit from acute skilled PT services to address these deficits and reach maximum level of function.    Recent Surgery: * No surgery found *      Plan:     During this hospitalization, patient to be seen 5 x/week to address the identified rehab impairments via therapeutic activities, gait training, therapeutic exercises, neuromuscular re-education and progress toward the following goals:    Plan of Care Expires:  12/15/23    Subjective     Chief Complaint: "I used the bathroom"  Patient/Family Comments/goals: none stated  Pain/Comfort:  Pain Rating 1: 0/10  Pain Rating Post-Intervention 1: 0/10      Objective:     Communicated with patient prior to session.  Patient found sitting edge of bed with telemetry, peripheral IV upon PT entry to room.     General Precautions: Standard, fall  Orthopedic Precautions: N/A  Braces: N/A  Respiratory Status: Room air     Functional Mobility:  Transfers:     Sit to Stand:  modified independence with no AD  Gait: patient able ambulate ~400ft SBA with no AD. Patient able to ambulate without LOB.        AM-PAC 6 CLICK MOBILITY  Turning over in bed (including adjusting bedclothes, sheets and blankets)?: 4  Sitting down on and standing up from a chair with arms (e.g., wheelchair, " bedside commode, etc.): 4  Moving from lying on back to sitting on the side of the bed?: 4  Moving to and from a bed to a chair (including a wheelchair)?: 3  Need to walk in hospital room?: 3  Climbing 3-5 steps with a railing?: 3 (clinical judgment)  Basic Mobility Total Score: 21       Treatment & Education:  Patient performed standing heel raises and hip flexion 2x10 during ambulation.     Patient left sitting edge of bed with all lines intact, call button in reach, bed alarm on, and patient care technician present..    GOALS:   Multidisciplinary Problems       Physical Therapy Goals          Problem: Physical Therapy    Goal Priority Disciplines Outcome Goal Variances Interventions   Physical Therapy Goal     PT, PT/OT Ongoing, Progressing     Description: Patient will increase functional independence with mobility by performin. Supine to sit with Independent  2. Sit to supine with Independent  3. Bed to chair transfer with Supervision or Set-up Assistancewith or without rolling walker using Stand Pivot TECHNIQUE  4. Gait  x 300  feet with Supervision or Set-up Assistance with or without rolling walker  5. Lower extremity exercise program x10 reps per handout, with assistance as needed                        Time Tracking:     PT Received On: 23  PT Start Time: 908     PT Stop Time: 932  PT Total Time (min): 24 min     Billable Minutes: Gait Training 12 and Therapeutic Activity 12    Treatment Type: Treatment  PT/PTA: PTA     Number of PTA visits since last PT visit: 1   Ana Fernandes, PTA  2023

## 2023-12-10 NOTE — DISCHARGE SUMMARY
[facility]  Discharge Summary      Patient Name: Shonda Borrego  MRN: [id number]  GELY: [id number]  Patient Class: IP- Inpatient  Admission Date: 12/5/2023  Hospital Length of Stay: 2 days  Discharge Date and Time: 12/7/2023  1:47 PM  Attending Physician: No att. providers found   Discharging Provider: Geeta Gaines DO  Primary Care Provider: Geeta Gaines DO    Primary Care Team: Networked reference to record PCT     HPI:     History           Chief Complaint   Patient presents with    Altered Mental Status       Pt c/o low back pain and has been altered since Sunday      Patient presents emergency department from home via EMS for concerns of altered mental status for last several days.  Limited information provided by EMS due to patient's mental status. She is alert oriented to person place unsure of year.  She states she has some lower back pain but denies any pain elsewhere.  Patient moves all extremities and follows commands.  Patient has a history of cirrhosis due to alcohol abuse.  58 year old with hepatocellular carcinoma, alcoholic cirrhosis, anxiety, history of substance abuse history presents to emergency department by her sister and brother for altered mental status and frequent falls recently.     ED course:   On arrival vital signs /58 mmHg, HR 95, RR 14, SpO2 99% on room air, temp 98.2F. Labs notable for ammonia 134, lipase 24, AST/ALT 38/30, Tbili 2.2, Na 144, K 4.2, Cl 114, CO2 26, Glucose 124. Hg 14.9, HCT 43.2, WBC 6.56, . Urine positive for presumptive benzodiazepine. CT head reports no intracranial processes, CXR with no acute cardiopulmonary processes, CT cervical, thoracic, lumbar and pelvis with no evidence o acute fraction or dislocations. Recent oupatient MRI of abdomen shows stable right hepatic lobe signaling, sequela of portal hypertenion, splenomegaly, cholelithasis.    Patient received lactulose x1 and started on IVF LR bolus 500  x1.  Patient keegan admitted to medicine services for treament of hepatic encephalopathy, hyperammonemia with lactulose, rifaximin, and continue IVF resuscitation.      * No surgery found *      Hospital Course:   12/6 No acute events overnight. Lactulose x2 with stool and endorses couple episodes of stooling. She becomes teary and wanting to go home to her sister and brother. Does not recall the last time she has fallen. Endorses compliance with her medications. Patient outpatient sees psychiatry for her anxiety. Concerning sedating effects of medications affecting her ability to stay awake and increase risks for falls, will consult psychiatry for further recommendations.   Continue IVF, lactulose, rifaximin for hyperammonemia.   12/7 Back to baseline strength, no acute abdomen on physical exam. US abdomen with no acute findings. Patient ready to discharge with close follow up with primary hepatologist.         Goals of Care Treatment Preferences:  Code Status: Full Code      Consults:   Consults (From admission, onward)          Status Ordering Provider     Inpatient consult to Psychiatry  Once        Provider:  Jaime Heaton, NP    Completed YAMILE SAPP            Neuro  Forgetfulness  Multifactorial with hepatic encephalopathy, anti-anxiety medication induced.   - f/u psychiatry outpatient      Psychiatric  Mood disorder  Home regimen clonazepam 0.5 mg PRN  - f/u psychiatry consult      Anxiety  Resume home medications.   - PRN ativan 0.5 mg Q12H      Pulmonary  COPD without exacerbation  Patient's COPD is controlled currently.  Patient is currently on COPD Pathway. Continue scheduled inhalers Supplemental oxygen per O2 protocol and monitor respiratory status closely.     Hematology  Thrombocytopenia  Patient was found to have thrombocytopenia, the likely etiology is secondary to cirrhosis/portal hypertension, will monitor the platelets Daily. Will transfuse if platelet count is <50k (if undergoing  "surgical procedure or have active bleeding). Hold DVT prophylaxis if platelets are <50k. The patient's platelet results have been reviewed and are listed below.  No results for input(s): "PLT" in the last 24 hours.        Endocrine  Class 1 obesity with serious comorbidity and body mass index (BMI) of 32.0 to 32.9 in adult  Body mass index is 32.57 kg/m². Morbid obesity complicates all aspects of disease management from diagnostic modalities to treatment. Weight loss encouraged and health benefits explained to patient.         GI  * Acute hepatic encephalopathy  Ammonia level 134, Tbili 2.2  Continue lactulose 30 mg QID  Continue rifaximin      Hyperbilirubinemia  Secondary to liver cirrhosis. Continue to trend.   Tbili increase 3.3 from 2.2  No obstructive pattern on ultrasound, Dbili elevated 0.6.  Patient endorses close outpatient follow up with hepatologist.         Calculus of gallbladder without cholecystitis without obstruction  Known condition, still present on recent findings on MRI of abdomen. Patient asymptomatic at this time. Continue to monitor.       Alcoholic cirrhosis of liver without ascites  Patient with known Cirrhosis with Child's class Calculator for Child's score link- https://www.mdcalc.com/calc/340/child-gonzalez-score-cirrhosis-mortality#creator-insights. Co-morbidities are present and inclusive of portal hypertension, esophageal varices, hepatic encephalopathy, and anemia/pancytopenia.  MELD-Na score calculated; MELD 3.0: 17 at 9/18/2023 12:40 PM  MELD-Na: 15 at 9/18/2023 12:40 PM  Calculated from:  Serum Creatinine: 1.4 mg/dL at 9/18/2023 12:40 PM  Serum Sodium: 136 mmol/L at 9/18/2023 12:40 PM  Total Bilirubin: 1.9 mg/dL at 9/18/2023 12:40 PM  Serum Albumin: 3.3 g/dL at 9/18/2023 12:40 PM  INR(ratio): 1.2 at 9/18/2023 12:40 PM  Age at listing (hypothetical): 58 years  Sex: Female at 9/18/2023 12:40 PM      Continue chronic meds. Etiology likely  ETOH, multiple substance abuse history . Will " avoid any hepatotoxic meds, and monitor CBC/CMP/INR for synthetic function.     Other  Dependence on nicotine from cigarettes  Dangers of cigarette smoking were reviewed with patient in detail. Patient was Counseled for 3-10 minutes. Nicotine replacement options were discussed. Nicotine replacement was discussed- prescribed      Final Active Diagnoses:    Diagnosis Date Noted POA    PRINCIPAL PROBLEM:  Acute hepatic encephalopathy [K76.82] 04/17/2023 Yes    Class 1 obesity with serious comorbidity and body mass index (BMI) of 32.0 to 32.9 in adult [E66.9, Z68.32] 02/23/2023 Not Applicable    Mood disorder [F39] 07/26/2022 Yes    Forgetfulness [R68.89] 07/26/2022 Yes    Hyperbilirubinemia [E80.6] 04/21/2022 Yes    COPD without exacerbation [J44.9] 04/21/2022 Yes    Dependence on nicotine from cigarettes [F17.210] 03/18/2022 Yes    Anxiety [F41.9] 03/18/2022 Yes    Thrombocytopenia [D69.6] 10/22/2021 Yes    Calculus of gallbladder without cholecystitis without obstruction [K80.20] 10/22/2021 Yes    Alcoholic cirrhosis of liver without ascites [K70.30] 10/15/2021 Yes      Problems Resolved During this Admission:       Discharged Condition: stable    Disposition: Home-Health Care [facility] [place]    Follow Up:   Contact information for follow-up providers       Geeta Gaines, DO Follow up on 12/12/2023.    Specialty: Family Medicine  Why: 10:20am.  Contact information:  [address]  [address]  [facility] [address]  [phone]                       Contact information for after-discharge care       Home Medical Care       [facility] .    Service: Home Nursing  Contact information:  [address]  [facility] [address]  [phone]                                 Patient Instructions:      Ambulatory referral/consult to Home Health   Standing Status: Future   Referral Priority: Routine Referral Type: Home Health   Referral Reason: Specialty Services Required   Requested Specialty: Home Health  Services   Number of Visits Requested: 1       Significant Diagnostic Studies:   Lab Results   Component Value Date    INR 1.2 09/18/2023    INR 1.3 (H) 05/29/2023    INR 1.3 (H) 04/19/2023     US Abdomen Limited  Narrative: EXAMINATION:  US ABDOMEN LIMITED    CLINICAL HISTORY:  Elevated total bilirubin    COMPARISON:  MRI abdomen 12/04/2023 and abdominal ultrasound 06/17/2023    TECHNIQUE:  Multiple transverse and sagittal sonographic grayscale and Doppler images obtained of the right upper quadrant    FINDINGS:  Suboptimal study secondary to decreased acoustic windows.  No detected gallbladder wall thickening or pericholecystic fluid.  Sonographer notes a negative sonographic Gonzales sign.  Echogenic gallstone is present at the gallbladder neck measuring approximately 1.1 cm in diameter.  Common duct measures 6 mm.  No demonstrated intrahepatic biliary ductal dilatation.  There is a nodular contour of the liver with diffuse heterogeneity of the parenchymal echotexture.  The liver measures 15.5 cm.  Patent tips shunt is detected.  Impression: 1. No acute sonographic abnormality detected.  2. Cholelithiasis  3. Nodular contour of the liver with diffuse parenchymal heterogeneity consistent with cirrhotic change  4. Patent tips shunt    Electronically signed by: Ashu Menezes MD  Date:    12/06/2023  Time:    16:30     Pending Diagnostic Studies:       None           Medications:  Reconciled Home Medications:      Medication List        START taking these medications      lactulose 20 gram/30 mL Soln  Commonly known as: CHRONULAC  Take 45 mLs (30 g total) by mouth every 6 (six) hours.            CONTINUE taking these medications      albuterol 90 mcg/actuation inhaler  Commonly known as: PROVENTIL/VENTOLIN HFA  Inhale 2 puffs into the lungs every 6 (six) hours as needed for Wheezing or Shortness of Breath.     furosemide 20 MG tablet  Commonly known as: LASIX  Take 1 tablet (20 mg total) by mouth 2 (two) times daily.      mirtazapine 15 MG disintegrating tablet  Commonly known as: REMERON SOL-TAB  Take 15 mg by mouth every evening.     nicotine 14 mg/24 hr  Commonly known as: NICODERM CQ  Place 1 patch onto the skin once daily.     nicotine polacrilex 4 MG Gum  Commonly known as: NICORETTE  Take 1 each (4 mg total) by mouth as needed (Take 1 each (4 mg total) by mouth as needed for tobacco cessation. 1-2 per hour in the place of cigarettes. (5-16 daily max) - Oral).     ondansetron 4 MG tablet  Commonly known as: ZOFRAN  Take 1 tablet (4 mg total) by mouth 2 (two) times daily.     oxybutynin 5 MG Tr24  Commonly known as: DITROPAN-XL  Take 1 tablet (5 mg total) by mouth once daily.     pantoprazole 20 MG tablet  Commonly known as: PROTONIX  Take 1 tablet by mouth once daily     rifAXIMin 550 mg Tab  Commonly known as: XIFAXAN  Take 1 tablet (550 mg total) by mouth 2 (two) times daily.     rOPINIRole 1 MG tablet  Commonly known as: REQUIP  Take 1 tablet (1 mg total) by mouth every evening.     spironolactone 100 MG tablet  Commonly known as: ALDACTONE  Take 1 tablet (100 mg total) by mouth once daily.     STIOLTO RESPIMAT 2.5-2.5 mcg/actuation Mist  Generic drug: tiotropium-olodateroL  2 puffs Inhalation Once a day for 30 days     ursodioL 300 mg capsule  Commonly known as: ACTIGALL  Take 1 capsule (300 mg total) by mouth 3 (three) times daily. for 14 days     VRAYLAR 3 mg Cap  Generic drug: cariprazine  Take 3 mg by mouth.            ASK your doctor about these medications      gabapentin 300 MG capsule  Commonly known as: NEURONTIN  TAKE 1 CAPSULE BY MOUTH IN THE EVENING              Indwelling Lines/Drains at time of discharge:   Lines/Drains/Airways       None                   Time spent on the discharge of patient: 25 minutes    Geeta Gaines DO  Department of [facility] Medicine  [facility]

## 2023-12-10 NOTE — ASSESSMENT & PLAN NOTE
Secondary to liver cirrhosis. Continue to trend.   Tbili increase 3.3 from 2.2  No obstructive pattern on ultrasound, Dbili elevated 0.6.  Patient endorses close outpatient follow up with hepatologist.

## 2023-12-10 NOTE — ASSESSMENT & PLAN NOTE
Multifactorial with hepatic encephalopathy, anti-anxiety medication induced.   - f/u psychiatry outpatient

## 2023-12-10 NOTE — ASSESSMENT & PLAN NOTE
Patient with known Cirrhosis with Child's class Calculator for Child's score link- https://www.Wyldfire.com/calc/340/child-gonzalez-score-cirrhosis-mortality#creator-insights. Co-morbidities are present and inclusive of portal hypertension, esophageal varices, hepatic encephalopathy, and anemia/pancytopenia.  MELD-Na score calculated; MELD 3.0: 17 at 9/18/2023 12:40 PM  MELD-Na: 15 at 9/18/2023 12:40 PM  Calculated from:  Serum Creatinine: 1.4 mg/dL at 9/18/2023 12:40 PM  Serum Sodium: 136 mmol/L at 9/18/2023 12:40 PM  Total Bilirubin: 1.9 mg/dL at 9/18/2023 12:40 PM  Serum Albumin: 3.3 g/dL at 9/18/2023 12:40 PM  INR(ratio): 1.2 at 9/18/2023 12:40 PM  Age at listing (hypothetical): 58 years  Sex: Female at 9/18/2023 12:40 PM      Continue chronic meds. Etiology likely  ETOH, multiple substance abuse history . Will avoid any hepatotoxic meds, and monitor CBC/CMP/INR for synthetic function.

## 2023-12-10 NOTE — ASSESSMENT & PLAN NOTE
"Patient was found to have thrombocytopenia, the likely etiology is secondary to cirrhosis/portal hypertension, will monitor the platelets Daily. Will transfuse if platelet count is <50k (if undergoing surgical procedure or have active bleeding). Hold DVT prophylaxis if platelets are <50k. The patient's platelet results have been reviewed and are listed below.  No results for input(s): "PLT" in the last 24 hours.      "

## 2023-12-10 NOTE — SUBJECTIVE & OBJECTIVE
Interval History: Patient seen and examined.     Review of Systems   Constitutional:  Negative for activity change, appetite change, diaphoresis, fatigue and fever.   Respiratory:  Negative for cough, chest tightness, shortness of breath and wheezing.    Cardiovascular:  Negative for chest pain and leg swelling.   Genitourinary:  Negative for difficulty urinating.   Musculoskeletal:  Negative for neck pain and neck stiffness.   Skin:  Negative for pallor, rash and wound.   Neurological:  Positive for weakness. Negative for dizziness, tremors, seizures, speech difficulty, numbness and headaches.   Psychiatric/Behavioral:  Positive for confusion and dysphoric mood. Negative for agitation, behavioral problems and suicidal ideas. The patient is nervous/anxious.      Objective:     Vital Signs (Most Recent):  Temp: 98 °F (36.7 °C) (12/07/23 1135)  Pulse: 75 (12/07/23 1135)  Resp: 18 (12/07/23 1135)  BP: (!) 141/66 (12/07/23 1135)  SpO2: 98 % (12/07/23 1135) Vital Signs (24h Range):        Weight: 97.2 kg (214 lb 3.2 oz)  Body mass index is 32.57 kg/m².  No intake or output data in the 24 hours ending 12/10/23 1356      Physical Exam  Vitals reviewed.   Constitutional:       General: She is not in acute distress.     Appearance: Normal appearance. She is not ill-appearing, toxic-appearing or diaphoretic.   HENT:      Head: Normocephalic and atraumatic.      Right Ear: External ear normal.      Left Ear: External ear normal.      Nose: Nose normal.      Mouth/Throat:      Mouth: Mucous membranes are moist.      Pharynx: Oropharynx is clear.   Eyes:      Extraocular Movements: Extraocular movements intact.      Conjunctiva/sclera: Conjunctivae normal.   Cardiovascular:      Rate and Rhythm: Normal rate and regular rhythm.      Pulses: Normal pulses.      Heart sounds: Normal heart sounds.   Pulmonary:      Effort: Pulmonary effort is normal. No respiratory distress.      Breath sounds: No stridor. No wheezing, rhonchi or  rales.   Abdominal:      General: Abdomen is flat. There is no distension.      Palpations: Abdomen is soft. There is no mass.      Tenderness: There is no abdominal tenderness. There is no right CVA tenderness, left CVA tenderness or guarding.   Musculoskeletal:         General: No swelling, tenderness or deformity. Normal range of motion.      Cervical back: Normal range of motion and neck supple. No rigidity or tenderness.      Right lower leg: No edema.      Left lower leg: No edema.   Skin:     General: Skin is warm and dry.      Capillary Refill: Capillary refill takes less than 2 seconds.      Findings: No lesion or rash.   Neurological:      General: No focal deficit present.      Mental Status: She is alert and oriented to person, place, and time. Mental status is at baseline.      Motor: No weakness.      Gait: Gait normal.   Psychiatric:         Mood and Affect: Mood normal.         Behavior: Behavior normal.             Significant Labs: All pertinent labs within the past 24 hours have been reviewed.  A1C:   Recent Labs   Lab 06/16/23  0740   HGBA1C 5.4     Recent Labs   Lab 12/05/23  1117 12/06/23  0627 12/07/23  0544   BILIDIR  --   --  0.6*   BILITOT 2.2* 3.3* 3.3*   .  US Abdomen Limited  Narrative: EXAMINATION:  US ABDOMEN LIMITED    CLINICAL HISTORY:  Elevated total bilirubin    COMPARISON:  MRI abdomen 12/04/2023 and abdominal ultrasound 06/17/2023    TECHNIQUE:  Multiple transverse and sagittal sonographic grayscale and Doppler images obtained of the right upper quadrant    FINDINGS:  Suboptimal study secondary to decreased acoustic windows.  No detected gallbladder wall thickening or pericholecystic fluid.  Sonographer notes a negative sonographic Gonzales sign.  Echogenic gallstone is present at the gallbladder neck measuring approximately 1.1 cm in diameter.  Common duct measures 6 mm.  No demonstrated intrahepatic biliary ductal dilatation.  There is a nodular contour of the liver with diffuse  heterogeneity of the parenchymal echotexture.  The liver measures 15.5 cm.  Patent tips shunt is detected.  Impression: 1. No acute sonographic abnormality detected.  2. Cholelithiasis  3. Nodular contour of the liver with diffuse parenchymal heterogeneity consistent with cirrhotic change  4. Patent tips shunt    Electronically signed by: Ashu Menezes MD  Date:    12/06/2023  Time:    16:30     Significant Imaging: I have reviewed all pertinent imaging results/findings within the past 24 hours.

## 2023-12-11 ENCOUNTER — OFFICE VISIT (OUTPATIENT)
Dept: HEPATOLOGY | Facility: CLINIC | Age: 58
End: 2023-12-11
Payer: MEDICAID

## 2023-12-11 ENCOUNTER — TELEPHONE (OUTPATIENT)
Dept: PRIMARY CARE CLINIC | Facility: CLINIC | Age: 58
End: 2023-12-11
Payer: MEDICAID

## 2023-12-11 VITALS
HEART RATE: 76 BPM | TEMPERATURE: 98 F | OXYGEN SATURATION: 99 % | WEIGHT: 217.38 LBS | DIASTOLIC BLOOD PRESSURE: 70 MMHG | SYSTOLIC BLOOD PRESSURE: 132 MMHG | HEIGHT: 68 IN | BODY MASS INDEX: 32.94 KG/M2

## 2023-12-11 DIAGNOSIS — K76.82 HEPATIC ENCEPHALOPATHY: ICD-10-CM

## 2023-12-11 DIAGNOSIS — K76.9 LIVER DISEASE, UNSPECIFIED: ICD-10-CM

## 2023-12-11 DIAGNOSIS — K70.31 ALCOHOLIC CIRRHOSIS OF LIVER WITH ASCITES: Primary | ICD-10-CM

## 2023-12-11 PROCEDURE — 99999 PR PBB SHADOW E&M-EST. PATIENT-LVL IV: ICD-10-PCS | Mod: PBBFAC,,, | Performed by: INTERNAL MEDICINE

## 2023-12-11 PROCEDURE — 1159F PR MEDICATION LIST DOCUMENTED IN MEDICAL RECORD: ICD-10-PCS | Mod: CPTII,,, | Performed by: INTERNAL MEDICINE

## 2023-12-11 PROCEDURE — 99999 PR PBB SHADOW E&M-EST. PATIENT-LVL IV: CPT | Mod: PBBFAC,,, | Performed by: INTERNAL MEDICINE

## 2023-12-11 PROCEDURE — 3008F PR BODY MASS INDEX (BMI) DOCUMENTED: ICD-10-PCS | Mod: CPTII,,, | Performed by: INTERNAL MEDICINE

## 2023-12-11 PROCEDURE — 1159F MED LIST DOCD IN RCRD: CPT | Mod: CPTII,,, | Performed by: INTERNAL MEDICINE

## 2023-12-11 PROCEDURE — 1111F DSCHRG MED/CURRENT MED MERGE: CPT | Mod: CPTII,,, | Performed by: INTERNAL MEDICINE

## 2023-12-11 PROCEDURE — 3078F DIAST BP <80 MM HG: CPT | Mod: CPTII,,, | Performed by: INTERNAL MEDICINE

## 2023-12-11 PROCEDURE — 99215 PR OFFICE/OUTPT VISIT, EST, LEVL V, 40-54 MIN: ICD-10-PCS | Mod: S$PBB,,, | Performed by: INTERNAL MEDICINE

## 2023-12-11 PROCEDURE — 99214 OFFICE O/P EST MOD 30 MIN: CPT | Mod: PBBFAC | Performed by: INTERNAL MEDICINE

## 2023-12-11 PROCEDURE — 3075F PR MOST RECENT SYSTOLIC BLOOD PRESS GE 130-139MM HG: ICD-10-PCS | Mod: CPTII,,, | Performed by: INTERNAL MEDICINE

## 2023-12-11 PROCEDURE — 3078F PR MOST RECENT DIASTOLIC BLOOD PRESSURE < 80 MM HG: ICD-10-PCS | Mod: CPTII,,, | Performed by: INTERNAL MEDICINE

## 2023-12-11 PROCEDURE — 1111F PR DISCHARGE MEDS RECONCILED W/ CURRENT OUTPATIENT MED LIST: ICD-10-PCS | Mod: CPTII,,, | Performed by: INTERNAL MEDICINE

## 2023-12-11 PROCEDURE — 3008F BODY MASS INDEX DOCD: CPT | Mod: CPTII,,, | Performed by: INTERNAL MEDICINE

## 2023-12-11 PROCEDURE — 3044F PR MOST RECENT HEMOGLOBIN A1C LEVEL <7.0%: ICD-10-PCS | Mod: CPTII,,, | Performed by: INTERNAL MEDICINE

## 2023-12-11 PROCEDURE — 99215 OFFICE O/P EST HI 40 MIN: CPT | Mod: S$PBB,,, | Performed by: INTERNAL MEDICINE

## 2023-12-11 PROCEDURE — 3044F HG A1C LEVEL LT 7.0%: CPT | Mod: CPTII,,, | Performed by: INTERNAL MEDICINE

## 2023-12-11 PROCEDURE — 3075F SYST BP GE 130 - 139MM HG: CPT | Mod: CPTII,,, | Performed by: INTERNAL MEDICINE

## 2023-12-11 NOTE — PROGRESS NOTES
"[facility] New Patient (Self-Referral) Note    Reason for Visit:  The encounter diagnosis is Alcoholic Cirrhosis and HCC      HPI:  This is a 56 year lady with history of Restless leg syndrome, bipolar disorder, here for evaluation of her alcoholic cirrhosis diagnosed in 2019 when she had a variceal requiring emergent TIPS in [facility].  She had been following with PCP but has not had hepatology evaluation.  Patient says that she quit drinking >12 months ago after slowly cutting down since her diagnosis in 2019. Stopped smoking pot 8 months ago.         Interval History: 12/11/23:  Patient had a fall 3 times 5-6 days ago on 12/6/23 when she was at her sister's home, at a time when she felt like she was confused "having encephalopathy', had constipation for couple of days.  Ambulance was called, and she was taken to [facility], in [place], was there for 2 days, and sent home on lactulose, I have renewed xifaxan to be sent from [facility] (mail-in).  Has a home health nurse visit her 3-4 times/week.     Recommend:   -  Labs: CBC, CMP, PT INR, drug screen urine every 1 month, next due on January 5th 2024.  -  HCC surveillance AFP and u/s abd limited every 6 months, next due on June 6th 2024.     -  wean down Remeron and gabapentin (ran out), and stop.  -  Return in 3 months.       Her only complaints are lack of energy fatigue and constipation. The constipation is gradually improving since she became compliant with Lactulose.  She has also been taking Spironolactone and Lasix which has helped considerably with ascites and she has noted some weight loss after fluid removal.    Had episode of HE, could not remember boyfriend's name, was obnoxious, and had to be taken to ER in [facility] in [place]. Admitted for two days. Improved with lactulose.     Recommend minimizing doses of remeron, and gabapentin.      Positive for THC repeatedly (patient visits " sisters who smoke).  Patient states she has not used marijuana in a year.  She is willing to avoid visiting her sisters. She can see them on video.    Was positive for opiate on 8/7/23, and today she tells me she took an aspirin pill from her sister's medicine cabinet, and on the urine test, her opiate screen was positive.  The clock starts now to see how long she can negative for opiates, marijuana.     She was declined for liver transplant in 6/2023:   declined due to recent positive toxicology (METH and alcohol) pt needs to complete outpatient program and continue local regional therapy on tumor.    9/18/23:   As above, drug screen positive for opiates.  No evidence of HCC recurrence as of 6/3/2023, based on MRI and AFP.     Wants a liver transplant.    [facility] was going to send us results of weekly urine tests.      Xifaxan 550 mg twice daily, sent to [facility]      Elevated liver enzymes: Yes  Abnormal imaging: Yes  Cirrhosis: Yes  Hepatitis C: No  Hepatitis B: No  Fatty liver: No  Encephalopathy: No  Post-hospital discharge: No  Symptoms: Malaise Fatigue     Primary hepatic manifestations:  Fatigue: Yes  Edema:No  Ascites:Yes  Encephalopathy:According to patient few days after TIPS none since   Abdominal pain:No  GI bleeds: None since bleeding episode in 2019   Pruritus:No  Weight Changes:No  Changes in Bowel habits: No  Muscle cramps:No    Risk factors for liver disease:  No jaundice  No transfusions  No IVDU  Did not snort cocaine or similar agents  Did not live with anyone with hepatitis B or C  Sexual partner not tested  No hepatotoxic medications  No exposure to industrial toxins  Alcohol: Quit 6 months ago after using hard liquor > 10-15 years    Review of Systems   Constitutional:  Positive for malaise/fatigue. Negative for chills, fever and weight loss.   Respiratory:  Negative for cough.    Cardiovascular:  Negative for chest pain, palpitations and orthopnea.   Gastrointestinal:   Negative for abdominal pain, blood in stool, constipation, diarrhea, heartburn, melena, nausea and vomiting.     Surgical History:  None     Family History: No Family history of Liver disorders      Current Outpatient Medications   Medication Sig    albuterol (PROVENTIL/VENTOLIN HFA) 90 mcg/actuation inhaler Inhale 2 puffs into the lungs every 6 (six) hours as needed for Wheezing or Shortness of Breath.    furosemide (LASIX) 20 MG tablet Take 1 tablet (20 mg total) by mouth 2 (two) times daily.    gabapentin (NEURONTIN) 300 MG capsule TAKE 1 CAPSULE BY MOUTH IN THE EVENING    lactulose (CHRONULAC) 20 gram/30 mL Soln Take 45 mLs (30 g total) by mouth every 6 (six) hours.    mirtazapine (REMERON SOL-TAB) 15 MG disintegrating tablet Take 15 mg by mouth every evening.    nicotine (NICODERM CQ) 14 mg/24 hr Place 1 patch onto the skin once daily.    nicotine, polacrilex, (NICORETTE) 4 MG Gum Take 1 each (4 mg total) by mouth as needed (Take 1 each (4 mg total) by mouth as needed for tobacco cessation. 1-2 per hour in the place of cigarettes. (5-16 daily max) - Oral).    ondansetron (ZOFRAN) 4 MG tablet Take 1 tablet (4 mg total) by mouth 2 (two) times daily.    oxybutynin (DITROPAN-XL) 5 MG TR24 Take 1 tablet (5 mg total) by mouth once daily.    pantoprazole (PROTONIX) 20 MG tablet Take 1 tablet by mouth once daily    rifAXIMin (XIFAXAN) 550 mg Tab Take 1 tablet (550 mg total) by mouth 2 (two) times daily.    rOPINIRole (REQUIP) 1 MG tablet Take 1 tablet (1 mg total) by mouth every evening.    spironolactone (ALDACTONE) 100 MG tablet Take 1 tablet (100 mg total) by mouth once daily.    tiotropium-olodateroL (STIOLTO RESPIMAT) 2.5-2.5 mcg/actuation Mist 2 puffs Inhalation Once a day for 30 days    ursodioL (ACTIGALL) 300 mg capsule Take 1 capsule (300 mg total) by mouth 3 (three) times daily. for 14 days    VRAYLAR 3 mg Cap Take 3 mg by mouth.     No current facility-administered medications for this visit.     Objective  Findings:    Vital Signs:  There were no vitals filed for this visit.          Physical Exam  Vitals reviewed.   Constitutional:       Appearance: Normal appearance. She is not ill-appearing.   Cardiovascular:      Rate and Rhythm: Normal rate.      Pulses: Normal pulses.   Pulmonary:      Effort: Pulmonary effort is normal. No respiratory distress.   Abdominal:      General: Abdomen is flat. Bowel sounds are normal. There is no distension.      Palpations: Abdomen is soft. There is no mass.      Tenderness: There is no abdominal tenderness. There is no guarding.   Musculoskeletal:      Right lower leg: No edema.      Left lower leg: No edema.   Skin:     Capillary Refill: Capillary refill takes less than 2 seconds.      Coloration: Skin is pale. Skin is not jaundiced.      Findings: No erythema.   Neurological:      General: No focal deficit present.      Mental Status: She is alert and oriented to person, place, and time.       MELD 3.0: 17 at 9/18/2023 12:40 PM  MELD-Na: 15 at 9/18/2023 12:40 PM  Calculated from:  Serum Creatinine: 1.4 mg/dL at 9/18/2023 12:40 PM  Serum Sodium: 136 mmol/L at 9/18/2023 12:40 PM  Total Bilirubin: 1.9 mg/dL at 9/18/2023 12:40 PM  Serum Albumin: 3.3 g/dL at 9/18/2023 12:40 PM  INR(ratio): 1.2 at 9/18/2023 12:40 PM  Age at listing (hypothetical): 58 years  Sex: Female at 9/18/2023 12:40 PM       Imaging:     Ultrasound liver    Nodular contour of the liver indicating cirrhotic change.  There is associated diffuse heterogeneity of the hepatic parenchymal echotexture.  Hepatopetal flow in the portal vein.  Previously demonstrated tips shunt is not demonstrated on this exam.  Common duct normal in size measuring 5 mm  2. Cholelithiasis    Endoscopy:  Will evaluate after TIPS stent  evaluation is compleetd    Assessment:   Alcoholic Liver Cirrhosis   Decompensated with ascites and variceal bleeding  S/p TIPS in [facility] (2019), but it is no longer mentioned on the ultrasound  or MRI - Will Repeat MRI in December 2023, to confirm recent scan findings  MELD 17 on 9/18/23.       HCC screen- No focal lesion on MRI in June 2023 and AFP normal.    will repeat in 6 months, in early December 2023     HE- no history, for primary prophylaxis continue Lactulose to target 2-3 soft BM/day   - strict abstinence of alcohol use  - No history of SBP- today no ascites noted continue furosemide 20 mga nd Spironolactone 50 mg.    Recommendations:  -  Labs in 3 months after MRI done:  CBC, CMP, PT INR, AFP.     -   Transplant option discussed, will evaluate when MELD >15 or HCC found in the liver. Transplant evaluation completed 2023.     -  Y-90 was attempted to treat the HCC lesion, but it was met with difficulty. Hence, patient had microwave ablation of segment 6 hypervascular tumor on Jan 25, 2023. Now MRI scheduled for March 2024. , to look for response.      -  HE: Recommend minimizing doses of remeron, and gabapentin.      -  Positive for THC repeatedly (patient visits sisters who smoke).  Patient states she has not used marijuana in a year.  She is willing to avoid visiting her sisters. She can see them on video.  Wants a liver transplant.  [facility] was going to send us results of weekly urine tests.      -  Patient was declined 6/2023 for liver transplant due to recent positive toxicology (METH and alcohol) pt needs to complete outpatient program and continue local regional therapy on tumor.    Return in 3 months.   -  MRI abd w wo contrast and AFP in early March 2024.    - Routine labs CBC, CMP, PT INR also same day as MRI.   - avoid non-steroidal anti-inflammatory drugs (NSAIDs) such as ibuprofen, Motrin, naprosyn, Alleve due to the risk of kidney damage  - can take acetaminophen (Tylenol), no more than 2000 mg per day  - low sodium (salt) 2 gram per day diet  - nutrition: 25-30kcal (calorie per body body weight in kilogram) per day  - no need to restrict protein in diet  - high protein diet:  1.2-1.5 gram/kg (protein per body weight in kilogram) per day to prevent muscle mass loss  - avoid fasting  - Late night snack with 50 gram of complex carbohydrates and protein  - resistance exercises for muscle strength  - avoid raw seafoods due to the risk of fatal Vibrio vulnificus infection  - Upper endoscopy every 1-2 years to screen for varices in the stomach and foodpipe which can burst and cause fatal bleeding

## 2023-12-11 NOTE — PATIENT INSTRUCTIONS
enma in 3 months.   -  MRI abd w wo contrast and AFP in early March 2024.    - Routine labs CBC, CMP, PT INR also same day as MRI.   - avoid non-steroidal anti-inflammatory drugs (NSAIDs) such as ibuprofen, Motrin, naprosyn, Alleve due to the risk of kidney damage  - can take acetaminophen (Tylenol), no more than 2000 mg per day  - low sodium (salt) 2 gram per day diet  - nutrition: 25-30kcal (calorie per body body weight in kilogram) per day  - no need to restrict protein in diet  - high protein diet: 1.2-1.5 gram/kg (protein per body weight in kilogram) per day to prevent muscle mass loss  - avoid fasting  - Late night snack with 50 gram of complex carbohydrates and protein  - resistance exercises for muscle strength  - avoid raw seafoods due to the risk of fatal Vibrio vulnificus infection  - Upper endoscopy every 1-2 years to screen for varices in the stomach and foodpipe which can burst and cause fatal bleeding

## 2023-12-12 ENCOUNTER — TELEPHONE (OUTPATIENT)
Dept: HEPATOLOGY | Facility: CLINIC | Age: 58
End: 2023-12-12
Payer: MEDICAID

## 2023-12-12 NOTE — TELEPHONE ENCOUNTER
----- Message from Shari Arreola MD sent at 12/11/2023  3:35 PM CST -----  eturn in 3 months.   -  MRI abd w wo contrast and AFP in early March 2024.    - Routine labs CBC, CMP, PT INR also same day as MRI.   - avoid non-steroidal anti-inflammatory drugs (NSAIDs) such as ibuprofen, Motrin, naprosyn, Alleve due to the risk of kidney damage  - can take acetaminophen (Tylenol), no more than 2000 mg per day  - low sodium (salt) 2 gram per day diet  - nutrition: 25-30kcal (calorie per body body weight in kilogram) per day  - no need to restrict protein in diet  - high protein diet: 1.2-1.5 gram/kg (protein per body weight in kilogram) per day to prevent muscle mass loss  - avoid fasting  - Late night snack with 50 gram of complex carbohydrates and protein  - resistance exercises for muscle strength  - avoid raw seafoods due to the risk of fatal Vibrio vulnificus infection  - Upper endoscopy every 1-2 years to screen for varices in the stomach and foodpipe which can burst and cause fatal bleeding

## 2024-01-08 ENCOUNTER — OFFICE VISIT (OUTPATIENT)
Dept: PRIMARY CARE CLINIC | Facility: CLINIC | Age: 59
End: 2024-01-08
Payer: MEDICAID

## 2024-01-08 VITALS
HEIGHT: 68 IN | HEART RATE: 79 BPM | BODY MASS INDEX: 33.34 KG/M2 | TEMPERATURE: 98 F | RESPIRATION RATE: 18 BRPM | DIASTOLIC BLOOD PRESSURE: 63 MMHG | SYSTOLIC BLOOD PRESSURE: 122 MMHG | OXYGEN SATURATION: 96 % | WEIGHT: 220 LBS

## 2024-01-08 DIAGNOSIS — M79.89 LEG SWELLING: ICD-10-CM

## 2024-01-08 DIAGNOSIS — Z12.31 SCREENING MAMMOGRAM, ENCOUNTER FOR: ICD-10-CM

## 2024-01-08 DIAGNOSIS — F19.10 MULTIPLE SUBSTANCE ABUSE: ICD-10-CM

## 2024-01-08 DIAGNOSIS — F17.218 CIGARETTE NICOTINE DEPENDENCE WITH OTHER NICOTINE-INDUCED DISORDER: ICD-10-CM

## 2024-01-08 DIAGNOSIS — F39 MOOD DISORDER: ICD-10-CM

## 2024-01-08 DIAGNOSIS — K70.30 ALCOHOLIC CIRRHOSIS OF LIVER WITHOUT ASCITES: ICD-10-CM

## 2024-01-08 DIAGNOSIS — E66.9 CLASS 1 OBESITY WITH SERIOUS COMORBIDITY AND BODY MASS INDEX (BMI) OF 32.0 TO 32.9 IN ADULT, UNSPECIFIED OBESITY TYPE: ICD-10-CM

## 2024-01-08 DIAGNOSIS — F41.9 ANXIETY: Primary | ICD-10-CM

## 2024-01-08 DIAGNOSIS — I87.2 STASIS DERMATITIS WITHOUT VARICOSITIES: ICD-10-CM

## 2024-01-08 PROCEDURE — 3074F SYST BP LT 130 MM HG: CPT | Mod: CPTII,,, | Performed by: STUDENT IN AN ORGANIZED HEALTH CARE EDUCATION/TRAINING PROGRAM

## 2024-01-08 PROCEDURE — 1160F RVW MEDS BY RX/DR IN RCRD: CPT | Mod: CPTII,,, | Performed by: STUDENT IN AN ORGANIZED HEALTH CARE EDUCATION/TRAINING PROGRAM

## 2024-01-08 PROCEDURE — 99999 PR PBB SHADOW E&M-EST. PATIENT-LVL V: CPT | Mod: PBBFAC,,, | Performed by: STUDENT IN AN ORGANIZED HEALTH CARE EDUCATION/TRAINING PROGRAM

## 2024-01-08 PROCEDURE — 3078F DIAST BP <80 MM HG: CPT | Mod: CPTII,,, | Performed by: STUDENT IN AN ORGANIZED HEALTH CARE EDUCATION/TRAINING PROGRAM

## 2024-01-08 PROCEDURE — 1159F MED LIST DOCD IN RCRD: CPT | Mod: CPTII,,, | Performed by: STUDENT IN AN ORGANIZED HEALTH CARE EDUCATION/TRAINING PROGRAM

## 2024-01-08 PROCEDURE — 99215 OFFICE O/P EST HI 40 MIN: CPT | Mod: PBBFAC | Performed by: STUDENT IN AN ORGANIZED HEALTH CARE EDUCATION/TRAINING PROGRAM

## 2024-01-08 PROCEDURE — 3008F BODY MASS INDEX DOCD: CPT | Mod: CPTII,,, | Performed by: STUDENT IN AN ORGANIZED HEALTH CARE EDUCATION/TRAINING PROGRAM

## 2024-01-08 PROCEDURE — 99214 OFFICE O/P EST MOD 30 MIN: CPT | Mod: S$PBB,,, | Performed by: STUDENT IN AN ORGANIZED HEALTH CARE EDUCATION/TRAINING PROGRAM

## 2024-01-08 RX ORDER — CLONAZEPAM 0.5 MG/1
0.5 TABLET ORAL DAILY PRN
COMMUNITY

## 2024-01-08 NOTE — ASSESSMENT & PLAN NOTE
11/2023 No mammographic evidence of malignancy.  BI-RADS Category 1: Negative  Recommendation:  Routine screening mammogram in 1 year is recommended.

## 2024-01-08 NOTE — ASSESSMENT & PLAN NOTE
Body mass index is 33.45 kg/m². Morbid obesity complicates all aspects of disease management from diagnostic modalities to treatment. Weight loss encouraged and health benefits explained to patient.

## 2024-01-08 NOTE — ASSESSMENT & PLAN NOTE
Dangers of cigarette smoking were reviewed with patient in detail. Patient was Counseled for 3-10 minutes. Nicotine replacement options were discussed. Nicotine replacement was discussed- not prescribed per patient's request  Patient agreeable to once again to get back on smoking cessation program.

## 2024-01-08 NOTE — PROGRESS NOTES
[facility] Note    HPI:  Shonda Borrego is a 58 y.o. female who presents today for Health Maintenance  58 year old with hepatocellular carcinoma, alcoholic cirrhosis, anxiety, history of substance abuse.  Denies new symptoms.   Has been following up with hepatology specialist. Working to get back on transplant list.     ROS   A review of systems was performed and was negative except as noted above.    I personally reviewed allergies, past medical, surgical, social and family history and updated as appropriate.    Medications:    Current Outpatient Medications:     clonazePAM (KLONOPIN) 0.5 MG tablet, Take 0.5 mg by mouth daily as needed., Disp: , Rfl:     furosemide (LASIX) 20 MG tablet, Take 1 tablet (20 mg total) by mouth 2 (two) times daily., Disp: 60 tablet, Rfl: 11    lactulose (CHRONULAC) 20 gram/30 mL Soln, Take 45 mLs (30 g total) by mouth every 6 (six) hours., Disp: 2700 mL, Rfl: 1    mirtazapine (REMERON SOL-TAB) 15 MG disintegrating tablet, Take 15 mg by mouth every evening., Disp: , Rfl:     nicotine (NICODERM CQ) 14 mg/24 hr, Place 1 patch onto the skin once daily., Disp: 14 patch, Rfl: 0    nicotine, polacrilex, (NICORETTE) 4 MG Gum, Take 1 each (4 mg total) by mouth as needed (Take 1 each (4 mg total) by mouth as needed for tobacco cessation. 1-2 per hour in the place of cigarettes. (5-16 daily max) - Oral)., Disp: 300 each, Rfl: 0    ondansetron (ZOFRAN) 4 MG tablet, Take 1 tablet (4 mg total) by mouth 2 (two) times daily., Disp: 30 tablet, Rfl: 0    oxybutynin (DITROPAN-XL) 5 MG TR24, Take 1 tablet (5 mg total) by mouth once daily., Disp: 30 tablet, Rfl: 11    pantoprazole (PROTONIX) 20 MG tablet, Take 1 tablet by mouth once daily, Disp: 90 tablet, Rfl: 0    rifAXIMin (XIFAXAN) 550 mg Tab, Take 1 tablet (550 mg total) by mouth 2 (two) times daily., Disp: 60 tablet, Rfl: 11    spironolactone (ALDACTONE) 100 MG tablet, Take 1 tablet (100 mg total) by mouth once daily., Disp: 30  tablet, Rfl: 5    tiotropium-olodateroL (STIOLTO RESPIMAT) 2.5-2.5 mcg/actuation Mist, 2 puffs Inhalation Once a day for 30 days, Disp: , Rfl:     VRAYLAR 3 mg Cap, Take 3 mg by mouth., Disp: , Rfl:     albuterol (PROVENTIL/VENTOLIN HFA) 90 mcg/actuation inhaler, Inhale 2 puffs into the lungs every 6 (six) hours as needed for Wheezing or Shortness of Breath., Disp: 18 g, Rfl: 3    gabapentin (NEURONTIN) 300 MG capsule, TAKE 1 CAPSULE BY MOUTH IN THE EVENING, Disp: 30 capsule, Rfl: 0    rOPINIRole (REQUIP) 1 MG tablet, Take 1 tablet (1 mg total) by mouth every evening., Disp: 90 tablet, Rfl: 1    ursodioL (ACTIGALL) 300 mg capsule, Take 1 capsule (300 mg total) by mouth 3 (three) times daily. for 14 days, Disp: 90 capsule, Rfl: 3     Health Maintenance:  Immunization History   Administered Date(s) Administered    COVID-19 MRNA, LN-S PF (MODERNA HALF 0.25 ML DOSE) 12/09/2021    COVID-19 Vaccine 05/12/2021, 06/09/2021, 12/09/2021    COVID-19, MRNA, LN-S, PF (MODERNA FULL 0.5 ML DOSE) 05/12/2021    COVID-19, MRNA, LN-S, PF (Pfizer) (Gray Cap) 06/03/2022    COVID-19, MRNA, LN-S, PF (Pfizer) (Purple Cap) 06/09/2021, 09/25/2022    COVID-19, mRNA, LNP-S, PF (Moderna 2023)Ages 12+ 11/02/2023    COVID-19, mRNA, LNP-S, bivalent booster, PF (Moderna Omicron)12 + YEARS 09/25/2022    COVID-19, mRNA, LNP-S, bivalent booster, PF (PFIZER OMICRON YEARS 5-11) 09/25/2022    Hepatitis A, Adult 03/23/2023, 09/26/2023    Hepatitis B, Adult 04/24/2023, 05/26/2023, 11/09/2023    Influenza (FLUBLOK) - Quadrivalent - Recombinant - PF *Preferred* (egg allergy) 10/09/2023    Influenza - Quadrivalent - PF *Preferred* (6 months and older) 09/25/2022    Influenza - Trivalent (ADULT) 10/20/2021    Pneumococcal Conjugate - 20 Valent 10/05/2022, 02/07/2023    Tdap 03/03/2022    Zoster Recombinant 03/03/2022, 09/25/2022      Health Maintenance   Topic Date Due    LDCT Lung Screen  01/20/2024    Mammogram  11/22/2024    Lipid Panel  06/16/2028     "TETANUS VACCINE  03/03/2032    Colorectal Cancer Screening  08/09/2033    Hepatitis C Screening  Completed    Shingles Vaccine  Completed     Health Maintenance Topics with due status: Not Due       Topic Last Completion Date    TETANUS VACCINE 03/03/2022    LDCT Lung Screen 01/20/2023    Cervical Cancer Screening 04/26/2023    Hemoglobin A1c (Diabetic Prevention Screening) 06/16/2023    Lipid Panel 06/16/2023    Colorectal Cancer Screening 08/09/2023    Mammogram 11/22/2023     There are no preventive care reminders to display for this patient.    PHYSICAL EXAM:  Vitals:    01/08/24 0913   BP: 122/63   BP Location: Left arm   Patient Position: Sitting   BP Method: Large (Automatic)   Pulse: 79   Resp: 18   Temp: 98.1 °F (36.7 °C)   TempSrc: Oral   SpO2: 96%   Weight: 99.8 kg (220 lb)   Height: 5' 8" (1.727 m)     Body mass index is 33.45 kg/m².  Physical Exam  Nursing note reviewed.   Constitutional:       General: She is not in acute distress.     Appearance: She is not ill-appearing, toxic-appearing or diaphoretic.   HENT:      Right Ear: External ear normal.      Left Ear: External ear normal.      Mouth/Throat:      Mouth: Mucous membranes are dry.      Pharynx: Oropharynx is clear.   Eyes:      Extraocular Movements: Extraocular movements intact.   Cardiovascular:      Rate and Rhythm: Normal rate.      Pulses: Normal pulses.   Pulmonary:      Effort: Pulmonary effort is normal. No respiratory distress.      Breath sounds: No wheezing, rhonchi or rales.   Chest:      Chest wall: No tenderness.   Abdominal:      General: Abdomen is flat. There is no distension.      Palpations: Abdomen is soft.      Tenderness: There is no abdominal tenderness. There is no right CVA tenderness, left CVA tenderness or guarding.   Musculoskeletal:      Cervical back: Normal range of motion and neck supple.      Right lower leg: No edema.      Left lower leg: No edema.   Lymphadenopathy:      Cervical: No cervical adenopathy. "   Skin:     General: Skin is warm and dry.      Capillary Refill: Capillary refill takes less than 2 seconds.      Findings: No lesion or rash.   Neurological:      General: No focal deficit present.      Mental Status: She is alert and oriented to person, place, and time.   Psychiatric:         Mood and Affect: Mood normal.         Behavior: Behavior normal.          ASSESSMENT/PLAN:  1. Anxiety  Assessment & Plan:  Counseled at length on building coping skills  - demonstrated diaphragmatic breathing and help relax muscle groups  - when mind drifts off, then focus back to breathing  - discussed mindfulness at home   - free relaxation exercises to read and listen at [email or web address]          2. Cigarette nicotine dependence with other nicotine-induced disorder  Assessment & Plan:  Dangers of cigarette smoking were reviewed with patient in detail. Patient was Counseled for 3-10 minutes. Nicotine replacement options were discussed. Nicotine replacement was discussed- not prescribed per patient's request  Patient agreeable to once again to get back on smoking cessation program.       3. Leg swelling  Assessment & Plan:  Occurs primarily after prolonged seated posture. Has not tried compression stockings during the day time.   Avoid prolonged seated posture, incorporate daily physical activity with leg raises above heart level  - compression stockings  - continue with lasix 20 mg BID  - continue spironolactone to 100 mg  - f/u 4 weeks or sooner with worsening  - f/u bmp, mag      Orders:  -     Basic Metabolic Panel; Future; Expected date: 01/08/2024  -     Magnesium; Future; Expected date: 01/08/2024    4. Mood disorder    5. Multiple substance abuse  Assessment & Plan:  Denies use of pain medications. No recent prescriptions noted on .   Taking daily clonazepam, per patient for anxiety and sleep through her behavioral health specialist.       6. Screening mammogram, encounter for  Assessment &  Plan:  11/2023 No mammographic evidence of malignancy.  BI-RADS Category 1: Negative  Recommendation:  Routine screening mammogram in 1 year is recommended.           7. Class 1 obesity with serious comorbidity and body mass index (BMI) of 32.0 to 32.9 in adult, unspecified obesity type  Overview:  Wt Readings from Last 8 Encounters:   01/08/24 99.8 kg (220 lb)   12/11/23 98.6 kg (217 lb 6 oz)   12/05/23 97.2 kg (214 lb 3.2 oz)   11/22/23 98.4 kg (217 lb)   11/09/23 98.4 kg (217 lb)   09/27/23 97.8 kg (215 lb 9.8 oz)   08/25/23 99.8 kg (220 lb)   08/07/23 97.1 kg (214 lb)       Assessment & Plan:  Body mass index is 33.45 kg/m². Morbid obesity complicates all aspects of disease management from diagnostic modalities to treatment. Weight loss encouraged and health benefits explained to patient.           8. Stasis dermatitis without varicosities  Assessment & Plan:  For leg swelling and leg pain, counseled on daytime wear of compression stockings or CAMPOS hose.   - continue with daily spironolactone and lasix      9. Alcoholic cirrhosis of liver without ascites  Overview:  9/23: MRI 1.8 x 1.6 cm lesion in the right lobe of the liver suspicious for a primary liver neoplasm.    Assessment & Plan:  Patient with known Cirrhosis with Child's class Calculator for Child's score link- https://www.mdcalc.com/calc/340/child-gonzalez-score-cirrhosis-mortality#creator-insights. Co-morbidities are present and inclusive of portal hypertension, esophageal varices, hepatic encephalopathy, and anemia/pancytopenia.  Etiology likely  ETOH, multiple substance abuse history . Will avoid any hepatotoxic meds, and monitor CBC/CMP/INR for synthetic function.   - close monitoring with primary hepatology            Other than changes above, continue current medications and maintain follow up with specialists.      No follow-ups on file.   Recent Results (from the past 2016 hour(s))   Urinalysis, Reflex to Urine Culture Urine, Clean Catch     Collection Time: 12/05/23 11:00 AM    Specimen: Urine, Catheterized   Result Value Ref Range    Specimen UA Urine, Catheterized     Color, UA Yellow Yellow, Straw, Joycelyn    Appearance, UA Clear Clear    pH, UA 8.0 5.0 - 8.0    Specific Gravity, UA 1.010 1.005 - 1.030    Protein, UA Negative Negative    Glucose, UA Negative Negative    Ketones, UA Negative Negative    Bilirubin (UA) Negative Negative    Occult Blood UA Negative Negative    Nitrite, UA Positive (A) Negative    Urobilinogen, UA 1.0 <2.0 EU/dL    Leukocytes, UA Negative Negative   Urinalysis Microscopic    Collection Time: 12/05/23 11:00 AM   Result Value Ref Range    RBC, UA 1 0 - 4 /hpf    WBC, UA 6 (H) 0 - 5 /hpf    Bacteria Few (A) None-Occ /hpf    Squam Epithel, UA 1 /hpf    Hyaline Casts, UA 0.0 (A) 0-1/lpf /lpf    Microscopic Comment SEE COMMENT    CBC auto differential    Collection Time: 12/05/23 11:17 AM   Result Value Ref Range    WBC 6.56 3.90 - 12.70 K/uL    RBC 4.74 4.00 - 5.40 M/uL    Hemoglobin 14.9 12.0 - 16.0 g/dL    Hematocrit 43.2 37.0 - 48.5 %    MCV 91 82 - 98 fL    MCH 31.4 (H) 27.0 - 31.0 pg    MCHC 34.5 32.0 - 36.0 g/dL    RDW 13.9 11.5 - 14.5 %    Platelets 132 (L) 150 - 450 K/uL    MPV 10.8 9.2 - 12.9 fL    Immature Granulocytes 0.3 0.0 - 0.5 %    Gran # (ANC) 3.5 1.8 - 7.7 K/uL    Immature Grans (Abs) 0.02 0.00 - 0.04 K/uL    Lymph # 2.2 1.0 - 4.8 K/uL    Mono # 0.6 0.3 - 1.0 K/uL    Eos # 0.2 0.0 - 0.5 K/uL    Baso # 0.06 0.00 - 0.20 K/uL    nRBC 0 0 /100 WBC    Gran % 53.9 38.0 - 73.0 %    Lymph % 34.1 18.0 - 48.0 %    Mono % 8.5 4.0 - 15.0 %    Eosinophil % 2.3 0.0 - 8.0 %    Basophil % 0.9 0.0 - 1.9 %    Differential Method Automated    Comprehensive metabolic panel    Collection Time: 12/05/23 11:17 AM   Result Value Ref Range    Sodium 144 136 - 145 mmol/L    Potassium 4.2 3.5 - 5.1 mmol/L    Chloride 114 (H) 95 - 110 mmol/L    CO2 26 23 - 29 mmol/L    Glucose 124 (H) 70 - 110 mg/dL    BUN 9 6 - 20 mg/dL    Creatinine  0.8 0.5 - 1.4 mg/dL    Calcium 9.4 8.7 - 10.5 mg/dL    Total Protein 6.6 6.0 - 8.4 g/dL    Albumin 3.0 (L) 3.5 - 5.2 g/dL    Total Bilirubin 2.2 (H) 0.1 - 1.0 mg/dL    Alkaline Phosphatase 169 (H) 55 - 135 U/L    AST 38 10 - 40 U/L    ALT 30 10 - 44 U/L    eGFR >60.0 >60 mL/min/1.73 m^2    Anion Gap 4 3 - 11 mmol/L   Magnesium    Collection Time: 12/05/23 11:17 AM   Result Value Ref Range    Magnesium 2.1 1.6 - 2.6 mg/dL   Lipase    Collection Time: 12/05/23 11:17 AM   Result Value Ref Range    Lipase 24 13 - 75 U/L   Ammonia    Collection Time: 12/05/23 11:17 AM   Result Value Ref Range    Ammonia 134 (H) 10 - 50 umol/L   POCT Venous Blood Gas    Collection Time: 12/05/23 11:30 AM   Result Value Ref Range    POC PH 7.546 (H) 7.345 - 7.450    POC PCO2 25.2 (LL) 35.0 - 45.0 mmHg    POC PO2 220 (HH) 40.0 - 60.0 mmHg    Base Deficit -0.6 -2.0 - 2.0 mmol/l    POC Temp 37.0 C    Specimen source Venous     Performed By: ANTONIO Jones     MODIFIED BRAYAN'S TEST NA     FiO2 21.0 %    O2DEVICE Room Air     Comments       VBG DRAWN BY ARLEN PHLEBOTOMIST  -- sO2 VALUE ABOVE REPORTABLE RANGE >100.0    Provider Notified: ROBERTO ALLISON     Notified Time 12/05/2023 11:34     Notified By ANTONIO Jones     Notified Note Called and read back by ROBERTO ALLISON    Drug screen panel, emergency    Collection Time: 12/05/23  4:22 PM   Result Value Ref Range    Benzodiazepines Presumptive Positive (A) Negative    Methadone metabolites Negative Negative    Cocaine (Metab.) Negative Negative    Opiate Scrn, Ur Negative Negative    Barbiturate Screen, Ur Negative Negative    Amphetamine Screen, Ur Negative Negative    THC Negative Negative    Phencyclidine Negative Negative    Creatinine, Urine 88.7 15.0 - 325.0 mg/dL    Toxicology Information SEE COMMENT    CBC Auto Differential    Collection Time: 12/06/23  6:27 AM   Result Value Ref Range    WBC 7.41 3.90 - 12.70 K/uL    RBC 4.79 4.00 - 5.40 M/uL    Hemoglobin 14.9 12.0 - 16.0 g/dL     Hematocrit 44.0 37.0 - 48.5 %    MCV 92 82 - 98 fL    MCH 31.1 (H) 27.0 - 31.0 pg    MCHC 33.9 32.0 - 36.0 g/dL    RDW 14.1 11.5 - 14.5 %    Platelets 162 150 - 450 K/uL    MPV 10.4 9.2 - 12.9 fL    Immature Granulocytes 0.1 0.0 - 0.5 %    Gran # (ANC) 3.2 1.8 - 7.7 K/uL    Immature Grans (Abs) 0.01 0.00 - 0.04 K/uL    Lymph # 3.0 1.0 - 4.8 K/uL    Mono # 0.8 0.3 - 1.0 K/uL    Eos # 0.3 0.0 - 0.5 K/uL    Baso # 0.10 0.00 - 0.20 K/uL    nRBC 0 0 /100 WBC    Gran % 43.8 38.0 - 73.0 %    Lymph % 40.4 18.0 - 48.0 %    Mono % 10.4 4.0 - 15.0 %    Eosinophil % 4.0 0.0 - 8.0 %    Basophil % 1.3 0.0 - 1.9 %    Differential Method Automated    Comprehensive Metabolic Panel    Collection Time: 12/06/23  6:27 AM   Result Value Ref Range    Sodium 144 136 - 145 mmol/L    Potassium 3.8 3.5 - 5.1 mmol/L    Chloride 114 (H) 95 - 110 mmol/L    CO2 25 23 - 29 mmol/L    Glucose 118 (H) 70 - 110 mg/dL    BUN 11 6 - 20 mg/dL    Creatinine 0.9 0.5 - 1.4 mg/dL    Calcium 9.2 8.7 - 10.5 mg/dL    Total Protein 6.4 6.0 - 8.4 g/dL    Albumin 2.9 (L) 3.5 - 5.2 g/dL    Total Bilirubin 3.3 (H) 0.1 - 1.0 mg/dL    Alkaline Phosphatase 128 55 - 135 U/L    AST 34 10 - 40 U/L    ALT 27 10 - 44 U/L    eGFR >60.0 >60 mL/min/1.73 m^2    Anion Gap 5 3 - 11 mmol/L   Magnesium    Collection Time: 12/06/23  6:27 AM   Result Value Ref Range    Magnesium 1.8 1.6 - 2.6 mg/dL   CBC Auto Differential    Collection Time: 12/07/23  5:44 AM   Result Value Ref Range    WBC 5.41 3.90 - 12.70 K/uL    RBC 4.30 4.00 - 5.40 M/uL    Hemoglobin 13.4 12.0 - 16.0 g/dL    Hematocrit 39.8 37.0 - 48.5 %    MCV 93 82 - 98 fL    MCH 31.2 (H) 27.0 - 31.0 pg    MCHC 33.7 32.0 - 36.0 g/dL    RDW 13.8 11.5 - 14.5 %    Platelets 140 (L) 150 - 450 K/uL    MPV 10.4 9.2 - 12.9 fL    Immature Granulocytes 0.2 0.0 - 0.5 %    Gran # (ANC) 1.6 (L) 1.8 - 7.7 K/uL    Immature Grans (Abs) 0.01 0.00 - 0.04 K/uL    Lymph # 2.7 1.0 - 4.8 K/uL    Mono # 0.6 0.3 - 1.0 K/uL    Eos # 0.4 0.0 - 0.5  K/uL    Baso # 0.07 0.00 - 0.20 K/uL    nRBC 0 0 /100 WBC    Gran % 29.5 (L) 38.0 - 73.0 %    Lymph % 50.1 (H) 18.0 - 48.0 %    Mono % 11.5 4.0 - 15.0 %    Eosinophil % 7.4 0.0 - 8.0 %    Basophil % 1.3 0.0 - 1.9 %    Differential Method Automated    Magnesium    Collection Time: 12/07/23  5:44 AM   Result Value Ref Range    Magnesium 1.8 1.6 - 2.6 mg/dL   Hepatic Function Panel    Collection Time: 12/07/23  5:44 AM   Result Value Ref Range    Total Protein 5.7 (L) 6.0 - 8.4 g/dL    Albumin 2.7 (L) 3.5 - 5.2 g/dL    Total Bilirubin 3.3 (H) 0.1 - 1.0 mg/dL    Bilirubin, Direct 0.6 (H) 0.1 - 0.3 mg/dL    AST 33 10 - 40 U/L    ALT 25 10 - 44 U/L    Alkaline Phosphatase 111 55 - 135 U/L   Basic Metabolic Panel    Collection Time: 12/07/23  5:44 AM   Result Value Ref Range    Sodium 143 136 - 145 mmol/L    Potassium 3.6 3.5 - 5.1 mmol/L    Chloride 113 (H) 95 - 110 mmol/L    CO2 25 23 - 29 mmol/L    Glucose 118 (H) 70 - 110 mg/dL    BUN 10 6 - 20 mg/dL    Creatinine 0.8 0.5 - 1.4 mg/dL    Calcium 8.7 8.7 - 10.5 mg/dL    Anion Gap 5 3 - 11 mmol/L    eGFR >60.0 >60 mL/min/1.73 m^2         Kazumi G Yoshinaga, DO [facility]

## 2024-01-08 NOTE — ASSESSMENT & PLAN NOTE
Counseled at length on building coping skills  - demonstrated diaphragmatic breathing and help relax muscle groups  - when mind drifts off, then focus back to breathing  - discussed mindfulness at home   - free relaxation exercises to read and listen at [email or web address]

## 2024-01-08 NOTE — ASSESSMENT & PLAN NOTE
Occurs primarily after prolonged seated posture. Has not tried compression stockings during the day time.   Avoid prolonged seated posture, incorporate daily physical activity with leg raises above heart level  - compression stockings  - continue with lasix 20 mg BID  - continue spironolactone to 100 mg  - f/u 4 weeks or sooner with worsening  - f/u bmp, mag

## 2024-01-10 DIAGNOSIS — G25.81 RESTLESS LEG SYNDROME: ICD-10-CM

## 2024-01-10 RX ORDER — GABAPENTIN 300 MG/1
CAPSULE ORAL
Qty: 30 CAPSULE | Refills: 0 | Status: SHIPPED | OUTPATIENT
Start: 2024-01-10

## 2024-01-17 NOTE — ASSESSMENT & PLAN NOTE
Patient with known Cirrhosis with Child's class Calculator for Child's score link- https://www.[employer].com/calc/340/child-gonzalez-score-cirrhosis-mortality#creator-insights. Co-morbidities are present and inclusive of portal hypertension, esophageal varices, hepatic encephalopathy, and anemia/pancytopenia.  Etiology likely  ETOH, multiple substance abuse history . Will avoid any hepatotoxic meds, and monitor CBC/CMP/INR for synthetic function.   - close monitoring with primary hepatology

## 2024-01-17 NOTE — ASSESSMENT & PLAN NOTE
For leg swelling and leg pain, counseled on daytime wear of compression stockings or CAMPOS hose.   - continue with daily spironolactone and lasix

## 2024-01-26 ENCOUNTER — TELEPHONE (OUTPATIENT)
Dept: HEPATOLOGY | Facility: CLINIC | Age: 59
End: 2024-01-26
Payer: MEDICAID

## 2024-01-26 NOTE — TELEPHONE ENCOUNTER
----- Message from Gadiel Sweeney sent at 1/26/2024  9:54 AM CST -----  Regarding: miss call  Pt returning miss call     Call

## 2024-02-12 ENCOUNTER — TELEPHONE (OUTPATIENT)
Dept: SMOKING CESSATION | Facility: CLINIC | Age: 59
End: 2024-02-12
Payer: MEDICAID

## 2024-02-12 NOTE — TELEPHONE ENCOUNTER
Spoke with patient regarding smoking cessation program. Ms. Merchant has chosen to schedule new intake for 2/19/24 at 3 pm.

## 2024-02-19 ENCOUNTER — CLINICAL SUPPORT (OUTPATIENT)
Dept: SMOKING CESSATION | Facility: CLINIC | Age: 59
End: 2024-02-19
Payer: COMMERCIAL

## 2024-02-19 DIAGNOSIS — F17.200 NICOTINE DEPENDENCE: ICD-10-CM

## 2024-02-19 PROCEDURE — 99404 PREV MED CNSL INDIV APPRX 60: CPT | Mod: S$GLB,,,

## 2024-02-19 PROCEDURE — 99999 PR PBB SHADOW E&M-EST. PATIENT-LVL I: CPT | Mod: PBBFAC,,,

## 2024-02-19 RX ORDER — DIPHENHYDRAMINE HCL 25 MG
4 CAPSULE ORAL
Qty: 300 EACH | Refills: 0 | Status: SHIPPED | OUTPATIENT
Start: 2024-02-19 | End: 2024-05-06

## 2024-02-19 RX ORDER — VARENICLINE TARTRATE 1 MG/1
1 TABLET, FILM COATED ORAL 2 TIMES DAILY
Qty: 56 TABLET | Refills: 0 | Status: SHIPPED | OUTPATIENT
Start: 2024-02-19 | End: 2024-03-21 | Stop reason: SDUPTHER

## 2024-02-19 RX ORDER — IBUPROFEN 200 MG
1 TABLET ORAL DAILY
Qty: 14 PATCH | Refills: 0 | Status: SHIPPED | OUTPATIENT
Start: 2024-02-19 | End: 2024-04-25 | Stop reason: SDUPTHER

## 2024-02-29 RX ORDER — PANTOPRAZOLE SODIUM 20 MG/1
20 TABLET, DELAYED RELEASE ORAL
Qty: 90 TABLET | Refills: 0 | Status: SHIPPED | OUTPATIENT
Start: 2024-02-29 | End: 2024-06-11

## 2024-03-04 ENCOUNTER — HOSPITAL ENCOUNTER (OUTPATIENT)
Dept: RADIOLOGY | Facility: HOSPITAL | Age: 59
Discharge: HOME | End: 2024-03-04
Attending: INTERNAL MEDICINE
Payer: MEDICAID

## 2024-03-04 ENCOUNTER — LAB VISIT (OUTPATIENT)
Dept: LAB | Facility: HOSPITAL | Age: 59
End: 2024-03-04
Attending: INTERNAL MEDICINE
Payer: MEDICAID

## 2024-03-04 DIAGNOSIS — K76.9 LIVER DISEASE, UNSPECIFIED: ICD-10-CM

## 2024-03-04 DIAGNOSIS — R16.0 LIVER MASS: ICD-10-CM

## 2024-03-04 DIAGNOSIS — C22.0 HCC (HEPATOCELLULAR CARCINOMA): ICD-10-CM

## 2024-03-04 DIAGNOSIS — K70.30 ALCOHOLIC CIRRHOSIS OF LIVER WITHOUT ASCITES: ICD-10-CM

## 2024-03-04 LAB
AFP SERPL-MCNC: 3.7 NG/ML (ref 0–8.4)
ALBUMIN SERPL BCP-MCNC: 3.1 G/DL (ref 3.5–5.2)
ALP SERPL-CCNC: 198 U/L (ref 55–135)
ALT SERPL W/O P-5'-P-CCNC: 29 U/L (ref 10–44)
ANION GAP SERPL CALC-SCNC: 3 MMOL/L (ref 3–11)
AST SERPL-CCNC: 32 U/L (ref 10–40)
BASOPHILS # BLD AUTO: 0.05 K/UL (ref 0–0.2)
BASOPHILS NFR BLD: 1 % (ref 0–1.9)
BILIRUB SERPL-MCNC: 2.9 MG/DL (ref 0.1–1)
BUN SERPL-MCNC: 11 MG/DL (ref 6–20)
CALCIUM SERPL-MCNC: 9.3 MG/DL (ref 8.7–10.5)
CHLORIDE SERPL-SCNC: 107 MMOL/L (ref 95–110)
CO2 SERPL-SCNC: 26 MMOL/L (ref 23–29)
CREAT SERPL-MCNC: 1 MG/DL (ref 0.5–1.4)
DIFFERENTIAL METHOD BLD: ABNORMAL
EOSINOPHIL # BLD AUTO: 0.2 K/UL (ref 0–0.5)
EOSINOPHIL NFR BLD: 3.7 % (ref 0–8)
ERYTHROCYTE [DISTWIDTH] IN BLOOD BY AUTOMATED COUNT: 14.3 % (ref 11.5–14.5)
EST. GFR  (NO RACE VARIABLE): >60 ML/MIN/1.73 M^2
GLUCOSE SERPL-MCNC: 255 MG/DL (ref 70–110)
HCT VFR BLD AUTO: 42.7 % (ref 37–48.5)
HGB BLD-MCNC: 14.7 G/DL (ref 12–16)
IMM GRANULOCYTES # BLD AUTO: 0.01 K/UL (ref 0–0.04)
IMM GRANULOCYTES NFR BLD AUTO: 0.2 % (ref 0–0.5)
INR PPP: 1.1 (ref 0.8–1.2)
LYMPHOCYTES # BLD AUTO: 1.7 K/UL (ref 1–4.8)
LYMPHOCYTES NFR BLD: 33.7 % (ref 18–48)
MCH RBC QN AUTO: 31.3 PG (ref 27–31)
MCHC RBC AUTO-ENTMCNC: 34.4 G/DL (ref 32–36)
MCV RBC AUTO: 91 FL (ref 82–98)
MONOCYTES # BLD AUTO: 0.6 K/UL (ref 0.3–1)
MONOCYTES NFR BLD: 11.2 % (ref 4–15)
NEUTROPHILS # BLD AUTO: 2.6 K/UL (ref 1.8–7.7)
NEUTROPHILS NFR BLD: 50.2 % (ref 38–73)
NRBC BLD-RTO: 0 /100 WBC
PLATELET # BLD AUTO: 131 K/UL (ref 150–450)
PMV BLD AUTO: 10.8 FL (ref 9.2–12.9)
POTASSIUM SERPL-SCNC: 3.9 MMOL/L (ref 3.5–5.1)
PROT SERPL-MCNC: 6.6 G/DL (ref 6–8.4)
PROTHROMBIN TIME: 12.5 SEC (ref 9–12.5)
RBC # BLD AUTO: 4.7 M/UL (ref 4–5.4)
SODIUM SERPL-SCNC: 136 MMOL/L (ref 136–145)
WBC # BLD AUTO: 5.1 K/UL (ref 3.9–12.7)

## 2024-03-04 PROCEDURE — 85610 PROTHROMBIN TIME: CPT | Performed by: INTERNAL MEDICINE

## 2024-03-04 PROCEDURE — 36415 COLL VENOUS BLD VENIPUNCTURE: CPT | Performed by: INTERNAL MEDICINE

## 2024-03-04 PROCEDURE — 74183 MRI ABD W/O CNTR FLWD CNTR: CPT | Mod: TC

## 2024-03-04 PROCEDURE — A9585 GADOBUTROL INJECTION: HCPCS | Performed by: INTERNAL MEDICINE

## 2024-03-04 PROCEDURE — 25500020 PHARM REV CODE 255: Performed by: INTERNAL MEDICINE

## 2024-03-04 PROCEDURE — 80053 COMPREHEN METABOLIC PANEL: CPT | Performed by: INTERNAL MEDICINE

## 2024-03-04 PROCEDURE — 74183 MRI ABD W/O CNTR FLWD CNTR: CPT | Mod: 26,,, | Performed by: RADIOLOGY

## 2024-03-04 PROCEDURE — 82105 ALPHA-FETOPROTEIN SERUM: CPT | Performed by: INTERNAL MEDICINE

## 2024-03-04 PROCEDURE — 85025 COMPLETE CBC W/AUTO DIFF WBC: CPT | Performed by: INTERNAL MEDICINE

## 2024-03-04 RX ORDER — GADOBUTROL 604.72 MG/ML
10 INJECTION INTRAVENOUS
Status: COMPLETED | OUTPATIENT
Start: 2024-03-04 | End: 2024-03-04

## 2024-03-04 RX ADMIN — GADOBUTROL 10 ML: 604.72 INJECTION INTRAVENOUS at 01:03

## 2024-03-05 ENCOUNTER — TELEPHONE (OUTPATIENT)
Dept: SMOKING CESSATION | Facility: CLINIC | Age: 59
End: 2024-03-05
Payer: MEDICAID

## 2024-03-05 NOTE — TELEPHONE ENCOUNTER
Spoke with patient regarding smoking cessation follow up. Ms. Merchant requests to reschedule appointment for 3/14/24 at 11am.

## 2024-03-11 ENCOUNTER — PATIENT MESSAGE (OUTPATIENT)
Dept: HEPATOLOGY | Facility: CLINIC | Age: 59
End: 2024-03-11
Payer: MEDICAID

## 2024-03-12 ENCOUNTER — PATIENT MESSAGE (OUTPATIENT)
Dept: HEPATOLOGY | Facility: CLINIC | Age: 59
End: 2024-03-12
Payer: MEDICAID

## 2024-03-19 ENCOUNTER — OFFICE VISIT (OUTPATIENT)
Dept: HEPATOLOGY | Facility: CLINIC | Age: 59
End: 2024-03-19
Payer: MEDICAID

## 2024-03-19 VITALS
OXYGEN SATURATION: 96 % | TEMPERATURE: 98 F | SYSTOLIC BLOOD PRESSURE: 137 MMHG | BODY MASS INDEX: 33.75 KG/M2 | DIASTOLIC BLOOD PRESSURE: 69 MMHG | WEIGHT: 222.69 LBS | HEIGHT: 68 IN | HEART RATE: 89 BPM

## 2024-03-19 DIAGNOSIS — K70.31 ALCOHOLIC CIRRHOSIS OF LIVER WITH ASCITES: ICD-10-CM

## 2024-03-19 DIAGNOSIS — K76.9 LIVER DISEASE, UNSPECIFIED: Primary | ICD-10-CM

## 2024-03-19 DIAGNOSIS — K70.31 ALCOHOLIC CIRRHOSIS OF LIVER WITH ASCITES: Primary | ICD-10-CM

## 2024-03-19 DIAGNOSIS — C22.0 HCC (HEPATOCELLULAR CARCINOMA): ICD-10-CM

## 2024-03-19 PROCEDURE — 99214 OFFICE O/P EST MOD 30 MIN: CPT | Mod: S$PBB,,, | Performed by: INTERNAL MEDICINE

## 2024-03-19 PROCEDURE — 99999 PR PBB SHADOW E&M-EST. PATIENT-LVL IV: CPT | Mod: PBBFAC,,, | Performed by: INTERNAL MEDICINE

## 2024-03-19 PROCEDURE — 1159F MED LIST DOCD IN RCRD: CPT | Mod: CPTII,,, | Performed by: INTERNAL MEDICINE

## 2024-03-19 PROCEDURE — 99214 OFFICE O/P EST MOD 30 MIN: CPT | Mod: PBBFAC | Performed by: INTERNAL MEDICINE

## 2024-03-19 PROCEDURE — 3075F SYST BP GE 130 - 139MM HG: CPT | Mod: CPTII,,, | Performed by: INTERNAL MEDICINE

## 2024-03-19 PROCEDURE — 3078F DIAST BP <80 MM HG: CPT | Mod: CPTII,,, | Performed by: INTERNAL MEDICINE

## 2024-03-19 PROCEDURE — 3008F BODY MASS INDEX DOCD: CPT | Mod: CPTII,,, | Performed by: INTERNAL MEDICINE

## 2024-03-19 NOTE — PROGRESS NOTES
"[facility] New Patient (Self-Referral) Note    Reason for Visit:  The encounter diagnosis is Alcoholic Cirrhosis and HCC      HPI:  This is a 56 year lady with history of Restless leg syndrome, bipolar disorder, here for evaluation of her alcoholic cirrhosis diagnosed in 2019 when she had a variceal requiring emergent TIPS in [facility].    She had been following with PCP but has not had hepatology evaluation.  Patient says that she quit drinking 1.5 yrs ago after slowly cutting down since her diagnosis in 2019. Stopped smoking pot 8 months ago.        Interval History: 3/19/24:  Feels sleepy after lunch.    - Does not have confusion, disorientation, or somnolence.  Sleeps from 8:30 PM to 5:30 AM - 6 AM.    - Has to wake up 5 times at night to empty her bladder, sometimes doesn't make it to the bathroom .  She drinks an 8-oz glass an hour before going to bed.  She takes oxybutynin for an overactive bladder, but she states it is not working for her.  Discussed Kegel exercise with her, and encouraged her to try it.       -  Not using marijuana anymore. THC neg since June 2023.    -  In "Smoker Sensation program" to stop smoking cigarettes.  She is down to a 20 cigs/packet over 5 days (4 cigs/day).   -  Benzo positive in urine - takes klonopin which her psychiatrist gives her for panic attacks.       Interval History: 12/11/23:  Patient had a fall 3 times 5-6 days ago on 12/6/23 when she was at her sister's home, at a time when she felt like she was confused "having encephalopathy', had constipation for couple of days.  Ambulance was called, and she was taken to [facility], in [place], was there for 2 days, and sent home on lactulose, I have renewed xifaxan to be sent from [facility] (mail-in).  Has a home health nurse visit her 3-4 times/week.     Recommend:   -  Labs: CBC, CMP, PT INR, drug screen urine every 1 month, next due on January 5th 2024.  -  HCC " surveillance AFP and u/s abd limited every 6 months, next due on June 6th 2024.     -  wean down Remeron and gabapentin (ran out), and stop.  -  Return in 3 months.       Her only complaints are lack of energy fatigue and constipation. The constipation is gradually improving since she became compliant with Lactulose.  She has also been taking Spironolactone and Lasix which has helped considerably with ascites and she has noted some weight loss after fluid removal.    Had episode of HE, could not remember boyfriend's name, was obnoxious, and had to be taken to ER in [facility] in [place]. Admitted for two days. Improved with lactulose.     Recommend minimizing doses of remeron, and gabapentin.      Positive for THC repeatedly (patient visits sisters who smoke).  Patient states she has not used marijuana in a year.  She is willing to avoid visiting her sisters. She can see them on video.    Was positive for opiate on 8/7/23, and today she tells me she took an aspirin pill from her sister's medicine cabinet, and on the urine test, her opiate screen was positive.  The clock starts now to see how long she can negative for opiates, marijuana.     She was declined for liver transplant in 6/2023:   declined due to recent positive toxicology (METH and alcohol) pt needs to complete outpatient program and continue local regional therapy on tumor.    9/18/23:   As above, drug screen positive for opiates.  No evidence of HCC recurrence as of 6/3/2023, based on MRI and AFP.     Wants a liver transplant.    [facility] was going to send us results of weekly urine tests.      Xifaxan 550 mg twice daily, sent to [facility]      Elevated liver enzymes: Yes  Abnormal imaging: Yes  Cirrhosis: Yes  Hepatitis C: No  Hepatitis B: No  Fatty liver: No  Encephalopathy: No  Post-hospital discharge: No  Symptoms: Malaise Fatigue     Primary hepatic manifestations:  Fatigue:  Yes  Edema:No  Ascites:Yes  Encephalopathy:According to patient few days after TIPS none since   Abdominal pain:No  GI bleeds: None since bleeding episode in 2019   Pruritus:No  Weight Changes:No  Changes in Bowel habits: No  Muscle cramps:No    Risk factors for liver disease:  No jaundice  No transfusions  No IVDU  Did not snort cocaine or similar agents  Did not live with anyone with hepatitis B or C  Sexual partner not tested  No hepatotoxic medications  No exposure to industrial toxins  Alcohol: Quit 6 months ago after using hard liquor > 10-15 years    Review of Systems   Constitutional:  Positive for malaise/fatigue. Negative for chills, fever and weight loss.   Respiratory:  Negative for cough.    Cardiovascular:  Negative for chest pain, palpitations and orthopnea.   Gastrointestinal:  Negative for abdominal pain, blood in stool, constipation, diarrhea, heartburn, melena, nausea and vomiting.     Surgical History:  None     Family History: No Family history of Liver disorders      Current Outpatient Medications   Medication Sig    albuterol (PROVENTIL/VENTOLIN HFA) 90 mcg/actuation inhaler Inhale 2 puffs into the lungs every 6 (six) hours as needed for Wheezing or Shortness of Breath.    clonazePAM (KLONOPIN) 0.5 MG tablet Take 0.5 mg by mouth daily as needed.    furosemide (LASIX) 20 MG tablet Take 1 tablet (20 mg total) by mouth 2 (two) times daily.    gabapentin (NEURONTIN) 300 MG capsule TAKE 1 CAPSULE BY MOUTH IN THE EVENING    lactulose (CHRONULAC) 20 gram/30 mL Soln Take 45 mLs (30 g total) by mouth every 6 (six) hours.    mirtazapine (REMERON SOL-TAB) 15 MG disintegrating tablet Take 15 mg by mouth every evening.    nicotine (NICODERM CQ) 14 mg/24 hr Place 1 patch onto the skin once daily.    nicotine polacrilex (NICORETTE) 4 MG Gum Take 1 each (4 mg total) by mouth as needed (Take 1 each (4 mg total) by mouth as needed for tobacco cessation. 1-2 per hour in the place of cigarettes. (5-16 daily max)  - Oral).    ondansetron (ZOFRAN) 4 MG tablet Take 1 tablet (4 mg total) by mouth 2 (two) times daily.    oxybutynin (DITROPAN-XL) 5 MG TR24 Take 1 tablet (5 mg total) by mouth once daily.    pantoprazole (PROTONIX) 20 MG tablet Take 1 tablet by mouth once daily    rifAXIMin (XIFAXAN) 550 mg Tab Take 1 tablet (550 mg total) by mouth 2 (two) times daily.    rOPINIRole (REQUIP) 1 MG tablet Take 1 tablet (1 mg total) by mouth every evening.    spironolactone (ALDACTONE) 100 MG tablet Take 1 tablet (100 mg total) by mouth once daily.    tiotropium-olodateroL (STIOLTO RESPIMAT) 2.5-2.5 mcg/actuation Mist 2 puffs Inhalation Once a day for 30 days    ursodioL (ACTIGALL) 300 mg capsule Take 1 capsule (300 mg total) by mouth 3 (three) times daily. for 14 days    varenicline (CHANTIX) 1 mg Tab Take 1 tablet (1 mg total) by mouth 2 (two) times daily.    VRAYLAR 3 mg Cap Take 3 mg by mouth.     No current facility-administered medications for this visit.     Objective Findings:    Vital Signs:  There were no vitals filed for this visit.          Physical Exam  Vitals reviewed.   Constitutional:       Appearance: Normal appearance. She is not ill-appearing.   Cardiovascular:      Rate and Rhythm: Normal rate.      Pulses: Normal pulses.   Pulmonary:      Effort: Pulmonary effort is normal. No respiratory distress.   Abdominal:      General: Abdomen is flat. Bowel sounds are normal. There is no distension.      Palpations: Abdomen is soft. There is no mass.      Tenderness: There is no abdominal tenderness. There is no guarding.   Musculoskeletal:      Right lower leg: No edema.      Left lower leg: No edema.   Skin:     Capillary Refill: Capillary refill takes less than 2 seconds.      Coloration: Skin is pale. Skin is not jaundiced.      Findings: No erythema.   Neurological:      General: No focal deficit present.      Mental Status: She is alert and oriented to person, place, and time.       MELD 3.0: 14 at 3/4/2024  9:10  AM  MELD-Na: 13 at 3/4/2024  9:10 AM  Calculated from:  Serum Creatinine: 1.0 mg/dL at 3/4/2024  9:10 AM  Serum Sodium: 136 mmol/L at 3/4/2024  9:10 AM  Total Bilirubin: 2.9 mg/dL at 3/4/2024  9:10 AM  Serum Albumin: 3.1 g/dL at 3/4/2024  9:10 AM  INR(ratio): 1.1 at 3/4/2024  9:10 AM  Age at listing (hypothetical): 58 years  Sex: Female at 3/4/2024  9:10 AM       Imaging:     Ultrasound liver    Nodular contour of the liver indicating cirrhotic change.  There is associated diffuse heterogeneity of the hepatic parenchymal echotexture.  Hepatopetal flow in the portal vein.  Previously demonstrated tips shunt is not demonstrated on this exam.  Common duct normal in size measuring 5 mm  2. Cholelithiasis    Endoscopy:  Will evaluate after TIPS stent  evaluation is compleetd    Assessment:   Alcoholic Liver Cirrhosis   Decompensated with ascites and variceal bleeding  S/p TIPS in [facility] (2019), but it is no longer mentioned on the ultrasound   Will Repeat MRI 3/4/24, no HCC recurrence or residual.  Next will be in June 2034 to confirm. recent scan findings.  AFP 3.7 on 3/4/34.   MELD 14 on 3/4/24.       MELD 3.0: 14 at 3/4/2024  9:10 AM  MELD-Na: 13 at 3/4/2024  9:10 AM  Calculated from:  Serum Creatinine: 1.0 mg/dL at 3/4/2024  9:10 AM  Serum Sodium: 136 mmol/L at 3/4/2024  9:10 AM  Total Bilirubin: 2.9 mg/dL at 3/4/2024  9:10 AM  Serum Albumin: 3.1 g/dL at 3/4/2024  9:10 AM  INR(ratio): 1.1 at 3/4/2024  9:10 AM  Age at listing (hypothetical): 58 years  Sex: Female at 3/4/2024  9:10 AM        HCC screen- No focal lesion on MRI in June 2023 and AFP normal.    will repeat in 6 months, in early December 2023     HE- no history, for primary prophylaxis continue Lactulose to target 2-3 soft BM/day   - strict abstinence of alcohol use  - No history of SBP- today no ascites noted continue furosemide 20 mga nd Spironolactone 50 mg.    Recommendations:  -  Labs in 3 months after MRI done:  CBC, CMP, PT INR, AFP.      -   Transplant option discussed, will evaluate when MELD >15 or HCC found in the liver. Transplant evaluation completed 2023.     -  Y-90 was attempted to treat the HCC lesion, but it was met with difficulty. Hence, patient had microwave ablation of segment 6 hypervascular tumor on Jan 25, 2023. Now MRI scheduled for March 2024. , to look for response.      -  HE: Recommend minimizing doses of remeron, and gabapentin.      -  Positive for THC repeatedly (patient visits sisters who smoke).  Patient states she has not used marijuana in 1.5 yrs.  She is willing to avoid visiting her sisters. She can see them on video.  Wants a liver transplant.  [facility] was going to send us results of weekly urine tests.      -  Patient was declined 6/2023 for liver transplant due to recent positive toxicology (METH and alcohol) pt needs to complete outpatient program and continue local regional therapy on tumor.    Return in 3 months.   -  MRI abd w wo contrast and AFP in early June 2024.    -  Routine labs CBC, CMP, PT INR also same day as MRI.   - avoid non-steroidal anti-inflammatory drugs (NSAIDs) such as ibuprofen, Motrin, naprosyn, Alleve due to the risk of kidney damage  - can take acetaminophen (Tylenol), no more than 2000 mg per day  - low sodium (salt) 2 gram per day diet  - nutrition: 25-30kcal (calorie per body body weight in kilogram) per day  - no need to restrict protein in diet  - high protein diet: 1.2-1.5 gram/kg (protein per body weight in kilogram) per day to prevent muscle mass loss  - avoid fasting  - Late night snack with 50 gram of complex carbohydrates and protein  - resistance exercises for muscle strength  - avoid raw seafoods due to the risk of fatal Vibrio vulnificus infection  - Upper endoscopy every 1-2 years to screen for varices in the stomach and foodpipe which can burst and cause fatal bleeding

## 2024-03-19 NOTE — Clinical Note
Return in 3 months.  -  MRI abd w wo contrast and AFP in early June 2024.   -  Routine labs CBC, CMP, PT INR also same day as MRI.  - avoid non-steroidal anti-inflammatory drugs (NSAIDs) such as ibuprofen, Motrin, naprosyn, Alleve due to the risk of kidney damage - can take acetaminophen (Tylenol), no more than 2000 mg per day - low sodium (salt) 2 gram per day diet - nutrition: 25-30kcal (calorie per body body weight in kilogram) per day - no need to restrict protein in diet - high protein diet: 1.2-1.5 gram/kg (protein per body weight in kilogram) per day to prevent muscle mass loss - avoid fasting - Late night snack with 50 gram of complex carbohydrates and protein - resistance exercises for muscle strength - avoid raw seafoods due to the risk of fatal Vibrio vulnificus infection - Upper endoscopy every 1-2 years to screen for varices in the stomach and foodpipe which can burst and cause fatal bleeding

## 2024-03-21 ENCOUNTER — CLINICAL SUPPORT (OUTPATIENT)
Dept: SMOKING CESSATION | Facility: CLINIC | Age: 59
End: 2024-03-21
Payer: COMMERCIAL

## 2024-03-21 DIAGNOSIS — F17.200 NICOTINE DEPENDENCE: ICD-10-CM

## 2024-03-21 PROCEDURE — 99403 PREV MED CNSL INDIV APPRX 45: CPT | Mod: S$GLB,,,

## 2024-03-21 PROCEDURE — 99999 PR PBB SHADOW E&M-EST. PATIENT-LVL I: CPT | Mod: PBBFAC,,,

## 2024-03-21 RX ORDER — VARENICLINE TARTRATE 1 MG/1
1 TABLET, FILM COATED ORAL 2 TIMES DAILY
Qty: 56 TABLET | Refills: 0 | Status: SHIPPED | OUTPATIENT
Start: 2024-03-21 | End: 2024-04-25

## 2024-03-21 NOTE — PROGRESS NOTES
Individual Follow-Up Form    3/21/2024    Quit Date: TBD    Clinical Status of Patient: Outpatient    Length of Service: 45 minutes    Continuing Medication: yes  Chantix    Other Medications: none     Target Symptoms: Withdrawal and medication side effects. The following were  rated moderate (3) to severe (4) on TCRS:  Moderate (3): desire/crave tobacco  Severe (4): none    Comments: Spoke with patient regarding progress made with smoking cessation journey. Patient is very happy to report that she is down to 1-3 cpd at this time. Commended pt for effort thus far and encouraged to remain positive and focus on small goals. Pt remains on cessation medication 1 mg chantix BID with no adverse effects, low moods, or unusual changes in behaviors to note at this time. Completion of TCRS (Tobacco Cessation Rating Scale) reviewed strategies, cues, triggers, negative impact of tobacco on health, the health advantages of discontinuing the use of tobacco, time line improved health changes after a quit, withdrawal issues to expect from nicotine and habit, and ways to achieve the goal of a quit. Pt to consider new goal quit date. She has family visiting for theresa and feels very optimistic. FU schedule in 3 weeks.    Diagnosis: F17.200    Next Visit: 3 weeks

## 2024-04-14 DIAGNOSIS — K70.30 ALCOHOLIC CIRRHOSIS OF LIVER WITHOUT ASCITES: Primary | ICD-10-CM

## 2024-04-15 ENCOUNTER — LAB VISIT (OUTPATIENT)
Dept: LAB | Facility: HOSPITAL | Age: 59
End: 2024-04-15
Attending: NURSE PRACTITIONER
Payer: MEDICAID

## 2024-04-15 DIAGNOSIS — E80.6 HYPERBILIRUBINEMIA: Primary | ICD-10-CM

## 2024-04-15 LAB
ALBUMIN SERPL BCP-MCNC: 2.9 G/DL (ref 3.5–5.2)
ALP SERPL-CCNC: 221 U/L (ref 55–135)
ALT SERPL W/O P-5'-P-CCNC: 39 U/L (ref 10–44)
ANION GAP SERPL CALC-SCNC: 8 MMOL/L (ref 3–11)
AST SERPL-CCNC: 41 U/L (ref 10–40)
BASOPHILS # BLD AUTO: 0.08 K/UL (ref 0–0.2)
BASOPHILS NFR BLD: 1.5 % (ref 0–1.9)
BILIRUB SERPL-MCNC: 2.8 MG/DL (ref 0.1–1)
BUN SERPL-MCNC: 8 MG/DL (ref 6–20)
CALCIUM SERPL-MCNC: 9.4 MG/DL (ref 8.7–10.5)
CHLORIDE SERPL-SCNC: 111 MMOL/L (ref 95–110)
CHOLEST SERPL-MCNC: 126 MG/DL (ref 120–199)
CHOLEST/HDLC SERPL: 1.7 {RATIO} (ref 2–5)
CO2 SERPL-SCNC: 22 MMOL/L (ref 23–29)
CREAT SERPL-MCNC: 1.3 MG/DL (ref 0.5–1.4)
DIFFERENTIAL METHOD BLD: ABNORMAL
EOSINOPHIL # BLD AUTO: 0.2 K/UL (ref 0–0.5)
EOSINOPHIL NFR BLD: 3.9 % (ref 0–8)
ERYTHROCYTE [DISTWIDTH] IN BLOOD BY AUTOMATED COUNT: 14.9 % (ref 11.5–14.5)
EST. GFR  (NO RACE VARIABLE): 47.4 ML/MIN/1.73 M^2
GLUCOSE SERPL-MCNC: 214 MG/DL (ref 70–110)
HCT VFR BLD AUTO: 43.5 % (ref 37–48.5)
HDLC SERPL-MCNC: 75 MG/DL (ref 40–75)
HDLC SERPL: 59.5 % (ref 20–50)
HGB BLD-MCNC: 15 G/DL (ref 12–16)
IMM GRANULOCYTES # BLD AUTO: 0.01 K/UL (ref 0–0.04)
IMM GRANULOCYTES NFR BLD AUTO: 0.2 % (ref 0–0.5)
LDLC SERPL CALC-MCNC: 36.6 MG/DL (ref 63–159)
LYMPHOCYTES # BLD AUTO: 2 K/UL (ref 1–4.8)
LYMPHOCYTES NFR BLD: 36.5 % (ref 18–48)
MCH RBC QN AUTO: 31.5 PG (ref 27–31)
MCHC RBC AUTO-ENTMCNC: 34.5 G/DL (ref 32–36)
MCV RBC AUTO: 91 FL (ref 82–98)
MONOCYTES # BLD AUTO: 0.7 K/UL (ref 0.3–1)
MONOCYTES NFR BLD: 13 % (ref 4–15)
NEUTROPHILS # BLD AUTO: 2.4 K/UL (ref 1.8–7.7)
NEUTROPHILS NFR BLD: 44.9 % (ref 38–73)
NONHDLC SERPL-MCNC: 51 MG/DL
NRBC BLD-RTO: 0 /100 WBC
PLATELET # BLD AUTO: 154 K/UL (ref 150–450)
PMV BLD AUTO: 10.6 FL (ref 9.2–12.9)
POTASSIUM SERPL-SCNC: 3 MMOL/L (ref 3.5–5.1)
PROT SERPL-MCNC: 6.2 G/DL (ref 6–8.4)
RBC # BLD AUTO: 4.76 M/UL (ref 4–5.4)
SODIUM SERPL-SCNC: 141 MMOL/L (ref 136–145)
TRIGL SERPL-MCNC: 72 MG/DL (ref 30–150)
TSH SERPL DL<=0.005 MIU/L-ACNC: 2.25 UIU/ML (ref 0.4–4)
WBC # BLD AUTO: 5.4 K/UL (ref 3.9–12.7)

## 2024-04-15 PROCEDURE — 36415 COLL VENOUS BLD VENIPUNCTURE: CPT | Performed by: NURSE PRACTITIONER

## 2024-04-15 PROCEDURE — 85025 COMPLETE CBC W/AUTO DIFF WBC: CPT | Performed by: NURSE PRACTITIONER

## 2024-04-15 PROCEDURE — 84443 ASSAY THYROID STIM HORMONE: CPT | Performed by: NURSE PRACTITIONER

## 2024-04-15 PROCEDURE — 80061 LIPID PANEL: CPT | Performed by: NURSE PRACTITIONER

## 2024-04-15 PROCEDURE — 80053 COMPREHEN METABOLIC PANEL: CPT | Performed by: NURSE PRACTITIONER

## 2024-04-16 RX ORDER — LACTULOSE 10 G/15ML
30 SOLUTION ORAL EVERY 6 HOURS
Qty: 2700 ML | Refills: 1 | Status: CANCELLED | OUTPATIENT
Start: 2024-04-16

## 2024-04-17 DIAGNOSIS — K70.30 ALCOHOLIC CIRRHOSIS OF LIVER WITHOUT ASCITES: Primary | ICD-10-CM

## 2024-04-17 RX ORDER — LACTULOSE 10 G/15ML
SOLUTION ORAL
Refills: 0 | OUTPATIENT
Start: 2024-04-17

## 2024-04-17 RX ORDER — LACTULOSE 10 G/15ML
30 SOLUTION ORAL 3 TIMES DAILY
Qty: 1892 ML | Refills: 8 | Status: SHIPPED | OUTPATIENT
Start: 2024-04-17

## 2024-04-17 RX ORDER — LACTULOSE 10 G/15ML
30 SOLUTION ORAL EVERY 6 HOURS
Qty: 2700 ML | Refills: 1 | OUTPATIENT
Start: 2024-04-17

## 2024-04-25 ENCOUNTER — CLINICAL SUPPORT (OUTPATIENT)
Dept: SMOKING CESSATION | Facility: CLINIC | Age: 59
End: 2024-04-25
Payer: COMMERCIAL

## 2024-04-25 DIAGNOSIS — F17.200 NICOTINE DEPENDENCE: ICD-10-CM

## 2024-04-25 PROCEDURE — 99999 PR PBB SHADOW E&M-EST. PATIENT-LVL I: CPT | Mod: PBBFAC,,,

## 2024-04-25 PROCEDURE — 99403 PREV MED CNSL INDIV APPRX 45: CPT | Mod: S$GLB,,,

## 2024-04-25 RX ORDER — IBUPROFEN 200 MG
1 TABLET ORAL DAILY
Qty: 28 PATCH | Refills: 0 | Status: SHIPPED | OUTPATIENT
Start: 2024-04-25

## 2024-04-25 NOTE — PROGRESS NOTES
Individual Follow-Up Form    4/25/2024    Quit Date: TBD    Clinical Status of Patient: Outpatient    Length of Service: 45 minutes    Continuing Medication: yes  Patches (Chantix Dc'd)    Other Medications: none     Target Symptoms: Withdrawal and medication side effects. The following were  rated moderate (3) to severe (4) on TCRS:  Moderate (3): desire/crave tobacco, nightmares, nausea  Severe (4): none    Comments: Patient seen in clinic regarding smoking progress update. Pt reports that she is smoking approximately 5 cpd between the times of 10am-4pm. She has also stopped smoking in her car. Commended pt for effort thus far and encouraged her to attempt a dry run. Patient has discontinued chantix due to nightmares and nausea. New order placed for 14 mg nicotine patch QD and reviewed directions for use with patient. Reviewed strategies, relaxation techniques, cues, and triggers. Pt's exhaled carbon monoxide level was?6?ppm as per Smoker[employer]zer. (non- smoker = 0-5 ppm.)? Pt to continue with counseling in?2?weeks.     Diagnosis: F17.200    Next Visit: 2 weeks

## 2024-05-06 ENCOUNTER — CLINICAL SUPPORT (OUTPATIENT)
Dept: SMOKING CESSATION | Facility: CLINIC | Age: 59
End: 2024-05-06
Payer: COMMERCIAL

## 2024-05-06 ENCOUNTER — OFFICE VISIT (OUTPATIENT)
Dept: PRIMARY CARE CLINIC | Facility: CLINIC | Age: 59
End: 2024-05-06
Payer: MEDICAID

## 2024-05-06 VITALS
HEIGHT: 68 IN | WEIGHT: 219 LBS | SYSTOLIC BLOOD PRESSURE: 130 MMHG | HEART RATE: 94 BPM | RESPIRATION RATE: 18 BRPM | OXYGEN SATURATION: 97 % | BODY MASS INDEX: 33.19 KG/M2 | DIASTOLIC BLOOD PRESSURE: 86 MMHG

## 2024-05-06 DIAGNOSIS — K80.20 CALCULUS OF GALLBLADDER WITHOUT CHOLECYSTITIS WITHOUT OBSTRUCTION: ICD-10-CM

## 2024-05-06 DIAGNOSIS — K83.1 CHOLESTASIS: ICD-10-CM

## 2024-05-06 DIAGNOSIS — F17.218 CIGARETTE NICOTINE DEPENDENCE WITH OTHER NICOTINE-INDUCED DISORDER: ICD-10-CM

## 2024-05-06 DIAGNOSIS — F17.200 NICOTINE DEPENDENCE: Primary | ICD-10-CM

## 2024-05-06 DIAGNOSIS — E66.9 CLASS 1 OBESITY WITH SERIOUS COMORBIDITY AND BODY MASS INDEX (BMI) OF 32.0 TO 32.9 IN ADULT, UNSPECIFIED OBESITY TYPE: ICD-10-CM

## 2024-05-06 DIAGNOSIS — J44.9 COPD WITHOUT EXACERBATION: ICD-10-CM

## 2024-05-06 DIAGNOSIS — F41.9 ANXIETY: Primary | ICD-10-CM

## 2024-05-06 PROCEDURE — 3075F SYST BP GE 130 - 139MM HG: CPT | Mod: CPTII,,, | Performed by: STUDENT IN AN ORGANIZED HEALTH CARE EDUCATION/TRAINING PROGRAM

## 2024-05-06 PROCEDURE — 99214 OFFICE O/P EST MOD 30 MIN: CPT | Mod: PBBFAC | Performed by: STUDENT IN AN ORGANIZED HEALTH CARE EDUCATION/TRAINING PROGRAM

## 2024-05-06 PROCEDURE — 99404 PREV MED CNSL INDIV APPRX 60: CPT | Mod: S$GLB,,,

## 2024-05-06 PROCEDURE — 3008F BODY MASS INDEX DOCD: CPT | Mod: CPTII,,, | Performed by: STUDENT IN AN ORGANIZED HEALTH CARE EDUCATION/TRAINING PROGRAM

## 2024-05-06 PROCEDURE — 99999 PR PBB SHADOW E&M-EST. PATIENT-LVL IV: CPT | Mod: PBBFAC,,, | Performed by: STUDENT IN AN ORGANIZED HEALTH CARE EDUCATION/TRAINING PROGRAM

## 2024-05-06 PROCEDURE — 1160F RVW MEDS BY RX/DR IN RCRD: CPT | Mod: CPTII,,, | Performed by: STUDENT IN AN ORGANIZED HEALTH CARE EDUCATION/TRAINING PROGRAM

## 2024-05-06 PROCEDURE — 99999 PR PBB SHADOW E&M-EST. PATIENT-LVL I: CPT | Mod: PBBFAC,,,

## 2024-05-06 PROCEDURE — 3079F DIAST BP 80-89 MM HG: CPT | Mod: CPTII,,, | Performed by: STUDENT IN AN ORGANIZED HEALTH CARE EDUCATION/TRAINING PROGRAM

## 2024-05-06 PROCEDURE — 99214 OFFICE O/P EST MOD 30 MIN: CPT | Mod: S$PBB,,, | Performed by: STUDENT IN AN ORGANIZED HEALTH CARE EDUCATION/TRAINING PROGRAM

## 2024-05-06 PROCEDURE — 1159F MED LIST DOCD IN RCRD: CPT | Mod: CPTII,,, | Performed by: STUDENT IN AN ORGANIZED HEALTH CARE EDUCATION/TRAINING PROGRAM

## 2024-05-06 RX ORDER — TIOTROPIUM BROMIDE AND OLODATEROL 3.124; 2.736 UG/1; UG/1
2 SPRAY, METERED RESPIRATORY (INHALATION) DAILY
Qty: 4 G | Refills: 11 | Status: SHIPPED | OUTPATIENT
Start: 2024-05-06

## 2024-05-06 RX ORDER — ALBUTEROL SULFATE 90 UG/1
2 AEROSOL, METERED RESPIRATORY (INHALATION) EVERY 6 HOURS PRN
Qty: 18 G | Refills: 3 | Status: SHIPPED | OUTPATIENT
Start: 2024-05-06

## 2024-05-06 RX ORDER — URSODIOL 300 MG/1
300 CAPSULE ORAL 3 TIMES DAILY
Qty: 90 CAPSULE | Refills: 5 | Status: SHIPPED | OUTPATIENT
Start: 2024-05-06

## 2024-05-06 NOTE — ASSESSMENT & PLAN NOTE
Known condition, still present on recent findings on MRI (3/4/24) of abdomen. Patient asymptomatic at this time. Continue to monitor.

## 2024-05-06 NOTE — ASSESSMENT & PLAN NOTE
Mood stable. Feeling well overall. Continue with below. Patient currently meeting with  specialist once a month.   Klonopin use once 2-3 times per week.   - discussed mindfulness at home   - free relaxation exercises to read and listen at [email or web address]  - f/u notes from  specialist

## 2024-05-06 NOTE — ASSESSMENT & PLAN NOTE
Stable weight.   Body mass index is 33.3 kg/m². Morbid obesity complicates all aspects of disease management from diagnostic modalities to treatment. Weight loss encouraged and health benefits explained to patient.   Recommendations:   Stay physically active. As tolerated alternate resistance training with stretching and cardio. Goal of 150 minutes per week of moderate intensity activity or 7,500 - 10,000 steps per day. Follow the Mediterranean Diet. Include whole fresh fruits, vegetables, olive oil, seeds, nuts, whole grains, cold water fish, salmon, mackerel and lean cuts of meat.  Do not drink sugary/diet carbonated beverages. Decrease portion sizes slightly which will result in an approximately 500-calorie deficit. Avoid fast or fried and processed food, especially canned foods. Avoid refined carbohydrates, white starchy foods, flour, white potato, bread, muffins, and cakes. Consider substituting one meal a day with a meal replacement such as Slim fast, lean cuisine, or weight watcher's. Follow a healthy diet that includes enough calcium, vitamin D and proteins for bone health.

## 2024-05-06 NOTE — ASSESSMENT & PLAN NOTE
Patient back on schedule meeting with smoking cessation specialist. Off Chantix.   Currently smoking 1/2 pack per day.

## 2024-05-06 NOTE — PROGRESS NOTES
[facility] Note    HPI:  Shonda Borrego is a 58 y.o. female who presents today for Follow-up (4 month follow up )  58 year old with hepatocellular carcinoma, alcoholic cirrhosis, anxiety, history of substance abuse.  Denies new symptoms.   Has been following hepatology specialist closely, MELD score stable. No recent medication changes.   Patient has been told that she will likely have to live with her current liver she has and continue current regimen.    Patient denies excessive swelling in her abdomen, legs bilaterally, brain fog, mood changes, dizziness.  Denies fever, chills, vision changes, chest pain, palpitations, shortness of breath, abdominal pain, excessive diarrhea, constipation.    ROS   A review of systems was performed and was negative except as noted above.    I personally reviewed allergies, past medical, surgical, social and family history and updated as appropriate.    Medications:    Current Outpatient Medications:     clonazePAM (KLONOPIN) 0.5 MG tablet, Take 0.5 mg by mouth daily as needed., Disp: , Rfl:     CONSTULOSE 10 gram/15 mL solution, Take 30 mLs (20 g total) by mouth 3 (three) times daily., Disp: 1892 mL, Rfl: 8    furosemide (LASIX) 20 MG tablet, Take 1 tablet (20 mg total) by mouth 2 (two) times daily., Disp: 60 tablet, Rfl: 11    gabapentin (NEURONTIN) 300 MG capsule, TAKE 1 CAPSULE BY MOUTH IN THE EVENING, Disp: 30 capsule, Rfl: 0    mirtazapine (REMERON SOL-TAB) 15 MG disintegrating tablet, Take 15 mg by mouth every evening., Disp: , Rfl:     nicotine (NICODERM CQ) 14 mg/24 hr, Place 1 patch onto the skin once daily., Disp: 28 patch, Rfl: 0    nicotine polacrilex (NICORETTE) 4 MG Gum, Take 1 each (4 mg total) by mouth as needed (Take 1 each (4 mg total) by mouth as needed for tobacco cessation. 1-2 per hour in the place of cigarettes. (5-16 daily max) - Oral)., Disp: 300 each, Rfl: 0    ondansetron (ZOFRAN) 4 MG tablet, Take 1 tablet (4 mg total) by mouth 2 (two)  times daily., Disp: 30 tablet, Rfl: 0    oxybutynin (DITROPAN-XL) 5 MG TR24, Take 1 tablet (5 mg total) by mouth once daily., Disp: 30 tablet, Rfl: 11    pantoprazole (PROTONIX) 20 MG tablet, Take 1 tablet by mouth once daily, Disp: 90 tablet, Rfl: 0    rifAXIMin (XIFAXAN) 550 mg Tab, Take 1 tablet (550 mg total) by mouth 2 (two) times daily., Disp: 60 tablet, Rfl: 11    spironolactone (ALDACTONE) 100 MG tablet, Take 1 tablet (100 mg total) by mouth once daily., Disp: 30 tablet, Rfl: 5    VRAYLAR 3 mg Cap, Take 3 mg by mouth., Disp: , Rfl:     albuterol (PROVENTIL/VENTOLIN HFA) 90 mcg/actuation inhaler, Inhale 2 puffs into the lungs every 6 (six) hours as needed for Wheezing or Shortness of Breath., Disp: 18 g, Rfl: 3    rOPINIRole (REQUIP) 1 MG tablet, Take 1 tablet (1 mg total) by mouth every evening., Disp: 90 tablet, Rfl: 1    tiotropium-olodateroL (STIOLTO RESPIMAT) 2.5-2.5 mcg/actuation Mist, Inhale 2 puffs into the lungs once daily. Controller, Disp: 4 g, Rfl: 11    ursodioL (ACTIGALL) 300 mg capsule, Take 1 capsule (300 mg total) by mouth 3 (three) times daily., Disp: 90 capsule, Rfl: 5     Health Maintenance:  Immunization History   Administered Date(s) Administered    COVID-19 MRNA, LN-S PF (MODERNA HALF 0.25 ML DOSE) 12/09/2021    COVID-19 Vaccine 05/12/2021, 06/09/2021, 12/09/2021    COVID-19, MRNA, LN-S, PF (MODERNA FULL 0.5 ML DOSE) 05/12/2021, 06/09/2021    COVID-19, MRNA, LN-S, PF (Pfizer) (Gray Cap) 06/03/2022    COVID-19, MRNA, LN-S, PF (Pfizer) (Purple Cap) 06/09/2021, 09/25/2022    COVID-19, mRNA, LNP-S, PF (Moderna 2023)Ages 12+ 11/02/2023    COVID-19, mRNA, LNP-S, bivalent booster, PF (Moderna Omicron)12 + YEARS 09/25/2022    COVID-19, mRNA, LNP-S, bivalent booster, PF (PFIZER OMICRON YEARS 5-11) 09/25/2022    Hepatitis A, Adult 03/23/2023, 09/26/2023    Hepatitis B, Adult 04/24/2023, 05/26/2023, 11/09/2023    Influenza (FLUBLOK) - Quadrivalent - Recombinant - PF *Preferred* (egg allergy)  "10/09/2023    Influenza - Quadrivalent - PF *Preferred* (6 months and older) 09/25/2022    Influenza - Trivalent (ADULT) 10/20/2021    Pneumococcal Conjugate - 20 Valent 10/05/2022, 02/07/2023    Tdap 03/03/2022    Zoster Recombinant 03/03/2022, 09/25/2022      Health Maintenance   Topic Date Due    LDCT Lung Screen  01/20/2024    Mammogram  11/22/2024    Lipid Panel  04/15/2029    TETANUS VACCINE  03/03/2032    Colorectal Cancer Screening  08/09/2033    Hepatitis C Screening  Completed    Shingles Vaccine  Completed     Health Maintenance Topics with due status: Not Due       Topic Last Completion Date    TETANUS VACCINE 03/03/2022    Cervical Cancer Screening 04/26/2023    Hemoglobin A1c (Diabetic Prevention Screening) 06/16/2023    Colorectal Cancer Screening 08/09/2023    Mammogram 11/22/2023    Lipid Panel 04/15/2024     Health Maintenance Due   Topic Date Due    LDCT Lung Screen  01/20/2024       PHYSICAL EXAM:  Vitals:    05/06/24 0913   BP: 130/86   BP Location: Right arm   Patient Position: Sitting   BP Method: Large (Automatic)   Pulse: 94   Resp: 18   SpO2: 97%   Weight: 99.3 kg (219 lb)   Height: 5' 8" (1.727 m)     Body mass index is 33.3 kg/m².  Physical Exam  Nursing note reviewed.   Constitutional:       General: She is not in acute distress.     Appearance: She is not ill-appearing, toxic-appearing or diaphoretic.   HENT:      Right Ear: External ear normal.      Left Ear: External ear normal.      Mouth/Throat:      Mouth: Mucous membranes are moist.      Pharynx: Oropharynx is clear.   Eyes:      Extraocular Movements: Extraocular movements intact.   Cardiovascular:      Rate and Rhythm: Normal rate.      Pulses: Normal pulses.   Pulmonary:      Effort: Pulmonary effort is normal. No respiratory distress.      Breath sounds: No wheezing, rhonchi or rales.   Chest:      Chest wall: No tenderness.   Abdominal:      General: Abdomen is flat. There is no distension.      Palpations: Abdomen is soft. " There is no mass.      Tenderness: There is no abdominal tenderness. There is no right CVA tenderness, left CVA tenderness or guarding.   Musculoskeletal:      Cervical back: Normal range of motion and neck supple.      Right lower leg: No edema.      Left lower leg: No edema.   Lymphadenopathy:      Cervical: No cervical adenopathy.   Skin:     General: Skin is warm and dry.      Capillary Refill: Capillary refill takes less than 2 seconds.      Findings: No lesion or rash.   Neurological:      General: No focal deficit present.      Mental Status: She is alert and oriented to person, place, and time. Mental status is at baseline.      Motor: No weakness.      Gait: Gait normal.   Psychiatric:         Mood and Affect: Mood normal.         Behavior: Behavior normal.         Thought Content: Thought content normal.          ASSESSMENT/PLAN:  1. Anxiety  Assessment & Plan:  Mood stable. Feeling well overall. Continue with below. Patient currently meeting with  specialist once a month.   Klonopin use once 2-3 times per week.   - discussed mindfulness at home   - free relaxation exercises to read and listen at [email or web address]  - f/u notes from  specialist          2. Calculus of gallbladder without cholecystitis without obstruction  Assessment & Plan:  Known condition, still present on recent findings on MRI (3/4/24) of abdomen. Patient asymptomatic at this time. Continue to monitor.       Orders:  -     ursodioL (ACTIGALL) 300 mg capsule; Take 1 capsule (300 mg total) by mouth 3 (three) times daily.  Dispense: 90 capsule; Refill: 5    3. Cholestasis  -     ursodioL (ACTIGALL) 300 mg capsule; Take 1 capsule (300 mg total) by mouth 3 (three) times daily.  Dispense: 90 capsule; Refill: 5    4. COPD without exacerbation  -     tiotropium-olodateroL (STIOLTO RESPIMAT) 2.5-2.5 mcg/actuation Mist; Inhale 2 puffs into the lungs once daily. Controller  Dispense: 4 g; Refill: 11  -     albuterol  (PROVENTIL/VENTOLIN HFA) 90 mcg/actuation inhaler; Inhale 2 puffs into the lungs every 6 (six) hours as needed for Wheezing or Shortness of Breath.  Dispense: 18 g; Refill: 3    5. Class 1 obesity with serious comorbidity and body mass index (BMI) of 32.0 to 32.9 in adult, unspecified obesity type  Overview:  Wt Readings from Last 8 Encounters:   05/06/24 99.3 kg (219 lb)   03/19/24 101 kg (222 lb 10.6 oz)   01/08/24 99.8 kg (220 lb)   12/11/23 98.6 kg (217 lb 6 oz)   12/05/23 97.2 kg (214 lb 3.2 oz)   11/22/23 98.4 kg (217 lb)   11/09/23 98.4 kg (217 lb)   09/27/23 97.8 kg (215 lb 9.8 oz)       Assessment & Plan:  Stable weight.   Body mass index is 33.3 kg/m². Morbid obesity complicates all aspects of disease management from diagnostic modalities to treatment. Weight loss encouraged and health benefits explained to patient.   Recommendations:   Stay physically active. As tolerated alternate resistance training with stretching and cardio. Goal of 150 minutes per week of moderate intensity activity or 7,500 - 10,000 steps per day. Follow the Mediterranean Diet. Include whole fresh fruits, vegetables, olive oil, seeds, nuts, whole grains, cold water fish, salmon, mackerel and lean cuts of meat.  Do not drink sugary/diet carbonated beverages. Decrease portion sizes slightly which will result in an approximately 500-calorie deficit. Avoid fast or fried and processed food, especially canned foods. Avoid refined carbohydrates, white starchy foods, flour, white potato, bread, muffins, and cakes. Consider substituting one meal a day with a meal replacement such as Slim fast, lean cuisine, or weight watcher's. Follow a healthy diet that includes enough calcium, vitamin D and proteins for bone health.        6. Cigarette nicotine dependence with other nicotine-induced disorder  Assessment & Plan:  Patient back on schedule meeting with smoking cessation specialist. Off Chantix.   Currently smoking 1/2 pack per day.              Other than changes above, continue current medications and maintain follow up with specialists.      No follow-ups on file.   Recent Results (from the past 2016 hour(s))   CBC Auto Differential    Collection Time: 03/04/24  9:10 AM   Result Value Ref Range    WBC 5.10 3.90 - 12.70 K/uL    RBC 4.70 4.00 - 5.40 M/uL    Hemoglobin 14.7 12.0 - 16.0 g/dL    Hematocrit 42.7 37.0 - 48.5 %    MCV 91 82 - 98 fL    MCH 31.3 (H) 27.0 - 31.0 pg    MCHC 34.4 32.0 - 36.0 g/dL    RDW 14.3 11.5 - 14.5 %    Platelets 131 (L) 150 - 450 K/uL    MPV 10.8 9.2 - 12.9 fL    Immature Granulocytes 0.2 0.0 - 0.5 %    Gran # (ANC) 2.6 1.8 - 7.7 K/uL    Immature Grans (Abs) 0.01 0.00 - 0.04 K/uL    Lymph # 1.7 1.0 - 4.8 K/uL    Mono # 0.6 0.3 - 1.0 K/uL    Eos # 0.2 0.0 - 0.5 K/uL    Baso # 0.05 0.00 - 0.20 K/uL    nRBC 0 0 /100 WBC    Gran % 50.2 38.0 - 73.0 %    Lymph % 33.7 18.0 - 48.0 %    Mono % 11.2 4.0 - 15.0 %    Eosinophil % 3.7 0.0 - 8.0 %    Basophil % 1.0 0.0 - 1.9 %    Differential Method Automated    Protime-INR    Collection Time: 03/04/24  9:10 AM   Result Value Ref Range    Prothrombin Time 12.5 9.0 - 12.5 sec    INR 1.1 0.8 - 1.2   AFP TUMOR MARKER    Collection Time: 03/04/24  9:10 AM   Result Value Ref Range    AFP 3.7 0.0 - 8.4 ng/mL   Comprehensive Metabolic Panel    Collection Time: 03/04/24  9:10 AM   Result Value Ref Range    Sodium 136 136 - 145 mmol/L    Potassium 3.9 3.5 - 5.1 mmol/L    Chloride 107 95 - 110 mmol/L    CO2 26 23 - 29 mmol/L    Glucose 255 (H) 70 - 110 mg/dL    BUN 11 6 - 20 mg/dL    Creatinine 1.0 0.5 - 1.4 mg/dL    Calcium 9.3 8.7 - 10.5 mg/dL    Total Protein 6.6 6.0 - 8.4 g/dL    Albumin 3.1 (L) 3.5 - 5.2 g/dL    Total Bilirubin 2.9 (H) 0.1 - 1.0 mg/dL    Alkaline Phosphatase 198 (H) 55 - 135 U/L    AST 32 10 - 40 U/L    ALT 29 10 - 44 U/L    eGFR >60.0 >60 mL/min/1.73 m^2    Anion Gap 3 3 - 11 mmol/L   CBC Auto Differential    Collection Time: 04/15/24 11:37 AM   Result Value Ref  Range    WBC 5.40 3.90 - 12.70 K/uL    RBC 4.76 4.00 - 5.40 M/uL    Hemoglobin 15.0 12.0 - 16.0 g/dL    Hematocrit 43.5 37.0 - 48.5 %    MCV 91 82 - 98 fL    MCH 31.5 (H) 27.0 - 31.0 pg    MCHC 34.5 32.0 - 36.0 g/dL    RDW 14.9 (H) 11.5 - 14.5 %    Platelets 154 150 - 450 K/uL    MPV 10.6 9.2 - 12.9 fL    Immature Granulocytes 0.2 0.0 - 0.5 %    Gran # (ANC) 2.4 1.8 - 7.7 K/uL    Immature Grans (Abs) 0.01 0.00 - 0.04 K/uL    Lymph # 2.0 1.0 - 4.8 K/uL    Mono # 0.7 0.3 - 1.0 K/uL    Eos # 0.2 0.0 - 0.5 K/uL    Baso # 0.08 0.00 - 0.20 K/uL    nRBC 0 0 /100 WBC    Gran % 44.9 38.0 - 73.0 %    Lymph % 36.5 18.0 - 48.0 %    Mono % 13.0 4.0 - 15.0 %    Eosinophil % 3.9 0.0 - 8.0 %    Basophil % 1.5 0.0 - 1.9 %    Differential Method Automated    Comprehensive Metabolic Panel    Collection Time: 04/15/24 11:37 AM   Result Value Ref Range    Sodium 141 136 - 145 mmol/L    Potassium 3.0 (L) 3.5 - 5.1 mmol/L    Chloride 111 (H) 95 - 110 mmol/L    CO2 22 (L) 23 - 29 mmol/L    Glucose 214 (H) 70 - 110 mg/dL    BUN 8 6 - 20 mg/dL    Creatinine 1.3 0.5 - 1.4 mg/dL    Calcium 9.4 8.7 - 10.5 mg/dL    Total Protein 6.2 6.0 - 8.4 g/dL    Albumin 2.9 (L) 3.5 - 5.2 g/dL    Total Bilirubin 2.8 (H) 0.1 - 1.0 mg/dL    Alkaline Phosphatase 221 (H) 55 - 135 U/L    AST 41 (H) 10 - 40 U/L    ALT 39 10 - 44 U/L    eGFR 47.4 (A) >60 mL/min/1.73 m^2    Anion Gap 8 3 - 11 mmol/L   Lipid Panel    Collection Time: 04/15/24 11:37 AM   Result Value Ref Range    Cholesterol 126 120 - 199 mg/dL    Triglycerides 72 30 - 150 mg/dL    HDL 75 40 - 75 mg/dL    LDL Cholesterol 36.6 (L) 63.0 - 159.0 mg/dL    HDL/Cholesterol Ratio 59.5 (H) 20.0 - 50.0 %    Total Cholesterol/HDL Ratio 1.7 (L) 2.0 - 5.0    Non-HDL Cholesterol 51 mg/dL   TSH    Collection Time: 04/15/24 11:37 AM   Result Value Ref Range    TSH 2.250 0.400 - 4.000 uIU/mL         Geeta Gaines DO  [facility]

## 2024-05-06 NOTE — PROGRESS NOTES
Individual Follow-Up Form    5/6/2024    Quit Date: TBD    Clinical Status of Patient: Outpatient    Length of Service: 60 minutes    Continuing Medication: yes  Patches    Other Medications: none     Target Symptoms: Withdrawal and medication side effects. The following were  rated moderate (3) to severe (4) on TCRS:  Moderate (3): desire/crave tobacco  Severe (4): none    Comments: Patient seen in clinic regarding smoking progress update. Pt reports that she is down to about 3 cpd and is not craving much. Patient expresses that she feels that she is close to quitting. Highly encouraged pt to attempt a dry run. Ms. Merchant continues NRT 14 mg nicotine patch QD with no adverse effects noted at this time. Completion of TCRS (Tobacco Cessation Rating Scale) reviewed strategies, controlling environment, cues, triggers, high risk situations with preparation interventions, understanding urges, cravings, stress and relaxation. Open discussion with intervention discussion. Pt's exhaled carbon monoxide level was?7?ppm as per [employer]. (non- smoker = 0-5 ppm.)? Pt to continue with counseling in?1?week.    Diagnosis: F17.200    Next Visit: 1 week

## 2024-05-13 ENCOUNTER — CLINICAL SUPPORT (OUTPATIENT)
Dept: SMOKING CESSATION | Facility: CLINIC | Age: 59
End: 2024-05-13
Payer: COMMERCIAL

## 2024-05-13 DIAGNOSIS — F17.200 NICOTINE DEPENDENCE: Primary | ICD-10-CM

## 2024-05-13 PROCEDURE — 99402 PREV MED CNSL INDIV APPRX 30: CPT | Mod: S$GLB,,,

## 2024-05-13 PROCEDURE — 99999 PR PBB SHADOW E&M-EST. PATIENT-LVL I: CPT | Mod: PBBFAC,,,

## 2024-05-13 NOTE — PROGRESS NOTES
Individual Follow-Up Form    5/13/2024    Quit Date: TBD    Clinical Status of Patient: Outpatient    Length of Service: 30 minutes    Continuing Medication: yes  Patches    Other Medications: none     Target Symptoms: Withdrawal and medication side effects. The following were  rated moderate (3) to severe (4) on TCRS:  Moderate (3): desire/crave tobacco  Severe (4): none    Comments: Spoke with patient regarding smoking cessation follow up. Pt reports that she continues to smoke about 3 cpd and was able to complete a day long dry run. Encouraged patient to choose a quit date or possibly make a quit attempt before next appointment. Patient continues 14 mg nicotine patch QD with no adverse effects to note at this time. Reviewed learned addiction model, triggers, cues, and short/long term consequences of tobacco use. Commended patient for her ongoing strength and efforts throughout this journey. Encouraged patient to remain positive. Discussed preparations for quit attempt. FU in 3 weeks. Patient gives permission to renew [employer]ess[employer] benefits.    Diagnosis: F17.200    Next Visit: 3 weeks

## 2024-05-17 ENCOUNTER — OFFICE VISIT (OUTPATIENT)
Dept: OBSTETRICS AND GYNECOLOGY | Facility: CLINIC | Age: 59
End: 2024-05-17
Payer: MEDICAID

## 2024-05-17 VITALS
HEIGHT: 68 IN | BODY MASS INDEX: 33.49 KG/M2 | WEIGHT: 221 LBS | SYSTOLIC BLOOD PRESSURE: 152 MMHG | HEART RATE: 82 BPM | DIASTOLIC BLOOD PRESSURE: 70 MMHG

## 2024-05-17 DIAGNOSIS — Z01.419 ENCOUNTER FOR ANNUAL ROUTINE GYNECOLOGICAL EXAMINATION: Primary | ICD-10-CM

## 2024-05-17 DIAGNOSIS — Z78.0 POSTMENOPAUSAL: ICD-10-CM

## 2024-05-17 DIAGNOSIS — N32.81 OVERACTIVE BLADDER: ICD-10-CM

## 2024-05-17 PROCEDURE — 99213 OFFICE O/P EST LOW 20 MIN: CPT | Mod: PBBFAC | Performed by: OBSTETRICS & GYNECOLOGY

## 2024-05-17 PROCEDURE — 3077F SYST BP >= 140 MM HG: CPT | Mod: CPTII,,, | Performed by: OBSTETRICS & GYNECOLOGY

## 2024-05-17 PROCEDURE — 99999 PR PBB SHADOW E&M-EST. PATIENT-LVL III: CPT | Mod: PBBFAC,,, | Performed by: OBSTETRICS & GYNECOLOGY

## 2024-05-17 PROCEDURE — 99396 PREV VISIT EST AGE 40-64: CPT | Mod: S$PBB,,, | Performed by: OBSTETRICS & GYNECOLOGY

## 2024-05-17 PROCEDURE — 3008F BODY MASS INDEX DOCD: CPT | Mod: CPTII,,, | Performed by: OBSTETRICS & GYNECOLOGY

## 2024-05-17 PROCEDURE — 1159F MED LIST DOCD IN RCRD: CPT | Mod: CPTII,,, | Performed by: OBSTETRICS & GYNECOLOGY

## 2024-05-17 PROCEDURE — 3078F DIAST BP <80 MM HG: CPT | Mod: CPTII,,, | Performed by: OBSTETRICS & GYNECOLOGY

## 2024-05-17 PROCEDURE — 1160F RVW MEDS BY RX/DR IN RCRD: CPT | Mod: CPTII,,, | Performed by: OBSTETRICS & GYNECOLOGY

## 2024-05-17 NOTE — PROGRESS NOTES
Subjective:    Patient ID: Shonda Borrego is a 58 y.o. female.     Chief Complaint: Annual Well Woman Exam     History of Present Illness:  Shonda presents today for Annual Well Woman exam. .No LMP recorded. Patient is postmenopausal.. She is currently using no hormone therapy and she reports no problems with hot flashes, night sweats, irritability or vaginal dryness. She denies breast tenderness, denies masses, denies nipple discharge. She denies difficulty with urination. Bowel movements have not significantly changed.     Doing well with the ditropan for overactive bladder.       Menstrual History:   No LMP recorded. Patient is postmenopausal..     OB History          3    Para   3    Term   3            AB        Living             SAB        IAB        Ectopic        Multiple        Live Births                     Review of Systems   Constitutional:  Negative for chills, fatigue and fever.   Respiratory:  Negative for shortness of breath.    Cardiovascular:  Negative for chest pain.   Gastrointestinal:  Negative for abdominal pain, constipation, diarrhea and nausea.   Genitourinary:  Negative for bladder incontinence, dysuria, hot flashes, pelvic pain and vaginal bleeding.   Neurological:  Negative for headaches.   Psychiatric/Behavioral:  Negative for depression.          Objective:    Vital Signs:  Vitals:    24 0823   BP: (!) 152/70   Pulse: 82       Physical Exam:  General:  alert, no distress   Skin:  Skin color, texture, turgor normal. No rashes or lesions   HEENT:  conjunctivae/corneas clear. PERRL.   Neck: supple, trachea midline, no adenopathy or thyromegaly   Respiratory:  clear to auscultation bilaterally   Heart:  regular rate and rhythm, S1, S2 normal, no murmur, click, rub or gallop   Breasts: Left Breast: Nipple protruding. No nipple discharge. No palpable masses, erythema, skin changes, tenderness, or adenopathy.  Right Breast: Nipple protruding. No nipple discharge. No  palpable masses, erythema, skin changes, tenderness, or adenopathy.   Abdomen:  Soft, non-tender. Bowel sounds normal. No masses,  no organomegaly   Pelvis: External genitalia: normal general appearance  Urinary system: urethral meatus normal, bladder nontender  Vaginal: atrophic mucosa  Cervix: normal appearance  Uterus: normal single, nontender  Adnexa: normal bimanual exam   Extremities: Normal ROM; no edema, no cyanosis   Neurological: Normal strength and tone. No focal numbness or weakness. Reflexes 2+ and equal.   Psychiatric: normal mood, speech, dress, and thought processes             Assessment:      1. Encounter for annual routine gynecological examination    2. Overactive bladder    3. Postmenopausal          Plan:      Encounter for annual routine gynecological examination    Overactive bladder    Postmenopausal        Health Maintenance and Screening:   Shonda was counseled on A.C.O.G. Pap guidelines and recommendations for yearly pelvic exams in addition to recommendations for yearly mammograms and monthly self breast exams, and adequate calcium and vitamin D in her diet.          Farzana Mccallum MD, FACOG   05/17/2024 8:44 AM

## 2024-05-23 ENCOUNTER — CLINICAL SUPPORT (OUTPATIENT)
Dept: SMOKING CESSATION | Facility: CLINIC | Age: 59
End: 2024-05-23
Payer: COMMERCIAL

## 2024-05-23 DIAGNOSIS — F17.200 NICOTINE DEPENDENCE: Primary | ICD-10-CM

## 2024-05-23 PROCEDURE — 99999 PR PBB SHADOW E&M-EST. PATIENT-LVL I: CPT | Mod: PBBFAC,,,

## 2024-05-23 PROCEDURE — 99407 BEHAV CHNG SMOKING > 10 MIN: CPT | Mod: S$GLB,,,

## 2024-05-23 NOTE — PROGRESS NOTES
Spoke with patient today in regard to smoking cessation progress for 3 month telephone follow up, she states not tobacco free. Patient is currently enrolled in the program with a scheduled follow up visit and states the use of the nicotine patch. Informed patient of benefit period, future follow ups, and contact information if any further help or support is needed. Will complete smart form for 3 month follow up on Quit attempt #3.

## 2024-06-03 ENCOUNTER — TELEPHONE (OUTPATIENT)
Dept: SMOKING CESSATION | Facility: CLINIC | Age: 59
End: 2024-06-03
Payer: MEDICAID

## 2024-06-03 NOTE — TELEPHONE ENCOUNTER
Attempted to contact patient regarding smoking cessation follow up. There was no answer at this time. [employer] is currently unavailable.

## 2024-06-05 NOTE — ASSESSMENT & PLAN NOTE
Identified pt with two pt identifiers (name and ). Reviewed chart in preparation for visit and have obtained necessary documentation.    Kendra López is a 52 y.o. female  Chief Complaint   Patient presents with    Pain     Reason for Visit: hand pain  Pain level: 2  Patient states hurts to try to griping and turning objects.  Patient does not know how the injury occurred but it is starting to feel better. It is hard to open a door or anything with gripping using the thumb, index, and middle finger         /85 (Site: Right Upper Arm, Position: Sitting, Cuff Size: Large Adult)   Pulse 94   Temp 98.8 °F (37.1 °C) (Oral)   Ht 1.727 m (5' 8\")   Wt 88.7 kg (195 lb 8.8 oz)   SpO2 95%   BMI 29.73 kg/m²     1. Have you been to the ER, urgent care clinic since your last visit?  Hospitalized since your last visit?no    2. Have you seen or consulted any other health care providers outside of the [facility] since your last visit?  Include any pap smears or colon screening. no   Resume home medications.   - PRN ativan 0.5 mg Q12H     allergies           Past Surgical History:  Past Surgical History[]Expand by Default   History reviewed. No pertinent surgical history.     Current Outpatient Medications:       Current Outpatient Medications   Medication Instructions    atorvastatin (LIPITOR) 20 MG tablet 1 tablet, Oral, DAILY    escitalopram (LEXAPRO) 20 mg, Oral, DAILY    HYDROcodone-acetaminophen (NORCO) 5-325 MG per tablet 1 tablet, Oral, EVERY 6 HOURS PRN, Intended supply: 3 days. Take lowest dose possible to manage pain    ibuprofen (ADVIL;MOTRIN) 600 mg, Oral, EVERY 8 HOURS PRN    Lantus SoloStar 40 Units, SubCUTAneous, DAILY    metFORMIN (GLUCOPHAGE) 1000 MG tablet 1 tablet, Oral, 2 TIMES DAILY WITH MEALS    methocarbamol (ROBAXIN-750) 750 mg, Oral, 3 TIMES DAILY PRN    Tylenol 650 mg, Oral, EVERY 6 HOURS PRN    Victoza 1.8 mg, SubCUTAneous, DAILY    Family History:  Family History[]Expand by Default         Family History   Problem Relation Age of Onset    Dementia Mother      Alzheimer's Disease Father      Heart Disease Father      Alzheimer's Disease Mother           Review of Systems  Musculoskeletal is positive for right hand and thumb pain, first webspace pain, and extensor tendon pain.  Denies any paresthesias in the thumb, index finger, or middle finger.  Denies any direct wrist pain, swelling, erythema, or any injury.  Neurologic is normal and peripheral vascular is normal.  Objective:   Right Hand Exam     Tenderness   The patient is experiencing tenderness in the dorsal area, radial area and snuff box.    Range of Motion   The patient has normal right wrist ROM.     Muscle Strength   The patient has normal right wrist strength.    Tests   Phalen’s Sign: negative  Tinel's sign (median nerve): negative  Finkelstein's test: positive    Other   Erythema: absent  Scars: absent  Sensation: normal  Pulse: present    Comments:  She is tender over the basal joint of the thumb and the extensor tendon of the thumb as well as the soft

## 2024-06-10 ENCOUNTER — LAB VISIT (OUTPATIENT)
Dept: LAB | Facility: HOSPITAL | Age: 59
End: 2024-06-10
Attending: INTERNAL MEDICINE
Payer: MEDICAID

## 2024-06-10 DIAGNOSIS — C22.0 HCC (HEPATOCELLULAR CARCINOMA): ICD-10-CM

## 2024-06-10 DIAGNOSIS — C22.0 HEPATOCELLULAR CARCINOMA: ICD-10-CM

## 2024-06-10 LAB
AFP SERPL-MCNC: 3.2 NG/ML (ref 0–8.4)
ALBUMIN SERPL BCP-MCNC: 3 G/DL (ref 3.5–5.2)
ALP SERPL-CCNC: 182 U/L (ref 55–135)
ALT SERPL W/O P-5'-P-CCNC: 50 U/L (ref 10–44)
ANION GAP SERPL CALC-SCNC: 3 MMOL/L (ref 3–11)
AST SERPL-CCNC: 53 U/L (ref 10–40)
BASOPHILS # BLD AUTO: 0.07 K/UL (ref 0–0.2)
BASOPHILS NFR BLD: 1.5 % (ref 0–1.9)
BILIRUB SERPL-MCNC: 2.7 MG/DL (ref 0.1–1)
BUN SERPL-MCNC: 11 MG/DL (ref 6–20)
CALCIUM SERPL-MCNC: 9.5 MG/DL (ref 8.7–10.5)
CHLORIDE SERPL-SCNC: 110 MMOL/L (ref 95–110)
CO2 SERPL-SCNC: 29 MMOL/L (ref 23–29)
CREAT SERPL-MCNC: 1.1 MG/DL (ref 0.5–1.4)
DIFFERENTIAL METHOD BLD: ABNORMAL
EOSINOPHIL # BLD AUTO: 0.2 K/UL (ref 0–0.5)
EOSINOPHIL NFR BLD: 3.3 % (ref 0–8)
ERYTHROCYTE [DISTWIDTH] IN BLOOD BY AUTOMATED COUNT: 14.8 % (ref 11.5–14.5)
EST. GFR  (NO RACE VARIABLE): 57.9 ML/MIN/1.73 M^2
GLUCOSE SERPL-MCNC: 183 MG/DL (ref 70–110)
HCT VFR BLD AUTO: 43.1 % (ref 37–48.5)
HGB BLD-MCNC: 14.9 G/DL (ref 12–16)
IMM GRANULOCYTES # BLD AUTO: 0 K/UL (ref 0–0.04)
IMM GRANULOCYTES NFR BLD AUTO: 0 % (ref 0–0.5)
INR PPP: 1.1 (ref 0.8–1.2)
LYMPHOCYTES # BLD AUTO: 1.6 K/UL (ref 1–4.8)
LYMPHOCYTES NFR BLD: 35.8 % (ref 18–48)
MCH RBC QN AUTO: 31.4 PG (ref 27–31)
MCHC RBC AUTO-ENTMCNC: 34.6 G/DL (ref 32–36)
MCV RBC AUTO: 91 FL (ref 82–98)
MONOCYTES # BLD AUTO: 0.5 K/UL (ref 0.3–1)
MONOCYTES NFR BLD: 11.5 % (ref 4–15)
NEUTROPHILS # BLD AUTO: 2.2 K/UL (ref 1.8–7.7)
NEUTROPHILS NFR BLD: 47.9 % (ref 38–73)
NRBC BLD-RTO: 0 /100 WBC
PLATELET # BLD AUTO: 149 K/UL (ref 150–450)
PMV BLD AUTO: 10.6 FL (ref 9.2–12.9)
POTASSIUM SERPL-SCNC: 3.4 MMOL/L (ref 3.5–5.1)
PROT SERPL-MCNC: 6.7 G/DL (ref 6–8.4)
PROTHROMBIN TIME: 11.7 SEC (ref 9–12.5)
RBC # BLD AUTO: 4.75 M/UL (ref 4–5.4)
SODIUM SERPL-SCNC: 142 MMOL/L (ref 136–145)
WBC # BLD AUTO: 4.53 K/UL (ref 3.9–12.7)

## 2024-06-10 PROCEDURE — 85610 PROTHROMBIN TIME: CPT | Performed by: INTERNAL MEDICINE

## 2024-06-10 PROCEDURE — 80053 COMPREHEN METABOLIC PANEL: CPT | Performed by: INTERNAL MEDICINE

## 2024-06-10 PROCEDURE — 85025 COMPLETE CBC W/AUTO DIFF WBC: CPT | Performed by: INTERNAL MEDICINE

## 2024-06-10 PROCEDURE — 36415 COLL VENOUS BLD VENIPUNCTURE: CPT | Performed by: INTERNAL MEDICINE

## 2024-06-10 PROCEDURE — 82105 ALPHA-FETOPROTEIN SERUM: CPT | Performed by: INTERNAL MEDICINE

## 2024-06-11 DIAGNOSIS — K76.82 HEPATIC ENCEPHALOPATHY: ICD-10-CM

## 2024-06-11 RX ORDER — PANTOPRAZOLE SODIUM 20 MG/1
20 TABLET, DELAYED RELEASE ORAL
Qty: 90 TABLET | Refills: 0 | Status: SHIPPED | OUTPATIENT
Start: 2024-06-11

## 2024-06-11 NOTE — TELEPHONE ENCOUNTER
----- Message from Consuelo Fraga sent at 6/11/2024  9:48 AM CDT -----  Type:  RX Refill Request    Who Called: pt  Refill or New Rx:refil  RX Name and Strength:rifAXIMin (XIFAXAN) 550 mg Tab  How is the patient currently taking it? (ex. 1XDay): Take 1 tablet (550 mg total) by mouth 2 (two) times daily.   Is this a 30 day or 90 day RX:60  [facility]      Local or Mail Order:mail  Ordering Provider:Dr Arreola  Would the patient rather a call back or a response via [facility]? call  Best Call Back Number:   [phone]    Additional Information:

## 2024-06-12 ENCOUNTER — HOSPITAL ENCOUNTER (OUTPATIENT)
Dept: RADIOLOGY | Facility: HOSPITAL | Age: 59
Discharge: HOME | End: 2024-06-12
Attending: INTERNAL MEDICINE
Payer: MEDICAID

## 2024-06-12 DIAGNOSIS — K70.31 ALCOHOLIC CIRRHOSIS OF LIVER WITH ASCITES: ICD-10-CM

## 2024-06-12 DIAGNOSIS — K76.9 LIVER DISEASE, UNSPECIFIED: ICD-10-CM

## 2024-06-12 DIAGNOSIS — C22.0 HCC (HEPATOCELLULAR CARCINOMA): ICD-10-CM

## 2024-06-12 PROCEDURE — 25500020 PHARM REV CODE 255: Performed by: INTERNAL MEDICINE

## 2024-06-12 PROCEDURE — 74183 MRI ABD W/O CNTR FLWD CNTR: CPT | Mod: 26,,, | Performed by: RADIOLOGY

## 2024-06-12 PROCEDURE — 74183 MRI ABD W/O CNTR FLWD CNTR: CPT | Mod: TC

## 2024-06-12 PROCEDURE — A9585 GADOBUTROL INJECTION: HCPCS | Performed by: INTERNAL MEDICINE

## 2024-06-12 RX ORDER — GADOBUTROL 604.72 MG/ML
10 INJECTION INTRAVENOUS
Status: COMPLETED | OUTPATIENT
Start: 2024-06-12 | End: 2024-06-12

## 2024-06-12 RX ADMIN — GADOBUTROL 10 ML: 604.72 INJECTION INTRAVENOUS at 02:06

## 2024-06-20 ENCOUNTER — TELEPHONE (OUTPATIENT)
Dept: SMOKING CESSATION | Facility: CLINIC | Age: 59
End: 2024-06-20
Payer: MEDICAID

## 2024-06-21 ENCOUNTER — PATIENT MESSAGE (OUTPATIENT)
Dept: HEPATOLOGY | Facility: CLINIC | Age: 59
End: 2024-06-21
Payer: MEDICAID

## 2024-06-21 ENCOUNTER — TELEPHONE (OUTPATIENT)
Dept: HEPATOLOGY | Facility: CLINIC | Age: 59
End: 2024-06-21
Payer: MEDICAID

## 2024-06-21 DIAGNOSIS — K76.9 LIVER DISEASE, UNSPECIFIED: Primary | ICD-10-CM

## 2024-06-21 NOTE — TELEPHONE ENCOUNTER
MRI June 2024: no recurrence or new liver lesions (ablation was done Jan 2023, so now it is 1.5 yrs after ablation).   Placed order for 3 month MRI.-  schedule in Sept 2024    ARSENIO Arreola MD

## 2024-06-21 NOTE — TELEPHONE ENCOUNTER
The patient has been contacted to schedule a MRI.  No answer, an voicemail was left with the call back .

## 2024-06-21 NOTE — TELEPHONE ENCOUNTER
The patient was contacted to schedule MRI in September.  No answer, an voicemail was left with the call back .

## 2024-06-26 DIAGNOSIS — G25.81 RESTLESS LEG SYNDROME: ICD-10-CM

## 2024-06-27 ENCOUNTER — CLINICAL SUPPORT (OUTPATIENT)
Dept: SMOKING CESSATION | Facility: CLINIC | Age: 59
End: 2024-06-27
Payer: COMMERCIAL

## 2024-06-27 DIAGNOSIS — F17.200 NICOTINE DEPENDENCE: Primary | ICD-10-CM

## 2024-06-27 PROCEDURE — 99403 PREV MED CNSL INDIV APPRX 45: CPT | Mod: S$GLB,,,

## 2024-06-27 PROCEDURE — 99999 PR PBB SHADOW E&M-EST. PATIENT-LVL I: CPT | Mod: PBBFAC,,,

## 2024-06-27 RX ORDER — IBUPROFEN 200 MG
1 TABLET ORAL DAILY
Qty: 28 PATCH | Refills: 0 | Status: SHIPPED | OUTPATIENT
Start: 2024-06-27

## 2024-06-27 RX ORDER — GABAPENTIN 300 MG/1
CAPSULE ORAL
Qty: 30 CAPSULE | Refills: 0 | Status: SHIPPED | OUTPATIENT
Start: 2024-06-27

## 2024-06-27 NOTE — PROGRESS NOTES
Individual Follow-Up Form    6/27/2024    Quit Date: TBD    Clinical Status of Patient: Outpatient    Length of Service: 45 minutes    Continuing Medication: yes  Patches    Other Medications: none     Target Symptoms: Withdrawal and medication side effects. The following were  rated moderate (3) to severe (4) on TCRS:  Moderate (3): desire/crave tobacco  Severe (4): none    Comments: Spoke with patient regarding smoking cessation follow up. Ms. Merchant states that she is going through a lot of personal family matters that she feels contributed to an increased in tobacco use. She is up to 6 cpd at this time. Discussed healthy coping skills, relaxation techniques, and deep breathing meditations. Pt continues NRT 14 mg nicotine patch QD with no adverse effects noted. Encouraged patient to smoke outside only and to wait 15 minutes prior to smoking in an effort to identify the feelings behind her triggers. Reviewed learned addiction model, triggers, cues, and short/long term consequences of tobacco use. Commended patient for effort thus far. Encouraged Ms. Merchant to remain positive. FU in 2 weeks.    Diagnosis: F17.200    Next Visit: 2 weeks

## 2024-07-18 ENCOUNTER — CLINICAL SUPPORT (OUTPATIENT)
Dept: SMOKING CESSATION | Facility: CLINIC | Age: 59
End: 2024-07-18
Payer: COMMERCIAL

## 2024-07-18 DIAGNOSIS — F17.200 NICOTINE DEPENDENCE: Primary | ICD-10-CM

## 2024-07-18 PROCEDURE — 99402 PREV MED CNSL INDIV APPRX 30: CPT | Mod: S$GLB,,,

## 2024-07-18 NOTE — PROGRESS NOTES
Individual Follow-Up Form    7/18/2024    Quit Date: TBD    Clinical Status of Patient: Outpatient    Length of Service: 30 minutes    Continuing Medication: yes  Patches    Other Medications: none     Target Symptoms: Withdrawal and medication side effects. The following were  rated moderate (3) to severe (4) on TCRS:  Moderate (3): desire/crave tobacco  Severe (4): none    Comments: Spoke with patient regarding smoking cessation follow up. Patient reports that she is doing much better since previous appointment. Ms. Merchant has decreased <5 cpd. She is currently helping her granddaughter with a personal matter and there is no smoking around her. Ms. Merchant feels that this helps her to smoke less. Encouraged patient to continue to smoke outside only and to increased wait times prior to smoking. Commended patient for accomplishments thus far. Pt continues NRT 14 mg nicotine patch QD with no adverse effects noted. Reviewed learned addiction model, triggers, cues, the importance of consistency, small steps, and controlling environment. Goal is to set a date for next quit attempt. FU in 2-3 weeks.    Diagnosis: F17.200    Next Visit: 2 weeks

## 2024-07-25 ENCOUNTER — HOSPITAL ENCOUNTER (EMERGENCY)
Facility: HOSPITAL | Age: 59
Discharge: HOME | End: 2024-07-25
Attending: EMERGENCY MEDICINE
Payer: MEDICAID

## 2024-07-25 VITALS
DIASTOLIC BLOOD PRESSURE: 72 MMHG | HEART RATE: 84 BPM | RESPIRATION RATE: 20 BRPM | TEMPERATURE: 98 F | OXYGEN SATURATION: 99 % | SYSTOLIC BLOOD PRESSURE: 142 MMHG

## 2024-07-25 DIAGNOSIS — N39.0 URINARY TRACT INFECTION WITHOUT HEMATURIA, SITE UNSPECIFIED: Primary | ICD-10-CM

## 2024-07-25 DIAGNOSIS — R79.89 INCREASED AMMONIA LEVEL: ICD-10-CM

## 2024-07-25 LAB
ALBUMIN SERPL BCP-MCNC: 3.1 G/DL (ref 3.5–5.2)
ALP SERPL-CCNC: 138 U/L (ref 55–135)
ALT SERPL W/O P-5'-P-CCNC: 35 U/L (ref 10–44)
AMMONIA PLAS-SCNC: 59 UMOL/L (ref 10–50)
AMPHET+METHAMPHET UR QL: NEGATIVE
ANION GAP SERPL CALC-SCNC: 9 MMOL/L (ref 3–11)
AST SERPL-CCNC: 40 U/L (ref 10–40)
BACTERIA #/AREA URNS HPF: ABNORMAL /HPF
BARBITURATES UR QL SCN>200 NG/ML: NEGATIVE
BASOPHILS # BLD AUTO: 0.07 K/UL (ref 0–0.2)
BASOPHILS NFR BLD: 1.4 % (ref 0–1.9)
BENZODIAZ UR QL SCN>200 NG/ML: NEGATIVE
BILIRUB SERPL-MCNC: 3.5 MG/DL (ref 0.1–1)
BILIRUB UR QL STRIP: NEGATIVE
BUN SERPL-MCNC: 8 MG/DL (ref 6–20)
BZE UR QL SCN: NEGATIVE
CALCIUM SERPL-MCNC: 9.1 MG/DL (ref 8.7–10.5)
CANNABINOIDS UR QL SCN: NEGATIVE
CHLORIDE SERPL-SCNC: 110 MMOL/L (ref 95–110)
CLARITY UR: ABNORMAL
CO2 SERPL-SCNC: 26 MMOL/L (ref 23–29)
COLOR UR: YELLOW
CREAT SERPL-MCNC: 0.9 MG/DL (ref 0.5–1.4)
CREAT UR-MCNC: 99.9 MG/DL (ref 15–325)
DIFFERENTIAL METHOD BLD: ABNORMAL
EOSINOPHIL # BLD AUTO: 0.1 K/UL (ref 0–0.5)
EOSINOPHIL NFR BLD: 2.9 % (ref 0–8)
ERYTHROCYTE [DISTWIDTH] IN BLOOD BY AUTOMATED COUNT: 14.6 % (ref 11.5–14.5)
EST. GFR  (NO RACE VARIABLE): >60 ML/MIN/1.73 M^2
ETHANOL SERPL-MCNC: <3 MG/DL
GLUCOSE SERPL-MCNC: 144 MG/DL (ref 70–110)
GLUCOSE UR QL STRIP: NEGATIVE
HCT VFR BLD AUTO: 40.3 % (ref 37–48.5)
HGB BLD-MCNC: 14.1 G/DL (ref 12–16)
HGB UR QL STRIP: NEGATIVE
HYALINE CASTS #/AREA URNS LPF: 0.7 /LPF
IMM GRANULOCYTES # BLD AUTO: 0.01 K/UL (ref 0–0.04)
IMM GRANULOCYTES NFR BLD AUTO: 0.2 % (ref 0–0.5)
KETONES UR QL STRIP: NEGATIVE
LEUKOCYTE ESTERASE UR QL STRIP: ABNORMAL
LYMPHOCYTES # BLD AUTO: 1.5 K/UL (ref 1–4.8)
LYMPHOCYTES NFR BLD: 30.3 % (ref 18–48)
MCH RBC QN AUTO: 31.5 PG (ref 27–31)
MCHC RBC AUTO-ENTMCNC: 35 G/DL (ref 32–36)
MCV RBC AUTO: 90 FL (ref 82–98)
METHADONE UR QL SCN>300 NG/ML: NEGATIVE
MICROSCOPIC COMMENT: ABNORMAL
MONOCYTES # BLD AUTO: 0.5 K/UL (ref 0.3–1)
MONOCYTES NFR BLD: 10.5 % (ref 4–15)
NEUTROPHILS # BLD AUTO: 2.7 K/UL (ref 1.8–7.7)
NEUTROPHILS NFR BLD: 54.7 % (ref 38–73)
NITRITE UR QL STRIP: POSITIVE
NRBC BLD-RTO: 0 /100 WBC
OPIATES UR QL SCN: NEGATIVE
PCP UR QL SCN>25 NG/ML: NEGATIVE
PH UR STRIP: 8 [PH] (ref 5–8)
PLATELET # BLD AUTO: 138 K/UL (ref 150–450)
PMV BLD AUTO: 11.1 FL (ref 9.2–12.9)
POTASSIUM SERPL-SCNC: 3.1 MMOL/L (ref 3.5–5.1)
PROT SERPL-MCNC: 6.5 G/DL (ref 6–8.4)
PROT UR QL STRIP: NEGATIVE
RBC # BLD AUTO: 4.47 M/UL (ref 4–5.4)
RBC #/AREA URNS HPF: 1 /HPF (ref 0–4)
SODIUM SERPL-SCNC: 145 MMOL/L (ref 136–145)
SP GR UR STRIP: 1.01 (ref 1–1.03)
SQUAMOUS #/AREA URNS HPF: 3 /HPF
TOXICOLOGY INFORMATION: NORMAL
URN SPEC COLLECT METH UR: ABNORMAL
UROBILINOGEN UR STRIP-ACNC: 1 EU/DL
WBC # BLD AUTO: 4.85 K/UL (ref 3.9–12.7)
WBC #/AREA URNS HPF: 4 /HPF (ref 0–5)

## 2024-07-25 PROCEDURE — 63600175 PHARM REV CODE 636 W HCPCS: Performed by: EMERGENCY MEDICINE

## 2024-07-25 PROCEDURE — 85025 COMPLETE CBC W/AUTO DIFF WBC: CPT | Performed by: EMERGENCY MEDICINE

## 2024-07-25 PROCEDURE — 80053 COMPREHEN METABOLIC PANEL: CPT | Performed by: EMERGENCY MEDICINE

## 2024-07-25 PROCEDURE — 81000 URINALYSIS NONAUTO W/SCOPE: CPT | Mod: 59 | Performed by: EMERGENCY MEDICINE

## 2024-07-25 PROCEDURE — 36415 COLL VENOUS BLD VENIPUNCTURE: CPT | Performed by: EMERGENCY MEDICINE

## 2024-07-25 PROCEDURE — 99284 EMERGENCY DEPT VISIT MOD MDM: CPT | Mod: 25

## 2024-07-25 PROCEDURE — 80307 DRUG TEST PRSMV CHEM ANLYZR: CPT | Performed by: EMERGENCY MEDICINE

## 2024-07-25 PROCEDURE — 82077 ASSAY SPEC XCP UR&BREATH IA: CPT | Performed by: EMERGENCY MEDICINE

## 2024-07-25 PROCEDURE — 96365 THER/PROPH/DIAG IV INF INIT: CPT

## 2024-07-25 PROCEDURE — 25000003 PHARM REV CODE 250: Performed by: EMERGENCY MEDICINE

## 2024-07-25 PROCEDURE — 82140 ASSAY OF AMMONIA: CPT | Performed by: EMERGENCY MEDICINE

## 2024-07-25 RX ORDER — LACTULOSE 10 G/15ML
30 SOLUTION ORAL
Status: COMPLETED | OUTPATIENT
Start: 2024-07-25 | End: 2024-07-25

## 2024-07-25 RX ORDER — LACTULOSE 10 G/15ML
10 SOLUTION ORAL 3 TIMES DAILY
Qty: 1350 ML | Refills: 0 | Status: SHIPPED | OUTPATIENT
Start: 2024-07-25 | End: 2024-08-24

## 2024-07-25 RX ORDER — CEPHALEXIN 500 MG/1
500 CAPSULE ORAL EVERY 12 HOURS
Qty: 14 CAPSULE | Refills: 0 | Status: SHIPPED | OUTPATIENT
Start: 2024-07-25 | End: 2024-08-01

## 2024-07-25 RX ADMIN — CEFTRIAXONE 1 G: 1 INJECTION, POWDER, FOR SOLUTION INTRAMUSCULAR; INTRAVENOUS at 12:07

## 2024-07-25 RX ADMIN — LACTULOSE 30 G: 20 SOLUTION ORAL at 01:07

## 2024-07-25 NOTE — ED PROVIDER NOTES
EMERGENCY DEPARTMENT HISTORY AND PHYSICAL EXAM     This note is dictated on M*Modal word recognition program.  There are word recognition mistakes and grammatical errors that are occasionally missed on review.     Date: 7/25/2024   Patient Name: Shonda Borrego       History of Presenting Illness      Chief Complaint   Patient presents with    Altered Mental Status     Patient hx of cirrhosis, confused per family        0922   Shonda Borrego is a 58 y.o. female with PMHX of alcoholic cirrhosis status post tips, COPD, tobacco use, chronic prescription benzodiazepine use who presents to the emergency department C/O confusion.      Patient arrives by EMS has family reports increased confusion.  In the emergency department patient is awake, pleasant.  Answers questions appropriately but has difficulty with year.  She has some word-finding difficulty to explain why she was here.  Agrees she has been feeling a bit more confused.  She states her sleep has been poor difficulty falling and staying asleep.  She denies alcohol and recreational drug use.  She denies shortness of breath.  She denies abdominal pain.  She reports her diet has been.  No  complaints.    PCP: Geeta Gaines, DO        No current facility-administered medications for this encounter.     Current Outpatient Medications   Medication Sig Dispense Refill    furosemide (LASIX) 20 MG tablet Take 1 tablet (20 mg total) by mouth 2 (two) times daily. 60 tablet 11    gabapentin (NEURONTIN) 300 MG capsule TAKE 1 CAPSULE BY MOUTH IN THE EVENING 30 capsule 0    oxybutynin (DITROPAN-XL) 5 MG TR24 Take 1 tablet (5 mg total) by mouth once daily. 30 tablet 11    pantoprazole (PROTONIX) 20 MG tablet Take 1 tablet by mouth once daily 90 tablet 0    rifAXIMin (XIFAXAN) 550 mg Tab Take 1 tablet (550 mg total) by mouth 2 (two) times daily. 60 tablet 11    spironolactone (ALDACTONE) 100 MG tablet Take 1 tablet (100 mg total) by mouth once daily. 30 tablet 5     VRAYLAR 3 mg Cap Take 3 mg by mouth.      albuterol (PROVENTIL/VENTOLIN HFA) 90 mcg/actuation inhaler Inhale 2 puffs into the lungs every 6 (six) hours as needed for Wheezing or Shortness of Breath. (Patient not taking: Reported on 5/17/2024) 18 g 3    clonazePAM (KLONOPIN) 0.5 MG tablet Take 0.5 mg by mouth daily as needed.      CONSTULOSE 10 gram/15 mL solution Take 30 mLs (20 g total) by mouth 3 (three) times daily. 1892 mL 8    mirtazapine (REMERON SOL-TAB) 15 MG disintegrating tablet Take 15 mg by mouth every evening. (Patient not taking: Reported on 5/17/2024)      nicotine (NICODERM CQ) 14 mg/24 hr Place 1 patch onto the skin once daily. 28 patch 0    ondansetron (ZOFRAN) 4 MG tablet Take 1 tablet (4 mg total) by mouth 2 (two) times daily. (Patient not taking: Reported on 5/17/2024) 30 tablet 0    rOPINIRole (REQUIP) 1 MG tablet Take 1 tablet (1 mg total) by mouth every evening. (Patient not taking: Reported on 5/17/2024) 90 tablet 1    tiotropium-olodateroL (STIOLTO RESPIMAT) 2.5-2.5 mcg/actuation Mist Inhale 2 puffs into the lungs once daily. Controller (Patient not taking: Reported on 5/17/2024) 4 g 11    ursodioL (ACTIGALL) 300 mg capsule Take 1 capsule (300 mg total) by mouth 3 (three) times daily. 90 capsule 5           Past History     Past Medical History:   Past Medical History:   Diagnosis Date    Anxiety     Bipolar disorder     BMI 32.0-32.9,adult 2/7/2023    Wt Readings from Last 8 Encounters: 02/07/23 95.7 kg (211 lb) 01/25/23 99.8 kg (220 lb) 01/20/23 97.1 kg (214 lb 1.1 oz) 01/20/23 97.1 kg (214 lb 1.1 oz) 01/20/23 97.1 kg (214 lb 1.1 oz) 01/19/23 100.2 kg (221 lb) 12/26/22 100.2 kg (221 lb) 12/14/22 99.8 kg (220 lb) Recommendations:  Stay physically active. As tolerated alternate resistance training with stretching and cardio. Goal of 150 minutes     BMI 34.0-34.9,adult 3/18/2022    Cirrhosis     Constipation 7/26/2022    COPD (chronic obstructive pulmonary disease)     GERD (gastroesophageal  reflux disease)     Hepatic encephalopathy 4/17/2023    Hyperammonemia 4/21/2022    Restless leg syndrome     Urinary frequency         Past Surgical History:   Past Surgical History:   Procedure Laterality Date    COLONOSCOPY N/A 8/9/2023    Procedure: COLONOSCOPY;  Surgeon: Sweetie Juarez MD;  Location: [facility];  Service: General;  Laterality: N/A;  6th 1000    TIPS PROCEDURE          Family History:   Family History   Problem Relation Name Age of Onset    Lung cancer Mother      No Known Problems Father      Cancer Sister      Lung cancer Sister      No Known Problems Maternal Grandmother      No Known Problems Maternal Grandfather      No Known Problems Paternal Grandmother      No Known Problems Paternal Grandfather      No Known Problems Daughter      No Known Problems Maternal Aunt      No Known Problems Maternal Uncle      No Known Problems Paternal Aunt      No Known Problems Paternal Uncle      Breast cancer Neg Hx      Ovarian cancer Neg Hx      BRCA 1/2 Neg Hx          Social History:   Social History     Tobacco Use    Smoking status: Some Days     Current packs/day: 0.50     Average packs/day: 1.5 packs/day for 49.6 years (73.4 ttl pk-yrs)     Types: Cigarettes     Start date: 1975     Passive exposure: Past    Smokeless tobacco: Never    Tobacco comments:     Down to 1-3 cpd   Substance Use Topics    Alcohol use: Not Currently     Comment: socially    Drug use: Not Currently     Types: Marijuana        Allergies:   Review of patient's allergies indicates:   Allergen Reactions    Chantix [varenicline]      Nightmares          Review of Systems   Review of Systems   See HPI for pertinent positives and negatives       Physical Exam     Vitals:    07/25/24 0920   BP: (!) 155/74   Pulse: 85   Resp: 20   Temp: 98.2 °F (36.8 °C)   SpO2: 96%      Physical Exam  Vitals and nursing note reviewed.   Constitutional:       General: She is not in acute distress.     Appearance: Normal appearance. She is not  ill-appearing.   HENT:      Head: Normocephalic and atraumatic.      Right Ear: External ear normal.      Left Ear: External ear normal.      Nose: Nose normal. No congestion or rhinorrhea.      Mouth/Throat:      Mouth: Mucous membranes are moist.   Eyes:      Conjunctiva/sclera: Conjunctivae normal.      Pupils: Pupils are equal, round, and reactive to light.   Cardiovascular:      Rate and Rhythm: Normal rate and regular rhythm.      Heart sounds: Normal heart sounds.   Pulmonary:      Effort: Pulmonary effort is normal. No respiratory distress.      Breath sounds: Normal breath sounds.   Musculoskeletal:         General: No deformity. Normal range of motion.      Cervical back: Normal range of motion. No rigidity.   Skin:     General: Skin is dry.      Comments: No jaundice     Neurological:      General: No focal deficit present.      Mental Status: She is alert. She is disoriented.      Cranial Nerves: Cranial nerves 2-12 are intact.      Sensory: Sensation is intact.      Motor: Motor function is intact.      Coordination: Coordination is intact.      Comments: No asterixis or tongue fasticulations  AAO x 2, answers questions appropriately, able to name her hepatologist, struggles with year/month, and has some word finding difficulty    Psychiatric:         Mood and Affect: Mood normal.         Behavior: Behavior normal.              Diagnostic Study Results      Labs -   No results found for this or any previous visit (from the past 12 hour(s)).     Radiologic Studies -    No orders to display        Medications given in the ED-   Medications - No data to display        Medical Decision Making    I am the first provider for this patient.     I reviewed the vital signs, available nursing notes, past medical history, past surgical history, family history and social history.     Vital Signs:  Reviewed the patient's vital signs.     Pulse Oximetry Analysis and Interpretation:    96% on Room Air,  normal        External Test Results (Pertinent to encounter):    Records Reviewed: Nursing Notes, Current Prescription Medications, Old Medical Records, and PMPAware     History Obtained By: Patient    Provider Notes: Shonda Borrego is a 58 y.o. female with confusion    Co-morbidities Considered:  Cirrhosis    Differential Diagnosis:  Hepatic encephalopathy, UTI, polypharmacy, substance abuse      ED Course:    Patient presents with reports of increased confusion.  She is grossly oriented in the emergency department but struggles with some word-finding as well as the day.  She has a history cirrhosis and her ammonia is mildly elevated but not as high as it has been in the past.  Urinalysis was consistent with urinary tract infection and this is likely contributory towards her increasing confusion.  She was given dose of Rocephin in emergency department and will continue on antibiotic.  Will initiate lactulose which she has been on in the past for elevated ammonia.  If confusion or symptoms worsen she is to return to the ER for re-evaluation.    ED Course as of 07/26/24 0715   Thu Jul 25, 2024   0948 Potassium(!): 3.1 [MO]   0948 PROTEIN TOTAL: 6.5 [MO]   0949 ALP(!): 138 [MO]   0949 AST: 40 [MO]   0949 ALT: 35 [MO]   0951 Ammonia(!): 59 [MO]   0951 Alcohol, Serum: <3 [MO]   1214 Bacteria, UA(!): Many [MO]   1214 NITRITE UA(!): Positive [MO]      ED Course User Index  [MO] Jarvis Comer MD       Problems Addressed:  Urinary tract infection, elevated ammonia    Procedures:   Procedures       Diagnosis and Disposition     Critical Care:      DISCHARGE NOTE:       Shonda Borrego's  results have been reviewed with her.  She has been counseled regarding her diagnosis, treatment, and plan.  She verbally conveys understanding and agreement of the signs, symptoms, diagnosis, treatment and prognosis and additionally agrees to follow up as discussed.  She also agrees with the care-plan and conveys that all of her  questions have been answered.  I have also provided discharge instructions for her that include: educational information regarding their diagnosis and treatment, and list of reasons why they would want to return to the ED prior to their follow-up appointment, should her condition change. She has been provided with education for proper emergency department utilization.         CLINICAL IMPRESSION:         1. Urinary tract infection without hematuria, site unspecified    2. Increased ammonia level              PLAN:   1. Discharge Home  2.      Medication List        ASK your doctor about these medications      albuterol 90 mcg/actuation inhaler  Commonly known as: PROVENTIL/VENTOLIN HFA  Inhale 2 puffs into the lungs every 6 (six) hours as needed for Wheezing or Shortness of Breath.     clonazePAM 0.5 MG tablet  Commonly known as: KlonoPIN     CONSTULOSE 10 gram/15 mL solution  Generic drug: lactulose  Take 30 mLs (20 g total) by mouth 3 (three) times daily.     furosemide 20 MG tablet  Commonly known as: LASIX  Take 1 tablet (20 mg total) by mouth 2 (two) times daily.     gabapentin 300 MG capsule  Commonly known as: NEURONTIN  TAKE 1 CAPSULE BY MOUTH IN THE EVENING     mirtazapine 15 MG disintegrating tablet  Commonly known as: REMERON SOL-TAB     nicotine 14 mg/24 hr  Commonly known as: NICODERM CQ  Place 1 patch onto the skin once daily.     ondansetron 4 MG tablet  Commonly known as: ZOFRAN  Take 1 tablet (4 mg total) by mouth 2 (two) times daily.     oxybutynin 5 MG Tr24  Commonly known as: DITROPAN-XL  Take 1 tablet (5 mg total) by mouth once daily.     pantoprazole 20 MG tablet  Commonly known as: PROTONIX  Take 1 tablet by mouth once daily     rOPINIRole 1 MG tablet  Commonly known as: REQUIP  Take 1 tablet (1 mg total) by mouth every evening.     spironolactone 100 MG tablet  Commonly known as: ALDACTONE  Take 1 tablet (100 mg total) by mouth once daily.     STIOLTO RESPIMAT 2.5-2.5 mcg/actuation  Mist  Generic drug: tiotropium-olodateroL  Inhale 2 puffs into the lungs once daily. Controller     ursodioL 300 mg capsule  Commonly known as: ACTIGALL  Take 1 capsule (300 mg total) by mouth 3 (three) times daily.     VRAYLAR 3 mg Cap  Generic drug: cariprazine     XIFAXAN 550 mg Tab  Generic drug: rifAXIMin  Take 1 tablet (550 mg total) by mouth 2 (two) times daily.             3. No follow-up provider specified.     _______________________________     Please note that this dictation was completed with [email or web address], the computer voice recognition software.  Quite often unanticipated grammatical, syntax, homophones, and other interpretive errors are inadvertently transcribed by the computer software.  Please disregard these errors.  Please excuse any errors that have escaped final proofreading.             Jarvis Comer MD  07/26/24 0716

## 2024-07-25 NOTE — DISCHARGE INSTRUCTIONS
Please take Lactulose (20g or 30mL) up to 4 times a day until you are having at least 2-3 soft stools a day. You may adjust dosing to achieve 2 soft stools daily.

## 2024-08-08 ENCOUNTER — TELEPHONE (OUTPATIENT)
Dept: SMOKING CESSATION | Facility: CLINIC | Age: 59
End: 2024-08-08
Payer: MEDICAID

## 2024-08-15 ENCOUNTER — CLINICAL SUPPORT (OUTPATIENT)
Dept: SMOKING CESSATION | Facility: CLINIC | Age: 59
End: 2024-08-15
Payer: COMMERCIAL

## 2024-08-15 DIAGNOSIS — F17.200 NICOTINE DEPENDENCE: Primary | ICD-10-CM

## 2024-08-15 PROCEDURE — 99402 PREV MED CNSL INDIV APPRX 30: CPT | Mod: S$GLB,,,

## 2024-08-15 PROCEDURE — 99999 PR PBB SHADOW E&M-EST. PATIENT-LVL I: CPT | Mod: PBBFAC,,,

## 2024-08-15 RX ORDER — IBUPROFEN 200 MG
1 TABLET ORAL DAILY
Qty: 28 PATCH | Refills: 0 | Status: SHIPPED | OUTPATIENT
Start: 2024-08-15

## 2024-08-15 NOTE — PROGRESS NOTES
Individual Follow-Up Form    8/15/2024    Quit Date: TBD    Clinical Status of Patient: Outpatient    Length of Service: 30 minutes    Continuing Medication: yes  Patches    Other Medications: none     Target Symptoms: Withdrawal and medication side effects. The following were  rated moderate (3) to severe (4) on TCRS:  Moderate (3): desire/crave tobacco  Severe (4): none    Comments: Spoke with patient regarding smoking cessation follow up. Ms. Merchant expresses that she feels like she is very close to making a quit attempt. Pt reports that she is smoking 1-3 cpd and even has days that she does not smoke at all. Commended pt for accomplishments thus far and encouraged to choose a goal quit date. Pt continues NRT 14 mg nicotine patch QD with no adverse effects noted. Ms. Merchant's daughter is visiting, which she feels helps her to smoke less. completion of TCRS (Tobacco Cessation Rating Scale) reviewed strategies, controlling environment, cues, triggers, understanding urges, cravings, stress and relaxation. Open discussion with intervention discussion. Pt scheduled for FU in 2 weeks.    Diagnosis: F17.200    Next Visit: 2 weeks

## 2024-08-19 ENCOUNTER — CLINICAL SUPPORT (OUTPATIENT)
Dept: SMOKING CESSATION | Facility: CLINIC | Age: 59
End: 2024-08-19
Payer: COMMERCIAL

## 2024-08-19 DIAGNOSIS — F17.200 NICOTINE DEPENDENCE: Primary | ICD-10-CM

## 2024-08-19 PROCEDURE — 99407 BEHAV CHNG SMOKING > 10 MIN: CPT | Mod: S$GLB,,,

## 2024-08-19 NOTE — PROGRESS NOTES
Spoke with patient today in regard to smoking cessation progress for 6 month phone follow up on Quit 3.  Patient not tobacco free at this time but stated that she is close to Quitting. Commended patient on their progress thus far.  Patient is pleased with the counseling she is receiving from CTTS, Jamila Arellano.  Patient currently enrolled in program for Quit 3 and has an appointment scheduled with his CTTS.   Informed patient of benefit period, future follow ups, and contact information if any further help or support is needed. Will complete smart form for 6 month follow up on Quit attempt # 3.

## 2024-08-23 DIAGNOSIS — K74.60 HEPATIC CIRRHOSIS, UNSPECIFIED HEPATIC CIRRHOSIS TYPE, UNSPECIFIED WHETHER ASCITES PRESENT: ICD-10-CM

## 2024-08-23 DIAGNOSIS — M79.89 LEG SWELLING: ICD-10-CM

## 2024-08-23 RX ORDER — SPIRONOLACTONE 100 MG/1
100 TABLET, FILM COATED ORAL
Qty: 30 TABLET | Refills: 0 | Status: SHIPPED | OUTPATIENT
Start: 2024-08-23

## 2024-08-24 DIAGNOSIS — M79.89 LEG SWELLING: ICD-10-CM

## 2024-08-28 RX ORDER — FUROSEMIDE 20 MG/1
20 TABLET ORAL 2 TIMES DAILY
Qty: 180 TABLET | Refills: 0 | OUTPATIENT
Start: 2024-08-28

## 2024-09-05 ENCOUNTER — CLINICAL SUPPORT (OUTPATIENT)
Dept: SMOKING CESSATION | Facility: CLINIC | Age: 59
End: 2024-09-05
Payer: COMMERCIAL

## 2024-09-05 DIAGNOSIS — F17.200 NICOTINE DEPENDENCE: Primary | ICD-10-CM

## 2024-09-05 PROCEDURE — 99403 PREV MED CNSL INDIV APPRX 45: CPT | Mod: S$GLB,,,

## 2024-09-05 RX ORDER — IBUPROFEN 200 MG
1 TABLET ORAL DAILY
Qty: 28 PATCH | Refills: 0 | Status: SHIPPED | OUTPATIENT
Start: 2024-09-05

## 2024-09-05 NOTE — PROGRESS NOTES
Individual Follow-Up Form    9/5/2024    Quit Date: TBD    Clinical Status of Patient: Outpatient    Length of Service: 45 minutes    Continuing Medication: yes  Patches    Other Medications: none     Target Symptoms: Withdrawal and medication side effects. The following were  rated moderate (3) to severe (4) on TCRS:  Moderate (3): desire/crave tobacco  Severe (4): none    Comments: Patient seen in clinic regarding smoking cessation follow up. Ms. Merchant reports that she has continued to work on her smoking cessation efforts and is down to 3-4 cpd. Commended pt for ongoing efforts. Encouraged pt to make a quit attempt and discussed preparations. Pt remains on NRT 14 mg nicotine patch QD with JEOVANY noted at this time. Pt feels optimistic and expresses that she and her daughter have been helping one another. Reviewed learned addiction model, triggers, cues, short/long term consequences of tobacco use, and benefits of tobacco free living. Encouraged pt to remain positive. FU in approximately 2 weeks.    Diagnosis: F17.200    Next Visit: 2 weeks

## 2024-09-12 ENCOUNTER — TELEPHONE (OUTPATIENT)
Dept: HEPATOLOGY | Facility: CLINIC | Age: 59
End: 2024-09-12
Payer: MEDICAID
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
## 2024-09-17 DIAGNOSIS — K74.60 HEPATIC CIRRHOSIS, UNSPECIFIED HEPATIC CIRRHOSIS TYPE, UNSPECIFIED WHETHER ASCITES PRESENT: ICD-10-CM

## 2024-09-17 DIAGNOSIS — M79.89 LEG SWELLING: ICD-10-CM

## 2024-09-17 RX ORDER — SPIRONOLACTONE 100 MG/1
100 TABLET, FILM COATED ORAL
Qty: 30 TABLET | Refills: 0 | Status: SHIPPED | OUTPATIENT
Start: 2024-09-17

## 2024-09-19 ENCOUNTER — CLINICAL SUPPORT (OUTPATIENT)
Dept: SMOKING CESSATION | Facility: CLINIC | Age: 59
End: 2024-09-19
Payer: COMMERCIAL

## 2024-09-19 DIAGNOSIS — F17.200 NICOTINE DEPENDENCE: Primary | ICD-10-CM

## 2024-09-19 PROCEDURE — 99402 PREV MED CNSL INDIV APPRX 30: CPT | Mod: S$GLB,,,
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
## 2024-10-11 ENCOUNTER — TELEPHONE (OUTPATIENT)
Dept: HEPATOLOGY | Facility: CLINIC | Age: 59
End: 2024-10-11
Payer: MEDICAID

## 2024-10-11 NOTE — TELEPHONE ENCOUNTER
I called patient to schedule MRI and lab before appt with Dr Arreola on 10/30.  No answer. Unable to leave voice mail since mail box was full.

## 2024-12-04 DIAGNOSIS — Z12.31 OTHER SCREENING MAMMOGRAM: ICD-10-CM

## 2025-02-27 ENCOUNTER — TELEPHONE (OUTPATIENT)
Dept: SMOKING CESSATION | Facility: CLINIC | Age: 60
End: 2025-02-27
Payer: MEDICAID

## 2025-02-27 NOTE — TELEPHONE ENCOUNTER
First attempt left message regarding smoking cessation quit 3 episode. Resolved episode per protocol.

## (undated) DEVICE — KIT BIOGUARD AIR WTR SUC VALVE

## (undated) DEVICE — SYR SLIP TIP 60 CC DISP

## (undated) DEVICE — LINER SUCTION CANNISTER REGUGA

## (undated) DEVICE — SPONGE DRY VIA GREEN

## (undated) DEVICE — GOWN ECLIPSE REINF LVL4 TWL XL

## (undated) DEVICE — CANNULA SSOFT C02 MALE 14FT

## (undated) DEVICE — ELECTRODE MEDI-TRACE 855 FOAM

## (undated) DEVICE — CONNECTOR WATERJET ENDO

## (undated) DEVICE — SOL IRRI STRL WATER 1000ML

## (undated) DEVICE — KIT VIA CUSTOM PROCEDURE

## (undated) DEVICE — UNDERPAD DISPOSABLE 30X30IN